# Patient Record
Sex: FEMALE | Race: WHITE | Employment: OTHER | URBAN - METROPOLITAN AREA
[De-identification: names, ages, dates, MRNs, and addresses within clinical notes are randomized per-mention and may not be internally consistent; named-entity substitution may affect disease eponyms.]

---

## 2019-08-20 ENCOUNTER — HOSPITAL ENCOUNTER (OUTPATIENT)
Dept: MAMMOGRAPHY | Age: 74
Discharge: HOME | End: 2019-08-22
Payer: MEDICARE

## 2019-08-20 DIAGNOSIS — Z12.31 SCREENING MAMMOGRAM, ENCOUNTER FOR: ICD-10-CM

## 2019-08-20 PROCEDURE — 77067 SCR MAMMO BI INCL CAD: CPT

## 2019-08-29 ENCOUNTER — HOSPITAL ENCOUNTER (OUTPATIENT)
Dept: ULTRASOUND IMAGING | Age: 74
Discharge: HOME | End: 2019-08-29
Payer: MEDICARE

## 2019-08-29 ENCOUNTER — HOSPITAL ENCOUNTER (OUTPATIENT)
Dept: MAMMOGRAPHY | Age: 74
Discharge: HOME | End: 2019-08-31
Payer: MEDICARE

## 2019-08-29 DIAGNOSIS — N64.89 BREAST ASYMMETRY: ICD-10-CM

## 2019-08-29 DIAGNOSIS — R92.0 BREAST MICROCALCIFICATIONS: ICD-10-CM

## 2019-08-29 PROCEDURE — 76642 ULTRASOUND BREAST LIMITED: CPT

## 2019-08-29 PROCEDURE — G0279 TOMOSYNTHESIS, MAMMO: HCPCS

## 2019-09-06 ENCOUNTER — TELEPHONE (OUTPATIENT)
Dept: GENERAL RADIOLOGY | Age: 74
End: 2019-09-06

## 2019-09-10 ENCOUNTER — HOSPITAL ENCOUNTER (OUTPATIENT)
Dept: GENERAL RADIOLOGY | Age: 74
Discharge: HOME | End: 2019-09-12
Payer: MEDICARE

## 2019-09-10 DIAGNOSIS — R92.0 MICROCALCIFICATIONS OF THE BREAST: ICD-10-CM

## 2019-09-10 PROCEDURE — 88360 TUMOR IMMUNOHISTOCHEM/MANUAL: CPT

## 2019-09-10 PROCEDURE — 76098 X-RAY EXAM SURGICAL SPECIMEN: CPT

## 2019-09-10 PROCEDURE — 77065 DX MAMMO INCL CAD UNI: CPT

## 2019-09-10 PROCEDURE — 2720000010 MAM STEREO BREAST BX W LOC DEVICE 1ST LESION LEFT

## 2019-09-10 PROCEDURE — 88342 IMHCHEM/IMCYTCHM 1ST ANTB: CPT

## 2019-09-10 PROCEDURE — 88305 TISSUE EXAM BY PATHOLOGIST: CPT

## 2019-09-10 PROCEDURE — 2500000003 HC RX 250 WO HCPCS

## 2019-09-10 PROCEDURE — 88341 IMHCHEM/IMCYTCHM EA ADD ANTB: CPT

## 2019-09-11 ENCOUNTER — TELEPHONE (OUTPATIENT)
Dept: GENERAL RADIOLOGY | Age: 74
End: 2019-09-11

## 2019-09-16 ENCOUNTER — TELEPHONE (OUTPATIENT)
Dept: GENERAL RADIOLOGY | Age: 74
End: 2019-09-16

## 2019-09-17 ENCOUNTER — HOSPITAL ENCOUNTER (OUTPATIENT)
Dept: GENERAL RADIOLOGY | Age: 74
Discharge: HOME | End: 2019-09-19
Payer: MEDICARE

## 2019-09-17 DIAGNOSIS — N63.0 BREAST NODULE: ICD-10-CM

## 2019-09-17 PROCEDURE — 2500000003 HC RX 250 WO HCPCS

## 2019-09-17 PROCEDURE — 19083 BX BREAST 1ST LESION US IMAG: CPT

## 2019-09-17 PROCEDURE — 88305 TISSUE EXAM BY PATHOLOGIST: CPT

## 2019-09-17 PROCEDURE — 77065 DX MAMMO INCL CAD UNI: CPT

## 2019-09-18 ENCOUNTER — TELEPHONE (OUTPATIENT)
Dept: GENERAL RADIOLOGY | Age: 74
End: 2019-09-18

## 2019-09-23 ENCOUNTER — TELEPHONE (OUTPATIENT)
Dept: GENERAL RADIOLOGY | Age: 74
End: 2019-09-23

## 2019-09-23 NOTE — TELEPHONE ENCOUNTER
[facility] Protocol, patient was asked prior to biopsy how she would like notified of her breast biopsy results and she elected telephone call from breast navigator. Call to patient today to instruct her that the pathology report from her U/S guided left breast biopsy indicates a benign finding: however, the performing radiologist indicates that this is discordant with her breast imaging and therefore, either a repeat U/S guided biopsy or excision is recommended. Delmis Tyson indicates that she is interested in a mastectomy for her recently diagnosed DCIS, and therefore, does not want another breast biopsy. Instructed to discuss her thought with Ruel Harada and request a referral to a surgeon. She indicates that she would like a surgical consultation at the [facility]. Instructed that I will notify her practitioner of that request.  Breast biopsy results and surgical referral form faxed to [facility]'s office. Delmis Tyson indicates that she is planning a vacation to [place] the first week of October and would like a consultation with a surgeon when she returns.   Electronically signed by Rhett Cheung RN, BSN on 9/23/2019 at 3:49 PM

## 2019-09-26 ENCOUNTER — TELEPHONE (OUTPATIENT)
Dept: BREAST CENTER | Age: 74
End: 2019-09-26

## 2019-10-08 ENCOUNTER — OFFICE VISIT (OUTPATIENT)
Dept: BREAST CENTER | Age: 74
End: 2019-10-08
Payer: MEDICARE

## 2019-10-08 ENCOUNTER — TELEPHONE (OUTPATIENT)
Dept: CASE MANAGEMENT | Age: 74
End: 2019-10-08

## 2019-10-08 ENCOUNTER — HOSPITAL ENCOUNTER (OUTPATIENT)
Age: 74
Discharge: HOME | End: 2019-10-10
Payer: MEDICARE

## 2019-10-08 VITALS
RESPIRATION RATE: 16 BRPM | HEIGHT: 64 IN | DIASTOLIC BLOOD PRESSURE: 66 MMHG | OXYGEN SATURATION: 99 % | TEMPERATURE: 97.9 F | BODY MASS INDEX: 21.85 KG/M2 | HEART RATE: 67 BPM | SYSTOLIC BLOOD PRESSURE: 140 MMHG | WEIGHT: 128 LBS

## 2019-10-08 DIAGNOSIS — D05.12 DUCTAL CARCINOMA IN SITU (DCIS) OF LEFT BREAST: ICD-10-CM

## 2019-10-08 DIAGNOSIS — D05.12 DUCTAL CARCINOMA IN SITU (DCIS) OF LEFT BREAST: Primary | ICD-10-CM

## 2019-10-08 LAB
ALBUMIN SERPL-MCNC: 4.4 G/DL (ref 3.5–5.2)
ALP BLD-CCNC: 95 U/L (ref 35–104)
ALT SERPL-CCNC: 15 U/L (ref 0–32)
ANION GAP SERPL CALCULATED.3IONS-SCNC: 11 MMOL/L (ref 7–16)
AST SERPL-CCNC: 23 U/L (ref 0–31)
BASOPHILS ABSOLUTE: 0.04 E9/L (ref 0–0.2)
BASOPHILS RELATIVE PERCENT: 0.8 % (ref 0–2)
BILIRUB SERPL-MCNC: 0.3 MG/DL (ref 0–1.2)
BUN BLDV-MCNC: 11 MG/DL (ref 8–23)
CALCIUM SERPL-MCNC: 9.2 MG/DL (ref 8.6–10.2)
CHLORIDE BLD-SCNC: 100 MMOL/L (ref 98–107)
CO2: 27 MMOL/L (ref 22–29)
CREAT SERPL-MCNC: 1 MG/DL (ref 0.5–1)
EOSINOPHILS ABSOLUTE: 0.06 E9/L (ref 0.05–0.5)
EOSINOPHILS RELATIVE PERCENT: 1.2 % (ref 0–6)
GFR AFRICAN AMERICAN: >60
GFR NON-AFRICAN AMERICAN: 54 ML/MIN/1.73
GLUCOSE BLD-MCNC: 76 MG/DL (ref 74–99)
HCT VFR BLD CALC: 43.5 % (ref 34–48)
HEMOGLOBIN: 14 G/DL (ref 11.5–15.5)
IMMATURE GRANULOCYTES #: 0.02 E9/L
IMMATURE GRANULOCYTES %: 0.4 % (ref 0–5)
LYMPHOCYTES ABSOLUTE: 1.68 E9/L (ref 1.5–4)
LYMPHOCYTES RELATIVE PERCENT: 33.6 % (ref 20–42)
MCH RBC QN AUTO: 28.3 PG (ref 26–35)
MCHC RBC AUTO-ENTMCNC: 32.2 % (ref 32–34.5)
MCV RBC AUTO: 88.1 FL (ref 80–99.9)
MONOCYTES ABSOLUTE: 0.45 E9/L (ref 0.1–0.95)
MONOCYTES RELATIVE PERCENT: 9 % (ref 2–12)
NEUTROPHILS ABSOLUTE: 2.75 E9/L (ref 1.8–7.3)
NEUTROPHILS RELATIVE PERCENT: 55 % (ref 43–80)
PDW BLD-RTO: 13.1 FL (ref 11.5–15)
PLATELET # BLD: 304 E9/L (ref 130–450)
PMV BLD AUTO: 10.1 FL (ref 7–12)
POTASSIUM SERPL-SCNC: 4.3 MMOL/L (ref 3.5–5)
RBC # BLD: 4.94 E12/L (ref 3.5–5.5)
SODIUM BLD-SCNC: 138 MMOL/L (ref 132–146)
TOTAL PROTEIN: 7.4 G/DL (ref 6.4–8.3)
WBC # BLD: 5 E9/L (ref 4.5–11.5)

## 2019-10-08 PROCEDURE — G8484 FLU IMMUNIZE NO ADMIN: HCPCS | Performed by: SURGERY

## 2019-10-08 PROCEDURE — 99203 OFFICE O/P NEW LOW 30 MIN: CPT | Performed by: SURGERY

## 2019-10-08 PROCEDURE — 36415 COLL VENOUS BLD VENIPUNCTURE: CPT | Performed by: SURGERY

## 2019-10-08 PROCEDURE — 99204 OFFICE O/P NEW MOD 45 MIN: CPT | Performed by: SURGERY

## 2019-10-08 PROCEDURE — 3017F COLORECTAL CA SCREEN DOC REV: CPT | Performed by: SURGERY

## 2019-10-08 PROCEDURE — 80053 COMPREHEN METABOLIC PANEL: CPT

## 2019-10-08 PROCEDURE — 85025 COMPLETE CBC W/AUTO DIFF WBC: CPT

## 2019-10-08 PROCEDURE — 1090F PRES/ABSN URINE INCON ASSESS: CPT | Performed by: SURGERY

## 2019-10-08 PROCEDURE — 1036F TOBACCO NON-USER: CPT | Performed by: SURGERY

## 2019-10-08 PROCEDURE — G8427 DOCREV CUR MEDS BY ELIG CLIN: HCPCS | Performed by: SURGERY

## 2019-10-08 PROCEDURE — G8420 CALC BMI NORM PARAMETERS: HCPCS | Performed by: SURGERY

## 2019-10-08 PROCEDURE — G8399 PT W/DXA RESULTS DOCUMENT: HCPCS | Performed by: SURGERY

## 2019-10-08 PROCEDURE — 4040F PNEUMOC VAC/ADMIN/RCVD: CPT | Performed by: SURGERY

## 2019-10-08 PROCEDURE — 1123F ACP DISCUSS/DSCN MKR DOCD: CPT | Performed by: SURGERY

## 2019-10-08 RX ORDER — LEVOTHYROXINE SODIUM 88 UG/1
88 TABLET ORAL DAILY
COMMUNITY

## 2019-10-08 RX ORDER — PAROXETINE HYDROCHLORIDE 20 MG/1
20 TABLET, FILM COATED ORAL EVERY MORNING
COMMUNITY
End: 2020-06-04 | Stop reason: ALTCHOICE

## 2019-10-11 DIAGNOSIS — D05.12 DUCTAL CARCINOMA IN SITU (DCIS) OF LEFT BREAST: Primary | ICD-10-CM

## 2019-10-23 ENCOUNTER — PREP FOR PROCEDURE (OUTPATIENT)
Dept: SURGERY | Age: 74
End: 2019-10-23

## 2019-10-23 ENCOUNTER — TELEPHONE (OUTPATIENT)
Dept: BREAST CENTER | Age: 74
End: 2019-10-23

## 2019-10-23 RX ORDER — SODIUM CHLORIDE 0.9 % (FLUSH) 0.9 %
10 SYRINGE (ML) INJECTION PRN
Status: CANCELLED | OUTPATIENT
Start: 2019-10-23

## 2019-10-23 RX ORDER — SODIUM CHLORIDE, SODIUM LACTATE, POTASSIUM CHLORIDE, CALCIUM CHLORIDE 600; 310; 30; 20 MG/100ML; MG/100ML; MG/100ML; MG/100ML
INJECTION, SOLUTION INTRAVENOUS CONTINUOUS
Status: CANCELLED | OUTPATIENT
Start: 2019-10-23

## 2019-10-23 RX ORDER — SODIUM CHLORIDE 0.9 % (FLUSH) 0.9 %
10 SYRINGE (ML) INJECTION EVERY 12 HOURS SCHEDULED
Status: CANCELLED | OUTPATIENT
Start: 2019-10-23

## 2019-10-24 ENCOUNTER — TELEPHONE (OUTPATIENT)
Dept: BREAST CENTER | Age: 74
End: 2019-10-24

## 2019-11-20 ENCOUNTER — HOSPITAL ENCOUNTER (OUTPATIENT)
Age: 74
Setting detail: OUTPATIENT SURGERY
Discharge: HOME | End: 2019-11-20
Attending: SURGERY | Admitting: SURGERY
Payer: MEDICARE

## 2019-11-20 ENCOUNTER — HOSPITAL ENCOUNTER (OUTPATIENT)
Dept: NUCLEAR MEDICINE | Age: 74
Discharge: HOME | End: 2019-11-22
Payer: MEDICARE

## 2019-11-20 ENCOUNTER — ANESTHESIA (OUTPATIENT)
Dept: OPERATING ROOM | Age: 74
End: 2019-11-20
Payer: MEDICARE

## 2019-11-20 ENCOUNTER — ANESTHESIA EVENT (OUTPATIENT)
Dept: OPERATING ROOM | Age: 74
End: 2019-11-20
Payer: MEDICARE

## 2019-11-20 VITALS
RESPIRATION RATE: 22 BRPM | HEART RATE: 65 BPM | SYSTOLIC BLOOD PRESSURE: 146 MMHG | OXYGEN SATURATION: 96 % | WEIGHT: 136 LBS | TEMPERATURE: 98 F | BODY MASS INDEX: 23.22 KG/M2 | HEIGHT: 64 IN | DIASTOLIC BLOOD PRESSURE: 82 MMHG

## 2019-11-20 VITALS
SYSTOLIC BLOOD PRESSURE: 140 MMHG | OXYGEN SATURATION: 100 % | RESPIRATION RATE: 4 BRPM | DIASTOLIC BLOOD PRESSURE: 75 MMHG

## 2019-11-20 DIAGNOSIS — Z01.818 PREOPERATIVE TESTING: Primary | ICD-10-CM

## 2019-11-20 DIAGNOSIS — D05.12 DUCTAL CARCINOMA IN SITU (DCIS) OF LEFT BREAST: ICD-10-CM

## 2019-11-20 LAB
HCT VFR BLD CALC: 43.5 % (ref 34–48)
HEMOGLOBIN: 14.1 G/DL (ref 11.5–15.5)
MCH RBC QN AUTO: 28.1 PG (ref 26–35)
MCHC RBC AUTO-ENTMCNC: 32.4 % (ref 32–34.5)
MCV RBC AUTO: 86.8 FL (ref 80–99.9)
PDW BLD-RTO: 13.1 FL (ref 11.5–15)
PLATELET # BLD: 249 E9/L (ref 130–450)
PMV BLD AUTO: 10.3 FL (ref 7–12)
RBC # BLD: 5.01 E12/L (ref 3.5–5.5)
WBC # BLD: 4.8 E9/L (ref 4.5–11.5)

## 2019-11-20 PROCEDURE — 2580000003 HC RX 258: Performed by: SURGERY

## 2019-11-20 PROCEDURE — 2500000003 HC RX 250 WO HCPCS

## 2019-11-20 PROCEDURE — 7100000001 HC PACU RECOVERY - ADDTL 15 MIN: Performed by: SURGERY

## 2019-11-20 PROCEDURE — 88342 IMHCHEM/IMCYTCHM 1ST ANTB: CPT

## 2019-11-20 PROCEDURE — 3600000002 HC SURGERY LEVEL 2 BASE: Performed by: SURGERY

## 2019-11-20 PROCEDURE — 6360000002 HC RX W HCPCS: Performed by: ANESTHESIOLOGY

## 2019-11-20 PROCEDURE — 3700000001 HC ADD 15 MINUTES (ANESTHESIA): Performed by: SURGERY

## 2019-11-20 PROCEDURE — 6360000002 HC RX W HCPCS: Performed by: SURGERY

## 2019-11-20 PROCEDURE — 7100000010 HC PHASE II RECOVERY - FIRST 15 MIN: Performed by: SURGERY

## 2019-11-20 PROCEDURE — 3430000000 HC RX DIAGNOSTIC RADIOPHARMACEUTICAL: Performed by: RADIOLOGY

## 2019-11-20 PROCEDURE — 2720000010 HC SURG SUPPLY STERILE: Performed by: SURGERY

## 2019-11-20 PROCEDURE — 3700000000 HC ANESTHESIA ATTENDED CARE: Performed by: SURGERY

## 2019-11-20 PROCEDURE — 38900 IO MAP OF SENT LYMPH NODE: CPT | Performed by: SURGERY

## 2019-11-20 PROCEDURE — A9541 TC99M SULFUR COLLOID: HCPCS | Performed by: RADIOLOGY

## 2019-11-20 PROCEDURE — 3600000012 HC SURGERY LEVEL 2 ADDTL 15MIN: Performed by: SURGERY

## 2019-11-20 PROCEDURE — 2500000003 HC RX 250 WO HCPCS: Performed by: SURGERY

## 2019-11-20 PROCEDURE — 19303 MAST SIMPLE COMPLETE: CPT | Performed by: SURGERY

## 2019-11-20 PROCEDURE — 85027 COMPLETE CBC AUTOMATED: CPT

## 2019-11-20 PROCEDURE — 2709999900 HC NON-CHARGEABLE SUPPLY: Performed by: SURGERY

## 2019-11-20 PROCEDURE — 6360000002 HC RX W HCPCS

## 2019-11-20 PROCEDURE — 38525 BIOPSY/REMOVAL LYMPH NODES: CPT | Performed by: SURGERY

## 2019-11-20 PROCEDURE — 36415 COLL VENOUS BLD VENIPUNCTURE: CPT

## 2019-11-20 PROCEDURE — 2500000003 HC RX 250 WO HCPCS: Performed by: NURSE ANESTHETIST, CERTIFIED REGISTERED

## 2019-11-20 PROCEDURE — 38792 RA TRACER ID OF SENTINL NODE: CPT

## 2019-11-20 PROCEDURE — 88360 TUMOR IMMUNOHISTOCHEM/MANUAL: CPT

## 2019-11-20 PROCEDURE — 64450 NJX AA&/STRD OTHER PN/BRANCH: CPT | Performed by: ANESTHESIOLOGY

## 2019-11-20 PROCEDURE — 6360000002 HC RX W HCPCS: Performed by: NURSE ANESTHETIST, CERTIFIED REGISTERED

## 2019-11-20 PROCEDURE — 7100000000 HC PACU RECOVERY - FIRST 15 MIN: Performed by: SURGERY

## 2019-11-20 PROCEDURE — 6370000000 HC RX 637 (ALT 250 FOR IP): Performed by: ANESTHESIOLOGY

## 2019-11-20 PROCEDURE — 7100000011 HC PHASE II RECOVERY - ADDTL 15 MIN: Performed by: SURGERY

## 2019-11-20 PROCEDURE — 88341 IMHCHEM/IMCYTCHM EA ADD ANTB: CPT

## 2019-11-20 PROCEDURE — 88307 TISSUE EXAM BY PATHOLOGIST: CPT

## 2019-11-20 RX ORDER — SODIUM CHLORIDE 0.9 % (FLUSH) 0.9 %
10 SYRINGE (ML) INJECTION EVERY 12 HOURS SCHEDULED
Status: DISCONTINUED | OUTPATIENT
Start: 2019-11-20 | End: 2019-11-20 | Stop reason: HOSPADM

## 2019-11-20 RX ORDER — SCOLOPAMINE TRANSDERMAL SYSTEM 1 MG/1
1 PATCH, EXTENDED RELEASE TRANSDERMAL ONCE
Status: DISCONTINUED | OUTPATIENT
Start: 2019-11-20 | End: 2019-11-20 | Stop reason: HOSPADM

## 2019-11-20 RX ORDER — FENTANYL CITRATE 50 UG/ML
25 INJECTION, SOLUTION INTRAMUSCULAR; INTRAVENOUS EVERY 5 MIN PRN
Status: DISCONTINUED | OUTPATIENT
Start: 2019-11-20 | End: 2019-11-20 | Stop reason: HOSPADM

## 2019-11-20 RX ORDER — METHYLENE BLUE 10 MG/ML
INJECTION INTRAVENOUS PRN
Status: DISCONTINUED | OUTPATIENT
Start: 2019-11-20 | End: 2019-11-20 | Stop reason: ALTCHOICE

## 2019-11-20 RX ORDER — PROMETHAZINE HYDROCHLORIDE 25 MG/ML
6.25 INJECTION, SOLUTION INTRAMUSCULAR; INTRAVENOUS
Status: DISCONTINUED | OUTPATIENT
Start: 2019-11-20 | End: 2019-11-20 | Stop reason: HOSPADM

## 2019-11-20 RX ORDER — ROPIVACAINE HYDROCHLORIDE 5 MG/ML
35 INJECTION, SOLUTION EPIDURAL; INFILTRATION; PERINEURAL ONCE
Status: COMPLETED | OUTPATIENT
Start: 2019-11-20 | End: 2019-11-20

## 2019-11-20 RX ORDER — OXYCODONE HYDROCHLORIDE AND ACETAMINOPHEN 5; 325 MG/1; MG/1
1 TABLET ORAL EVERY 6 HOURS PRN
Qty: 6 TABLET | Refills: 0 | Status: SHIPPED | OUTPATIENT
Start: 2019-11-20 | End: 2019-11-23

## 2019-11-20 RX ORDER — LABETALOL HYDROCHLORIDE 5 MG/ML
INJECTION, SOLUTION INTRAVENOUS
Status: COMPLETED
Start: 2019-11-20 | End: 2019-11-20

## 2019-11-20 RX ORDER — DEXAMETHASONE SODIUM PHOSPHATE 10 MG/ML
INJECTION INTRAMUSCULAR; INTRAVENOUS PRN
Status: DISCONTINUED | OUTPATIENT
Start: 2019-11-20 | End: 2019-11-20 | Stop reason: SDUPTHER

## 2019-11-20 RX ORDER — FENTANYL CITRATE 50 UG/ML
50 INJECTION, SOLUTION INTRAMUSCULAR; INTRAVENOUS EVERY 5 MIN PRN
Status: DISCONTINUED | OUTPATIENT
Start: 2019-11-20 | End: 2019-11-20 | Stop reason: HOSPADM

## 2019-11-20 RX ORDER — SODIUM CHLORIDE, SODIUM LACTATE, POTASSIUM CHLORIDE, CALCIUM CHLORIDE 600; 310; 30; 20 MG/100ML; MG/100ML; MG/100ML; MG/100ML
INJECTION, SOLUTION INTRAVENOUS CONTINUOUS
Status: DISCONTINUED | OUTPATIENT
Start: 2019-11-20 | End: 2019-11-20 | Stop reason: HOSPADM

## 2019-11-20 RX ORDER — FENTANYL CITRATE 50 UG/ML
25 INJECTION, SOLUTION INTRAMUSCULAR; INTRAVENOUS PRN
Status: DISCONTINUED | OUTPATIENT
Start: 2019-11-20 | End: 2019-11-20 | Stop reason: HOSPADM

## 2019-11-20 RX ORDER — PROPOFOL 10 MG/ML
INJECTION, EMULSION INTRAVENOUS PRN
Status: DISCONTINUED | OUTPATIENT
Start: 2019-11-20 | End: 2019-11-20 | Stop reason: SDUPTHER

## 2019-11-20 RX ORDER — ONDANSETRON 2 MG/ML
INJECTION INTRAMUSCULAR; INTRAVENOUS PRN
Status: DISCONTINUED | OUTPATIENT
Start: 2019-11-20 | End: 2019-11-20 | Stop reason: SDUPTHER

## 2019-11-20 RX ORDER — NEOSTIGMINE METHYLSULFATE 1 MG/ML
INJECTION, SOLUTION INTRAVENOUS PRN
Status: DISCONTINUED | OUTPATIENT
Start: 2019-11-20 | End: 2019-11-20 | Stop reason: SDUPTHER

## 2019-11-20 RX ORDER — SODIUM CHLORIDE 0.9 % (FLUSH) 0.9 %
10 SYRINGE (ML) INJECTION PRN
Status: DISCONTINUED | OUTPATIENT
Start: 2019-11-20 | End: 2019-11-20 | Stop reason: HOSPADM

## 2019-11-20 RX ORDER — NALOXONE HYDROCHLORIDE 0.4 MG/ML
INJECTION, SOLUTION INTRAMUSCULAR; INTRAVENOUS; SUBCUTANEOUS PRN
Status: DISCONTINUED | OUTPATIENT
Start: 2019-11-20 | End: 2019-11-20 | Stop reason: SDUPTHER

## 2019-11-20 RX ORDER — OXYCODONE HYDROCHLORIDE AND ACETAMINOPHEN 5; 325 MG/1; MG/1
1 TABLET ORAL
Status: DISCONTINUED | OUTPATIENT
Start: 2019-11-20 | End: 2019-11-20 | Stop reason: HOSPADM

## 2019-11-20 RX ORDER — LABETALOL HYDROCHLORIDE 5 MG/ML
10 INJECTION, SOLUTION INTRAVENOUS ONCE
Status: COMPLETED | OUTPATIENT
Start: 2019-11-20 | End: 2019-11-20

## 2019-11-20 RX ORDER — VECURONIUM BROMIDE 1 MG/ML
INJECTION, POWDER, LYOPHILIZED, FOR SOLUTION INTRAVENOUS PRN
Status: DISCONTINUED | OUTPATIENT
Start: 2019-11-20 | End: 2019-11-20 | Stop reason: SDUPTHER

## 2019-11-20 RX ORDER — CEFAZOLIN SODIUM 2 G/50ML
2 SOLUTION INTRAVENOUS
Status: COMPLETED | OUTPATIENT
Start: 2019-11-20 | End: 2019-11-20

## 2019-11-20 RX ORDER — MIDAZOLAM HYDROCHLORIDE 1 MG/ML
0.5 INJECTION INTRAMUSCULAR; INTRAVENOUS PRN
Status: DISCONTINUED | OUTPATIENT
Start: 2019-11-20 | End: 2019-11-20 | Stop reason: HOSPADM

## 2019-11-20 RX ORDER — FENTANYL CITRATE 50 UG/ML
INJECTION, SOLUTION INTRAMUSCULAR; INTRAVENOUS PRN
Status: DISCONTINUED | OUTPATIENT
Start: 2019-11-20 | End: 2019-11-20 | Stop reason: SDUPTHER

## 2019-11-20 RX ORDER — KETOROLAC TROMETHAMINE 30 MG/ML
INJECTION, SOLUTION INTRAMUSCULAR; INTRAVENOUS PRN
Status: DISCONTINUED | OUTPATIENT
Start: 2019-11-20 | End: 2019-11-20 | Stop reason: SDUPTHER

## 2019-11-20 RX ORDER — MEPERIDINE HYDROCHLORIDE 50 MG/ML
12.5 INJECTION INTRAMUSCULAR; INTRAVENOUS; SUBCUTANEOUS EVERY 5 MIN PRN
Status: DISCONTINUED | OUTPATIENT
Start: 2019-11-20 | End: 2019-11-20 | Stop reason: HOSPADM

## 2019-11-20 RX ORDER — BUPIVACAINE HYDROCHLORIDE AND EPINEPHRINE 2.5; 5 MG/ML; UG/ML
INJECTION, SOLUTION EPIDURAL; INFILTRATION; INTRACAUDAL; PERINEURAL PRN
Status: DISCONTINUED | OUTPATIENT
Start: 2019-11-20 | End: 2019-11-20 | Stop reason: ALTCHOICE

## 2019-11-20 RX ORDER — DIPHENHYDRAMINE HYDROCHLORIDE 50 MG/ML
12.5 INJECTION INTRAMUSCULAR; INTRAVENOUS
Status: DISCONTINUED | OUTPATIENT
Start: 2019-11-20 | End: 2019-11-20 | Stop reason: HOSPADM

## 2019-11-20 RX ORDER — GLYCOPYRROLATE 1 MG/5 ML
SYRINGE (ML) INTRAVENOUS PRN
Status: DISCONTINUED | OUTPATIENT
Start: 2019-11-20 | End: 2019-11-20 | Stop reason: SDUPTHER

## 2019-11-20 RX ORDER — LIDOCAINE HYDROCHLORIDE 20 MG/ML
INJECTION, SOLUTION INTRAVENOUS PRN
Status: DISCONTINUED | OUTPATIENT
Start: 2019-11-20 | End: 2019-11-20 | Stop reason: SDUPTHER

## 2019-11-20 RX ADMIN — Medication 520 MICRO CURIE: at 10:00

## 2019-11-20 RX ADMIN — LABETALOL HYDROCHLORIDE 10 MG: 5 INJECTION INTRAVENOUS at 16:55

## 2019-11-20 RX ADMIN — SODIUM CHLORIDE, POTASSIUM CHLORIDE, SODIUM LACTATE AND CALCIUM CHLORIDE: 600; 310; 30; 20 INJECTION, SOLUTION INTRAVENOUS at 08:57

## 2019-11-20 RX ADMIN — ONDANSETRON HYDROCHLORIDE 4 MG: 2 INJECTION, SOLUTION INTRAMUSCULAR; INTRAVENOUS at 15:42

## 2019-11-20 RX ADMIN — Medication 0.4 MG: at 15:56

## 2019-11-20 RX ADMIN — SODIUM CHLORIDE, POTASSIUM CHLORIDE, SODIUM LACTATE AND CALCIUM CHLORIDE: 600; 310; 30; 20 INJECTION, SOLUTION INTRAVENOUS at 14:42

## 2019-11-20 RX ADMIN — NALOXONE HYDROCHLORIDE 0.08 MG: 0.4 INJECTION, SOLUTION INTRAMUSCULAR; INTRAVENOUS; SUBCUTANEOUS at 16:13

## 2019-11-20 RX ADMIN — FENTANYL CITRATE 100 MCG: 50 INJECTION, SOLUTION INTRAMUSCULAR; INTRAVENOUS at 14:09

## 2019-11-20 RX ADMIN — FENTANYL CITRATE 50 MCG: 50 INJECTION, SOLUTION INTRAMUSCULAR; INTRAVENOUS at 14:32

## 2019-11-20 RX ADMIN — VECURONIUM BROMIDE FOR INJECTION 2 MG: 1 INJECTION, POWDER, LYOPHILIZED, FOR SOLUTION INTRAVENOUS at 14:58

## 2019-11-20 RX ADMIN — KETOROLAC TROMETHAMINE 30 MG: 30 INJECTION, SOLUTION INTRAMUSCULAR; INTRAVENOUS at 15:38

## 2019-11-20 RX ADMIN — VECURONIUM BROMIDE FOR INJECTION 6 MG: 1 INJECTION, POWDER, LYOPHILIZED, FOR SOLUTION INTRAVENOUS at 14:09

## 2019-11-20 RX ADMIN — LIDOCAINE HYDROCHLORIDE 40 MG: 20 INJECTION, SOLUTION INTRAVENOUS at 14:09

## 2019-11-20 RX ADMIN — ROPIVACAINE HYDROCHLORIDE 30 ML: 5 INJECTION EPIDURAL; INFILTRATION; PERINEURAL at 12:23

## 2019-11-20 RX ADMIN — LABETALOL HYDROCHLORIDE 10 MG: 5 INJECTION, SOLUTION INTRAVENOUS at 16:55

## 2019-11-20 RX ADMIN — Medication 3 MG: at 15:56

## 2019-11-20 RX ADMIN — DEXAMETHASONE SODIUM PHOSPHATE 10 MG: 10 INJECTION INTRAMUSCULAR; INTRAVENOUS at 14:13

## 2019-11-20 RX ADMIN — FENTANYL CITRATE 50 MCG: 50 INJECTION, SOLUTION INTRAMUSCULAR; INTRAVENOUS at 12:10

## 2019-11-20 RX ADMIN — PROPOFOL 200 MG: 10 INJECTION, EMULSION INTRAVENOUS at 14:09

## 2019-11-20 RX ADMIN — FENTANYL CITRATE 50 MCG: 50 INJECTION, SOLUTION INTRAMUSCULAR; INTRAVENOUS at 14:58

## 2019-11-20 RX ADMIN — CEFAZOLIN SODIUM 2 G: 2 SOLUTION INTRAVENOUS at 14:13

## 2019-11-20 RX ADMIN — MIDAZOLAM 2 MG: 1 INJECTION INTRAMUSCULAR; INTRAVENOUS at 12:10

## 2019-11-20 ASSESSMENT — PULMONARY FUNCTION TESTS
PIF_VALUE: 23
PIF_VALUE: 1
PIF_VALUE: 20
PIF_VALUE: 22
PIF_VALUE: 23
PIF_VALUE: 21
PIF_VALUE: 24
PIF_VALUE: 20
PIF_VALUE: 20
PIF_VALUE: 22
PIF_VALUE: 23
PIF_VALUE: 22
PIF_VALUE: 9
PIF_VALUE: 22
PIF_VALUE: 8
PIF_VALUE: 24
PIF_VALUE: 23
PIF_VALUE: 23
PIF_VALUE: 20
PIF_VALUE: 22
PIF_VALUE: 23
PIF_VALUE: 23
PIF_VALUE: 22
PIF_VALUE: 20
PIF_VALUE: 19
PIF_VALUE: 20
PIF_VALUE: 3
PIF_VALUE: 21
PIF_VALUE: 22
PIF_VALUE: 20
PIF_VALUE: 22
PIF_VALUE: 22
PIF_VALUE: 1
PIF_VALUE: 20
PIF_VALUE: 21
PIF_VALUE: 22
PIF_VALUE: 23
PIF_VALUE: 23
PIF_VALUE: 20
PIF_VALUE: 22
PIF_VALUE: 23
PIF_VALUE: 21
PIF_VALUE: 3
PIF_VALUE: 22
PIF_VALUE: 1
PIF_VALUE: 20
PIF_VALUE: 23
PIF_VALUE: 23
PIF_VALUE: 22
PIF_VALUE: 1
PIF_VALUE: 3
PIF_VALUE: 1
PIF_VALUE: 23
PIF_VALUE: 1
PIF_VALUE: 22
PIF_VALUE: 23
PIF_VALUE: 23
PIF_VALUE: 22
PIF_VALUE: 1
PIF_VALUE: 22
PIF_VALUE: 21
PIF_VALUE: 2
PIF_VALUE: 3
PIF_VALUE: 11
PIF_VALUE: 23
PIF_VALUE: 20
PIF_VALUE: 20
PIF_VALUE: 22
PIF_VALUE: 7
PIF_VALUE: 23
PIF_VALUE: 24
PIF_VALUE: 20
PIF_VALUE: 3
PIF_VALUE: 21
PIF_VALUE: 22
PIF_VALUE: 23
PIF_VALUE: 20
PIF_VALUE: 0
PIF_VALUE: 22
PIF_VALUE: 22
PIF_VALUE: 20
PIF_VALUE: 21
PIF_VALUE: 31
PIF_VALUE: 21
PIF_VALUE: 22
PIF_VALUE: 10
PIF_VALUE: 22
PIF_VALUE: 23
PIF_VALUE: 2
PIF_VALUE: 22
PIF_VALUE: 1
PIF_VALUE: 22
PIF_VALUE: 19
PIF_VALUE: 20
PIF_VALUE: 20
PIF_VALUE: 16
PIF_VALUE: 20
PIF_VALUE: 21
PIF_VALUE: 2
PIF_VALUE: 20
PIF_VALUE: 22
PIF_VALUE: 13
PIF_VALUE: 3
PIF_VALUE: 20
PIF_VALUE: 21
PIF_VALUE: 2
PIF_VALUE: 23
PIF_VALUE: 22
PIF_VALUE: 21
PIF_VALUE: 22
PIF_VALUE: 22
PIF_VALUE: 21
PIF_VALUE: 24
PIF_VALUE: 20
PIF_VALUE: 23
PIF_VALUE: 22
PIF_VALUE: 21
PIF_VALUE: 22
PIF_VALUE: 23
PIF_VALUE: 21
PIF_VALUE: 22

## 2019-11-20 ASSESSMENT — PAIN DESCRIPTION - PAIN TYPE: TYPE: SURGICAL PAIN

## 2019-11-20 ASSESSMENT — PAIN - FUNCTIONAL ASSESSMENT: PAIN_FUNCTIONAL_ASSESSMENT: 0-10

## 2019-11-20 ASSESSMENT — PAIN SCALES - GENERAL
PAINLEVEL_OUTOF10: 0

## 2019-11-20 ASSESSMENT — PAIN DESCRIPTION - ORIENTATION: ORIENTATION: LEFT

## 2019-11-20 ASSESSMENT — LIFESTYLE VARIABLES: SMOKING_STATUS: 0

## 2019-11-20 ASSESSMENT — PAIN DESCRIPTION - LOCATION: LOCATION: CHEST

## 2019-11-21 ENCOUNTER — TELEPHONE (OUTPATIENT)
Dept: BREAST CENTER | Age: 74
End: 2019-11-21

## 2019-11-25 ENCOUNTER — TELEPHONE (OUTPATIENT)
Dept: BREAST CENTER | Age: 74
End: 2019-11-25

## 2019-11-27 ENCOUNTER — TELEPHONE (OUTPATIENT)
Dept: BREAST CENTER | Age: 74
End: 2019-11-27

## 2019-12-02 ENCOUNTER — OFFICE VISIT (OUTPATIENT)
Dept: BREAST CENTER | Age: 74
End: 2019-12-02
Payer: MEDICARE

## 2019-12-02 VITALS
HEIGHT: 64 IN | OXYGEN SATURATION: 98 % | TEMPERATURE: 98 F | RESPIRATION RATE: 16 BRPM | HEART RATE: 81 BPM | DIASTOLIC BLOOD PRESSURE: 62 MMHG | BODY MASS INDEX: 22.2 KG/M2 | WEIGHT: 130 LBS | SYSTOLIC BLOOD PRESSURE: 124 MMHG

## 2019-12-02 DIAGNOSIS — D05.12 DUCTAL CARCINOMA IN SITU (DCIS) OF LEFT BREAST: Primary | ICD-10-CM

## 2019-12-02 PROCEDURE — 99024 POSTOP FOLLOW-UP VISIT: CPT | Performed by: SURGERY

## 2019-12-10 ENCOUNTER — HOSPITAL ENCOUNTER (OUTPATIENT)
Dept: INFUSION THERAPY | Age: 74
Discharge: HOME | End: 2019-12-10
Payer: MEDICARE

## 2019-12-10 ENCOUNTER — OFFICE VISIT (OUTPATIENT)
Dept: ONCOLOGY | Age: 74
End: 2019-12-10
Payer: MEDICARE

## 2019-12-10 VITALS
HEART RATE: 69 BPM | HEIGHT: 64 IN | BODY MASS INDEX: 22.36 KG/M2 | DIASTOLIC BLOOD PRESSURE: 66 MMHG | SYSTOLIC BLOOD PRESSURE: 146 MMHG | WEIGHT: 131 LBS | TEMPERATURE: 98.6 F | OXYGEN SATURATION: 98 %

## 2019-12-10 DIAGNOSIS — D05.12 DUCTAL CARCINOMA IN SITU (DCIS) OF LEFT BREAST: Primary | ICD-10-CM

## 2019-12-10 DIAGNOSIS — Z79.899 ENCOUNTER FOR MONITORING CARDIOTOXIC DRUG THERAPY: ICD-10-CM

## 2019-12-10 DIAGNOSIS — Z51.81 ENCOUNTER FOR MONITORING CARDIOTOXIC DRUG THERAPY: ICD-10-CM

## 2019-12-10 DIAGNOSIS — C50.912 MALIGNANT NEOPLASM OF LEFT FEMALE BREAST, UNSPECIFIED ESTROGEN RECEPTOR STATUS, UNSPECIFIED SITE OF BREAST (HCC): ICD-10-CM

## 2019-12-10 PROCEDURE — 99205 OFFICE O/P NEW HI 60 MIN: CPT | Performed by: INTERNAL MEDICINE

## 2019-12-10 PROCEDURE — G8420 CALC BMI NORM PARAMETERS: HCPCS | Performed by: INTERNAL MEDICINE

## 2019-12-10 PROCEDURE — G8399 PT W/DXA RESULTS DOCUMENT: HCPCS | Performed by: INTERNAL MEDICINE

## 2019-12-10 PROCEDURE — G8427 DOCREV CUR MEDS BY ELIG CLIN: HCPCS | Performed by: INTERNAL MEDICINE

## 2019-12-10 PROCEDURE — 3017F COLORECTAL CA SCREEN DOC REV: CPT | Performed by: INTERNAL MEDICINE

## 2019-12-10 PROCEDURE — G8484 FLU IMMUNIZE NO ADMIN: HCPCS | Performed by: INTERNAL MEDICINE

## 2019-12-10 PROCEDURE — 99214 OFFICE O/P EST MOD 30 MIN: CPT | Performed by: INTERNAL MEDICINE

## 2019-12-10 PROCEDURE — 1036F TOBACCO NON-USER: CPT | Performed by: INTERNAL MEDICINE

## 2019-12-10 PROCEDURE — 4040F PNEUMOC VAC/ADMIN/RCVD: CPT | Performed by: INTERNAL MEDICINE

## 2019-12-10 PROCEDURE — 1090F PRES/ABSN URINE INCON ASSESS: CPT | Performed by: INTERNAL MEDICINE

## 2019-12-10 PROCEDURE — 1123F ACP DISCUSS/DSCN MKR DOCD: CPT | Performed by: INTERNAL MEDICINE

## 2019-12-10 RX ORDER — SODIUM CHLORIDE 0.9 % (FLUSH) 0.9 %
10 SYRINGE (ML) INJECTION PRN
Status: CANCELLED | OUTPATIENT
Start: 2019-12-10

## 2019-12-10 RX ORDER — HEPARIN SODIUM (PORCINE) LOCK FLUSH IV SOLN 100 UNIT/ML 100 UNIT/ML
500 SOLUTION INTRAVENOUS PRN
Status: CANCELLED | OUTPATIENT
Start: 2019-12-10

## 2019-12-11 ENCOUNTER — TELEPHONE (OUTPATIENT)
Dept: ONCOLOGY | Age: 74
End: 2019-12-11

## 2019-12-12 ENCOUNTER — HOSPITAL ENCOUNTER (OUTPATIENT)
Dept: NON INVASIVE DIAGNOSTICS | Age: 74
Discharge: HOME | End: 2019-12-12
Payer: MEDICARE

## 2019-12-12 DIAGNOSIS — Z51.81 ENCOUNTER FOR MONITORING CARDIOTOXIC DRUG THERAPY: ICD-10-CM

## 2019-12-12 DIAGNOSIS — Z79.899 ENCOUNTER FOR MONITORING CARDIOTOXIC DRUG THERAPY: ICD-10-CM

## 2019-12-12 DIAGNOSIS — D05.12 DUCTAL CARCINOMA IN SITU (DCIS) OF LEFT BREAST: ICD-10-CM

## 2019-12-12 PROCEDURE — 93306 TTE W/DOPPLER COMPLETE: CPT

## 2019-12-16 ENCOUNTER — OFFICE VISIT (OUTPATIENT)
Dept: SURGERY | Age: 74
End: 2019-12-16
Payer: MEDICARE

## 2019-12-16 VITALS
HEIGHT: 64 IN | HEART RATE: 85 BPM | WEIGHT: 130 LBS | DIASTOLIC BLOOD PRESSURE: 64 MMHG | BODY MASS INDEX: 22.2 KG/M2 | TEMPERATURE: 97.9 F | OXYGEN SATURATION: 94 % | SYSTOLIC BLOOD PRESSURE: 124 MMHG

## 2019-12-16 DIAGNOSIS — I87.8 POOR VENOUS ACCESS: Primary | ICD-10-CM

## 2019-12-16 PROCEDURE — 1123F ACP DISCUSS/DSCN MKR DOCD: CPT | Performed by: SURGERY

## 2019-12-16 PROCEDURE — 1036F TOBACCO NON-USER: CPT | Performed by: SURGERY

## 2019-12-16 PROCEDURE — 1090F PRES/ABSN URINE INCON ASSESS: CPT | Performed by: SURGERY

## 2019-12-16 PROCEDURE — G8484 FLU IMMUNIZE NO ADMIN: HCPCS | Performed by: SURGERY

## 2019-12-16 PROCEDURE — 4040F PNEUMOC VAC/ADMIN/RCVD: CPT | Performed by: SURGERY

## 2019-12-16 PROCEDURE — G8427 DOCREV CUR MEDS BY ELIG CLIN: HCPCS | Performed by: SURGERY

## 2019-12-16 PROCEDURE — 3017F COLORECTAL CA SCREEN DOC REV: CPT | Performed by: SURGERY

## 2019-12-16 PROCEDURE — G8399 PT W/DXA RESULTS DOCUMENT: HCPCS | Performed by: SURGERY

## 2019-12-16 PROCEDURE — G8420 CALC BMI NORM PARAMETERS: HCPCS | Performed by: SURGERY

## 2019-12-16 PROCEDURE — 99214 OFFICE O/P EST MOD 30 MIN: CPT | Performed by: SURGERY

## 2019-12-16 RX ORDER — SODIUM CHLORIDE 0.9 % (FLUSH) 0.9 %
10 SYRINGE (ML) INJECTION PRN
Status: CANCELLED | OUTPATIENT
Start: 2019-12-16

## 2019-12-16 RX ORDER — SODIUM CHLORIDE 0.9 % (FLUSH) 0.9 %
10 SYRINGE (ML) INJECTION EVERY 12 HOURS SCHEDULED
Status: CANCELLED | OUTPATIENT
Start: 2019-12-16

## 2019-12-16 RX ORDER — SODIUM CHLORIDE, SODIUM LACTATE, POTASSIUM CHLORIDE, CALCIUM CHLORIDE 600; 310; 30; 20 MG/100ML; MG/100ML; MG/100ML; MG/100ML
INJECTION, SOLUTION INTRAVENOUS CONTINUOUS
Status: CANCELLED | OUTPATIENT
Start: 2019-12-16

## 2019-12-17 ENCOUNTER — TELEPHONE (OUTPATIENT)
Dept: SURGERY | Age: 74
End: 2019-12-17

## 2019-12-26 ENCOUNTER — HOSPITAL ENCOUNTER (OUTPATIENT)
Dept: INFUSION THERAPY | Age: 74
Discharge: HOME | End: 2019-12-26
Payer: MEDICARE

## 2019-12-26 ENCOUNTER — HOSPITAL ENCOUNTER (OUTPATIENT)
Dept: PREADMISSION TESTING | Age: 74
Discharge: HOME | End: 2019-12-26
Payer: MEDICARE

## 2019-12-26 VITALS
SYSTOLIC BLOOD PRESSURE: 123 MMHG | BODY MASS INDEX: 22.91 KG/M2 | RESPIRATION RATE: 16 BRPM | OXYGEN SATURATION: 96 % | HEIGHT: 64 IN | WEIGHT: 134.19 LBS | TEMPERATURE: 98.3 F | HEART RATE: 80 BPM | DIASTOLIC BLOOD PRESSURE: 59 MMHG

## 2019-12-26 DIAGNOSIS — C50.912 MALIGNANT NEOPLASM OF LEFT FEMALE BREAST, UNSPECIFIED ESTROGEN RECEPTOR STATUS, UNSPECIFIED SITE OF BREAST (HCC): Primary | ICD-10-CM

## 2019-12-26 DIAGNOSIS — Z01.818 PRE-OP TESTING: Primary | ICD-10-CM

## 2019-12-26 LAB
APTT: 23.8 SEC (ref 24.5–35.1)
HCT VFR BLD CALC: 41.2 % (ref 34–48)
HEMOGLOBIN: 13.8 G/DL (ref 11.5–15.5)
INR BLD: 1
MCH RBC QN AUTO: 29.4 PG (ref 26–35)
MCHC RBC AUTO-ENTMCNC: 33.5 % (ref 32–34.5)
MCV RBC AUTO: 87.8 FL (ref 80–99.9)
PDW BLD-RTO: 12.9 FL (ref 11.5–15)
PLATELET # BLD: 274 E9/L (ref 130–450)
PMV BLD AUTO: 9.8 FL (ref 7–12)
PROTHROMBIN TIME: 11.9 SEC (ref 9.3–12.4)
RBC # BLD: 4.69 E12/L (ref 3.5–5.5)
WBC # BLD: 5.1 E9/L (ref 4.5–11.5)

## 2019-12-26 PROCEDURE — 85610 PROTHROMBIN TIME: CPT

## 2019-12-26 PROCEDURE — 99214 OFFICE O/P EST MOD 30 MIN: CPT

## 2019-12-26 PROCEDURE — 85730 THROMBOPLASTIN TIME PARTIAL: CPT

## 2019-12-26 PROCEDURE — 85027 COMPLETE CBC AUTOMATED: CPT

## 2019-12-26 PROCEDURE — 36415 COLL VENOUS BLD VENIPUNCTURE: CPT

## 2019-12-26 RX ORDER — PROCHLORPERAZINE MALEATE 10 MG
10 TABLET ORAL EVERY 6 HOURS PRN
Qty: 40 TABLET | Refills: 3 | Status: SHIPPED | OUTPATIENT
Start: 2019-12-26 | End: 2020-06-04 | Stop reason: ALTCHOICE

## 2019-12-26 NOTE — PROGRESS NOTES
CHEMOTHERAPY TEACHING    Chemotherapy teaching performed today for patient and . The teaching included:    1. Rationale for chemotherapy    2. Actions of chemotherapy    3. Actions of pre and/or post chemotherapy medications    4. Administration plan: approximate length of treatment and interval between doses. A.  Chemotherapeutic Agents:  PACLitaxel/TRastuzumab    5. Management of side effects:     A. Nausea and vomiting:  · Eat light the day of treatment  · Dietary alterations: no greasy or spicy foods. Stay away from favorites. B.  Alopecia:  · Obtain hairpiece prior to hair loss  · Alternatives to wigs  C. Bone Marrow Depression:  · Frequent CBC - Paul count (lab time prior to appointment with doctor)   D.  WBC:  · Function and recovery  · Precautionary measures when low (temperatures BID - avoid ill people/crowds)  E. Hemoglobin:  · Function and recovery  · Signs/symptoms if low  · Possibility of transfusion  F.  Platelets:  · Function and recovery  · Signs/symptoms if low    6. Mental status changes post chemotherapy:  A. Patient will need a  after 1st treatment (pre-meds)  B. Encourage family to stay with patient 24 hours post treatment. 7.  Sexuality and Reproduction:   A. Teratogenic effects of chemotherapy (prevent pregnancy during treatment                     and at least 2 months post therapy). B. Sperm banking (young males). 8.  Patient received \"Chemotherapy and You\" booklet and was encouraged to call clinic with questions. 9.  Copy of home going instructions and thermometer were given. 10.  Written consent obtained    11. Patient viewed chemotherapy teaching DVD \"Understanding Chemotherapy\" by the          [facility]. 12. Chemo bear was given to the patient. 13. Teaching took approximately 60 minutes.

## 2019-12-26 NOTE — PROGRESS NOTES
Spoke with patient about treatment medications (paclitaxel/trastuzumab). We went over the protocol to be used, what to expect as far as side effects, and when to call with problems. This was intended to reinforce the education done by Dr. Lauren Armando. Prescriptions were sent to patient's local pharmacy for prochlorperazine . Patient was provided medication and dietary handouts to take home and patient was told to call if there are any questions once they had a chance to absorb the information. Patient had the opportunity to ask questions with all questions being answered to their satisfaction. The patient expressed understanding and acceptance of instructions.     Roney Bolton [place] 12/26/2019 2:53 PM

## 2019-12-27 ENCOUNTER — ANESTHESIA (OUTPATIENT)
Dept: OPERATING ROOM | Age: 74
End: 2019-12-27
Payer: MEDICARE

## 2019-12-27 ENCOUNTER — APPOINTMENT (OUTPATIENT)
Dept: GENERAL RADIOLOGY | Age: 74
End: 2019-12-27
Attending: SURGERY
Payer: MEDICARE

## 2019-12-27 ENCOUNTER — HOSPITAL ENCOUNTER (OUTPATIENT)
Age: 74
Setting detail: OUTPATIENT SURGERY
Discharge: HOME | End: 2019-12-27
Attending: SURGERY | Admitting: SURGERY
Payer: MEDICARE

## 2019-12-27 ENCOUNTER — ANESTHESIA EVENT (OUTPATIENT)
Dept: OPERATING ROOM | Age: 74
End: 2019-12-27
Payer: MEDICARE

## 2019-12-27 VITALS
SYSTOLIC BLOOD PRESSURE: 91 MMHG | DIASTOLIC BLOOD PRESSURE: 46 MMHG | OXYGEN SATURATION: 100 % | RESPIRATION RATE: 18 BRPM

## 2019-12-27 VITALS
OXYGEN SATURATION: 95 % | HEIGHT: 64 IN | WEIGHT: 132 LBS | HEART RATE: 77 BPM | SYSTOLIC BLOOD PRESSURE: 162 MMHG | RESPIRATION RATE: 20 BRPM | TEMPERATURE: 97 F | BODY MASS INDEX: 22.53 KG/M2 | DIASTOLIC BLOOD PRESSURE: 68 MMHG

## 2019-12-27 DIAGNOSIS — I87.8 POOR VENOUS ACCESS: ICD-10-CM

## 2019-12-27 PROCEDURE — 3700000001 HC ADD 15 MINUTES (ANESTHESIA): Performed by: SURGERY

## 2019-12-27 PROCEDURE — 99024 POSTOP FOLLOW-UP VISIT: CPT | Performed by: SURGERY

## 2019-12-27 PROCEDURE — 77001 FLUOROGUIDE FOR VEIN DEVICE: CPT | Performed by: SURGERY

## 2019-12-27 PROCEDURE — 36561 INSERT TUNNELED CV CATH: CPT | Performed by: SURGERY

## 2019-12-27 PROCEDURE — 2709999900 HC NON-CHARGEABLE SUPPLY: Performed by: SURGERY

## 2019-12-27 PROCEDURE — 7100000011 HC PHASE II RECOVERY - ADDTL 15 MIN: Performed by: SURGERY

## 2019-12-27 PROCEDURE — 3700000000 HC ANESTHESIA ATTENDED CARE: Performed by: SURGERY

## 2019-12-27 PROCEDURE — 6360000002 HC RX W HCPCS: Performed by: NURSE ANESTHETIST, CERTIFIED REGISTERED

## 2019-12-27 PROCEDURE — 2580000003 HC RX 258: Performed by: SURGERY

## 2019-12-27 PROCEDURE — 3209999900 FLUORO FOR SURGICAL PROCEDURES

## 2019-12-27 PROCEDURE — C1788 PORT, INDWELLING, IMP: HCPCS | Performed by: SURGERY

## 2019-12-27 PROCEDURE — 7100000010 HC PHASE II RECOVERY - FIRST 15 MIN: Performed by: SURGERY

## 2019-12-27 PROCEDURE — 2500000003 HC RX 250 WO HCPCS: Performed by: SURGERY

## 2019-12-27 PROCEDURE — 3600000013 HC SURGERY LEVEL 3 ADDTL 15MIN: Performed by: SURGERY

## 2019-12-27 PROCEDURE — 6360000002 HC RX W HCPCS: Performed by: SURGERY

## 2019-12-27 PROCEDURE — 2580000003 HC RX 258: Performed by: NURSE ANESTHETIST, CERTIFIED REGISTERED

## 2019-12-27 PROCEDURE — 3600000003 HC SURGERY LEVEL 3 BASE: Performed by: SURGERY

## 2019-12-27 PROCEDURE — 71045 X-RAY EXAM CHEST 1 VIEW: CPT

## 2019-12-27 DEVICE — PORT INFUS OD2.7MM ID1.5MM INTRO 8FR TI POLYUR CATH DETACH CT80STPD] ANGIODYNAMICS INC]: Type: IMPLANTABLE DEVICE | Site: CHEST | Status: FUNCTIONAL

## 2019-12-27 RX ORDER — SODIUM CHLORIDE 0.9 % (FLUSH) 0.9 %
10 SYRINGE (ML) INJECTION PRN
Status: DISCONTINUED | OUTPATIENT
Start: 2019-12-27 | End: 2019-12-27 | Stop reason: HOSPADM

## 2019-12-27 RX ORDER — BUPIVACAINE HYDROCHLORIDE AND EPINEPHRINE 2.5; 5 MG/ML; UG/ML
INJECTION, SOLUTION EPIDURAL; INFILTRATION; INTRACAUDAL; PERINEURAL PRN
Status: DISCONTINUED | OUTPATIENT
Start: 2019-12-27 | End: 2019-12-27 | Stop reason: ALTCHOICE

## 2019-12-27 RX ORDER — PROPOFOL 10 MG/ML
INJECTION, EMULSION INTRAVENOUS PRN
Status: DISCONTINUED | OUTPATIENT
Start: 2019-12-27 | End: 2019-12-27 | Stop reason: SDUPTHER

## 2019-12-27 RX ORDER — HEPARIN SODIUM (PORCINE) LOCK FLUSH IV SOLN 100 UNIT/ML 100 UNIT/ML
SOLUTION INTRAVENOUS PRN
Status: DISCONTINUED | OUTPATIENT
Start: 2019-12-27 | End: 2019-12-27 | Stop reason: ALTCHOICE

## 2019-12-27 RX ORDER — FENTANYL CITRATE 50 UG/ML
INJECTION, SOLUTION INTRAMUSCULAR; INTRAVENOUS PRN
Status: DISCONTINUED | OUTPATIENT
Start: 2019-12-27 | End: 2019-12-27 | Stop reason: SDUPTHER

## 2019-12-27 RX ORDER — SODIUM CHLORIDE 0.9 % (FLUSH) 0.9 %
10 SYRINGE (ML) INJECTION EVERY 12 HOURS SCHEDULED
Status: DISCONTINUED | OUTPATIENT
Start: 2019-12-27 | End: 2019-12-27 | Stop reason: HOSPADM

## 2019-12-27 RX ORDER — CEFAZOLIN SODIUM 2 G/50ML
2 SOLUTION INTRAVENOUS
Status: COMPLETED | OUTPATIENT
Start: 2019-12-27 | End: 2019-12-27

## 2019-12-27 RX ORDER — PROPOFOL 10 MG/ML
INJECTION, EMULSION INTRAVENOUS CONTINUOUS PRN
Status: DISCONTINUED | OUTPATIENT
Start: 2019-12-27 | End: 2019-12-27 | Stop reason: SDUPTHER

## 2019-12-27 RX ORDER — SODIUM CHLORIDE, SODIUM LACTATE, POTASSIUM CHLORIDE, CALCIUM CHLORIDE 600; 310; 30; 20 MG/100ML; MG/100ML; MG/100ML; MG/100ML
INJECTION, SOLUTION INTRAVENOUS CONTINUOUS
Status: DISCONTINUED | OUTPATIENT
Start: 2019-12-27 | End: 2019-12-27 | Stop reason: HOSPADM

## 2019-12-27 RX ORDER — HYDROCODONE BITARTRATE AND ACETAMINOPHEN 5; 325 MG/1; MG/1
1 TABLET ORAL EVERY 6 HOURS PRN
Qty: 10 TABLET | Refills: 0 | Status: SHIPPED | OUTPATIENT
Start: 2019-12-27 | End: 2019-12-30

## 2019-12-27 RX ORDER — SODIUM CHLORIDE, SODIUM LACTATE, POTASSIUM CHLORIDE, CALCIUM CHLORIDE 600; 310; 30; 20 MG/100ML; MG/100ML; MG/100ML; MG/100ML
INJECTION, SOLUTION INTRAVENOUS CONTINUOUS PRN
Status: DISCONTINUED | OUTPATIENT
Start: 2019-12-27 | End: 2019-12-27 | Stop reason: SDUPTHER

## 2019-12-27 RX ADMIN — PROPOFOL 100 MCG/KG/MIN: 10 INJECTION, EMULSION INTRAVENOUS at 08:32

## 2019-12-27 RX ADMIN — SODIUM CHLORIDE, POTASSIUM CHLORIDE, SODIUM LACTATE AND CALCIUM CHLORIDE: 600; 310; 30; 20 INJECTION, SOLUTION INTRAVENOUS at 08:28

## 2019-12-27 RX ADMIN — PROPOFOL 100 MG: 10 INJECTION, EMULSION INTRAVENOUS at 08:32

## 2019-12-27 RX ADMIN — FENTANYL CITRATE 50 MCG: 50 INJECTION, SOLUTION INTRAMUSCULAR; INTRAVENOUS at 08:44

## 2019-12-27 RX ADMIN — CEFAZOLIN SODIUM 2 G: 2 SOLUTION INTRAVENOUS at 08:32

## 2019-12-27 RX ADMIN — FENTANYL CITRATE 50 MCG: 50 INJECTION, SOLUTION INTRAMUSCULAR; INTRAVENOUS at 08:32

## 2019-12-27 RX ADMIN — SODIUM CHLORIDE, POTASSIUM CHLORIDE, SODIUM LACTATE AND CALCIUM CHLORIDE: 600; 310; 30; 20 INJECTION, SOLUTION INTRAVENOUS at 07:25

## 2019-12-27 ASSESSMENT — PULMONARY FUNCTION TESTS
PIF_VALUE: 11
PIF_VALUE: 11
PIF_VALUE: 12
PIF_VALUE: 1
PIF_VALUE: 12
PIF_VALUE: 12
PIF_VALUE: 1
PIF_VALUE: 12
PIF_VALUE: 11
PIF_VALUE: 5
PIF_VALUE: 1
PIF_VALUE: 0
PIF_VALUE: 11
PIF_VALUE: 11
PIF_VALUE: 12
PIF_VALUE: 1
PIF_VALUE: 1
PIF_VALUE: 12
PIF_VALUE: 11
PIF_VALUE: 12
PIF_VALUE: 1
PIF_VALUE: 1
PIF_VALUE: 11
PIF_VALUE: 11
PIF_VALUE: 12
PIF_VALUE: 0
PIF_VALUE: 12
PIF_VALUE: 1
PIF_VALUE: 11
PIF_VALUE: 12
PIF_VALUE: 12
PIF_VALUE: 3
PIF_VALUE: 1
PIF_VALUE: 0
PIF_VALUE: 12
PIF_VALUE: 1
PIF_VALUE: 12

## 2019-12-27 ASSESSMENT — PAIN SCALES - GENERAL
PAINLEVEL_OUTOF10: 0
PAINLEVEL_OUTOF10: 0

## 2019-12-27 ASSESSMENT — LIFESTYLE VARIABLES: SMOKING_STATUS: 0

## 2019-12-27 ASSESSMENT — PAIN - FUNCTIONAL ASSESSMENT: PAIN_FUNCTIONAL_ASSESSMENT: 0-10

## 2020-01-02 ENCOUNTER — HOSPITAL ENCOUNTER (OUTPATIENT)
Dept: INFUSION THERAPY | Age: 75
Discharge: HOME | End: 2020-01-02
Payer: MEDICARE

## 2020-01-02 ENCOUNTER — OFFICE VISIT (OUTPATIENT)
Dept: ONCOLOGY | Age: 75
End: 2020-01-02
Payer: MEDICARE

## 2020-01-02 VITALS
HEART RATE: 87 BPM | WEIGHT: 131.1 LBS | HEIGHT: 64 IN | BODY MASS INDEX: 22.38 KG/M2 | SYSTOLIC BLOOD PRESSURE: 141 MMHG | TEMPERATURE: 98.2 F | OXYGEN SATURATION: 97 % | DIASTOLIC BLOOD PRESSURE: 67 MMHG

## 2020-01-02 VITALS — RESPIRATION RATE: 20 BRPM | HEART RATE: 67 BPM | SYSTOLIC BLOOD PRESSURE: 129 MMHG | DIASTOLIC BLOOD PRESSURE: 69 MMHG

## 2020-01-02 DIAGNOSIS — C50.912 MALIGNANT NEOPLASM OF LEFT FEMALE BREAST, UNSPECIFIED ESTROGEN RECEPTOR STATUS, UNSPECIFIED SITE OF BREAST (HCC): Primary | ICD-10-CM

## 2020-01-02 DIAGNOSIS — D05.12 DUCTAL CARCINOMA IN SITU (DCIS) OF LEFT BREAST: ICD-10-CM

## 2020-01-02 LAB
ALBUMIN SERPL-MCNC: 4.2 G/DL (ref 3.5–5.2)
ALP BLD-CCNC: 98 U/L (ref 35–104)
ALT SERPL-CCNC: 9 U/L (ref 0–32)
ANION GAP SERPL CALCULATED.3IONS-SCNC: 12 MMOL/L (ref 7–16)
AST SERPL-CCNC: 19 U/L (ref 0–31)
BASOPHILS ABSOLUTE: 0.03 E9/L (ref 0–0.2)
BASOPHILS RELATIVE PERCENT: 0.6 % (ref 0–2)
BILIRUB SERPL-MCNC: 0.4 MG/DL (ref 0–1.2)
BUN BLDV-MCNC: 15 MG/DL (ref 8–23)
CALCIUM SERPL-MCNC: 8.9 MG/DL (ref 8.6–10.2)
CHLORIDE BLD-SCNC: 99 MMOL/L (ref 98–107)
CO2: 24 MMOL/L (ref 22–29)
CREAT SERPL-MCNC: 0.9 MG/DL (ref 0.5–1)
EOSINOPHILS ABSOLUTE: 0.05 E9/L (ref 0.05–0.5)
EOSINOPHILS RELATIVE PERCENT: 0.9 % (ref 0–6)
GFR AFRICAN AMERICAN: >60
GFR NON-AFRICAN AMERICAN: >60 ML/MIN/1.73
GLUCOSE BLD-MCNC: 111 MG/DL (ref 74–99)
HCT VFR BLD CALC: 40 % (ref 34–48)
HEMOGLOBIN: 13.1 G/DL (ref 11.5–15.5)
IMMATURE GRANULOCYTES #: 0.02 E9/L
IMMATURE GRANULOCYTES %: 0.4 % (ref 0–5)
LYMPHOCYTES ABSOLUTE: 1.13 E9/L (ref 1.5–4)
LYMPHOCYTES RELATIVE PERCENT: 20.8 % (ref 20–42)
MCH RBC QN AUTO: 28.7 PG (ref 26–35)
MCHC RBC AUTO-ENTMCNC: 32.8 % (ref 32–34.5)
MCV RBC AUTO: 87.5 FL (ref 80–99.9)
MONOCYTES ABSOLUTE: 0.54 E9/L (ref 0.1–0.95)
MONOCYTES RELATIVE PERCENT: 9.9 % (ref 2–12)
NEUTROPHILS ABSOLUTE: 3.67 E9/L (ref 1.8–7.3)
NEUTROPHILS RELATIVE PERCENT: 67.4 % (ref 43–80)
PDW BLD-RTO: 13.1 FL (ref 11.5–15)
PLATELET # BLD: 254 E9/L (ref 130–450)
PMV BLD AUTO: 9.6 FL (ref 7–12)
POTASSIUM SERPL-SCNC: 4 MMOL/L (ref 3.5–5)
RBC # BLD: 4.57 E12/L (ref 3.5–5.5)
SODIUM BLD-SCNC: 135 MMOL/L (ref 132–146)
TOTAL PROTEIN: 7.1 G/DL (ref 6.4–8.3)
WBC # BLD: 5.4 E9/L (ref 4.5–11.5)

## 2020-01-02 PROCEDURE — 2500000003 HC RX 250 WO HCPCS: Performed by: INTERNAL MEDICINE

## 2020-01-02 PROCEDURE — 3017F COLORECTAL CA SCREEN DOC REV: CPT | Performed by: INTERNAL MEDICINE

## 2020-01-02 PROCEDURE — 6360000002 HC RX W HCPCS: Performed by: INTERNAL MEDICINE

## 2020-01-02 PROCEDURE — 80053 COMPREHEN METABOLIC PANEL: CPT

## 2020-01-02 PROCEDURE — 1036F TOBACCO NON-USER: CPT | Performed by: INTERNAL MEDICINE

## 2020-01-02 PROCEDURE — 4040F PNEUMOC VAC/ADMIN/RCVD: CPT | Performed by: INTERNAL MEDICINE

## 2020-01-02 PROCEDURE — 85025 COMPLETE CBC W/AUTO DIFF WBC: CPT

## 2020-01-02 PROCEDURE — 96413 CHEMO IV INFUSION 1 HR: CPT

## 2020-01-02 PROCEDURE — 2580000003 HC RX 258: Performed by: INTERNAL MEDICINE

## 2020-01-02 PROCEDURE — 96415 CHEMO IV INFUSION ADDL HR: CPT

## 2020-01-02 PROCEDURE — 1090F PRES/ABSN URINE INCON ASSESS: CPT | Performed by: INTERNAL MEDICINE

## 2020-01-02 PROCEDURE — 1123F ACP DISCUSS/DSCN MKR DOCD: CPT | Performed by: INTERNAL MEDICINE

## 2020-01-02 PROCEDURE — 96417 CHEMO IV INFUS EACH ADDL SEQ: CPT

## 2020-01-02 PROCEDURE — G8420 CALC BMI NORM PARAMETERS: HCPCS | Performed by: INTERNAL MEDICINE

## 2020-01-02 PROCEDURE — 99214 OFFICE O/P EST MOD 30 MIN: CPT | Performed by: INTERNAL MEDICINE

## 2020-01-02 PROCEDURE — G8427 DOCREV CUR MEDS BY ELIG CLIN: HCPCS | Performed by: INTERNAL MEDICINE

## 2020-01-02 PROCEDURE — G8484 FLU IMMUNIZE NO ADMIN: HCPCS | Performed by: INTERNAL MEDICINE

## 2020-01-02 PROCEDURE — G8399 PT W/DXA RESULTS DOCUMENT: HCPCS | Performed by: INTERNAL MEDICINE

## 2020-01-02 PROCEDURE — 96375 TX/PRO/DX INJ NEW DRUG ADDON: CPT

## 2020-01-02 RX ORDER — SODIUM CHLORIDE 9 MG/ML
20 INJECTION, SOLUTION INTRAVENOUS ONCE
Status: CANCELLED | OUTPATIENT
Start: 2020-01-16

## 2020-01-02 RX ORDER — SODIUM CHLORIDE 0.9 % (FLUSH) 0.9 %
10 SYRINGE (ML) INJECTION PRN
Status: CANCELLED | OUTPATIENT
Start: 2020-01-02

## 2020-01-02 RX ORDER — MEPERIDINE HYDROCHLORIDE 25 MG/ML
12.5 INJECTION INTRAMUSCULAR; INTRAVENOUS; SUBCUTANEOUS ONCE
Status: CANCELLED | OUTPATIENT
Start: 2020-01-02

## 2020-01-02 RX ORDER — METHYLPREDNISOLONE SODIUM SUCCINATE 125 MG/2ML
125 INJECTION, POWDER, LYOPHILIZED, FOR SOLUTION INTRAMUSCULAR; INTRAVENOUS ONCE
Status: CANCELLED | OUTPATIENT
Start: 2020-01-02

## 2020-01-02 RX ORDER — SODIUM CHLORIDE 0.9 % (FLUSH) 0.9 %
5 SYRINGE (ML) INJECTION PRN
Status: CANCELLED | OUTPATIENT
Start: 2020-01-16

## 2020-01-02 RX ORDER — MEPERIDINE HYDROCHLORIDE 25 MG/ML
12.5 INJECTION INTRAMUSCULAR; INTRAVENOUS; SUBCUTANEOUS ONCE
Status: CANCELLED | OUTPATIENT
Start: 2020-01-09

## 2020-01-02 RX ORDER — DIPHENHYDRAMINE HYDROCHLORIDE 50 MG/ML
50 INJECTION INTRAMUSCULAR; INTRAVENOUS ONCE
Status: CANCELLED | OUTPATIENT
Start: 2020-01-09

## 2020-01-02 RX ORDER — MEPERIDINE HYDROCHLORIDE 25 MG/ML
12.5 INJECTION INTRAMUSCULAR; INTRAVENOUS; SUBCUTANEOUS ONCE
Status: CANCELLED | OUTPATIENT
Start: 2020-01-16

## 2020-01-02 RX ORDER — SODIUM CHLORIDE 9 MG/ML
INJECTION, SOLUTION INTRAVENOUS CONTINUOUS
Status: CANCELLED | OUTPATIENT
Start: 2020-01-09

## 2020-01-02 RX ORDER — DIPHENHYDRAMINE HYDROCHLORIDE 50 MG/ML
50 INJECTION INTRAMUSCULAR; INTRAVENOUS ONCE
Status: CANCELLED | OUTPATIENT
Start: 2020-01-16

## 2020-01-02 RX ORDER — SODIUM CHLORIDE 9 MG/ML
INJECTION, SOLUTION INTRAVENOUS CONTINUOUS
Status: CANCELLED | OUTPATIENT
Start: 2020-01-16

## 2020-01-02 RX ORDER — DEXAMETHASONE SODIUM PHOSPHATE 10 MG/ML
10 INJECTION INTRAMUSCULAR; INTRAVENOUS ONCE
Status: COMPLETED | OUTPATIENT
Start: 2020-01-02 | End: 2020-01-02

## 2020-01-02 RX ORDER — HEPARIN SODIUM (PORCINE) LOCK FLUSH IV SOLN 100 UNIT/ML 100 UNIT/ML
500 SOLUTION INTRAVENOUS PRN
Status: DISCONTINUED | OUTPATIENT
Start: 2020-01-02 | End: 2020-01-03 | Stop reason: HOSPADM

## 2020-01-02 RX ORDER — EPINEPHRINE 1 MG/ML
0.3 INJECTION, SOLUTION, CONCENTRATE INTRAVENOUS PRN
Status: CANCELLED | OUTPATIENT
Start: 2020-01-16

## 2020-01-02 RX ORDER — DIPHENHYDRAMINE HYDROCHLORIDE 50 MG/ML
50 INJECTION INTRAMUSCULAR; INTRAVENOUS ONCE
Status: COMPLETED | OUTPATIENT
Start: 2020-01-02 | End: 2020-01-02

## 2020-01-02 RX ORDER — DEXAMETHASONE SODIUM PHOSPHATE 10 MG/ML
10 INJECTION, SOLUTION INTRAMUSCULAR; INTRAVENOUS ONCE
Status: CANCELLED | OUTPATIENT
Start: 2020-01-09

## 2020-01-02 RX ORDER — METHYLPREDNISOLONE SODIUM SUCCINATE 125 MG/2ML
125 INJECTION, POWDER, LYOPHILIZED, FOR SOLUTION INTRAMUSCULAR; INTRAVENOUS ONCE
Status: CANCELLED | OUTPATIENT
Start: 2020-01-16

## 2020-01-02 RX ORDER — SODIUM CHLORIDE 0.9 % (FLUSH) 0.9 %
5 SYRINGE (ML) INJECTION PRN
Status: CANCELLED | OUTPATIENT
Start: 2020-01-02

## 2020-01-02 RX ORDER — SODIUM CHLORIDE 0.9 % (FLUSH) 0.9 %
10 SYRINGE (ML) INJECTION PRN
Status: CANCELLED | OUTPATIENT
Start: 2020-01-09

## 2020-01-02 RX ORDER — HEPARIN SODIUM (PORCINE) LOCK FLUSH IV SOLN 100 UNIT/ML 100 UNIT/ML
500 SOLUTION INTRAVENOUS PRN
Status: CANCELLED | OUTPATIENT
Start: 2020-01-16

## 2020-01-02 RX ORDER — EPINEPHRINE 1 MG/ML
0.3 INJECTION, SOLUTION, CONCENTRATE INTRAVENOUS PRN
Status: CANCELLED | OUTPATIENT
Start: 2020-01-09

## 2020-01-02 RX ORDER — DEXAMETHASONE SODIUM PHOSPHATE 10 MG/ML
10 INJECTION, SOLUTION INTRAMUSCULAR; INTRAVENOUS ONCE
Status: CANCELLED | OUTPATIENT
Start: 2020-01-16

## 2020-01-02 RX ORDER — SODIUM CHLORIDE 0.9 % (FLUSH) 0.9 %
10 SYRINGE (ML) INJECTION PRN
Status: CANCELLED | OUTPATIENT
Start: 2020-01-16

## 2020-01-02 RX ORDER — SODIUM CHLORIDE 9 MG/ML
20 INJECTION, SOLUTION INTRAVENOUS ONCE
Status: COMPLETED | OUTPATIENT
Start: 2020-01-02 | End: 2020-01-02

## 2020-01-02 RX ORDER — HEPARIN SODIUM (PORCINE) LOCK FLUSH IV SOLN 100 UNIT/ML 100 UNIT/ML
500 SOLUTION INTRAVENOUS PRN
Status: CANCELLED | OUTPATIENT
Start: 2020-01-02

## 2020-01-02 RX ORDER — SODIUM CHLORIDE 0.9 % (FLUSH) 0.9 %
10 SYRINGE (ML) INJECTION PRN
Status: DISCONTINUED | OUTPATIENT
Start: 2020-01-02 | End: 2020-01-03 | Stop reason: HOSPADM

## 2020-01-02 RX ORDER — EPINEPHRINE 1 MG/ML
0.3 INJECTION, SOLUTION, CONCENTRATE INTRAVENOUS PRN
Status: CANCELLED | OUTPATIENT
Start: 2020-01-02

## 2020-01-02 RX ORDER — DIPHENHYDRAMINE HYDROCHLORIDE 50 MG/ML
50 INJECTION INTRAMUSCULAR; INTRAVENOUS ONCE
Status: CANCELLED | OUTPATIENT
Start: 2020-01-02

## 2020-01-02 RX ORDER — SODIUM CHLORIDE 9 MG/ML
20 INJECTION, SOLUTION INTRAVENOUS ONCE
Status: CANCELLED | OUTPATIENT
Start: 2020-01-02

## 2020-01-02 RX ORDER — METHYLPREDNISOLONE SODIUM SUCCINATE 125 MG/2ML
125 INJECTION, POWDER, LYOPHILIZED, FOR SOLUTION INTRAMUSCULAR; INTRAVENOUS ONCE
Status: CANCELLED | OUTPATIENT
Start: 2020-01-09

## 2020-01-02 RX ORDER — SODIUM CHLORIDE 0.9 % (FLUSH) 0.9 %
5 SYRINGE (ML) INJECTION PRN
Status: CANCELLED | OUTPATIENT
Start: 2020-01-09

## 2020-01-02 RX ORDER — DEXAMETHASONE SODIUM PHOSPHATE 10 MG/ML
10 INJECTION, SOLUTION INTRAMUSCULAR; INTRAVENOUS ONCE
Status: CANCELLED | OUTPATIENT
Start: 2020-01-02

## 2020-01-02 RX ORDER — HEPARIN SODIUM (PORCINE) LOCK FLUSH IV SOLN 100 UNIT/ML 100 UNIT/ML
500 SOLUTION INTRAVENOUS PRN
Status: CANCELLED | OUTPATIENT
Start: 2020-01-09

## 2020-01-02 RX ORDER — SODIUM CHLORIDE 9 MG/ML
20 INJECTION, SOLUTION INTRAVENOUS ONCE
Status: CANCELLED | OUTPATIENT
Start: 2020-01-09

## 2020-01-02 RX ORDER — SODIUM CHLORIDE 9 MG/ML
INJECTION, SOLUTION INTRAVENOUS CONTINUOUS
Status: CANCELLED | OUTPATIENT
Start: 2020-01-02

## 2020-01-02 RX ADMIN — Medication 500 UNITS: at 14:15

## 2020-01-02 RX ADMIN — Medication 10 ML: at 14:15

## 2020-01-02 RX ADMIN — SODIUM CHLORIDE 20 ML/HR: 9 INJECTION, SOLUTION INTRAVENOUS at 10:41

## 2020-01-02 RX ADMIN — DIPHENHYDRAMINE HYDROCHLORIDE 50 MG: 50 INJECTION, SOLUTION INTRAMUSCULAR; INTRAVENOUS at 10:47

## 2020-01-02 RX ADMIN — FAMOTIDINE 20 MG: 10 INJECTION, SOLUTION INTRAVENOUS at 10:46

## 2020-01-02 RX ADMIN — TRASTUZUMAB 483 MG: 150 INJECTION, POWDER, LYOPHILIZED, FOR SOLUTION INTRAVENOUS at 12:32

## 2020-01-02 RX ADMIN — PACLITAXEL 132 MG: 6 INJECTION, SOLUTION INTRAVENOUS at 11:22

## 2020-01-02 RX ADMIN — DEXAMETHASONE SODIUM PHOSPHATE 10 MG: 10 INJECTION INTRAMUSCULAR; INTRAVENOUS at 10:55

## 2020-01-09 ENCOUNTER — HOSPITAL ENCOUNTER (OUTPATIENT)
Dept: INFUSION THERAPY | Age: 75
Discharge: HOME | End: 2020-01-09
Payer: MEDICARE

## 2020-01-09 ENCOUNTER — OFFICE VISIT (OUTPATIENT)
Dept: ONCOLOGY | Age: 75
End: 2020-01-09
Payer: MEDICARE

## 2020-01-09 VITALS
HEIGHT: 64 IN | TEMPERATURE: 98.4 F | DIASTOLIC BLOOD PRESSURE: 60 MMHG | BODY MASS INDEX: 23.17 KG/M2 | WEIGHT: 135.7 LBS | SYSTOLIC BLOOD PRESSURE: 129 MMHG | HEART RATE: 72 BPM

## 2020-01-09 VITALS — SYSTOLIC BLOOD PRESSURE: 139 MMHG | DIASTOLIC BLOOD PRESSURE: 72 MMHG | HEART RATE: 68 BPM | RESPIRATION RATE: 20 BRPM

## 2020-01-09 DIAGNOSIS — C50.912 MALIGNANT NEOPLASM OF LEFT FEMALE BREAST, UNSPECIFIED ESTROGEN RECEPTOR STATUS, UNSPECIFIED SITE OF BREAST (HCC): Primary | ICD-10-CM

## 2020-01-09 DIAGNOSIS — D05.12 DUCTAL CARCINOMA IN SITU (DCIS) OF LEFT BREAST: ICD-10-CM

## 2020-01-09 LAB
ALBUMIN SERPL-MCNC: 3.9 G/DL (ref 3.5–5.2)
ALP BLD-CCNC: 87 U/L (ref 35–104)
ALT SERPL-CCNC: 10 U/L (ref 0–32)
ANION GAP SERPL CALCULATED.3IONS-SCNC: 11 MMOL/L (ref 7–16)
AST SERPL-CCNC: 18 U/L (ref 0–31)
BASOPHILS ABSOLUTE: 0.06 E9/L (ref 0–0.2)
BASOPHILS RELATIVE PERCENT: 1.4 % (ref 0–2)
BILIRUB SERPL-MCNC: 0.3 MG/DL (ref 0–1.2)
BUN BLDV-MCNC: 10 MG/DL (ref 8–23)
CALCIUM SERPL-MCNC: 8.9 MG/DL (ref 8.6–10.2)
CHLORIDE BLD-SCNC: 98 MMOL/L (ref 98–107)
CO2: 25 MMOL/L (ref 22–29)
CREAT SERPL-MCNC: 0.8 MG/DL (ref 0.5–1)
EOSINOPHILS ABSOLUTE: 0.18 E9/L (ref 0.05–0.5)
EOSINOPHILS RELATIVE PERCENT: 4.3 % (ref 0–6)
GFR AFRICAN AMERICAN: >60
GFR NON-AFRICAN AMERICAN: >60 ML/MIN/1.73
GLUCOSE BLD-MCNC: 97 MG/DL (ref 74–99)
HCT VFR BLD CALC: 36.6 % (ref 34–48)
HEMOGLOBIN: 12 G/DL (ref 11.5–15.5)
IMMATURE GRANULOCYTES #: 0.03 E9/L
IMMATURE GRANULOCYTES %: 0.7 % (ref 0–5)
LYMPHOCYTES ABSOLUTE: 1.35 E9/L (ref 1.5–4)
LYMPHOCYTES RELATIVE PERCENT: 32.6 % (ref 20–42)
MCH RBC QN AUTO: 28.7 PG (ref 26–35)
MCHC RBC AUTO-ENTMCNC: 32.8 % (ref 32–34.5)
MCV RBC AUTO: 87.6 FL (ref 80–99.9)
MONOCYTES ABSOLUTE: 0.31 E9/L (ref 0.1–0.95)
MONOCYTES RELATIVE PERCENT: 7.5 % (ref 2–12)
NEUTROPHILS ABSOLUTE: 2.21 E9/L (ref 1.8–7.3)
NEUTROPHILS RELATIVE PERCENT: 53.5 % (ref 43–80)
PDW BLD-RTO: 12.8 FL (ref 11.5–15)
PLATELET # BLD: 287 E9/L (ref 130–450)
PMV BLD AUTO: 9.6 FL (ref 7–12)
POTASSIUM SERPL-SCNC: 4.2 MMOL/L (ref 3.5–5)
RBC # BLD: 4.18 E12/L (ref 3.5–5.5)
SODIUM BLD-SCNC: 134 MMOL/L (ref 132–146)
TOTAL PROTEIN: 6.5 G/DL (ref 6.4–8.3)
WBC # BLD: 4.1 E9/L (ref 4.5–11.5)

## 2020-01-09 PROCEDURE — 1090F PRES/ABSN URINE INCON ASSESS: CPT | Performed by: INTERNAL MEDICINE

## 2020-01-09 PROCEDURE — 99214 OFFICE O/P EST MOD 30 MIN: CPT | Performed by: INTERNAL MEDICINE

## 2020-01-09 PROCEDURE — 96413 CHEMO IV INFUSION 1 HR: CPT

## 2020-01-09 PROCEDURE — 1123F ACP DISCUSS/DSCN MKR DOCD: CPT | Performed by: INTERNAL MEDICINE

## 2020-01-09 PROCEDURE — G8427 DOCREV CUR MEDS BY ELIG CLIN: HCPCS | Performed by: INTERNAL MEDICINE

## 2020-01-09 PROCEDURE — 6360000002 HC RX W HCPCS: Performed by: INTERNAL MEDICINE

## 2020-01-09 PROCEDURE — 80053 COMPREHEN METABOLIC PANEL: CPT

## 2020-01-09 PROCEDURE — G8420 CALC BMI NORM PARAMETERS: HCPCS | Performed by: INTERNAL MEDICINE

## 2020-01-09 PROCEDURE — 2500000003 HC RX 250 WO HCPCS: Performed by: INTERNAL MEDICINE

## 2020-01-09 PROCEDURE — 96375 TX/PRO/DX INJ NEW DRUG ADDON: CPT

## 2020-01-09 PROCEDURE — 99212 OFFICE O/P EST SF 10 MIN: CPT

## 2020-01-09 PROCEDURE — 2580000003 HC RX 258: Performed by: INTERNAL MEDICINE

## 2020-01-09 PROCEDURE — G8399 PT W/DXA RESULTS DOCUMENT: HCPCS | Performed by: INTERNAL MEDICINE

## 2020-01-09 PROCEDURE — 1036F TOBACCO NON-USER: CPT | Performed by: INTERNAL MEDICINE

## 2020-01-09 PROCEDURE — 3017F COLORECTAL CA SCREEN DOC REV: CPT | Performed by: INTERNAL MEDICINE

## 2020-01-09 PROCEDURE — G8484 FLU IMMUNIZE NO ADMIN: HCPCS | Performed by: INTERNAL MEDICINE

## 2020-01-09 PROCEDURE — 85025 COMPLETE CBC W/AUTO DIFF WBC: CPT

## 2020-01-09 PROCEDURE — 4040F PNEUMOC VAC/ADMIN/RCVD: CPT | Performed by: INTERNAL MEDICINE

## 2020-01-09 RX ORDER — HEPARIN SODIUM (PORCINE) LOCK FLUSH IV SOLN 100 UNIT/ML 100 UNIT/ML
500 SOLUTION INTRAVENOUS PRN
Status: DISCONTINUED | OUTPATIENT
Start: 2020-01-09 | End: 2020-01-10 | Stop reason: HOSPADM

## 2020-01-09 RX ORDER — SODIUM CHLORIDE 9 MG/ML
20 INJECTION, SOLUTION INTRAVENOUS ONCE
Status: COMPLETED | OUTPATIENT
Start: 2020-01-09 | End: 2020-01-09

## 2020-01-09 RX ORDER — DIPHENHYDRAMINE HYDROCHLORIDE 50 MG/ML
50 INJECTION INTRAMUSCULAR; INTRAVENOUS ONCE
Status: COMPLETED | OUTPATIENT
Start: 2020-01-09 | End: 2020-01-09

## 2020-01-09 RX ORDER — SODIUM CHLORIDE 0.9 % (FLUSH) 0.9 %
10 SYRINGE (ML) INJECTION PRN
Status: DISCONTINUED | OUTPATIENT
Start: 2020-01-09 | End: 2020-01-10 | Stop reason: HOSPADM

## 2020-01-09 RX ORDER — DEXAMETHASONE SODIUM PHOSPHATE 10 MG/ML
10 INJECTION INTRAMUSCULAR; INTRAVENOUS ONCE
Status: COMPLETED | OUTPATIENT
Start: 2020-01-09 | End: 2020-01-09

## 2020-01-09 RX ADMIN — SODIUM CHLORIDE 20 ML/HR: 9 INJECTION, SOLUTION INTRAVENOUS at 11:12

## 2020-01-09 RX ADMIN — SODIUM CHLORIDE, PRESERVATIVE FREE 10 ML: 5 INJECTION INTRAVENOUS at 12:55

## 2020-01-09 RX ADMIN — DIPHENHYDRAMINE HYDROCHLORIDE 50 MG: 50 INJECTION, SOLUTION INTRAMUSCULAR; INTRAVENOUS at 11:15

## 2020-01-09 RX ADMIN — Medication 500 UNITS: at 12:55

## 2020-01-09 RX ADMIN — FAMOTIDINE 20 MG: 10 INJECTION, SOLUTION INTRAVENOUS at 11:14

## 2020-01-09 RX ADMIN — PACLITAXEL 132 MG: 6 INJECTION, SOLUTION INTRAVENOUS at 11:40

## 2020-01-09 RX ADMIN — DEXAMETHASONE SODIUM PHOSPHATE 10 MG: 10 INJECTION INTRAMUSCULAR; INTRAVENOUS at 11:19

## 2020-01-09 NOTE — PROGRESS NOTES
[address] [address]  Dept: [phone]  Loc: [phone]  Attending Progress Note      Reason for Visit:   Left breast cancer. Referring Physician: Mine Greer MD.    PCP:  KAREN Roe    History of Present Illness: The patient is a 44-year-old lady with a past medical history significant for thyroid disease, depression, and IBS, who had presented with an abnormal screening mammogram,    8/20/19  Bilateral screening mammogram  [facility]         FINDINGS: No suspicious masses, calcifications, or distortions are   identified on the right. On the left there is a new focal nodular   asymmetry at 12:00, with adjacent linear branching calcifications. Spot compression views is recommended for the asymmetry with   ultrasound, and spot magnification views for the calcifications. .             Impression   1. Right breast no mammographic evidence of malignancy           2. Left breast new focal asymmetry at 12:00, new microcalcifications.       Recommendation:   1. Right breast annual screening mammogram.   2. Left breast additional views spot compression and spot   magnification along with ultrasound.       BI-RADS Category 0:  Incomplete- Needs Additional Imaging Evaluation             8/29/19  Left Diagnostic mammogram   [facility]         FINDINGS:        A small partially obscured mass is identified in the upper outer left   breast measuring approximately 5-6 mm. Additionally, there is a small   segmentally distributed cluster of pleomorphic microcalcifications   located superiorly near the mass extending to the middle third depth.       Ultrasound confirmed a small irregular shaped hypoechoic mass with   circumscribed margins at the 12:00 position measuring 5 mm in maximal   dimension.           Impression       1. Small solid suspicious mass at the 12:00 position.    2. Segmentally distributed pleomorphic microcalcifications located   near the breast mass likely at the 12:00 position.       RECOMMENDATION:       Recommend ultrasound core biopsy of the mass seen on ultrasound and   stereotactic biopsy of the microcalcifications.       BI-RADS Category 5: Highly Suggestive of Malignancy          8/29/19  Left Breast US   [facility]         FINDINGS:        A small partially obscured mass is identified in the upper outer left   breast measuring approximately 5-6 mm. Additionally, there is a small   segmentally distributed cluster of pleomorphic microcalcifications   located superiorly near the mass extending to the middle third depth.       Ultrasound confirmed a small irregular shaped hypoechoic mass with   circumscribed margins at the 12:00 position measuring 5 mm in maximal   dimension.           Impression       1. Small solid suspicious mass at the 12:00 position. 2. Segmentally distributed pleomorphic microcalcifications located   near the breast mass likely at the 12:00 position.       RECOMMENDATION:       Recommend ultrasound core biopsy of the mass seen on ultrasound and   stereotactic biopsy of the microcalcifications.       BI-RADS Category 5: Highly Suggestive of Malignancy              She underwent a stereotactic guided left breast core biopsy at 12 o'clock position on September 10 , 2019.a single top hat shaped marker clip was deployed into the site.     She underwent an ultrasound guided biopsy of the left breast at the 12 o'clock position on September 17, 2019, A post-surgical ribbon shaped microclip was placed.      Pathological evaluation completed at [facility]:     9/10/19  Final Surgical Pathology Report  Diagnosis:  A. Left breast, 12:00, biopsy: High-grade ductal carcinoma in situ,  cannot exclude microinvasion, see comment. B. Left breast 12:00, additional, biopsy: High-grade ductal carcinoma in  situ, cannot exclude microinvasion, see comment.     Breast Cancer Marker Studies:  Estrogen Receptors (ER): Positive         Percentage of cells positive: <10%         Intensity: Weak    Progesterone Receptors (MI): Negative        Internal control cells present and stain as expected: No internal  control present     9/17/19 Final Surgical Pathology Report    Diagnosis:  Mass, Left breast, 12:00, core biopsy: Segments of benign fibroadipose  tissue and scant skeletal muscle, see comment. Comment:   Epithelial lined mammary ducts and lobules are not identified  in the tissue sample. Correlation with clinical and radiologic findings  is essential to assure adequacy of tissue sampling. In the setting of a  mass clinically or radiologically suspicious for neoplasm, additional  tissue sampling is recommended. The patient underwent on 11/20/2019 a left mastectomy with sentinel lymph node excisional biopsy, pathology:    CANCER CASE SUMMARY:  Procedure: Left simple mastectomy  Specimen laterality: Left  Tumor site: 12:00 position  Tumor size: 8.0 mm in greatest dimension  Histologic type:  Invasive carcinoma of no special type (invasive ductal  carcinoma, not otherwise specified)  Histologic grade (Deandre histologic score):   Glandular differentiation: Score 3   Nuclear pleomorphism: Score 2   Mitotic rate: Score 2   Overall grade: Grade 2 (score of 7)  Tumor focality: Single focus of invasive carcinoma  Ductal carcinoma in situ (DCIS): Present, adjacent to the invasive  carcinoma  Invasive carcinoma margins:   Margins uninvolved by invasive carcinoma    Distance from closest margin: 5.0 mm from the posterior margin  DCIS margins:   Margin uninvolved by DCIS    Distance from closest margin: 10.0 mm from the posterior margin  Regional lymph nodes: Uninvolved by tumor cells   Total number of lymph nodes examined: 2 (both sentinel nodes)  Treatment effect: No known presurgical therapy  Pathologic stage classification (pTNM, AJCC 8th edition):   pT1b   (sn) pN0    Ancillary studies:  Calponin immunostain on block A4 shows the invasive carcinoma lacking a  myoepithelial layer. P63 immunostain on block A6 shows the invasive carcinoma lacking a  myoepithelial layer, with a myoepithelial layer retained around the DCIS. Breast Cancer Marker Studies (Block A6):  Negative: 0%        No internal control cells present. Progesterone Receptors (LA):  Negative: 0%        No internal control cells present. Her-2/miles (c-erb B-2) protein expression: Positive (Score 3+)    The patient was started on adjuvant treatment with Herceptin and Taxol on 1/2/2020, she tolerated the treatment fairly well, no nausea vomiting diarrhea or mouth sores. No allergic-like reactions. Review of Systems;  CONSTITUTIONAL: No fever, chills. Good appetite and energy level. ENMT: Eyes: No diplopia; Nose: No epistaxis. Mouth: No sore throat. RESPIRATORY: No hemoptysis, shortness of breath, cough. CARDIOVASCULAR: No chest pain, palpitations. GASTROINTESTINAL: No nausea/vomiting, abdominal pain, diarrhea/constipation. GENITOURINARY: No dysuria, urinary frequency, hematuria. NEURO: No syncope, presyncope, headache.   Remainder:  ROS NEGATIVE    Past Medical History:      Diagnosis Date    Cancer [facility]) 2019    breast left    Depression     Hypothyroidism     Irritable bowel syndrome     not diagnosed, patient controls with diet    Thyroid disease      Patient Active Problem List   Diagnosis    Ductal carcinoma in situ (DCIS) of left breast    Malignant neoplasm of left female breast, unspecified estrogen receptor status, unspecified site of breast ([facility] [address]    Poor venous access        Past Surgical History:      Procedure Laterality Date    APPENDECTOMY      BREAST SURGERY      mastectomy left nov 20 2019    CHOLECYSTECTOMY      HYSTERECTOMY, TOTAL ABDOMINAL      oophorectomy    INSERTION / REMOVAL / REPLACEMENT VENOUS ACCESS CATHETER N/A 12/27/2019    MEDIPORT INSERTION performed by Robb Alberts MD at [facility] Left 11/20/2019

## 2020-01-16 ENCOUNTER — TELEPHONE (OUTPATIENT)
Dept: CASE MANAGEMENT | Age: 75
End: 2020-01-16

## 2020-01-16 ENCOUNTER — OFFICE VISIT (OUTPATIENT)
Dept: ONCOLOGY | Age: 75
End: 2020-01-16
Payer: MEDICARE

## 2020-01-16 ENCOUNTER — HOSPITAL ENCOUNTER (OUTPATIENT)
Dept: INFUSION THERAPY | Age: 75
Discharge: HOME | End: 2020-01-16
Payer: MEDICARE

## 2020-01-16 VITALS
TEMPERATURE: 97.3 F | HEART RATE: 77 BPM | BODY MASS INDEX: 22.98 KG/M2 | SYSTOLIC BLOOD PRESSURE: 136 MMHG | WEIGHT: 134.6 LBS | OXYGEN SATURATION: 98 % | DIASTOLIC BLOOD PRESSURE: 66 MMHG | HEIGHT: 64 IN

## 2020-01-16 VITALS — DIASTOLIC BLOOD PRESSURE: 55 MMHG | HEART RATE: 70 BPM | TEMPERATURE: 98.6 F | SYSTOLIC BLOOD PRESSURE: 123 MMHG

## 2020-01-16 DIAGNOSIS — C50.912 MALIGNANT NEOPLASM OF LEFT FEMALE BREAST, UNSPECIFIED ESTROGEN RECEPTOR STATUS, UNSPECIFIED SITE OF BREAST (HCC): Primary | ICD-10-CM

## 2020-01-16 DIAGNOSIS — D05.12 DUCTAL CARCINOMA IN SITU (DCIS) OF LEFT BREAST: ICD-10-CM

## 2020-01-16 LAB
ALBUMIN SERPL-MCNC: 4.1 G/DL (ref 3.5–5.2)
ALP BLD-CCNC: 79 U/L (ref 35–104)
ALT SERPL-CCNC: 8 U/L (ref 0–32)
ANION GAP SERPL CALCULATED.3IONS-SCNC: 9 MMOL/L (ref 7–16)
AST SERPL-CCNC: 20 U/L (ref 0–31)
BASOPHILS ABSOLUTE: 0.03 E9/L (ref 0–0.2)
BASOPHILS RELATIVE PERCENT: 0.9 % (ref 0–2)
BILIRUB SERPL-MCNC: 0.3 MG/DL (ref 0–1.2)
BUN BLDV-MCNC: 10 MG/DL (ref 8–23)
CALCIUM SERPL-MCNC: 8.6 MG/DL (ref 8.6–10.2)
CHLORIDE BLD-SCNC: 100 MMOL/L (ref 98–107)
CO2: 25 MMOL/L (ref 22–29)
CREAT SERPL-MCNC: 0.8 MG/DL (ref 0.5–1)
EOSINOPHILS ABSOLUTE: 0.18 E9/L (ref 0.05–0.5)
EOSINOPHILS RELATIVE PERCENT: 5.5 % (ref 0–6)
GFR AFRICAN AMERICAN: >60
GFR NON-AFRICAN AMERICAN: >60 ML/MIN/1.73
GLUCOSE BLD-MCNC: 113 MG/DL (ref 74–99)
HCT VFR BLD CALC: 36.1 % (ref 34–48)
HEMOGLOBIN: 11.9 G/DL (ref 11.5–15.5)
IMMATURE GRANULOCYTES #: 0.02 E9/L
IMMATURE GRANULOCYTES %: 0.6 % (ref 0–5)
LYMPHOCYTES ABSOLUTE: 1.27 E9/L (ref 1.5–4)
LYMPHOCYTES RELATIVE PERCENT: 38.5 % (ref 20–42)
MCH RBC QN AUTO: 29.1 PG (ref 26–35)
MCHC RBC AUTO-ENTMCNC: 33 % (ref 32–34.5)
MCV RBC AUTO: 88.3 FL (ref 80–99.9)
MONOCYTES ABSOLUTE: 0.27 E9/L (ref 0.1–0.95)
MONOCYTES RELATIVE PERCENT: 8.2 % (ref 2–12)
NEUTROPHILS ABSOLUTE: 1.53 E9/L (ref 1.8–7.3)
NEUTROPHILS RELATIVE PERCENT: 46.3 % (ref 43–80)
PDW BLD-RTO: 13 FL (ref 11.5–15)
PLATELET # BLD: 280 E9/L (ref 130–450)
PMV BLD AUTO: 9.6 FL (ref 7–12)
POTASSIUM SERPL-SCNC: 4.7 MMOL/L (ref 3.5–5)
RBC # BLD: 4.09 E12/L (ref 3.5–5.5)
SODIUM BLD-SCNC: 134 MMOL/L (ref 132–146)
TOTAL PROTEIN: 6.5 G/DL (ref 6.4–8.3)
WBC # BLD: 3.3 E9/L (ref 4.5–11.5)

## 2020-01-16 PROCEDURE — 1090F PRES/ABSN URINE INCON ASSESS: CPT | Performed by: INTERNAL MEDICINE

## 2020-01-16 PROCEDURE — 1036F TOBACCO NON-USER: CPT | Performed by: INTERNAL MEDICINE

## 2020-01-16 PROCEDURE — 96375 TX/PRO/DX INJ NEW DRUG ADDON: CPT

## 2020-01-16 PROCEDURE — G8427 DOCREV CUR MEDS BY ELIG CLIN: HCPCS | Performed by: INTERNAL MEDICINE

## 2020-01-16 PROCEDURE — G8420 CALC BMI NORM PARAMETERS: HCPCS | Performed by: INTERNAL MEDICINE

## 2020-01-16 PROCEDURE — 4040F PNEUMOC VAC/ADMIN/RCVD: CPT | Performed by: INTERNAL MEDICINE

## 2020-01-16 PROCEDURE — 1123F ACP DISCUSS/DSCN MKR DOCD: CPT | Performed by: INTERNAL MEDICINE

## 2020-01-16 PROCEDURE — 80053 COMPREHEN METABOLIC PANEL: CPT

## 2020-01-16 PROCEDURE — 96413 CHEMO IV INFUSION 1 HR: CPT

## 2020-01-16 PROCEDURE — G8484 FLU IMMUNIZE NO ADMIN: HCPCS | Performed by: INTERNAL MEDICINE

## 2020-01-16 PROCEDURE — G8399 PT W/DXA RESULTS DOCUMENT: HCPCS | Performed by: INTERNAL MEDICINE

## 2020-01-16 PROCEDURE — 85025 COMPLETE CBC W/AUTO DIFF WBC: CPT

## 2020-01-16 PROCEDURE — 99214 OFFICE O/P EST MOD 30 MIN: CPT | Performed by: INTERNAL MEDICINE

## 2020-01-16 PROCEDURE — 2500000003 HC RX 250 WO HCPCS: Performed by: INTERNAL MEDICINE

## 2020-01-16 PROCEDURE — 2580000003 HC RX 258: Performed by: INTERNAL MEDICINE

## 2020-01-16 PROCEDURE — 6360000002 HC RX W HCPCS: Performed by: INTERNAL MEDICINE

## 2020-01-16 PROCEDURE — 3017F COLORECTAL CA SCREEN DOC REV: CPT | Performed by: INTERNAL MEDICINE

## 2020-01-16 RX ORDER — DIPHENHYDRAMINE HYDROCHLORIDE 50 MG/ML
50 INJECTION INTRAMUSCULAR; INTRAVENOUS ONCE
Status: COMPLETED | OUTPATIENT
Start: 2020-01-16 | End: 2020-01-16

## 2020-01-16 RX ORDER — DEXAMETHASONE SODIUM PHOSPHATE 10 MG/ML
10 INJECTION INTRAMUSCULAR; INTRAVENOUS ONCE
Status: COMPLETED | OUTPATIENT
Start: 2020-01-16 | End: 2020-01-16

## 2020-01-16 RX ORDER — HEPARIN SODIUM (PORCINE) LOCK FLUSH IV SOLN 100 UNIT/ML 100 UNIT/ML
500 SOLUTION INTRAVENOUS PRN
Status: DISCONTINUED | OUTPATIENT
Start: 2020-01-16 | End: 2020-01-17 | Stop reason: HOSPADM

## 2020-01-16 RX ORDER — SODIUM CHLORIDE 0.9 % (FLUSH) 0.9 %
10 SYRINGE (ML) INJECTION PRN
Status: DISCONTINUED | OUTPATIENT
Start: 2020-01-16 | End: 2020-01-17 | Stop reason: HOSPADM

## 2020-01-16 RX ORDER — SODIUM CHLORIDE 9 MG/ML
20 INJECTION, SOLUTION INTRAVENOUS ONCE
Status: COMPLETED | OUTPATIENT
Start: 2020-01-16 | End: 2020-01-16

## 2020-01-16 RX ADMIN — DIPHENHYDRAMINE HYDROCHLORIDE 50 MG: 50 INJECTION, SOLUTION INTRAMUSCULAR; INTRAVENOUS at 11:22

## 2020-01-16 RX ADMIN — SODIUM CHLORIDE, PRESERVATIVE FREE 10 ML: 5 INJECTION INTRAVENOUS at 13:44

## 2020-01-16 RX ADMIN — FAMOTIDINE 20 MG: 10 INJECTION, SOLUTION INTRAVENOUS at 11:17

## 2020-01-16 RX ADMIN — SODIUM CHLORIDE 20 ML/HR: 9 INJECTION, SOLUTION INTRAVENOUS at 11:13

## 2020-01-16 RX ADMIN — Medication 500 UNITS: at 13:44

## 2020-01-16 RX ADMIN — PACLITAXEL 132 MG: 6 INJECTION, SOLUTION INTRAVENOUS at 12:22

## 2020-01-16 RX ADMIN — DEXAMETHASONE SODIUM PHOSPHATE 10 MG: 10 INJECTION INTRAMUSCULAR; INTRAVENOUS at 11:50

## 2020-01-16 NOTE — PROGRESS NOTES
breast mass likely at the 12:00 position.       RECOMMENDATION:       Recommend ultrasound core biopsy of the mass seen on ultrasound and   stereotactic biopsy of the microcalcifications.       BI-RADS Category 5: Highly Suggestive of Malignancy          8/29/19  Left Breast US   [facility]         FINDINGS:        A small partially obscured mass is identified in the upper outer left   breast measuring approximately 5-6 mm. Additionally, there is a small   segmentally distributed cluster of pleomorphic microcalcifications   located superiorly near the mass extending to the middle third depth.       Ultrasound confirmed a small irregular shaped hypoechoic mass with   circumscribed margins at the 12:00 position measuring 5 mm in maximal   dimension.           Impression       1. Small solid suspicious mass at the 12:00 position. 2. Segmentally distributed pleomorphic microcalcifications located   near the breast mass likely at the 12:00 position.       RECOMMENDATION:       Recommend ultrasound core biopsy of the mass seen on ultrasound and   stereotactic biopsy of the microcalcifications.       BI-RADS Category 5: Highly Suggestive of Malignancy              She underwent a stereotactic guided left breast core biopsy at 12 o'clock position on September 10 , 2019.a single top hat shaped marker clip was deployed into the site.     She underwent an ultrasound guided biopsy of the left breast at the 12 o'clock position on September 17, 2019, A post-surgical ribbon shaped microclip was placed.      Pathological evaluation completed at [facility]):     9/10/19  Final Surgical Pathology Report  Diagnosis:  A. Left breast, 12:00, biopsy: High-grade ductal carcinoma in situ,  cannot exclude microinvasion, see comment. B. Left breast 12:00, additional, biopsy: High-grade ductal carcinoma in  situ, cannot exclude microinvasion, see comment.     Breast Cancer Marker Studies:  Estrogen Receptors (ER): Positive         Percentage Allergies:  No Known Allergies     OB/GYN:  Age of menarche was 22, medically induced. Age of menopause was 31. Patient admits to hormonal therapy, progesterone, premarin. Oral contraceptives? To start periods. Patient is       Physical Exam:  /66 (Site: Right Upper Arm, Position: Sitting, Cuff Size: Medium Adult)   Pulse 77   Temp 97.3 °F (36.3 °C) (Temporal)   Ht 5' 4\" (1.626 m)   Wt 134 lb 9.6 oz (61.1 kg)   SpO2 98%   BMI 23.10 kg/m²     GENERAL: Alert, oriented x 3, not in acute distress. HEENT: PERRLA; EOMI. Oropharynx clear. NECK: Supple. No palpable cervical or supraclavicular lymphadenopathy. LUNGS: Good air entry bilaterally. No wheezing, crackles or rhonchi. CARDIOVASCULAR: Regular rate. No murmurs, rubs or gallops. BREASTS: Right breast exam is negative for any skin changes, no nipple discharge, no palpable mass, no palpable right axillary lymphadenopathy. She is status post left mastectomy, the incision is healing nicely, no palpable left axillary lymphadenopathy. CHEST: This post right port placement  ABDOMEN: Soft. Non-tender, non-distended. Positive bowel sounds. EXTREMITIES: Without clubbing, cyanosis, or edema. NEUROLOGIC: No focal deficits. ECOG PS 1      Impression/Plan:      The patient is a 44-year-old lady with a past medical history significant for thyroid disease, depression, and IBS, who had presented with an abnormal screening mammogram, she had a left breast biopsy done revealing high-grade DCIS, ER positive less than 10%, NJ negative, she underwent on 2019 a left mastectomy with sentinel lymph node excisional biopsy, she was found to have invasive ductal carcinoma, tumor size 8 mm, grade 2, with associated DCIS, margins are negative, 2 sentinel lymph nodes were removed, they were both negative for metastatic disease, final pathologic stage pT1b(sn) pN0Mx, ER -0% NJ -0% HER-2/miles +3+ by IHC, prognostic stage IA.     I discussed with the

## 2020-01-23 ENCOUNTER — OFFICE VISIT (OUTPATIENT)
Dept: ONCOLOGY | Age: 75
End: 2020-01-23
Payer: MEDICARE

## 2020-01-23 ENCOUNTER — HOSPITAL ENCOUNTER (OUTPATIENT)
Dept: INFUSION THERAPY | Age: 75
Discharge: HOME | End: 2020-01-23
Payer: MEDICARE

## 2020-01-23 ENCOUNTER — TELEPHONE (OUTPATIENT)
Dept: INFUSION THERAPY | Age: 75
End: 2020-01-23

## 2020-01-23 VITALS — TEMPERATURE: 98.8 F | SYSTOLIC BLOOD PRESSURE: 120 MMHG | DIASTOLIC BLOOD PRESSURE: 59 MMHG | HEART RATE: 73 BPM

## 2020-01-23 VITALS
WEIGHT: 133.1 LBS | TEMPERATURE: 97.4 F | HEIGHT: 64 IN | SYSTOLIC BLOOD PRESSURE: 140 MMHG | DIASTOLIC BLOOD PRESSURE: 58 MMHG | HEART RATE: 80 BPM | BODY MASS INDEX: 22.72 KG/M2

## 2020-01-23 DIAGNOSIS — C50.912 MALIGNANT NEOPLASM OF LEFT FEMALE BREAST, UNSPECIFIED ESTROGEN RECEPTOR STATUS, UNSPECIFIED SITE OF BREAST (HCC): Primary | ICD-10-CM

## 2020-01-23 DIAGNOSIS — D05.12 DUCTAL CARCINOMA IN SITU (DCIS) OF LEFT BREAST: ICD-10-CM

## 2020-01-23 LAB
ALBUMIN SERPL-MCNC: 3.9 G/DL (ref 3.5–5.2)
ALP BLD-CCNC: 93 U/L (ref 35–104)
ALT SERPL-CCNC: 5 U/L (ref 0–32)
ANION GAP SERPL CALCULATED.3IONS-SCNC: 12 MMOL/L (ref 7–16)
AST SERPL-CCNC: 17 U/L (ref 0–31)
BASOPHILS ABSOLUTE: 0.04 E9/L (ref 0–0.2)
BASOPHILS RELATIVE PERCENT: 1.1 % (ref 0–2)
BILIRUB SERPL-MCNC: 0.2 MG/DL (ref 0–1.2)
BUN BLDV-MCNC: 10 MG/DL (ref 8–23)
CALCIUM SERPL-MCNC: 8.5 MG/DL (ref 8.6–10.2)
CHLORIDE BLD-SCNC: 97 MMOL/L (ref 98–107)
CO2: 26 MMOL/L (ref 22–29)
CREAT SERPL-MCNC: 0.8 MG/DL (ref 0.5–1)
EOSINOPHILS ABSOLUTE: 0.16 E9/L (ref 0.05–0.5)
EOSINOPHILS RELATIVE PERCENT: 4.3 % (ref 0–6)
GFR AFRICAN AMERICAN: >60
GFR NON-AFRICAN AMERICAN: >60 ML/MIN/1.73
GLUCOSE BLD-MCNC: 144 MG/DL (ref 74–99)
HCT VFR BLD CALC: 35.5 % (ref 34–48)
HEMOGLOBIN: 11.7 G/DL (ref 11.5–15.5)
IMMATURE GRANULOCYTES #: 0.01 E9/L
IMMATURE GRANULOCYTES %: 0.3 % (ref 0–5)
LYMPHOCYTES ABSOLUTE: 1.46 E9/L (ref 1.5–4)
LYMPHOCYTES RELATIVE PERCENT: 38.8 % (ref 20–42)
MCH RBC QN AUTO: 29.5 PG (ref 26–35)
MCHC RBC AUTO-ENTMCNC: 33 % (ref 32–34.5)
MCV RBC AUTO: 89.4 FL (ref 80–99.9)
MONOCYTES ABSOLUTE: 0.44 E9/L (ref 0.1–0.95)
MONOCYTES RELATIVE PERCENT: 11.7 % (ref 2–12)
NEUTROPHILS ABSOLUTE: 1.65 E9/L (ref 1.8–7.3)
NEUTROPHILS RELATIVE PERCENT: 43.8 % (ref 43–80)
PDW BLD-RTO: 13.3 FL (ref 11.5–15)
PLATELET # BLD: 289 E9/L (ref 130–450)
PMV BLD AUTO: 9.9 FL (ref 7–12)
POTASSIUM SERPL-SCNC: 3.9 MMOL/L (ref 3.5–5)
RBC # BLD: 3.97 E12/L (ref 3.5–5.5)
SODIUM BLD-SCNC: 135 MMOL/L (ref 132–146)
TOTAL PROTEIN: 6.6 G/DL (ref 6.4–8.3)
WBC # BLD: 3.8 E9/L (ref 4.5–11.5)

## 2020-01-23 PROCEDURE — G8427 DOCREV CUR MEDS BY ELIG CLIN: HCPCS | Performed by: INTERNAL MEDICINE

## 2020-01-23 PROCEDURE — 96417 CHEMO IV INFUS EACH ADDL SEQ: CPT

## 2020-01-23 PROCEDURE — 96413 CHEMO IV INFUSION 1 HR: CPT

## 2020-01-23 PROCEDURE — 1123F ACP DISCUSS/DSCN MKR DOCD: CPT | Performed by: INTERNAL MEDICINE

## 2020-01-23 PROCEDURE — 85025 COMPLETE CBC W/AUTO DIFF WBC: CPT

## 2020-01-23 PROCEDURE — 99214 OFFICE O/P EST MOD 30 MIN: CPT | Performed by: INTERNAL MEDICINE

## 2020-01-23 PROCEDURE — 6360000002 HC RX W HCPCS: Performed by: INTERNAL MEDICINE

## 2020-01-23 PROCEDURE — 2580000003 HC RX 258: Performed by: INTERNAL MEDICINE

## 2020-01-23 PROCEDURE — 96375 TX/PRO/DX INJ NEW DRUG ADDON: CPT

## 2020-01-23 PROCEDURE — G8420 CALC BMI NORM PARAMETERS: HCPCS | Performed by: INTERNAL MEDICINE

## 2020-01-23 PROCEDURE — 1090F PRES/ABSN URINE INCON ASSESS: CPT | Performed by: INTERNAL MEDICINE

## 2020-01-23 PROCEDURE — 4040F PNEUMOC VAC/ADMIN/RCVD: CPT | Performed by: INTERNAL MEDICINE

## 2020-01-23 PROCEDURE — G8484 FLU IMMUNIZE NO ADMIN: HCPCS | Performed by: INTERNAL MEDICINE

## 2020-01-23 PROCEDURE — 3017F COLORECTAL CA SCREEN DOC REV: CPT | Performed by: INTERNAL MEDICINE

## 2020-01-23 PROCEDURE — G8399 PT W/DXA RESULTS DOCUMENT: HCPCS | Performed by: INTERNAL MEDICINE

## 2020-01-23 PROCEDURE — 1036F TOBACCO NON-USER: CPT | Performed by: INTERNAL MEDICINE

## 2020-01-23 PROCEDURE — 80053 COMPREHEN METABOLIC PANEL: CPT

## 2020-01-23 PROCEDURE — 2500000003 HC RX 250 WO HCPCS: Performed by: INTERNAL MEDICINE

## 2020-01-23 RX ORDER — SODIUM CHLORIDE 9 MG/ML
20 INJECTION, SOLUTION INTRAVENOUS ONCE
Status: CANCELLED | OUTPATIENT
Start: 2020-02-06

## 2020-01-23 RX ORDER — DIPHENHYDRAMINE HYDROCHLORIDE 50 MG/ML
50 INJECTION INTRAMUSCULAR; INTRAVENOUS ONCE
Status: CANCELLED | OUTPATIENT
Start: 2020-02-06

## 2020-01-23 RX ORDER — SODIUM CHLORIDE 9 MG/ML
20 INJECTION, SOLUTION INTRAVENOUS ONCE
Status: CANCELLED | OUTPATIENT
Start: 2020-01-30

## 2020-01-23 RX ORDER — DEXAMETHASONE SODIUM PHOSPHATE 10 MG/ML
10 INJECTION, SOLUTION INTRAMUSCULAR; INTRAVENOUS ONCE
Status: CANCELLED | OUTPATIENT
Start: 2020-01-30

## 2020-01-23 RX ORDER — SODIUM CHLORIDE 9 MG/ML
20 INJECTION, SOLUTION INTRAVENOUS ONCE
Status: CANCELLED | OUTPATIENT
Start: 2020-01-23

## 2020-01-23 RX ORDER — DIPHENHYDRAMINE HYDROCHLORIDE 50 MG/ML
INJECTION INTRAMUSCULAR; INTRAVENOUS
Status: DISPENSED
Start: 2020-01-23 | End: 2020-01-23

## 2020-01-23 RX ORDER — SODIUM CHLORIDE 9 MG/ML
20 INJECTION, SOLUTION INTRAVENOUS ONCE
Status: COMPLETED | OUTPATIENT
Start: 2020-01-23 | End: 2020-01-23

## 2020-01-23 RX ORDER — MEPERIDINE HYDROCHLORIDE 25 MG/ML
12.5 INJECTION INTRAMUSCULAR; INTRAVENOUS; SUBCUTANEOUS ONCE
Status: CANCELLED | OUTPATIENT
Start: 2020-01-23

## 2020-01-23 RX ORDER — SODIUM CHLORIDE 9 MG/ML
INJECTION, SOLUTION INTRAVENOUS CONTINUOUS
Status: CANCELLED | OUTPATIENT
Start: 2020-01-30

## 2020-01-23 RX ORDER — SODIUM CHLORIDE 0.9 % (FLUSH) 0.9 %
10 SYRINGE (ML) INJECTION PRN
Status: CANCELLED | OUTPATIENT
Start: 2020-02-06

## 2020-01-23 RX ORDER — SODIUM CHLORIDE 0.9 % (FLUSH) 0.9 %
10 SYRINGE (ML) INJECTION PRN
Status: DISCONTINUED | OUTPATIENT
Start: 2020-01-23 | End: 2020-01-24 | Stop reason: HOSPADM

## 2020-01-23 RX ORDER — MEPERIDINE HYDROCHLORIDE 25 MG/ML
12.5 INJECTION INTRAMUSCULAR; INTRAVENOUS; SUBCUTANEOUS ONCE
Status: CANCELLED | OUTPATIENT
Start: 2020-01-30

## 2020-01-23 RX ORDER — MEPERIDINE HYDROCHLORIDE 25 MG/ML
12.5 INJECTION INTRAMUSCULAR; INTRAVENOUS; SUBCUTANEOUS ONCE
Status: CANCELLED | OUTPATIENT
Start: 2020-02-06

## 2020-01-23 RX ORDER — DIPHENHYDRAMINE HYDROCHLORIDE 50 MG/ML
50 INJECTION INTRAMUSCULAR; INTRAVENOUS ONCE
Status: CANCELLED | OUTPATIENT
Start: 2020-01-30

## 2020-01-23 RX ORDER — EPINEPHRINE 1 MG/ML
0.3 INJECTION, SOLUTION, CONCENTRATE INTRAVENOUS PRN
Status: CANCELLED | OUTPATIENT
Start: 2020-01-30

## 2020-01-23 RX ORDER — DIPHENHYDRAMINE HYDROCHLORIDE 50 MG/ML
50 INJECTION INTRAMUSCULAR; INTRAVENOUS ONCE
Status: CANCELLED | OUTPATIENT
Start: 2020-01-23

## 2020-01-23 RX ORDER — HEPARIN SODIUM (PORCINE) LOCK FLUSH IV SOLN 100 UNIT/ML 100 UNIT/ML
500 SOLUTION INTRAVENOUS PRN
Status: CANCELLED | OUTPATIENT
Start: 2020-02-06

## 2020-01-23 RX ORDER — METHYLPREDNISOLONE SODIUM SUCCINATE 125 MG/2ML
125 INJECTION, POWDER, LYOPHILIZED, FOR SOLUTION INTRAMUSCULAR; INTRAVENOUS ONCE
Status: CANCELLED | OUTPATIENT
Start: 2020-01-30

## 2020-01-23 RX ORDER — EPINEPHRINE 1 MG/ML
0.3 INJECTION, SOLUTION, CONCENTRATE INTRAVENOUS PRN
Status: CANCELLED | OUTPATIENT
Start: 2020-02-06

## 2020-01-23 RX ORDER — HEPARIN SODIUM (PORCINE) LOCK FLUSH IV SOLN 100 UNIT/ML 100 UNIT/ML
500 SOLUTION INTRAVENOUS PRN
Status: CANCELLED | OUTPATIENT
Start: 2020-01-23

## 2020-01-23 RX ORDER — SODIUM CHLORIDE 0.9 % (FLUSH) 0.9 %
10 SYRINGE (ML) INJECTION PRN
Status: CANCELLED | OUTPATIENT
Start: 2020-01-23

## 2020-01-23 RX ORDER — HEPARIN SODIUM (PORCINE) LOCK FLUSH IV SOLN 100 UNIT/ML 100 UNIT/ML
500 SOLUTION INTRAVENOUS PRN
Status: DISCONTINUED | OUTPATIENT
Start: 2020-01-23 | End: 2020-01-24 | Stop reason: HOSPADM

## 2020-01-23 RX ORDER — SODIUM CHLORIDE 0.9 % (FLUSH) 0.9 %
10 SYRINGE (ML) INJECTION PRN
Status: CANCELLED | OUTPATIENT
Start: 2020-01-30

## 2020-01-23 RX ORDER — SODIUM CHLORIDE 0.9 % (FLUSH) 0.9 %
5 SYRINGE (ML) INJECTION PRN
Status: CANCELLED | OUTPATIENT
Start: 2020-01-30

## 2020-01-23 RX ORDER — DEXAMETHASONE SODIUM PHOSPHATE 10 MG/ML
10 INJECTION INTRAMUSCULAR; INTRAVENOUS ONCE
Status: COMPLETED | OUTPATIENT
Start: 2020-01-23 | End: 2020-01-23

## 2020-01-23 RX ORDER — DEXAMETHASONE SODIUM PHOSPHATE 10 MG/ML
10 INJECTION, SOLUTION INTRAMUSCULAR; INTRAVENOUS ONCE
Status: CANCELLED | OUTPATIENT
Start: 2020-02-06

## 2020-01-23 RX ORDER — HEPARIN SODIUM (PORCINE) LOCK FLUSH IV SOLN 100 UNIT/ML 100 UNIT/ML
500 SOLUTION INTRAVENOUS PRN
Status: CANCELLED | OUTPATIENT
Start: 2020-01-30

## 2020-01-23 RX ORDER — METHYLPREDNISOLONE SODIUM SUCCINATE 125 MG/2ML
125 INJECTION, POWDER, LYOPHILIZED, FOR SOLUTION INTRAMUSCULAR; INTRAVENOUS ONCE
Status: CANCELLED | OUTPATIENT
Start: 2020-01-23

## 2020-01-23 RX ORDER — DEXAMETHASONE SODIUM PHOSPHATE 10 MG/ML
INJECTION INTRAMUSCULAR; INTRAVENOUS
Status: DISPENSED
Start: 2020-01-23 | End: 2020-01-23

## 2020-01-23 RX ORDER — SODIUM CHLORIDE 9 MG/ML
INJECTION, SOLUTION INTRAVENOUS CONTINUOUS
Status: CANCELLED | OUTPATIENT
Start: 2020-01-23

## 2020-01-23 RX ORDER — EPINEPHRINE 1 MG/ML
0.3 INJECTION, SOLUTION, CONCENTRATE INTRAVENOUS PRN
Status: CANCELLED | OUTPATIENT
Start: 2020-01-23

## 2020-01-23 RX ORDER — DIPHENHYDRAMINE HYDROCHLORIDE 50 MG/ML
50 INJECTION INTRAMUSCULAR; INTRAVENOUS ONCE
Status: COMPLETED | OUTPATIENT
Start: 2020-01-23 | End: 2020-01-23

## 2020-01-23 RX ORDER — DEXAMETHASONE SODIUM PHOSPHATE 10 MG/ML
10 INJECTION, SOLUTION INTRAMUSCULAR; INTRAVENOUS ONCE
Status: CANCELLED | OUTPATIENT
Start: 2020-01-23

## 2020-01-23 RX ORDER — SODIUM CHLORIDE 0.9 % (FLUSH) 0.9 %
5 SYRINGE (ML) INJECTION PRN
Status: CANCELLED | OUTPATIENT
Start: 2020-01-23

## 2020-01-23 RX ORDER — METHYLPREDNISOLONE SODIUM SUCCINATE 125 MG/2ML
125 INJECTION, POWDER, LYOPHILIZED, FOR SOLUTION INTRAMUSCULAR; INTRAVENOUS ONCE
Status: CANCELLED | OUTPATIENT
Start: 2020-02-06

## 2020-01-23 RX ORDER — SODIUM CHLORIDE 9 MG/ML
INJECTION, SOLUTION INTRAVENOUS CONTINUOUS
Status: CANCELLED | OUTPATIENT
Start: 2020-02-06

## 2020-01-23 RX ORDER — SODIUM CHLORIDE 0.9 % (FLUSH) 0.9 %
5 SYRINGE (ML) INJECTION PRN
Status: CANCELLED | OUTPATIENT
Start: 2020-02-06

## 2020-01-23 RX ADMIN — Medication 500 UNITS: at 13:46

## 2020-01-23 RX ADMIN — DIPHENHYDRAMINE HYDROCHLORIDE 50 MG: 50 INJECTION, SOLUTION INTRAMUSCULAR; INTRAVENOUS at 10:16

## 2020-01-23 RX ADMIN — DEXAMETHASONE SODIUM PHOSPHATE 10 MG: 10 INJECTION INTRAMUSCULAR; INTRAVENOUS at 10:06

## 2020-01-23 RX ADMIN — TRASTUZUMAB 357 MG: 150 INJECTION, POWDER, LYOPHILIZED, FOR SOLUTION INTRAVENOUS at 10:46

## 2020-01-23 RX ADMIN — SODIUM CHLORIDE 20 ML/HR: 9 INJECTION, SOLUTION INTRAVENOUS at 09:45

## 2020-01-23 RX ADMIN — PACLITAXEL 132 MG: 6 INJECTION, SOLUTION INTRAVENOUS at 12:04

## 2020-01-23 RX ADMIN — FAMOTIDINE 20 MG: 10 INJECTION, SOLUTION INTRAVENOUS at 10:02

## 2020-01-23 RX ADMIN — SODIUM CHLORIDE, PRESERVATIVE FREE 10 ML: 5 INJECTION INTRAVENOUS at 13:46

## 2020-01-23 NOTE — PROGRESS NOTES
[address] [address]  Dept: [phone]  Loc: [phone]  Attending Progress Note      Reason for Visit:   Left breast cancer. Referring Physician: Calrotta Dempsey MD.    PCP:  KAREN Stanley    History of Present Illness: The patient is a 76-year-old lady with a past medical history significant for thyroid disease, depression, and IBS, who had presented with an abnormal screening mammogram,    8/20/19  Bilateral screening mammogram  [facility]         FINDINGS: No suspicious masses, calcifications, or distortions are   identified on the right. On the left there is a new focal nodular   asymmetry at 12:00, with adjacent linear branching calcifications. Spot compression views is recommended for the asymmetry with   ultrasound, and spot magnification views for the calcifications. .             Impression   1. Right breast no mammographic evidence of malignancy           2. Left breast new focal asymmetry at 12:00, new microcalcifications.       Recommendation:   1. Right breast annual screening mammogram.   2. Left breast additional views spot compression and spot   magnification along with ultrasound.       BI-RADS Category 0:  Incomplete- Needs Additional Imaging Evaluation             8/29/19  Left Diagnostic mammogram   [facility]         FINDINGS:        A small partially obscured mass is identified in the upper outer left   breast measuring approximately 5-6 mm. Additionally, there is a small   segmentally distributed cluster of pleomorphic microcalcifications   located superiorly near the mass extending to the middle third depth.       Ultrasound confirmed a small irregular shaped hypoechoic mass with   circumscribed margins at the 12:00 position measuring 5 mm in maximal   dimension.           Impression       1. Small solid suspicious mass at the 12:00 position.    2. Segmentally distributed pleomorphic microcalcifications located   near the of cells positive: <10%         Intensity: Weak    Progesterone Receptors (MN): Negative        Internal control cells present and stain as expected: No internal  control present     9/17/19 Final Surgical Pathology Report    Diagnosis:  Mass, Left breast, 12:00, core biopsy: Segments of benign fibroadipose  tissue and scant skeletal muscle, see comment. Comment:   Epithelial lined mammary ducts and lobules are not identified  in the tissue sample. Correlation with clinical and radiologic findings  is essential to assure adequacy of tissue sampling. In the setting of a  mass clinically or radiologically suspicious for neoplasm, additional  tissue sampling is recommended. The patient underwent on 11/20/2019 a left mastectomy with sentinel lymph node excisional biopsy, pathology:    CANCER CASE SUMMARY:  Procedure: Left simple mastectomy  Specimen laterality: Left  Tumor site: 12:00 position  Tumor size: 8.0 mm in greatest dimension  Histologic type:  Invasive carcinoma of no special type (invasive ductal  carcinoma, not otherwise specified)  Histologic grade (Deandre histologic score):   Glandular differentiation: Score 3   Nuclear pleomorphism: Score 2   Mitotic rate: Score 2   Overall grade: Grade 2 (score of 7)  Tumor focality: Single focus of invasive carcinoma  Ductal carcinoma in situ (DCIS): Present, adjacent to the invasive  carcinoma  Invasive carcinoma margins:   Margins uninvolved by invasive carcinoma    Distance from closest margin: 5.0 mm from the posterior margin  DCIS margins:   Margin uninvolved by DCIS    Distance from closest margin: 10.0 mm from the posterior margin  Regional lymph nodes: Uninvolved by tumor cells   Total number of lymph nodes examined: 2 (both sentinel nodes)  Treatment effect: No known presurgical therapy  Pathologic stage classification (pTNM, AJCC 8th edition):   pT1b   (sn) pN0    Ancillary studies:  Calponin immunostain on block A4 shows the invasive carcinoma lacking a  myoepithelial layer. P63 immunostain on block A6 shows the invasive carcinoma lacking a  myoepithelial layer, with a myoepithelial layer retained around the DCIS. Breast Cancer Marker Studies (Block A6):  Negative: 0%        No internal control cells present. Progesterone Receptors (VT):  Negative: 0%        No internal control cells present. Her-2/miles (c-erb B-2) protein expression: Positive (Score 3+)    The patient was started on adjuvant treatment with Herceptin and Taxol on 1/2/2020, she had restless leg after the last treatment. Review of Systems;  CONSTITUTIONAL: No fever, chills. Good appetite and energy level. ENMT: Eyes: No diplopia; Nose: Positive for epistaxis. Mouth: No sore throat. RESPIRATORY: No hemoptysis, shortness of breath, cough. CARDIOVASCULAR: No chest pain, palpitations. GASTROINTESTINAL: As per HPI. GENITOURINARY: No dysuria, urinary frequency, hematuria. NEURO: No syncope, presyncope, headache.   Remainder:  ROS NEGATIVE    Past Medical History:      Diagnosis Date    Cancer [facility]) 2019    breast left    Depression     Hypothyroidism     Irritable bowel syndrome     not diagnosed, patient controls with diet    Thyroid disease      Patient Active Problem List   Diagnosis    Ductal carcinoma in situ (DCIS) of left breast    Malignant neoplasm of left female breast, unspecified estrogen receptor status, unspecified site of breast ([facility] [address]    Poor venous access        Past Surgical History:      Procedure Laterality Date    APPENDECTOMY      BREAST SURGERY      mastectomy left nov 20 2019    CHOLECYSTECTOMY      HYSTERECTOMY, TOTAL ABDOMINAL      oophorectomy    INSERTION / REMOVAL / REPLACEMENT VENOUS ACCESS CATHETER N/A 12/27/2019    MEDIPORT INSERTION performed by Robb Alberts MD at [facility] Left 11/20/2019    LEFT BREAST SIMPLE  MASTECTOMY, BLUE DYE INJECTION, LEFT AXILLARY  SENTINEL NODE BIOPSY POSSIBLE LEFT progesterone, premarin. Oral contraceptives? To start periods. Patient is       Physical Exam:  BP (!) 140/58 (Site: Right Upper Arm, Position: Sitting, Cuff Size: Medium Adult)   Pulse 80   Temp 97.4 °F (36.3 °C) (Temporal)   Ht 5' 4\" (1.626 m)   Wt 133 lb 1.6 oz (60.4 kg)   BMI 22.85 kg/m²     GENERAL: Alert, oriented x 3, not in acute distress. HEENT: PERRLA; EOMI. Oropharynx clear. NECK: Supple. No palpable cervical or supraclavicular lymphadenopathy. LUNGS: Good air entry bilaterally. No wheezing, crackles or rhonchi. CARDIOVASCULAR: Regular rate. No murmurs, rubs or gallops. BREASTS: Right breast exam is negative for any skin changes, no nipple discharge, no palpable mass, no palpable right axillary lymphadenopathy. She is status post left mastectomy, the incision is healing nicely, no palpable left axillary lymphadenopathy. CHEST: This post right port placement  ABDOMEN: Soft. Non-tender, non-distended. Positive bowel sounds. EXTREMITIES: Without clubbing, cyanosis, or edema. NEUROLOGIC: No focal deficits. ECOG PS 1      Impression/Plan:      The patient is a 59-year-old lady with a past medical history significant for thyroid disease, depression, and IBS, who had presented with an abnormal screening mammogram, she had a left breast biopsy done revealing high-grade DCIS, ER positive less than 10%, [place] negative, she underwent on 2019 a left mastectomy with sentinel lymph node excisional biopsy, she was found to have invasive ductal carcinoma, tumor size 8 mm, grade 2, with associated DCIS, margins are negative, 2 sentinel lymph nodes were removed, they were both negative for metastatic disease, final pathologic stage pT1b(sn) pN0Mx, ER -0% [place] -0% HER-2/miles +3+ by IHC, prognostic stage IA. I discussed with the patient her diagnosis, risks of her tumor, prognosis and recommendations for systemic therapy.   The patient has a small HER-2/miles positive disease, tumor size is 8 mm,

## 2020-01-30 ENCOUNTER — HOSPITAL ENCOUNTER (OUTPATIENT)
Dept: INFUSION THERAPY | Age: 75
Discharge: HOME | End: 2020-01-30
Payer: MEDICARE

## 2020-01-30 ENCOUNTER — TELEPHONE (OUTPATIENT)
Dept: CASE MANAGEMENT | Age: 75
End: 2020-01-30

## 2020-01-30 ENCOUNTER — OFFICE VISIT (OUTPATIENT)
Dept: ONCOLOGY | Age: 75
End: 2020-01-30
Payer: MEDICARE

## 2020-01-30 VITALS
BODY MASS INDEX: 22.88 KG/M2 | HEART RATE: 76 BPM | TEMPERATURE: 97 F | HEIGHT: 64 IN | SYSTOLIC BLOOD PRESSURE: 144 MMHG | OXYGEN SATURATION: 100 % | DIASTOLIC BLOOD PRESSURE: 66 MMHG | WEIGHT: 134 LBS

## 2020-01-30 VITALS — HEART RATE: 69 BPM | SYSTOLIC BLOOD PRESSURE: 130 MMHG | TEMPERATURE: 97.8 F | DIASTOLIC BLOOD PRESSURE: 61 MMHG

## 2020-01-30 DIAGNOSIS — D05.12 DUCTAL CARCINOMA IN SITU (DCIS) OF LEFT BREAST: ICD-10-CM

## 2020-01-30 DIAGNOSIS — C50.912 MALIGNANT NEOPLASM OF LEFT FEMALE BREAST, UNSPECIFIED ESTROGEN RECEPTOR STATUS, UNSPECIFIED SITE OF BREAST (HCC): Primary | ICD-10-CM

## 2020-01-30 LAB
ALBUMIN SERPL-MCNC: 3.8 G/DL (ref 3.5–5.2)
ALP BLD-CCNC: 82 U/L (ref 35–104)
ALT SERPL-CCNC: 11 U/L (ref 0–32)
ANION GAP SERPL CALCULATED.3IONS-SCNC: 12 MMOL/L (ref 7–16)
AST SERPL-CCNC: 21 U/L (ref 0–31)
BASOPHILS ABSOLUTE: 0.04 E9/L (ref 0–0.2)
BASOPHILS RELATIVE PERCENT: 1.1 % (ref 0–2)
BILIRUB SERPL-MCNC: <0.2 MG/DL (ref 0–1.2)
BUN BLDV-MCNC: 13 MG/DL (ref 8–23)
CALCIUM SERPL-MCNC: 8.6 MG/DL (ref 8.6–10.2)
CHLORIDE BLD-SCNC: 97 MMOL/L (ref 98–107)
CO2: 24 MMOL/L (ref 22–29)
CREAT SERPL-MCNC: 0.8 MG/DL (ref 0.5–1)
EOSINOPHILS ABSOLUTE: 0.12 E9/L (ref 0.05–0.5)
EOSINOPHILS RELATIVE PERCENT: 3.3 % (ref 0–6)
GFR AFRICAN AMERICAN: >60
GFR NON-AFRICAN AMERICAN: >60 ML/MIN/1.73
GLUCOSE BLD-MCNC: 126 MG/DL (ref 74–99)
HCT VFR BLD CALC: 34.8 % (ref 34–48)
HEMOGLOBIN: 11.5 G/DL (ref 11.5–15.5)
IMMATURE GRANULOCYTES #: 0.01 E9/L
IMMATURE GRANULOCYTES %: 0.3 % (ref 0–5)
LYMPHOCYTES ABSOLUTE: 1.17 E9/L (ref 1.5–4)
LYMPHOCYTES RELATIVE PERCENT: 32.3 % (ref 20–42)
MAGNESIUM: 2.1 MG/DL (ref 1.6–2.6)
MCH RBC QN AUTO: 29.6 PG (ref 26–35)
MCHC RBC AUTO-ENTMCNC: 33 % (ref 32–34.5)
MCV RBC AUTO: 89.5 FL (ref 80–99.9)
MONOCYTES ABSOLUTE: 0.32 E9/L (ref 0.1–0.95)
MONOCYTES RELATIVE PERCENT: 8.8 % (ref 2–12)
NEUTROPHILS ABSOLUTE: 1.96 E9/L (ref 1.8–7.3)
NEUTROPHILS RELATIVE PERCENT: 54.2 % (ref 43–80)
PDW BLD-RTO: 13.2 FL (ref 11.5–15)
PLATELET # BLD: 246 E9/L (ref 130–450)
PMV BLD AUTO: 9.7 FL (ref 7–12)
POTASSIUM SERPL-SCNC: 4.1 MMOL/L (ref 3.5–5)
RBC # BLD: 3.89 E12/L (ref 3.5–5.5)
SODIUM BLD-SCNC: 133 MMOL/L (ref 132–146)
TOTAL PROTEIN: 6.4 G/DL (ref 6.4–8.3)
WBC # BLD: 3.6 E9/L (ref 4.5–11.5)

## 2020-01-30 PROCEDURE — 3017F COLORECTAL CA SCREEN DOC REV: CPT | Performed by: INTERNAL MEDICINE

## 2020-01-30 PROCEDURE — 96375 TX/PRO/DX INJ NEW DRUG ADDON: CPT

## 2020-01-30 PROCEDURE — 1090F PRES/ABSN URINE INCON ASSESS: CPT | Performed by: INTERNAL MEDICINE

## 2020-01-30 PROCEDURE — 2580000003 HC RX 258: Performed by: INTERNAL MEDICINE

## 2020-01-30 PROCEDURE — 83735 ASSAY OF MAGNESIUM: CPT

## 2020-01-30 PROCEDURE — 4040F PNEUMOC VAC/ADMIN/RCVD: CPT | Performed by: INTERNAL MEDICINE

## 2020-01-30 PROCEDURE — 80053 COMPREHEN METABOLIC PANEL: CPT

## 2020-01-30 PROCEDURE — G8420 CALC BMI NORM PARAMETERS: HCPCS | Performed by: INTERNAL MEDICINE

## 2020-01-30 PROCEDURE — 1123F ACP DISCUSS/DSCN MKR DOCD: CPT | Performed by: INTERNAL MEDICINE

## 2020-01-30 PROCEDURE — 85025 COMPLETE CBC W/AUTO DIFF WBC: CPT

## 2020-01-30 PROCEDURE — 2500000003 HC RX 250 WO HCPCS: Performed by: INTERNAL MEDICINE

## 2020-01-30 PROCEDURE — 6360000002 HC RX W HCPCS: Performed by: INTERNAL MEDICINE

## 2020-01-30 PROCEDURE — 1036F TOBACCO NON-USER: CPT | Performed by: INTERNAL MEDICINE

## 2020-01-30 PROCEDURE — G8399 PT W/DXA RESULTS DOCUMENT: HCPCS | Performed by: INTERNAL MEDICINE

## 2020-01-30 PROCEDURE — 96413 CHEMO IV INFUSION 1 HR: CPT

## 2020-01-30 PROCEDURE — G8484 FLU IMMUNIZE NO ADMIN: HCPCS | Performed by: INTERNAL MEDICINE

## 2020-01-30 PROCEDURE — G8427 DOCREV CUR MEDS BY ELIG CLIN: HCPCS | Performed by: INTERNAL MEDICINE

## 2020-01-30 PROCEDURE — 99214 OFFICE O/P EST MOD 30 MIN: CPT | Performed by: INTERNAL MEDICINE

## 2020-01-30 RX ORDER — SODIUM CHLORIDE 0.9 % (FLUSH) 0.9 %
10 SYRINGE (ML) INJECTION PRN
Status: DISCONTINUED | OUTPATIENT
Start: 2020-01-30 | End: 2020-01-31 | Stop reason: HOSPADM

## 2020-01-30 RX ORDER — SODIUM CHLORIDE 9 MG/ML
20 INJECTION, SOLUTION INTRAVENOUS ONCE
Status: COMPLETED | OUTPATIENT
Start: 2020-01-30 | End: 2020-01-30

## 2020-01-30 RX ORDER — DIPHENHYDRAMINE HYDROCHLORIDE 50 MG/ML
50 INJECTION INTRAMUSCULAR; INTRAVENOUS ONCE
Status: COMPLETED | OUTPATIENT
Start: 2020-01-30 | End: 2020-01-30

## 2020-01-30 RX ORDER — HEPARIN SODIUM (PORCINE) LOCK FLUSH IV SOLN 100 UNIT/ML 100 UNIT/ML
500 SOLUTION INTRAVENOUS PRN
Status: DISCONTINUED | OUTPATIENT
Start: 2020-01-30 | End: 2020-01-31 | Stop reason: HOSPADM

## 2020-01-30 RX ORDER — DEXAMETHASONE SODIUM PHOSPHATE 10 MG/ML
10 INJECTION INTRAMUSCULAR; INTRAVENOUS ONCE
Status: COMPLETED | OUTPATIENT
Start: 2020-01-30 | End: 2020-01-30

## 2020-01-30 RX ADMIN — PACLITAXEL 132 MG: 6 INJECTION, SOLUTION INTRAVENOUS at 10:43

## 2020-01-30 RX ADMIN — SODIUM CHLORIDE, PRESERVATIVE FREE 10 ML: 5 INJECTION INTRAVENOUS at 11:56

## 2020-01-30 RX ADMIN — SODIUM CHLORIDE 20 ML/HR: 9 INJECTION, SOLUTION INTRAVENOUS at 09:54

## 2020-01-30 RX ADMIN — Medication 500 UNITS: at 11:56

## 2020-01-30 RX ADMIN — DEXAMETHASONE SODIUM PHOSPHATE 10 MG: 10 INJECTION INTRAMUSCULAR; INTRAVENOUS at 10:20

## 2020-01-30 RX ADMIN — DIPHENHYDRAMINE HYDROCHLORIDE 50 MG: 50 INJECTION, SOLUTION INTRAMUSCULAR; INTRAVENOUS at 10:01

## 2020-01-30 RX ADMIN — FAMOTIDINE 20 MG: 10 INJECTION, SOLUTION INTRAVENOUS at 09:57

## 2020-01-30 NOTE — PROGRESS NOTES
breast mass likely at the 12:00 position.       RECOMMENDATION:       Recommend ultrasound core biopsy of the mass seen on ultrasound and   stereotactic biopsy of the microcalcifications.       BI-RADS Category 5: Highly Suggestive of Malignancy          8/29/19  Left Breast US   [facility]         FINDINGS:        A small partially obscured mass is identified in the upper outer left   breast measuring approximately 5-6 mm. Additionally, there is a small   segmentally distributed cluster of pleomorphic microcalcifications   located superiorly near the mass extending to the middle third depth.       Ultrasound confirmed a small irregular shaped hypoechoic mass with   circumscribed margins at the 12:00 position measuring 5 mm in maximal   dimension.           Impression       1. Small solid suspicious mass at the 12:00 position. 2. Segmentally distributed pleomorphic microcalcifications located   near the breast mass likely at the 12:00 position.       RECOMMENDATION:       Recommend ultrasound core biopsy of the mass seen on ultrasound and   stereotactic biopsy of the microcalcifications.       BI-RADS Category 5: Highly Suggestive of Malignancy              She underwent a stereotactic guided left breast core biopsy at 12 o'clock position on September 10 , 2019.a single top hat shaped marker clip was deployed into the site.     She underwent an ultrasound guided biopsy of the left breast at the 12 o'clock position on September 17, 2019, A post-surgical ribbon shaped microclip was placed.      Pathological evaluation completed at [facility]):     9/10/19  Final Surgical Pathology Report  Diagnosis:  A. Left breast, 12:00, biopsy: High-grade ductal carcinoma in situ,  cannot exclude microinvasion, see comment. B. Left breast 12:00, additional, biopsy: High-grade ductal carcinoma in  situ, cannot exclude microinvasion, see comment.     Breast Cancer Marker Studies:  Estrogen Receptors (ER): Positive         Percentage of cells positive: <10%         Intensity: Weak    Progesterone Receptors (RI): Negative        Internal control cells present and stain as expected: No internal  control present     9/17/19 Final Surgical Pathology Report    Diagnosis:  Mass, Left breast, 12:00, core biopsy: Segments of benign fibroadipose  tissue and scant skeletal muscle, see comment. Comment:   Epithelial lined mammary ducts and lobules are not identified  in the tissue sample. Correlation with clinical and radiologic findings  is essential to assure adequacy of tissue sampling. In the setting of a  mass clinically or radiologically suspicious for neoplasm, additional  tissue sampling is recommended. The patient underwent on 11/20/2019 a left mastectomy with sentinel lymph node excisional biopsy, pathology:    CANCER CASE SUMMARY:  Procedure: Left simple mastectomy  Specimen laterality: Left  Tumor site: 12:00 position  Tumor size: 8.0 mm in greatest dimension  Histologic type:  Invasive carcinoma of no special type (invasive ductal  carcinoma, not otherwise specified)  Histologic grade (Deandre histologic score):   Glandular differentiation: Score 3   Nuclear pleomorphism: Score 2   Mitotic rate: Score 2   Overall grade: Grade 2 (score of 7)  Tumor focality: Single focus of invasive carcinoma  Ductal carcinoma in situ (DCIS): Present, adjacent to the invasive  carcinoma  Invasive carcinoma margins:   Margins uninvolved by invasive carcinoma    Distance from closest margin: 5.0 mm from the posterior margin  DCIS margins:   Margin uninvolved by DCIS    Distance from closest margin: 10.0 mm from the posterior margin  Regional lymph nodes: Uninvolved by tumor cells   Total number of lymph nodes examined: 2 (both sentinel nodes)  Treatment effect: No known presurgical therapy  Pathologic stage classification (pTNM, AJCC 8th edition):   pT1b   (sn) pN0    Ancillary studies:  Calponin immunostain on block A4 shows the invasive carcinoma lacking a  myoepithelial layer. P63 immunostain on block A6 shows the invasive carcinoma lacking a  myoepithelial layer, with a myoepithelial layer retained around the DCIS. Breast Cancer Marker Studies (Block A6):  Negative: 0%        No internal control cells present. Progesterone Receptors (OK):  Negative: 0%        No internal control cells present. Her-2/miles (c-erb B-2) protein expression: Positive (Score 3+)    The patient was started on adjuvant treatment with Herceptin and Taxol on 1/2/2020, she had restless leg after the last treatment, and was very active. Review of Systems;  CONSTITUTIONAL: No fever, chills. Good appetite and energy level. ENMT: Eyes: No diplopia; Nose: Positive for epistaxis. Mouth: No sore throat. RESPIRATORY: No hemoptysis, shortness of breath, cough. CARDIOVASCULAR: No chest pain, palpitations. GASTROINTESTINAL: As per HPI. GENITOURINARY: No dysuria, urinary frequency, hematuria. NEURO: No syncope, presyncope, headache.   Remainder:  ROS NEGATIVE    Past Medical History:      Diagnosis Date    Cancer [facility]) 2019    breast left    Depression     Hypothyroidism     Irritable bowel syndrome     not diagnosed, patient controls with diet    Thyroid disease      Patient Active Problem List   Diagnosis    Ductal carcinoma in situ (DCIS) of left breast    Malignant neoplasm of left female breast, unspecified estrogen receptor status, unspecified site of breast ([facility] [address]    Poor venous access        Past Surgical History:      Procedure Laterality Date    APPENDECTOMY      BREAST SURGERY      mastectomy left nov 20 2019    CHOLECYSTECTOMY      HYSTERECTOMY, TOTAL ABDOMINAL      oophorectomy    INSERTION / REMOVAL / REPLACEMENT VENOUS ACCESS CATHETER N/A 12/27/2019    MEDIPORT INSERTION performed by Irwin Mendoza MD at [facility] Left 11/20/2019    LEFT BREAST SIMPLE  MASTECTOMY, BLUE DYE INJECTION, LEFT AXILLARY  SENTINEL NODE to hormonal therapy, progesterone, premarin. Oral contraceptives? To start periods. Patient is       Physical Exam:  BP (!) 144/66 (Site: Right Upper Arm, Position: Sitting, Cuff Size: Medium Adult)   Pulse 76   Temp 97 °F (36.1 °C) (Temporal)   Ht 5' 4\" (1.626 m)   Wt 134 lb (60.8 kg)   SpO2 100%   BMI 23.00 kg/m²     GENERAL: Alert, oriented x 3, not in acute distress. HEENT: PERRLA; EOMI. Oropharynx clear. NECK: Supple. No palpable cervical or supraclavicular lymphadenopathy. LUNGS: Good air entry bilaterally. No wheezing, crackles or rhonchi. CARDIOVASCULAR: Regular rate. No murmurs, rubs or gallops. BREASTS: Right breast exam is negative for any skin changes, no nipple discharge, no palpable mass, no palpable right axillary lymphadenopathy. She is status post left mastectomy, the incision is healing nicely, no palpable left axillary lymphadenopathy. CHEST: This post right port placement  ABDOMEN: Soft. Non-tender, non-distended. Positive bowel sounds. EXTREMITIES: Without clubbing, cyanosis, or edema. NEUROLOGIC: No focal deficits. ECOG PS 1      Impression/Plan:      The patient is a 76-year-old lady with a past medical history significant for thyroid disease, depression, and IBS, who had presented with an abnormal screening mammogram, she had a left breast biopsy done revealing high-grade DCIS, ER positive less than 10%, NC negative, she underwent on 2019 a left mastectomy with sentinel lymph node excisional biopsy, she was found to have invasive ductal carcinoma, tumor size 8 mm, grade 2, with associated DCIS, margins are negative, 2 sentinel lymph nodes were removed, they were both negative for metastatic disease, final pathologic stage pT1b(sn) pN0Mx, ER -0% NC -0% HER-2/miles +3+ by IHC, prognostic stage IA. I discussed with the patient her diagnosis, risks of her tumor, prognosis and recommendations for systemic therapy.   The patient has a small HER-2/miles positive disease, tumor size is 8 mm, adjuvant treatment with Taxol and Herceptin is recommended, schedule and the side effects of the treatment were reviewed with the patient. 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 Will start endocrine therapy after she completes Taxol chemotherapy. Patient had a 2D echocardiogram done, LVEF is 65%, adequate for treatment with Herceptin, she had a port placed, she was started on adjuvant therapy with Taxol and Herceptin on 1/2/2020, tolerating it well, labs reviewed, blood counts are adequate for treatment, proceed with chemotherapy Herceptin and Taxol, week #5 (single agent Taxol today) today 1/23/2020. Discussed decreasing dose of the steroids, she prefers to continue with the same dose, magnesium is normal.    RTC in 1 week with labs, treatment. Thank you for allowing us to participate in the care of Mrs. Mariann Edwards.     Sahil Nazario MD   [facility]OLOG[facility]ICAL [facility]  [address] [address]  Dept: [place]: [phone]

## 2020-02-06 ENCOUNTER — OFFICE VISIT (OUTPATIENT)
Dept: ONCOLOGY | Age: 75
End: 2020-02-06
Payer: MEDICARE

## 2020-02-06 ENCOUNTER — HOSPITAL ENCOUNTER (OUTPATIENT)
Dept: INFUSION THERAPY | Age: 75
End: 2020-02-06
Payer: MEDICARE

## 2020-02-06 ENCOUNTER — HOSPITAL ENCOUNTER (OUTPATIENT)
Dept: INFUSION THERAPY | Age: 75
Discharge: HOME | End: 2020-02-06
Payer: MEDICARE

## 2020-02-06 VITALS
WEIGHT: 134.9 LBS | OXYGEN SATURATION: 97 % | DIASTOLIC BLOOD PRESSURE: 59 MMHG | SYSTOLIC BLOOD PRESSURE: 129 MMHG | TEMPERATURE: 97 F | BODY MASS INDEX: 23.03 KG/M2 | HEIGHT: 64 IN | HEART RATE: 71 BPM

## 2020-02-06 VITALS — DIASTOLIC BLOOD PRESSURE: 55 MMHG | SYSTOLIC BLOOD PRESSURE: 115 MMHG | TEMPERATURE: 97.6 F | HEART RATE: 71 BPM

## 2020-02-06 DIAGNOSIS — D05.12 DUCTAL CARCINOMA IN SITU (DCIS) OF LEFT BREAST: ICD-10-CM

## 2020-02-06 DIAGNOSIS — C50.912 MALIGNANT NEOPLASM OF LEFT FEMALE BREAST, UNSPECIFIED ESTROGEN RECEPTOR STATUS, UNSPECIFIED SITE OF BREAST (HCC): Primary | ICD-10-CM

## 2020-02-06 LAB
ALBUMIN SERPL-MCNC: 3.9 G/DL (ref 3.5–5.2)
ALP BLD-CCNC: 81 U/L (ref 35–104)
ALT SERPL-CCNC: 11 U/L (ref 0–32)
ANION GAP SERPL CALCULATED.3IONS-SCNC: 13 MMOL/L (ref 7–16)
AST SERPL-CCNC: 19 U/L (ref 0–31)
BASOPHILS ABSOLUTE: 0.03 E9/L (ref 0–0.2)
BASOPHILS RELATIVE PERCENT: 1 % (ref 0–2)
BILIRUB SERPL-MCNC: 0.2 MG/DL (ref 0–1.2)
BUN BLDV-MCNC: 10 MG/DL (ref 8–23)
CALCIUM SERPL-MCNC: 8.8 MG/DL (ref 8.6–10.2)
CHLORIDE BLD-SCNC: 98 MMOL/L (ref 98–107)
CO2: 24 MMOL/L (ref 22–29)
CREAT SERPL-MCNC: 0.8 MG/DL (ref 0.5–1)
EOSINOPHILS ABSOLUTE: 0.12 E9/L (ref 0.05–0.5)
EOSINOPHILS RELATIVE PERCENT: 3.9 % (ref 0–6)
GFR AFRICAN AMERICAN: >60
GFR NON-AFRICAN AMERICAN: >60 ML/MIN/1.73
GLUCOSE BLD-MCNC: 114 MG/DL (ref 74–99)
HCT VFR BLD CALC: 34.8 % (ref 34–48)
HEMOGLOBIN: 11.1 G/DL (ref 11.5–15.5)
IMMATURE GRANULOCYTES #: 0.01 E9/L
IMMATURE GRANULOCYTES %: 0.3 % (ref 0–5)
LYMPHOCYTES ABSOLUTE: 1.07 E9/L (ref 1.5–4)
LYMPHOCYTES RELATIVE PERCENT: 34.6 % (ref 20–42)
MAGNESIUM: 1.9 MG/DL (ref 1.6–2.6)
MCH RBC QN AUTO: 28.7 PG (ref 26–35)
MCHC RBC AUTO-ENTMCNC: 31.9 % (ref 32–34.5)
MCV RBC AUTO: 89.9 FL (ref 80–99.9)
MONOCYTES ABSOLUTE: 0.34 E9/L (ref 0.1–0.95)
MONOCYTES RELATIVE PERCENT: 11 % (ref 2–12)
NEUTROPHILS ABSOLUTE: 1.52 E9/L (ref 1.8–7.3)
NEUTROPHILS RELATIVE PERCENT: 49.2 % (ref 43–80)
PDW BLD-RTO: 13.7 FL (ref 11.5–15)
PLATELET # BLD: 248 E9/L (ref 130–450)
PMV BLD AUTO: 10.4 FL (ref 7–12)
POTASSIUM SERPL-SCNC: 3.7 MMOL/L (ref 3.5–5)
RBC # BLD: 3.87 E12/L (ref 3.5–5.5)
SODIUM BLD-SCNC: 135 MMOL/L (ref 132–146)
TOTAL PROTEIN: 6.3 G/DL (ref 6.4–8.3)
WBC # BLD: 3.1 E9/L (ref 4.5–11.5)

## 2020-02-06 PROCEDURE — 83735 ASSAY OF MAGNESIUM: CPT

## 2020-02-06 PROCEDURE — 1123F ACP DISCUSS/DSCN MKR DOCD: CPT | Performed by: INTERNAL MEDICINE

## 2020-02-06 PROCEDURE — G8420 CALC BMI NORM PARAMETERS: HCPCS | Performed by: INTERNAL MEDICINE

## 2020-02-06 PROCEDURE — G8427 DOCREV CUR MEDS BY ELIG CLIN: HCPCS | Performed by: INTERNAL MEDICINE

## 2020-02-06 PROCEDURE — 1090F PRES/ABSN URINE INCON ASSESS: CPT | Performed by: INTERNAL MEDICINE

## 2020-02-06 PROCEDURE — 80053 COMPREHEN METABOLIC PANEL: CPT

## 2020-02-06 PROCEDURE — 6360000002 HC RX W HCPCS: Performed by: INTERNAL MEDICINE

## 2020-02-06 PROCEDURE — G8399 PT W/DXA RESULTS DOCUMENT: HCPCS | Performed by: INTERNAL MEDICINE

## 2020-02-06 PROCEDURE — 96375 TX/PRO/DX INJ NEW DRUG ADDON: CPT

## 2020-02-06 PROCEDURE — 2500000003 HC RX 250 WO HCPCS: Performed by: INTERNAL MEDICINE

## 2020-02-06 PROCEDURE — G8484 FLU IMMUNIZE NO ADMIN: HCPCS | Performed by: INTERNAL MEDICINE

## 2020-02-06 PROCEDURE — 4040F PNEUMOC VAC/ADMIN/RCVD: CPT | Performed by: INTERNAL MEDICINE

## 2020-02-06 PROCEDURE — 2580000003 HC RX 258: Performed by: INTERNAL MEDICINE

## 2020-02-06 PROCEDURE — 1036F TOBACCO NON-USER: CPT | Performed by: INTERNAL MEDICINE

## 2020-02-06 PROCEDURE — 99214 OFFICE O/P EST MOD 30 MIN: CPT | Performed by: INTERNAL MEDICINE

## 2020-02-06 PROCEDURE — 96413 CHEMO IV INFUSION 1 HR: CPT

## 2020-02-06 PROCEDURE — 85025 COMPLETE CBC W/AUTO DIFF WBC: CPT

## 2020-02-06 PROCEDURE — 3017F COLORECTAL CA SCREEN DOC REV: CPT | Performed by: INTERNAL MEDICINE

## 2020-02-06 RX ORDER — DIPHENHYDRAMINE HYDROCHLORIDE 50 MG/ML
50 INJECTION INTRAMUSCULAR; INTRAVENOUS ONCE
Status: COMPLETED | OUTPATIENT
Start: 2020-02-06 | End: 2020-02-06

## 2020-02-06 RX ORDER — HEPARIN SODIUM (PORCINE) LOCK FLUSH IV SOLN 100 UNIT/ML 100 UNIT/ML
500 SOLUTION INTRAVENOUS PRN
Status: DISCONTINUED | OUTPATIENT
Start: 2020-02-06 | End: 2020-02-07 | Stop reason: HOSPADM

## 2020-02-06 RX ORDER — DEXAMETHASONE SODIUM PHOSPHATE 10 MG/ML
10 INJECTION INTRAMUSCULAR; INTRAVENOUS ONCE
Status: COMPLETED | OUTPATIENT
Start: 2020-02-06 | End: 2020-02-06

## 2020-02-06 RX ORDER — SODIUM CHLORIDE 9 MG/ML
20 INJECTION, SOLUTION INTRAVENOUS ONCE
Status: COMPLETED | OUTPATIENT
Start: 2020-02-06 | End: 2020-02-06

## 2020-02-06 RX ORDER — SODIUM CHLORIDE 0.9 % (FLUSH) 0.9 %
10 SYRINGE (ML) INJECTION PRN
Status: DISCONTINUED | OUTPATIENT
Start: 2020-02-06 | End: 2020-02-07 | Stop reason: HOSPADM

## 2020-02-06 RX ADMIN — DEXAMETHASONE SODIUM PHOSPHATE 10 MG: 10 INJECTION INTRAMUSCULAR; INTRAVENOUS at 09:44

## 2020-02-06 RX ADMIN — DIPHENHYDRAMINE HYDROCHLORIDE 50 MG: 50 INJECTION, SOLUTION INTRAMUSCULAR; INTRAVENOUS at 10:00

## 2020-02-06 RX ADMIN — PACLITAXEL 132 MG: 6 INJECTION, SOLUTION INTRAVENOUS at 10:28

## 2020-02-06 RX ADMIN — SODIUM CHLORIDE, PRESERVATIVE FREE 10 ML: 5 INJECTION INTRAVENOUS at 11:40

## 2020-02-06 RX ADMIN — SODIUM CHLORIDE 20 ML/HR: 9 INJECTION, SOLUTION INTRAVENOUS at 09:35

## 2020-02-06 RX ADMIN — FAMOTIDINE 20 MG: 10 INJECTION, SOLUTION INTRAVENOUS at 09:39

## 2020-02-06 RX ADMIN — Medication 500 UNITS: at 11:40

## 2020-02-06 NOTE — PROGRESS NOTES
[address] [address]  Dept: [phone]  Loc: [phone]  Attending Progress Note      Reason for Visit:   Left breast cancer. Referring Physician: Venus Chicas MD.    PCP:  KAREN Carrera    History of Present Illness: The patient is a 71-year-old lady with a past medical history significant for thyroid disease, depression, and IBS, who had presented with an abnormal screening mammogram,    8/20/19  Bilateral screening mammogram  [facility]         FINDINGS: No suspicious masses, calcifications, or distortions are   identified on the right. On the left there is a new focal nodular   asymmetry at 12:00, with adjacent linear branching calcifications. Spot compression views is recommended for the asymmetry with   ultrasound, and spot magnification views for the calcifications. .             Impression   1. Right breast no mammographic evidence of malignancy           2. Left breast new focal asymmetry at 12:00, new microcalcifications.       Recommendation:   1. Right breast annual screening mammogram.   2. Left breast additional views spot compression and spot   magnification along with ultrasound.       BI-RADS Category 0:  Incomplete- Needs Additional Imaging Evaluation             8/29/19  Left Diagnostic mammogram   [facility]         FINDINGS:        A small partially obscured mass is identified in the upper outer left   breast measuring approximately 5-6 mm. Additionally, there is a small   segmentally distributed cluster of pleomorphic microcalcifications   located superiorly near the mass extending to the middle third depth.       Ultrasound confirmed a small irregular shaped hypoechoic mass with   circumscribed margins at the 12:00 position measuring 5 mm in maximal   dimension.           Impression       1. Small solid suspicious mass at the 12:00 position.    2. Segmentally distributed pleomorphic microcalcifications located   near the breast mass likely at the 12:00 position.       RECOMMENDATION:       Recommend ultrasound core biopsy of the mass seen on ultrasound and   stereotactic biopsy of the microcalcifications.       BI-RADS Category 5: Highly Suggestive of Malignancy          8/29/19  Left Breast US   [facility]         FINDINGS:        A small partially obscured mass is identified in the upper outer left   breast measuring approximately 5-6 mm. Additionally, there is a small   segmentally distributed cluster of pleomorphic microcalcifications   located superiorly near the mass extending to the middle third depth.       Ultrasound confirmed a small irregular shaped hypoechoic mass with   circumscribed margins at the 12:00 position measuring 5 mm in maximal   dimension.           Impression       1. Small solid suspicious mass at the 12:00 position. 2. Segmentally distributed pleomorphic microcalcifications located   near the breast mass likely at the 12:00 position.       RECOMMENDATION:       Recommend ultrasound core biopsy of the mass seen on ultrasound and   stereotactic biopsy of the microcalcifications.       BI-RADS Category 5: Highly Suggestive of Malignancy              She underwent a stereotactic guided left breast core biopsy at 12 o'clock position on September 10 , 2019.a single top hat shaped marker clip was deployed into the site.     She underwent an ultrasound guided biopsy of the left breast at the 12 o'clock position on September 17, 2019, A post-surgical ribbon shaped microclip was placed.      Pathological evaluation completed at [facility]):     9/10/19  Final Surgical Pathology Report  Diagnosis:  A. Left breast, 12:00, biopsy: High-grade ductal carcinoma in situ,  cannot exclude microinvasion, see comment. B. Left breast 12:00, additional, biopsy: High-grade ductal carcinoma in  situ, cannot exclude microinvasion, see comment.     Breast Cancer Marker Studies:  Estrogen Receptors (ER): Positive         Percentage of cells positive: <10%         Intensity: Weak    Progesterone Receptors (IN): Negative        Internal control cells present and stain as expected: No internal  control present     9/17/19 Final Surgical Pathology Report    Diagnosis:  Mass, Left breast, 12:00, core biopsy: Segments of benign fibroadipose  tissue and scant skeletal muscle, see comment. Comment:   Epithelial lined mammary ducts and lobules are not identified  in the tissue sample. Correlation with clinical and radiologic findings  is essential to assure adequacy of tissue sampling. In the setting of a  mass clinically or radiologically suspicious for neoplasm, additional  tissue sampling is recommended. The patient underwent on 11/20/2019 a left mastectomy with sentinel lymph node excisional biopsy, pathology:    CANCER CASE SUMMARY:  Procedure: Left simple mastectomy  Specimen laterality: Left  Tumor site: 12:00 position  Tumor size: 8.0 mm in greatest dimension  Histologic type:  Invasive carcinoma of no special type (invasive ductal  carcinoma, not otherwise specified)  Histologic grade (Deandre histologic score):   Glandular differentiation: Score 3   Nuclear pleomorphism: Score 2   Mitotic rate: Score 2   Overall grade: Grade 2 (score of 7)  Tumor focality: Single focus of invasive carcinoma  Ductal carcinoma in situ (DCIS): Present, adjacent to the invasive  carcinoma  Invasive carcinoma margins:   Margins uninvolved by invasive carcinoma    Distance from closest margin: 5.0 mm from the posterior margin  DCIS margins:   Margin uninvolved by DCIS    Distance from closest margin: 10.0 mm from the posterior margin  Regional lymph nodes: Uninvolved by tumor cells   Total number of lymph nodes examined: 2 (both sentinel nodes)  Treatment effect: No known presurgical therapy  Pathologic stage classification (pTNM, AJCC 8th edition):   pT1b   (sn) pN0    Ancillary studies:  Calponin immunostain on block A4 shows the invasive carcinoma

## 2020-02-13 ENCOUNTER — OFFICE VISIT (OUTPATIENT)
Dept: ONCOLOGY | Age: 75
End: 2020-02-13
Payer: MEDICARE

## 2020-02-13 ENCOUNTER — HOSPITAL ENCOUNTER (OUTPATIENT)
Dept: INFUSION THERAPY | Age: 75
Discharge: HOME | End: 2020-02-13
Payer: MEDICARE

## 2020-02-13 VITALS — DIASTOLIC BLOOD PRESSURE: 60 MMHG | SYSTOLIC BLOOD PRESSURE: 128 MMHG | TEMPERATURE: 98.2 F | HEART RATE: 72 BPM

## 2020-02-13 VITALS
BODY MASS INDEX: 22.96 KG/M2 | TEMPERATURE: 97.2 F | HEART RATE: 75 BPM | DIASTOLIC BLOOD PRESSURE: 65 MMHG | HEIGHT: 64 IN | OXYGEN SATURATION: 98 % | WEIGHT: 134.5 LBS | SYSTOLIC BLOOD PRESSURE: 137 MMHG

## 2020-02-13 DIAGNOSIS — D05.12 DUCTAL CARCINOMA IN SITU (DCIS) OF LEFT BREAST: ICD-10-CM

## 2020-02-13 DIAGNOSIS — C50.912 MALIGNANT NEOPLASM OF LEFT FEMALE BREAST, UNSPECIFIED ESTROGEN RECEPTOR STATUS, UNSPECIFIED SITE OF BREAST (HCC): Primary | ICD-10-CM

## 2020-02-13 LAB
ALBUMIN SERPL-MCNC: 4 G/DL (ref 3.5–5.2)
ALP BLD-CCNC: 80 U/L (ref 35–104)
ALT SERPL-CCNC: 10 U/L (ref 0–32)
ANION GAP SERPL CALCULATED.3IONS-SCNC: 11 MMOL/L (ref 7–16)
AST SERPL-CCNC: 18 U/L (ref 0–31)
BASOPHILS ABSOLUTE: 0.04 E9/L (ref 0–0.2)
BASOPHILS RELATIVE PERCENT: 1.3 % (ref 0–2)
BILIRUB SERPL-MCNC: 0.2 MG/DL (ref 0–1.2)
BUN BLDV-MCNC: 12 MG/DL (ref 8–23)
CALCIUM SERPL-MCNC: 9.2 MG/DL (ref 8.6–10.2)
CHLORIDE BLD-SCNC: 94 MMOL/L (ref 98–107)
CO2: 26 MMOL/L (ref 22–29)
CREAT SERPL-MCNC: 0.8 MG/DL (ref 0.5–1)
EOSINOPHILS ABSOLUTE: 0.12 E9/L (ref 0.05–0.5)
EOSINOPHILS RELATIVE PERCENT: 3.8 % (ref 0–6)
GFR AFRICAN AMERICAN: >60
GFR NON-AFRICAN AMERICAN: >60 ML/MIN/1.73
GLUCOSE BLD-MCNC: 125 MG/DL (ref 74–99)
HCT VFR BLD CALC: 33.7 % (ref 34–48)
HEMOGLOBIN: 11.2 G/DL (ref 11.5–15.5)
IMMATURE GRANULOCYTES #: 0.03 E9/L
IMMATURE GRANULOCYTES %: 1 % (ref 0–5)
LYMPHOCYTES ABSOLUTE: 1.08 E9/L (ref 1.5–4)
LYMPHOCYTES RELATIVE PERCENT: 34.6 % (ref 20–42)
MAGNESIUM: 1.9 MG/DL (ref 1.6–2.6)
MCH RBC QN AUTO: 29.8 PG (ref 26–35)
MCHC RBC AUTO-ENTMCNC: 33.2 % (ref 32–34.5)
MCV RBC AUTO: 89.6 FL (ref 80–99.9)
MONOCYTES ABSOLUTE: 0.29 E9/L (ref 0.1–0.95)
MONOCYTES RELATIVE PERCENT: 9.3 % (ref 2–12)
NEUTROPHILS ABSOLUTE: 1.56 E9/L (ref 1.8–7.3)
NEUTROPHILS RELATIVE PERCENT: 50 % (ref 43–80)
PDW BLD-RTO: 14 FL (ref 11.5–15)
PLATELET # BLD: 284 E9/L (ref 130–450)
PMV BLD AUTO: 10 FL (ref 7–12)
POTASSIUM SERPL-SCNC: 3.9 MMOL/L (ref 3.5–5)
RBC # BLD: 3.76 E12/L (ref 3.5–5.5)
SODIUM BLD-SCNC: 131 MMOL/L (ref 132–146)
TOTAL PROTEIN: 6.3 G/DL (ref 6.4–8.3)
WBC # BLD: 3.1 E9/L (ref 4.5–11.5)

## 2020-02-13 PROCEDURE — G8399 PT W/DXA RESULTS DOCUMENT: HCPCS | Performed by: INTERNAL MEDICINE

## 2020-02-13 PROCEDURE — 85025 COMPLETE CBC W/AUTO DIFF WBC: CPT

## 2020-02-13 PROCEDURE — 1036F TOBACCO NON-USER: CPT | Performed by: INTERNAL MEDICINE

## 2020-02-13 PROCEDURE — 6360000002 HC RX W HCPCS: Performed by: INTERNAL MEDICINE

## 2020-02-13 PROCEDURE — 3017F COLORECTAL CA SCREEN DOC REV: CPT | Performed by: INTERNAL MEDICINE

## 2020-02-13 PROCEDURE — 96375 TX/PRO/DX INJ NEW DRUG ADDON: CPT

## 2020-02-13 PROCEDURE — 96417 CHEMO IV INFUS EACH ADDL SEQ: CPT

## 2020-02-13 PROCEDURE — G8484 FLU IMMUNIZE NO ADMIN: HCPCS | Performed by: INTERNAL MEDICINE

## 2020-02-13 PROCEDURE — 96413 CHEMO IV INFUSION 1 HR: CPT

## 2020-02-13 PROCEDURE — G8427 DOCREV CUR MEDS BY ELIG CLIN: HCPCS | Performed by: INTERNAL MEDICINE

## 2020-02-13 PROCEDURE — 2500000003 HC RX 250 WO HCPCS: Performed by: INTERNAL MEDICINE

## 2020-02-13 PROCEDURE — 1123F ACP DISCUSS/DSCN MKR DOCD: CPT | Performed by: INTERNAL MEDICINE

## 2020-02-13 PROCEDURE — 1090F PRES/ABSN URINE INCON ASSESS: CPT | Performed by: INTERNAL MEDICINE

## 2020-02-13 PROCEDURE — 2580000003 HC RX 258: Performed by: INTERNAL MEDICINE

## 2020-02-13 PROCEDURE — 99214 OFFICE O/P EST MOD 30 MIN: CPT | Performed by: INTERNAL MEDICINE

## 2020-02-13 PROCEDURE — 4040F PNEUMOC VAC/ADMIN/RCVD: CPT | Performed by: INTERNAL MEDICINE

## 2020-02-13 PROCEDURE — 83735 ASSAY OF MAGNESIUM: CPT

## 2020-02-13 PROCEDURE — 80053 COMPREHEN METABOLIC PANEL: CPT

## 2020-02-13 PROCEDURE — G8420 CALC BMI NORM PARAMETERS: HCPCS | Performed by: INTERNAL MEDICINE

## 2020-02-13 RX ORDER — DEXAMETHASONE SODIUM PHOSPHATE 10 MG/ML
10 INJECTION INTRAMUSCULAR; INTRAVENOUS ONCE
Status: COMPLETED | OUTPATIENT
Start: 2020-02-13 | End: 2020-02-13

## 2020-02-13 RX ORDER — DIPHENHYDRAMINE HYDROCHLORIDE 50 MG/ML
50 INJECTION INTRAMUSCULAR; INTRAVENOUS ONCE
Status: CANCELLED | OUTPATIENT
Start: 2020-02-20

## 2020-02-13 RX ORDER — DIPHENHYDRAMINE HYDROCHLORIDE 50 MG/ML
50 INJECTION INTRAMUSCULAR; INTRAVENOUS ONCE
Status: CANCELLED | OUTPATIENT
Start: 2020-02-13

## 2020-02-13 RX ORDER — METHYLPREDNISOLONE SODIUM SUCCINATE 125 MG/2ML
125 INJECTION, POWDER, LYOPHILIZED, FOR SOLUTION INTRAMUSCULAR; INTRAVENOUS ONCE
Status: CANCELLED | OUTPATIENT
Start: 2020-02-27

## 2020-02-13 RX ORDER — METHYLPREDNISOLONE SODIUM SUCCINATE 125 MG/2ML
125 INJECTION, POWDER, LYOPHILIZED, FOR SOLUTION INTRAMUSCULAR; INTRAVENOUS ONCE
Status: CANCELLED | OUTPATIENT
Start: 2020-02-20

## 2020-02-13 RX ORDER — EPINEPHRINE 1 MG/ML
0.3 INJECTION, SOLUTION, CONCENTRATE INTRAVENOUS PRN
Status: CANCELLED | OUTPATIENT
Start: 2020-02-13

## 2020-02-13 RX ORDER — MEPERIDINE HYDROCHLORIDE 25 MG/ML
12.5 INJECTION INTRAMUSCULAR; INTRAVENOUS; SUBCUTANEOUS ONCE
Status: CANCELLED | OUTPATIENT
Start: 2020-02-20

## 2020-02-13 RX ORDER — DEXAMETHASONE SODIUM PHOSPHATE 10 MG/ML
10 INJECTION, SOLUTION INTRAMUSCULAR; INTRAVENOUS ONCE
Status: CANCELLED | OUTPATIENT
Start: 2020-02-13

## 2020-02-13 RX ORDER — SODIUM CHLORIDE 0.9 % (FLUSH) 0.9 %
10 SYRINGE (ML) INJECTION PRN
Status: DISCONTINUED | OUTPATIENT
Start: 2020-02-13 | End: 2020-02-14 | Stop reason: HOSPADM

## 2020-02-13 RX ORDER — SODIUM CHLORIDE 9 MG/ML
20 INJECTION, SOLUTION INTRAVENOUS ONCE
Status: CANCELLED | OUTPATIENT
Start: 2020-02-13

## 2020-02-13 RX ORDER — SODIUM CHLORIDE 0.9 % (FLUSH) 0.9 %
10 SYRINGE (ML) INJECTION PRN
Status: CANCELLED | OUTPATIENT
Start: 2020-02-27

## 2020-02-13 RX ORDER — MEPERIDINE HYDROCHLORIDE 25 MG/ML
12.5 INJECTION INTRAMUSCULAR; INTRAVENOUS; SUBCUTANEOUS ONCE
Status: CANCELLED | OUTPATIENT
Start: 2020-02-13

## 2020-02-13 RX ORDER — HEPARIN SODIUM (PORCINE) LOCK FLUSH IV SOLN 100 UNIT/ML 100 UNIT/ML
500 SOLUTION INTRAVENOUS PRN
Status: CANCELLED | OUTPATIENT
Start: 2020-02-27

## 2020-02-13 RX ORDER — DIPHENHYDRAMINE HYDROCHLORIDE 50 MG/ML
50 INJECTION INTRAMUSCULAR; INTRAVENOUS ONCE
Status: COMPLETED | OUTPATIENT
Start: 2020-02-13 | End: 2020-02-13

## 2020-02-13 RX ORDER — SODIUM CHLORIDE 0.9 % (FLUSH) 0.9 %
10 SYRINGE (ML) INJECTION PRN
Status: CANCELLED | OUTPATIENT
Start: 2020-02-13

## 2020-02-13 RX ORDER — MEPERIDINE HYDROCHLORIDE 25 MG/ML
12.5 INJECTION INTRAMUSCULAR; INTRAVENOUS; SUBCUTANEOUS ONCE
Status: CANCELLED | OUTPATIENT
Start: 2020-02-27

## 2020-02-13 RX ORDER — EPINEPHRINE 1 MG/ML
0.3 INJECTION, SOLUTION, CONCENTRATE INTRAVENOUS PRN
Status: CANCELLED | OUTPATIENT
Start: 2020-02-20

## 2020-02-13 RX ORDER — SODIUM CHLORIDE 0.9 % (FLUSH) 0.9 %
5 SYRINGE (ML) INJECTION PRN
Status: CANCELLED | OUTPATIENT
Start: 2020-02-27

## 2020-02-13 RX ORDER — SODIUM CHLORIDE 9 MG/ML
INJECTION, SOLUTION INTRAVENOUS CONTINUOUS
Status: CANCELLED | OUTPATIENT
Start: 2020-02-20

## 2020-02-13 RX ORDER — SODIUM CHLORIDE 9 MG/ML
INJECTION, SOLUTION INTRAVENOUS CONTINUOUS
Status: CANCELLED | OUTPATIENT
Start: 2020-02-27

## 2020-02-13 RX ORDER — SODIUM CHLORIDE 0.9 % (FLUSH) 0.9 %
5 SYRINGE (ML) INJECTION PRN
Status: CANCELLED | OUTPATIENT
Start: 2020-02-20

## 2020-02-13 RX ORDER — SODIUM CHLORIDE 0.9 % (FLUSH) 0.9 %
5 SYRINGE (ML) INJECTION PRN
Status: CANCELLED | OUTPATIENT
Start: 2020-02-13

## 2020-02-13 RX ORDER — DEXAMETHASONE SODIUM PHOSPHATE 10 MG/ML
10 INJECTION, SOLUTION INTRAMUSCULAR; INTRAVENOUS ONCE
Status: CANCELLED | OUTPATIENT
Start: 2020-02-27

## 2020-02-13 RX ORDER — SODIUM CHLORIDE 9 MG/ML
INJECTION, SOLUTION INTRAVENOUS CONTINUOUS
Status: CANCELLED | OUTPATIENT
Start: 2020-02-13

## 2020-02-13 RX ORDER — HEPARIN SODIUM (PORCINE) LOCK FLUSH IV SOLN 100 UNIT/ML 100 UNIT/ML
500 SOLUTION INTRAVENOUS PRN
Status: CANCELLED | OUTPATIENT
Start: 2020-02-20

## 2020-02-13 RX ORDER — METHYLPREDNISOLONE SODIUM SUCCINATE 125 MG/2ML
125 INJECTION, POWDER, LYOPHILIZED, FOR SOLUTION INTRAMUSCULAR; INTRAVENOUS ONCE
Status: CANCELLED | OUTPATIENT
Start: 2020-02-13

## 2020-02-13 RX ORDER — EPINEPHRINE 1 MG/ML
0.3 INJECTION, SOLUTION, CONCENTRATE INTRAVENOUS PRN
Status: CANCELLED | OUTPATIENT
Start: 2020-02-27

## 2020-02-13 RX ORDER — SODIUM CHLORIDE 9 MG/ML
20 INJECTION, SOLUTION INTRAVENOUS ONCE
Status: COMPLETED | OUTPATIENT
Start: 2020-02-13 | End: 2020-02-13

## 2020-02-13 RX ORDER — HEPARIN SODIUM (PORCINE) LOCK FLUSH IV SOLN 100 UNIT/ML 100 UNIT/ML
500 SOLUTION INTRAVENOUS PRN
Status: CANCELLED | OUTPATIENT
Start: 2020-02-13

## 2020-02-13 RX ORDER — SODIUM CHLORIDE 0.9 % (FLUSH) 0.9 %
10 SYRINGE (ML) INJECTION PRN
Status: CANCELLED | OUTPATIENT
Start: 2020-02-20

## 2020-02-13 RX ORDER — DIPHENHYDRAMINE HYDROCHLORIDE 50 MG/ML
50 INJECTION INTRAMUSCULAR; INTRAVENOUS ONCE
Status: CANCELLED | OUTPATIENT
Start: 2020-02-27

## 2020-02-13 RX ORDER — DEXAMETHASONE SODIUM PHOSPHATE 10 MG/ML
10 INJECTION, SOLUTION INTRAMUSCULAR; INTRAVENOUS ONCE
Status: CANCELLED | OUTPATIENT
Start: 2020-02-20

## 2020-02-13 RX ORDER — HEPARIN SODIUM (PORCINE) LOCK FLUSH IV SOLN 100 UNIT/ML 100 UNIT/ML
500 SOLUTION INTRAVENOUS PRN
Status: DISCONTINUED | OUTPATIENT
Start: 2020-02-13 | End: 2020-02-14 | Stop reason: HOSPADM

## 2020-02-13 RX ORDER — SODIUM CHLORIDE 9 MG/ML
20 INJECTION, SOLUTION INTRAVENOUS ONCE
Status: CANCELLED | OUTPATIENT
Start: 2020-02-27

## 2020-02-13 RX ORDER — SODIUM CHLORIDE 9 MG/ML
20 INJECTION, SOLUTION INTRAVENOUS ONCE
Status: CANCELLED | OUTPATIENT
Start: 2020-02-20

## 2020-02-13 RX ADMIN — FAMOTIDINE 20 MG: 10 INJECTION, SOLUTION INTRAVENOUS at 10:53

## 2020-02-13 RX ADMIN — Medication 500 UNITS: at 13:25

## 2020-02-13 RX ADMIN — DIPHENHYDRAMINE HYDROCHLORIDE 50 MG: 50 INJECTION, SOLUTION INTRAMUSCULAR; INTRAVENOUS at 10:55

## 2020-02-13 RX ADMIN — DEXAMETHASONE SODIUM PHOSPHATE 10 MG: 10 INJECTION INTRAMUSCULAR; INTRAVENOUS at 11:11

## 2020-02-13 RX ADMIN — PACLITAXEL 132 MG: 6 INJECTION, SOLUTION INTRAVENOUS at 11:37

## 2020-02-13 RX ADMIN — SODIUM CHLORIDE 20 ML/HR: 9 INJECTION, SOLUTION INTRAVENOUS at 10:51

## 2020-02-13 RX ADMIN — SODIUM CHLORIDE, PRESERVATIVE FREE 10 ML: 5 INJECTION INTRAVENOUS at 13:25

## 2020-02-13 RX ADMIN — TRASTUZUMAB 357 MG: 150 INJECTION, POWDER, LYOPHILIZED, FOR SOLUTION INTRAVENOUS at 12:46

## 2020-02-13 NOTE — PROGRESS NOTES
of cells positive: <10%         Intensity: Weak    Progesterone Receptors ([place]): Negative        Internal control cells present and stain as expected: No internal  control present     9/17/19 Final Surgical Pathology Report    Diagnosis:  Mass, Left breast, 12:00, core biopsy: Segments of benign fibroadipose  tissue and scant skeletal muscle, see comment. Comment:   Epithelial lined mammary ducts and lobules are not identified  in the tissue sample. Correlation with clinical and radiologic findings  is essential to assure adequacy of tissue sampling. In the setting of a  mass clinically or radiologically suspicious for neoplasm, additional  tissue sampling is recommended. The patient underwent on 11/20/2019 a left mastectomy with sentinel lymph node excisional biopsy, pathology:    CANCER CASE SUMMARY:  Procedure: Left simple mastectomy  Specimen laterality: Left  Tumor site: 12:00 position  Tumor size: 8.0 mm in greatest dimension  Histologic type:  Invasive carcinoma of no special type (invasive ductal  carcinoma, not otherwise specified)  Histologic grade (Deandre histologic score):   Glandular differentiation: Score 3   Nuclear pleomorphism: Score 2   Mitotic rate: Score 2   Overall grade: Grade 2 (score of 7)  Tumor focality: Single focus of invasive carcinoma  Ductal carcinoma in situ (DCIS): Present, adjacent to the invasive  carcinoma  Invasive carcinoma margins:   Margins uninvolved by invasive carcinoma    Distance from closest margin: 5.0 mm from the posterior margin  DCIS margins:   Margin uninvolved by DCIS    Distance from closest margin: 10.0 mm from the posterior margin  Regional lymph nodes: Uninvolved by tumor cells   Total number of lymph nodes examined: 2 (both sentinel nodes)  Treatment effect: No known presurgical therapy  Pathologic stage classification (pTNM, AJCC 8th edition):   pT1b   (sn) pN0    Ancillary studies:  Calponin immunostain on block A4 shows the invasive carcinoma lacking a  myoepithelial layer. P63 immunostain on block A6 shows the invasive carcinoma lacking a  myoepithelial layer, with a myoepithelial layer retained around the DCIS. Breast Cancer Marker Studies (Block A6):  Negative: 0%        No internal control cells present. Progesterone Receptors (PA):  Negative: 0%        No internal control cells present. Her-2/miles (c-erb B-2) protein expression: Positive (Score 3+)    The patient was started on adjuvant treatment with Herceptin and Taxol on 1/2/2020, she felt tired after the last treatment, but she continues to be very active. Review of Systems;  CONSTITUTIONAL: No fever, chills. Good appetite feeling tired. ENMT: Eyes: No diplopia; Nose: Positive for epistaxis. Mouth: No sore throat. RESPIRATORY: No hemoptysis, shortness of breath, cough. CARDIOVASCULAR: No chest pain, palpitations. GASTROINTESTINAL: As per HPI. GENITOURINARY: No dysuria, urinary frequency, hematuria. NEURO: No syncope, presyncope, headache.   Remainder:  ROS NEGATIVE    Past Medical History:      Diagnosis Date    [facility]) 2019    breast left    Depression     Hypothyroidism     Irritable bowel syndrome     not diagnosed, patient controls with diet    Thyroid disease      Patient Active Problem List   Diagnosis    Ductal carcinoma in situ (DCIS) of left breast    Malignant neoplasm of left female breast, unspecified estrogen receptor status, unspecified site of breast ([facility] [place] [address]    Poor venous access        Past Surgical History:      Procedure Laterality Date    APPENDECTOMY      BREAST SURGERY      mastectomy left nov 20 2019    CHOLECYSTECTOMY      HYSTERECTOMY, TOTAL ABDOMINAL      oophorectomy    INSERTION / REMOVAL / REPLACEMENT VENOUS ACCESS CATHETER N/A 12/27/2019    MEDIPORT INSERTION performed by Cosme Faria MD at [facility] Left 11/20/2019    LEFT BREAST SIMPLE  MASTECTOMY, BLUE DYE INJECTION, LEFT AXILLARY  SENTINEL NODE BIOPSY POSSIBLE LEFT AXILLARY DISSECTION -- Bing Gordillo -- PECTORAL BLOCK performed by Narinder Ortega MD at [facility] 22 TONSILLECTOMY         Family History:  Family History   Problem Relation Age of Onset    Cancer Mother 66        liver    Cancer Brother 58        kidney       Medications:  Reviewed and reconciled. Social History:  Social History     Socioeconomic History    Marital status: Single     Spouse name: Not on file    Number of children: Not on file    Years of education: Not on file    Highest education level: Not on file   Occupational History    Not on file   Social Needs    Financial resource strain: Not on file    Food insecurity:     Worry: Not on file     Inability: Not on file    Transportation needs:     Medical: Not on file     Non-medical: Not on file   Tobacco Use    Smoking status: Former Smoker     Packs/day: 2.00     Years: 35.00     Pack years: 70.00     Last attempt to quit:      Years since quittin.1    Smokeless tobacco: Never Used   Substance and Sexual Activity    Alcohol use: Not Currently    Drug use: Never    Sexual activity: Not Currently   Lifestyle    Physical activity:     Days per week: Not on file     Minutes per session: Not on file    Stress: Not on file   Relationships    Social connections:     Talks on phone: Not on file     Gets together: Not on file     Attends [employer] service: Not on file     Active member of club or organization: Not on file     Attends meetings of clubs or organizations: Not on file     Relationship status: Not on file    Intimate partner violence:     Fear of current or ex partner: Not on file     Emotionally abused: Not on file     Physically abused: Not on file     Forced sexual activity: Not on file   Other Topics Concern    Not on file   Social History Narrative    Not on file       Allergies:  No Known Allergies     OB/GYN:  Age of menarche was 22, medically induced. Age of menopause was 31.     Patient

## 2020-02-20 ENCOUNTER — TELEPHONE (OUTPATIENT)
Dept: CASE MANAGEMENT | Age: 75
End: 2020-02-20

## 2020-02-20 ENCOUNTER — OFFICE VISIT (OUTPATIENT)
Dept: ONCOLOGY | Age: 75
End: 2020-02-20
Payer: MEDICARE

## 2020-02-20 ENCOUNTER — HOSPITAL ENCOUNTER (OUTPATIENT)
Dept: INFUSION THERAPY | Age: 75
Discharge: HOME | End: 2020-02-20
Payer: MEDICARE

## 2020-02-20 VITALS
BODY MASS INDEX: 23.05 KG/M2 | HEART RATE: 70 BPM | HEIGHT: 64 IN | TEMPERATURE: 98 F | WEIGHT: 135 LBS | DIASTOLIC BLOOD PRESSURE: 66 MMHG | SYSTOLIC BLOOD PRESSURE: 143 MMHG

## 2020-02-20 VITALS — HEART RATE: 73 BPM | TEMPERATURE: 97.7 F | DIASTOLIC BLOOD PRESSURE: 62 MMHG | SYSTOLIC BLOOD PRESSURE: 128 MMHG

## 2020-02-20 DIAGNOSIS — C50.912 MALIGNANT NEOPLASM OF LEFT FEMALE BREAST, UNSPECIFIED ESTROGEN RECEPTOR STATUS, UNSPECIFIED SITE OF BREAST (HCC): ICD-10-CM

## 2020-02-20 DIAGNOSIS — R53.83 OTHER FATIGUE: ICD-10-CM

## 2020-02-20 DIAGNOSIS — D05.12 DUCTAL CARCINOMA IN SITU (DCIS) OF LEFT BREAST: Primary | ICD-10-CM

## 2020-02-20 LAB
ALBUMIN SERPL-MCNC: 4 G/DL (ref 3.5–5.2)
ALP BLD-CCNC: 82 U/L (ref 35–104)
ALT SERPL-CCNC: 12 U/L (ref 0–32)
ANION GAP SERPL CALCULATED.3IONS-SCNC: 9 MMOL/L (ref 7–16)
AST SERPL-CCNC: 18 U/L (ref 0–31)
BASOPHILS ABSOLUTE: 0.05 E9/L (ref 0–0.2)
BASOPHILS RELATIVE PERCENT: 1.2 % (ref 0–2)
BILIRUB SERPL-MCNC: 0.2 MG/DL (ref 0–1.2)
BUN BLDV-MCNC: 13 MG/DL (ref 8–23)
CALCIUM SERPL-MCNC: 9 MG/DL (ref 8.6–10.2)
CHLORIDE BLD-SCNC: 99 MMOL/L (ref 98–107)
CO2: 26 MMOL/L (ref 22–29)
CREAT SERPL-MCNC: 0.8 MG/DL (ref 0.5–1)
EOSINOPHILS ABSOLUTE: 0.21 E9/L (ref 0.05–0.5)
EOSINOPHILS RELATIVE PERCENT: 5 % (ref 0–6)
GFR AFRICAN AMERICAN: >60
GFR NON-AFRICAN AMERICAN: >60 ML/MIN/1.73
GLUCOSE BLD-MCNC: 91 MG/DL (ref 74–99)
HCT VFR BLD CALC: 34.6 % (ref 34–48)
HEMOGLOBIN: 11.4 G/DL (ref 11.5–15.5)
IMMATURE GRANULOCYTES #: 0.02 E9/L
IMMATURE GRANULOCYTES %: 0.5 % (ref 0–5)
LYMPHOCYTES ABSOLUTE: 1.1 E9/L (ref 1.5–4)
LYMPHOCYTES RELATIVE PERCENT: 26.4 % (ref 20–42)
MAGNESIUM: 2 MG/DL (ref 1.6–2.6)
MCH RBC QN AUTO: 30 PG (ref 26–35)
MCHC RBC AUTO-ENTMCNC: 32.9 % (ref 32–34.5)
MCV RBC AUTO: 91.1 FL (ref 80–99.9)
MONOCYTES ABSOLUTE: 0.39 E9/L (ref 0.1–0.95)
MONOCYTES RELATIVE PERCENT: 9.4 % (ref 2–12)
NEUTROPHILS ABSOLUTE: 2.39 E9/L (ref 1.8–7.3)
NEUTROPHILS RELATIVE PERCENT: 57.5 % (ref 43–80)
PDW BLD-RTO: 14.9 FL (ref 11.5–15)
PLATELET # BLD: 272 E9/L (ref 130–450)
PMV BLD AUTO: 10.3 FL (ref 7–12)
POTASSIUM SERPL-SCNC: 4.2 MMOL/L (ref 3.5–5)
RBC # BLD: 3.8 E12/L (ref 3.5–5.5)
SODIUM BLD-SCNC: 134 MMOL/L (ref 132–146)
TOTAL PROTEIN: 6.4 G/DL (ref 6.4–8.3)
TSH SERPL DL<=0.05 MIU/L-ACNC: 1.55 UIU/ML (ref 0.27–4.2)
WBC # BLD: 4.2 E9/L (ref 4.5–11.5)

## 2020-02-20 PROCEDURE — 3017F COLORECTAL CA SCREEN DOC REV: CPT | Performed by: INTERNAL MEDICINE

## 2020-02-20 PROCEDURE — 1036F TOBACCO NON-USER: CPT | Performed by: INTERNAL MEDICINE

## 2020-02-20 PROCEDURE — 99213 OFFICE O/P EST LOW 20 MIN: CPT

## 2020-02-20 PROCEDURE — G8399 PT W/DXA RESULTS DOCUMENT: HCPCS | Performed by: INTERNAL MEDICINE

## 2020-02-20 PROCEDURE — G8484 FLU IMMUNIZE NO ADMIN: HCPCS | Performed by: INTERNAL MEDICINE

## 2020-02-20 PROCEDURE — G8420 CALC BMI NORM PARAMETERS: HCPCS | Performed by: INTERNAL MEDICINE

## 2020-02-20 PROCEDURE — 84443 ASSAY THYROID STIM HORMONE: CPT

## 2020-02-20 PROCEDURE — 2500000003 HC RX 250 WO HCPCS: Performed by: INTERNAL MEDICINE

## 2020-02-20 PROCEDURE — 96375 TX/PRO/DX INJ NEW DRUG ADDON: CPT

## 2020-02-20 PROCEDURE — 96413 CHEMO IV INFUSION 1 HR: CPT

## 2020-02-20 PROCEDURE — 6360000002 HC RX W HCPCS: Performed by: INTERNAL MEDICINE

## 2020-02-20 PROCEDURE — 99214 OFFICE O/P EST MOD 30 MIN: CPT | Performed by: INTERNAL MEDICINE

## 2020-02-20 PROCEDURE — 83735 ASSAY OF MAGNESIUM: CPT

## 2020-02-20 PROCEDURE — 1123F ACP DISCUSS/DSCN MKR DOCD: CPT | Performed by: INTERNAL MEDICINE

## 2020-02-20 PROCEDURE — 4040F PNEUMOC VAC/ADMIN/RCVD: CPT | Performed by: INTERNAL MEDICINE

## 2020-02-20 PROCEDURE — 80053 COMPREHEN METABOLIC PANEL: CPT

## 2020-02-20 PROCEDURE — 1090F PRES/ABSN URINE INCON ASSESS: CPT | Performed by: INTERNAL MEDICINE

## 2020-02-20 PROCEDURE — 2580000003 HC RX 258: Performed by: INTERNAL MEDICINE

## 2020-02-20 PROCEDURE — 85025 COMPLETE CBC W/AUTO DIFF WBC: CPT

## 2020-02-20 PROCEDURE — G8427 DOCREV CUR MEDS BY ELIG CLIN: HCPCS | Performed by: INTERNAL MEDICINE

## 2020-02-20 RX ORDER — SODIUM CHLORIDE 0.9 % (FLUSH) 0.9 %
10 SYRINGE (ML) INJECTION PRN
Status: DISCONTINUED | OUTPATIENT
Start: 2020-02-20 | End: 2020-02-21 | Stop reason: HOSPADM

## 2020-02-20 RX ORDER — HEPARIN SODIUM (PORCINE) LOCK FLUSH IV SOLN 100 UNIT/ML 100 UNIT/ML
500 SOLUTION INTRAVENOUS PRN
Status: DISCONTINUED | OUTPATIENT
Start: 2020-02-20 | End: 2020-02-21 | Stop reason: HOSPADM

## 2020-02-20 RX ORDER — DEXAMETHASONE SODIUM PHOSPHATE 10 MG/ML
10 INJECTION INTRAMUSCULAR; INTRAVENOUS ONCE
Status: COMPLETED | OUTPATIENT
Start: 2020-02-20 | End: 2020-02-20

## 2020-02-20 RX ORDER — SODIUM CHLORIDE 9 MG/ML
20 INJECTION, SOLUTION INTRAVENOUS ONCE
Status: COMPLETED | OUTPATIENT
Start: 2020-02-20 | End: 2020-02-20

## 2020-02-20 RX ORDER — DIPHENHYDRAMINE HYDROCHLORIDE 50 MG/ML
50 INJECTION INTRAMUSCULAR; INTRAVENOUS ONCE
Status: COMPLETED | OUTPATIENT
Start: 2020-02-20 | End: 2020-02-20

## 2020-02-20 RX ADMIN — SODIUM CHLORIDE, PRESERVATIVE FREE 10 ML: 5 INJECTION INTRAVENOUS at 13:55

## 2020-02-20 RX ADMIN — DIPHENHYDRAMINE HYDROCHLORIDE 50 MG: 50 INJECTION, SOLUTION INTRAMUSCULAR; INTRAVENOUS at 12:26

## 2020-02-20 RX ADMIN — DEXAMETHASONE SODIUM PHOSPHATE 10 MG: 10 INJECTION INTRAMUSCULAR; INTRAVENOUS at 12:01

## 2020-02-20 RX ADMIN — SODIUM CHLORIDE 20 ML/HR: 9 INJECTION, SOLUTION INTRAVENOUS at 11:47

## 2020-02-20 RX ADMIN — Medication 500 UNITS: at 13:55

## 2020-02-20 RX ADMIN — FAMOTIDINE 20 MG: 10 INJECTION, SOLUTION INTRAVENOUS at 11:58

## 2020-02-20 RX ADMIN — PACLITAXEL 132 MG: 6 INJECTION, SOLUTION INTRAVENOUS at 12:47

## 2020-02-20 NOTE — PROGRESS NOTES
[address] [address]  Dept: [phone]  Loc: [phone]  Attending Progress Note      Reason for Visit:   Left breast cancer. Referring Physician: Kiara Montenegro MD.    PCP:  KAREN Rodriguez    History of Present Illness: The patient is a 69-year-old lady with a past medical history significant for thyroid disease, depression, and IBS, who had presented with an abnormal screening mammogram,    8/20/19  Bilateral screening mammogram  [facility]         FINDINGS: No suspicious masses, calcifications, or distortions are   identified on the right. On the left there is a new focal nodular   asymmetry at 12:00, with adjacent linear branching calcifications. Spot compression views is recommended for the asymmetry with   ultrasound, and spot magnification views for the calcifications. .             Impression   1. Right breast no mammographic evidence of malignancy           2. Left breast new focal asymmetry at 12:00, new microcalcifications.       Recommendation:   1. Right breast annual screening mammogram.   2. Left breast additional views spot compression and spot   magnification along with ultrasound.       BI-RADS Category 0:  Incomplete- Needs Additional Imaging Evaluation             8/29/19  Left Diagnostic mammogram   [facility]         FINDINGS:        A small partially obscured mass is identified in the upper outer left   breast measuring approximately 5-6 mm. Additionally, there is a small   segmentally distributed cluster of pleomorphic microcalcifications   located superiorly near the mass extending to the middle third depth.       Ultrasound confirmed a small irregular shaped hypoechoic mass with   circumscribed margins at the 12:00 position measuring 5 mm in maximal   dimension.           Impression       1. Small solid suspicious mass at the 12:00 position.    2. Segmentally distributed pleomorphic microcalcifications located   near the lacking a  myoepithelial layer. P63 immunostain on block A6 shows the invasive carcinoma lacking a  myoepithelial layer, with a myoepithelial layer retained around the DCIS. Breast Cancer Marker Studies (Block A6):  Negative: 0%        No internal control cells present. Progesterone Receptors (NM):  Negative: 0%        No internal control cells present. Her-2/miles (c-erb B-2) protein expression: Positive (Score 3+)    The patient was started on adjuvant treatment with Herceptin and Taxol on 1/2/2020, she is tolerating the treatment well, she has a lump and tenderness in the right breast.    Review of Systems;  CONSTITUTIONAL: No fever, chills. Good appetite feeling tired. ENMT: Eyes: No diplopia; Nose: Positive for epistaxis. Mouth: No sore throat. RESPIRATORY: No hemoptysis, shortness of breath, cough. CARDIOVASCULAR: No chest pain, palpitations. GASTROINTESTINAL: As per HPI. GENITOURINARY: No dysuria, urinary frequency, hematuria. NEURO: No syncope, presyncope, headache.   Remainder:  ROS NEGATIVE    Past Medical History:      Diagnosis Date    Cancer [facility]) 2019    breast left    Depression     Hypothyroidism     Irritable bowel syndrome     not diagnosed, patient controls with diet    Thyroid disease      Patient Active Problem List   Diagnosis    Ductal carcinoma in situ (DCIS) of left breast    Malignant neoplasm of left female breast, unspecified estrogen receptor status, unspecified site of breast ([facility] [address]    Poor venous access        Past Surgical History:      Procedure Laterality Date    APPENDECTOMY      BREAST SURGERY      mastectomy left nov 20 2019    CHOLECYSTECTOMY      HYSTERECTOMY, TOTAL ABDOMINAL      oophorectomy    INSERTION / REMOVAL / REPLACEMENT VENOUS ACCESS CATHETER N/A 12/27/2019    MEDIPORT INSERTION performed by Татьяна Feldman MD at [facility] Left 11/20/2019    LEFT BREAST SIMPLE  MASTECTOMY, BLUE DYE INJECTION, LEFT systemic therapy. The patient has a small HER-2/miles positive disease, tumor size is 8 mm, adjuvant treatment with Taxol and Herceptin is recommended, schedule and the side effects of the treatment were reviewed with the patient. 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 Will start endocrine therapy after she completes Taxol chemotherapy. Patient had a 2D echocardiogram done, LVEF is 65%, adequate for treatment with Herceptin, she had a port placed, she was started on adjuvant therapy with Taxol and Herceptin on 1/2/2020, tolerating it well, labs reviewed, blood counts are adequate for treatment, proceed with chemotherapy Herceptin and Taxol, week #8 (Taxol today) today 2/20/2020. Right breast tenderness and lumps, ordered right diagnostic mammogram and ultrasound. RTC in 1 week with labs, treatment. Thank you for allowing us to participate in the care of Mrs. Letty Richards.     Burak Maldonado MD   HEMATOLOGY/MEDICAL [address]  [address] [address]  Dept: Saud: [phone]

## 2020-02-20 NOTE — TELEPHONE ENCOUNTER
Met with patient and her significant other in the treatment room during her chemotherapy treatment for follow up. Patient appears well and is in good spirits. States that she is still doing well with the treatments. Reports that her restless legs are bothering her some today and positioning helps relieve it more than her toxic water. Reports eating and sleeping well.  Denies any nausea or vomiting.  Provided support and encouragement.  Her significant other brought her for her treatment today.  Denies any additional needs for assistance from this NN.  Nurse navigator will continue as needed. Carol Whitney, JOSEW, RN, OCN  Oncology Nurse Navigator

## 2020-02-25 ENCOUNTER — HOSPITAL ENCOUNTER (OUTPATIENT)
Dept: GENERAL RADIOLOGY | Age: 75
Discharge: HOME | End: 2020-02-27
Payer: MEDICARE

## 2020-02-25 PROCEDURE — G0279 TOMOSYNTHESIS, MAMMO: HCPCS

## 2020-02-25 PROCEDURE — 76642 ULTRASOUND BREAST LIMITED: CPT

## 2020-02-27 ENCOUNTER — HOSPITAL ENCOUNTER (OUTPATIENT)
Dept: INFUSION THERAPY | Age: 75
Discharge: HOME | End: 2020-02-27
Payer: MEDICARE

## 2020-02-27 ENCOUNTER — OFFICE VISIT (OUTPATIENT)
Dept: ONCOLOGY | Age: 75
End: 2020-02-27
Payer: MEDICARE

## 2020-02-27 VITALS
OXYGEN SATURATION: 98 % | DIASTOLIC BLOOD PRESSURE: 52 MMHG | WEIGHT: 134.4 LBS | HEART RATE: 75 BPM | TEMPERATURE: 97.4 F | BODY MASS INDEX: 22.94 KG/M2 | SYSTOLIC BLOOD PRESSURE: 112 MMHG | HEIGHT: 64 IN

## 2020-02-27 VITALS
HEART RATE: 78 BPM | RESPIRATION RATE: 18 BRPM | SYSTOLIC BLOOD PRESSURE: 142 MMHG | DIASTOLIC BLOOD PRESSURE: 65 MMHG | TEMPERATURE: 98.1 F

## 2020-02-27 DIAGNOSIS — C50.912 MALIGNANT NEOPLASM OF LEFT FEMALE BREAST, UNSPECIFIED ESTROGEN RECEPTOR STATUS, UNSPECIFIED SITE OF BREAST (HCC): Primary | ICD-10-CM

## 2020-02-27 DIAGNOSIS — D05.12 DUCTAL CARCINOMA IN SITU (DCIS) OF LEFT BREAST: ICD-10-CM

## 2020-02-27 LAB
ALBUMIN SERPL-MCNC: 3.8 G/DL (ref 3.5–5.2)
ALP BLD-CCNC: 86 U/L (ref 35–104)
ALT SERPL-CCNC: 9 U/L (ref 0–32)
ANION GAP SERPL CALCULATED.3IONS-SCNC: 11 MMOL/L (ref 7–16)
AST SERPL-CCNC: 18 U/L (ref 0–31)
BASOPHILS ABSOLUTE: 0.08 E9/L (ref 0–0.2)
BASOPHILS RELATIVE PERCENT: 1.5 % (ref 0–2)
BILIRUB SERPL-MCNC: 0.3 MG/DL (ref 0–1.2)
BUN BLDV-MCNC: 9 MG/DL (ref 8–23)
CALCIUM SERPL-MCNC: 8.9 MG/DL (ref 8.6–10.2)
CHLORIDE BLD-SCNC: 97 MMOL/L (ref 98–107)
CO2: 25 MMOL/L (ref 22–29)
CREAT SERPL-MCNC: 0.8 MG/DL (ref 0.5–1)
EOSINOPHILS ABSOLUTE: 0.38 E9/L (ref 0.05–0.5)
EOSINOPHILS RELATIVE PERCENT: 6.9 % (ref 0–6)
GFR AFRICAN AMERICAN: >60
GFR NON-AFRICAN AMERICAN: >60 ML/MIN/1.73
GLUCOSE BLD-MCNC: 124 MG/DL (ref 74–99)
HCT VFR BLD CALC: 34.3 % (ref 34–48)
HEMOGLOBIN: 11.3 G/DL (ref 11.5–15.5)
IMMATURE GRANULOCYTES #: 0.02 E9/L
IMMATURE GRANULOCYTES %: 0.4 % (ref 0–5)
LYMPHOCYTES ABSOLUTE: 0.99 E9/L (ref 1.5–4)
LYMPHOCYTES RELATIVE PERCENT: 18.1 % (ref 20–42)
MCH RBC QN AUTO: 29.9 PG (ref 26–35)
MCHC RBC AUTO-ENTMCNC: 32.9 % (ref 32–34.5)
MCV RBC AUTO: 90.7 FL (ref 80–99.9)
MONOCYTES ABSOLUTE: 0.43 E9/L (ref 0.1–0.95)
MONOCYTES RELATIVE PERCENT: 7.9 % (ref 2–12)
NEUTROPHILS ABSOLUTE: 3.57 E9/L (ref 1.8–7.3)
NEUTROPHILS RELATIVE PERCENT: 65.2 % (ref 43–80)
PDW BLD-RTO: 15 FL (ref 11.5–15)
PLATELET # BLD: 301 E9/L (ref 130–450)
PMV BLD AUTO: 9.9 FL (ref 7–12)
POTASSIUM SERPL-SCNC: 3.8 MMOL/L (ref 3.5–5)
RBC # BLD: 3.78 E12/L (ref 3.5–5.5)
SODIUM BLD-SCNC: 133 MMOL/L (ref 132–146)
TOTAL PROTEIN: 6.4 G/DL (ref 6.4–8.3)
WBC # BLD: 5.5 E9/L (ref 4.5–11.5)

## 2020-02-27 PROCEDURE — G8420 CALC BMI NORM PARAMETERS: HCPCS | Performed by: INTERNAL MEDICINE

## 2020-02-27 PROCEDURE — 6360000002 HC RX W HCPCS: Performed by: INTERNAL MEDICINE

## 2020-02-27 PROCEDURE — 1036F TOBACCO NON-USER: CPT | Performed by: INTERNAL MEDICINE

## 2020-02-27 PROCEDURE — 2580000003 HC RX 258: Performed by: INTERNAL MEDICINE

## 2020-02-27 PROCEDURE — 96413 CHEMO IV INFUSION 1 HR: CPT

## 2020-02-27 PROCEDURE — 2500000003 HC RX 250 WO HCPCS: Performed by: INTERNAL MEDICINE

## 2020-02-27 PROCEDURE — G8484 FLU IMMUNIZE NO ADMIN: HCPCS | Performed by: INTERNAL MEDICINE

## 2020-02-27 PROCEDURE — 85025 COMPLETE CBC W/AUTO DIFF WBC: CPT

## 2020-02-27 PROCEDURE — 1090F PRES/ABSN URINE INCON ASSESS: CPT | Performed by: INTERNAL MEDICINE

## 2020-02-27 PROCEDURE — G8427 DOCREV CUR MEDS BY ELIG CLIN: HCPCS | Performed by: INTERNAL MEDICINE

## 2020-02-27 PROCEDURE — 99214 OFFICE O/P EST MOD 30 MIN: CPT | Performed by: INTERNAL MEDICINE

## 2020-02-27 PROCEDURE — 96375 TX/PRO/DX INJ NEW DRUG ADDON: CPT

## 2020-02-27 PROCEDURE — G8399 PT W/DXA RESULTS DOCUMENT: HCPCS | Performed by: INTERNAL MEDICINE

## 2020-02-27 PROCEDURE — 1123F ACP DISCUSS/DSCN MKR DOCD: CPT | Performed by: INTERNAL MEDICINE

## 2020-02-27 PROCEDURE — 3017F COLORECTAL CA SCREEN DOC REV: CPT | Performed by: INTERNAL MEDICINE

## 2020-02-27 PROCEDURE — 4040F PNEUMOC VAC/ADMIN/RCVD: CPT | Performed by: INTERNAL MEDICINE

## 2020-02-27 PROCEDURE — 80053 COMPREHEN METABOLIC PANEL: CPT

## 2020-02-27 RX ORDER — SODIUM CHLORIDE 9 MG/ML
20 INJECTION, SOLUTION INTRAVENOUS ONCE
Status: COMPLETED | OUTPATIENT
Start: 2020-02-27 | End: 2020-02-27

## 2020-02-27 RX ORDER — DIPHENHYDRAMINE HYDROCHLORIDE 50 MG/ML
50 INJECTION INTRAMUSCULAR; INTRAVENOUS ONCE
Status: COMPLETED | OUTPATIENT
Start: 2020-02-27 | End: 2020-02-27

## 2020-02-27 RX ORDER — SODIUM CHLORIDE 0.9 % (FLUSH) 0.9 %
10 SYRINGE (ML) INJECTION PRN
Status: DISCONTINUED | OUTPATIENT
Start: 2020-02-27 | End: 2020-02-28 | Stop reason: HOSPADM

## 2020-02-27 RX ORDER — HEPARIN SODIUM (PORCINE) LOCK FLUSH IV SOLN 100 UNIT/ML 100 UNIT/ML
500 SOLUTION INTRAVENOUS PRN
Status: DISCONTINUED | OUTPATIENT
Start: 2020-02-27 | End: 2020-02-28 | Stop reason: HOSPADM

## 2020-02-27 RX ORDER — DEXAMETHASONE SODIUM PHOSPHATE 100 MG/10ML
10 INJECTION INTRAMUSCULAR; INTRAVENOUS ONCE
Status: COMPLETED | OUTPATIENT
Start: 2020-02-27 | End: 2020-02-27

## 2020-02-27 RX ADMIN — SODIUM CHLORIDE 20 ML/HR: 9 INJECTION, SOLUTION INTRAVENOUS at 11:16

## 2020-02-27 RX ADMIN — PACLITAXEL 132 MG: 6 INJECTION, SOLUTION INTRAVENOUS at 11:58

## 2020-02-27 RX ADMIN — DEXAMETHASONE SODIUM PHOSPHATE 10 MG: 10 INJECTION, SOLUTION INTRAMUSCULAR; INTRAVENOUS at 11:37

## 2020-02-27 RX ADMIN — DIPHENHYDRAMINE HYDROCHLORIDE 50 MG: 50 INJECTION, SOLUTION INTRAMUSCULAR; INTRAVENOUS at 11:22

## 2020-02-27 RX ADMIN — FAMOTIDINE 20 MG: 10 INJECTION, SOLUTION INTRAVENOUS at 11:18

## 2020-02-27 RX ADMIN — Medication 500 UNITS: at 13:10

## 2020-02-27 RX ADMIN — SODIUM CHLORIDE, PRESERVATIVE FREE 10 ML: 5 INJECTION INTRAVENOUS at 13:10

## 2020-02-27 NOTE — PROGRESS NOTES
breast mass likely at the 12:00 position.       RECOMMENDATION:       Recommend ultrasound core biopsy of the mass seen on ultrasound and   stereotactic biopsy of the microcalcifications.       BI-RADS Category 5: Highly Suggestive of Malignancy          8/29/19  Left Breast US   [facility]         FINDINGS:        A small partially obscured mass is identified in the upper outer left   breast measuring approximately 5-6 mm. Additionally, there is a small   segmentally distributed cluster of pleomorphic microcalcifications   located superiorly near the mass extending to the middle third depth.       Ultrasound confirmed a small irregular shaped hypoechoic mass with   circumscribed margins at the 12:00 position measuring 5 mm in maximal   dimension.           Impression       1. Small solid suspicious mass at the 12:00 position. 2. Segmentally distributed pleomorphic microcalcifications located   near the breast mass likely at the 12:00 position.       RECOMMENDATION:       Recommend ultrasound core biopsy of the mass seen on ultrasound and   stereotactic biopsy of the microcalcifications.       BI-RADS Category 5: Highly Suggestive of Malignancy              She underwent a stereotactic guided left breast core biopsy at 12 o'clock position on September 10 , 2019.a single top hat shaped marker clip was deployed into the site.     She underwent an ultrasound guided biopsy of the left breast at the 12 o'clock position on September 17, 2019, A post-surgical ribbon shaped microclip was placed.      Pathological evaluation completed at [facility]):     9/10/19  Final Surgical Pathology Report  Diagnosis:  A. Left breast, 12:00, biopsy: High-grade ductal carcinoma in situ,  cannot exclude microinvasion, see comment. B. Left breast 12:00, additional, biopsy: High-grade ductal carcinoma in  situ, cannot exclude microinvasion, see comment.     Breast Cancer Marker Studies:  Estrogen Receptors (ER): Positive         Percentage lacking a  myoepithelial layer. P63 immunostain on block A6 shows the invasive carcinoma lacking a  myoepithelial layer, with a myoepithelial layer retained around the DCIS. Breast Cancer Marker Studies (Block A6):  Negative: 0%        No internal control cells present. Progesterone Receptors ([place]):  Negative: 0%        No internal control cells present. Her-2/miles (c-erb B-2) protein expression: Positive (Score 3+)    The patient was started on adjuvant treatment with Herceptin and Taxol on 1/2/2020, she is tolerating the treatment well, she denies any nausea or vomiting or tingling numbness of the hands and feet. Review of Systems;  CONSTITUTIONAL: No fever, chills. Good appetite feeling tired. ENMT: Eyes: No diplopia; Nose: Positive for epistaxis. Mouth: No sore throat. RESPIRATORY: No hemoptysis, shortness of breath, cough. CARDIOVASCULAR: No chest pain, palpitations. GASTROINTESTINAL: As per HPI. GENITOURINARY: No dysuria, urinary frequency, hematuria. NEURO: No syncope, presyncope, headache.   Remainder:  ROS NEGATIVE    Past Medical History:      Diagnosis Date    Cancer [facility]) 2019    breast left    Depression     Hypothyroidism     Irritable bowel syndrome     not diagnosed, patient controls with diet    Thyroid disease      Patient Active Problem List   Diagnosis    Ductal carcinoma in situ (DCIS) of left breast    Malignant neoplasm of left female breast, unspecified estrogen receptor status, unspecified site of breast ([facility] [place] [address]    Poor venous access        Past Surgical History:      Procedure Laterality Date    APPENDECTOMY      BREAST SURGERY      mastectomy left nov 20 2019    CHOLECYSTECTOMY      HYSTERECTOMY, TOTAL ABDOMINAL      oophorectomy    INSERTION / REMOVAL / REPLACEMENT VENOUS ACCESS CATHETER N/A 12/27/2019    MEDIPORT INSERTION performed by Robb Alberts MD at [facility] Left 11/20/2019    LEFT BREAST SIMPLE  MASTECTOMY, BLUE Age of menopause was 31. Patient admits to hormonal therapy, progesterone, premarin. Oral contraceptives? To start periods. Patient is       Physical Exam:  BP (!) 112/52 (Site: Right Upper Arm, Position: Sitting, Cuff Size: Medium Adult)   Pulse 75   Temp 97.4 °F (36.3 °C) (Temporal)   Ht 5' 4\" (1.626 m)   Wt 134 lb 6.4 oz (61 kg)   SpO2 98%   BMI 23.07 kg/m²     GENERAL: Alert, oriented x 3, not in acute distress. HEENT: PERRLA; EOMI. Oropharynx clear. NECK: Supple. No palpable cervical or supraclavicular lymphadenopathy. LUNGS: Good air entry bilaterally. No wheezing, crackles or rhonchi. CARDIOVASCULAR: Regular rate. No murmurs, rubs or gallops. BREASTS: Right breast exam is negative for any skin changes, no nipple discharge, she has nodularity and tenderness in the lower inner and outer quadrants, no palpable right axillary adenopathy. She is status post left mastectomy, the incision is healing nicely, no palpable left axillary lymphadenopathy. CHEST: This post right port placement  ABDOMEN: Soft. Non-tender, non-distended. Positive bowel sounds. EXTREMITIES: Without clubbing, cyanosis, or edema. NEUROLOGIC: No focal deficits. ECOG PS 1      Impression/Plan:      The patient is a 79-year-old lady with a past medical history significant for thyroid disease, depression, and IBS, who had presented with an abnormal screening mammogram, she had a left breast biopsy done revealing high-grade DCIS, ER positive less than 10%, [place] negative, she underwent on 2019 a left mastectomy with sentinel lymph node excisional biopsy, she was found to have invasive ductal carcinoma, tumor size 8 mm, grade 2, with associated DCIS, margins are negative, 2 sentinel lymph nodes were removed, they were both negative for metastatic disease, final pathologic stage pT1b(sn) pN0Mx, ER -0% [place] -0% HER-2/miles +3+ by IHC, prognostic stage IA.     I discussed with the patient her diagnosis, risks of her tumor, prognosis and recommendations for systemic therapy. The patient has a small HER-2/miles positive disease, tumor size is 8 mm, adjuvant treatment with Taxol and Herceptin is recommended, schedule and the side effects of the treatment were reviewed with the patient. 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 Will start endocrine therapy after she completes Taxol chemotherapy. Patient had a 2D echocardiogram done, LVEF is 65%, adequate for treatment with Herceptin, she had a port placed, she was started on adjuvant therapy with Taxol and Herceptin on 1/2/2020, tolerating it well, labs reviewed, blood counts are adequate for treatment, proceed with chemotherapy Herceptin and Taxol, week #9 (Taxol today) today 2/27/2020. Right breast tenderness and lumps, ordered right diagnostic mammogram and ultrasound. They were done on 2/25/2020, there was no mammographic/sonographic evidence of malignancy, this was BI-RADS Category 1, negative. RTC in 1 week with labs, treatment. Thank you for allowing us to participate in the care of Mrs. Rory Raman.     Ollie Ma MD   HEMATOLOGY/MEDICAL [address]  [address] [address]  Dept: Saud: [phone]

## 2020-03-05 ENCOUNTER — OFFICE VISIT (OUTPATIENT)
Dept: ONCOLOGY | Age: 75
End: 2020-03-05
Payer: MEDICARE

## 2020-03-05 ENCOUNTER — HOSPITAL ENCOUNTER (OUTPATIENT)
Dept: INFUSION THERAPY | Age: 75
Discharge: HOME | End: 2020-03-05
Payer: MEDICARE

## 2020-03-05 VITALS — HEART RATE: 77 BPM | SYSTOLIC BLOOD PRESSURE: 116 MMHG | RESPIRATION RATE: 18 BRPM | DIASTOLIC BLOOD PRESSURE: 65 MMHG

## 2020-03-05 VITALS
HEIGHT: 64 IN | SYSTOLIC BLOOD PRESSURE: 129 MMHG | DIASTOLIC BLOOD PRESSURE: 57 MMHG | WEIGHT: 134.4 LBS | BODY MASS INDEX: 22.94 KG/M2 | TEMPERATURE: 96.7 F | OXYGEN SATURATION: 96 % | HEART RATE: 78 BPM

## 2020-03-05 DIAGNOSIS — D05.12 DUCTAL CARCINOMA IN SITU (DCIS) OF LEFT BREAST: ICD-10-CM

## 2020-03-05 DIAGNOSIS — C50.912 MALIGNANT NEOPLASM OF LEFT FEMALE BREAST, UNSPECIFIED ESTROGEN RECEPTOR STATUS, UNSPECIFIED SITE OF BREAST (HCC): Primary | ICD-10-CM

## 2020-03-05 LAB
ALBUMIN SERPL-MCNC: 4.1 G/DL (ref 3.5–5.2)
ALP BLD-CCNC: 89 U/L (ref 35–104)
ALT SERPL-CCNC: 10 U/L (ref 0–32)
ANION GAP SERPL CALCULATED.3IONS-SCNC: 13 MMOL/L (ref 7–16)
AST SERPL-CCNC: 19 U/L (ref 0–31)
BASOPHILS ABSOLUTE: 0.07 E9/L (ref 0–0.2)
BASOPHILS RELATIVE PERCENT: 1.4 % (ref 0–2)
BILIRUB SERPL-MCNC: 0.2 MG/DL (ref 0–1.2)
BUN BLDV-MCNC: 10 MG/DL (ref 8–23)
CALCIUM SERPL-MCNC: 9.1 MG/DL (ref 8.6–10.2)
CHLORIDE BLD-SCNC: 97 MMOL/L (ref 98–107)
CO2: 26 MMOL/L (ref 22–29)
CREAT SERPL-MCNC: 0.8 MG/DL (ref 0.5–1)
EOSINOPHILS ABSOLUTE: 0.28 E9/L (ref 0.05–0.5)
EOSINOPHILS RELATIVE PERCENT: 5.7 % (ref 0–6)
GFR AFRICAN AMERICAN: >60
GFR NON-AFRICAN AMERICAN: >60 ML/MIN/1.73
GLUCOSE BLD-MCNC: 131 MG/DL (ref 74–99)
HCT VFR BLD CALC: 34.3 % (ref 34–48)
HEMOGLOBIN: 11.4 G/DL (ref 11.5–15.5)
IMMATURE GRANULOCYTES #: 0.02 E9/L
IMMATURE GRANULOCYTES %: 0.4 % (ref 0–5)
LYMPHOCYTES ABSOLUTE: 0.95 E9/L (ref 1.5–4)
LYMPHOCYTES RELATIVE PERCENT: 19.3 % (ref 20–42)
MAGNESIUM: 2.1 MG/DL (ref 1.6–2.6)
MCH RBC QN AUTO: 30.2 PG (ref 26–35)
MCHC RBC AUTO-ENTMCNC: 33.2 % (ref 32–34.5)
MCV RBC AUTO: 90.7 FL (ref 80–99.9)
MONOCYTES ABSOLUTE: 0.39 E9/L (ref 0.1–0.95)
MONOCYTES RELATIVE PERCENT: 7.9 % (ref 2–12)
NEUTROPHILS ABSOLUTE: 3.2 E9/L (ref 1.8–7.3)
NEUTROPHILS RELATIVE PERCENT: 65.3 % (ref 43–80)
PDW BLD-RTO: 15 FL (ref 11.5–15)
PLATELET # BLD: 321 E9/L (ref 130–450)
PMV BLD AUTO: 10 FL (ref 7–12)
POTASSIUM SERPL-SCNC: 3.8 MMOL/L (ref 3.5–5)
RBC # BLD: 3.78 E12/L (ref 3.5–5.5)
SODIUM BLD-SCNC: 136 MMOL/L (ref 132–146)
TOTAL PROTEIN: 6.5 G/DL (ref 6.4–8.3)
WBC # BLD: 4.9 E9/L (ref 4.5–11.5)

## 2020-03-05 PROCEDURE — G8420 CALC BMI NORM PARAMETERS: HCPCS | Performed by: INTERNAL MEDICINE

## 2020-03-05 PROCEDURE — G8427 DOCREV CUR MEDS BY ELIG CLIN: HCPCS | Performed by: INTERNAL MEDICINE

## 2020-03-05 PROCEDURE — 80053 COMPREHEN METABOLIC PANEL: CPT

## 2020-03-05 PROCEDURE — 3017F COLORECTAL CA SCREEN DOC REV: CPT | Performed by: INTERNAL MEDICINE

## 2020-03-05 PROCEDURE — 83735 ASSAY OF MAGNESIUM: CPT

## 2020-03-05 PROCEDURE — 4040F PNEUMOC VAC/ADMIN/RCVD: CPT | Performed by: INTERNAL MEDICINE

## 2020-03-05 PROCEDURE — 85025 COMPLETE CBC W/AUTO DIFF WBC: CPT

## 2020-03-05 PROCEDURE — 99214 OFFICE O/P EST MOD 30 MIN: CPT | Performed by: INTERNAL MEDICINE

## 2020-03-05 PROCEDURE — G8399 PT W/DXA RESULTS DOCUMENT: HCPCS | Performed by: INTERNAL MEDICINE

## 2020-03-05 PROCEDURE — 1090F PRES/ABSN URINE INCON ASSESS: CPT | Performed by: INTERNAL MEDICINE

## 2020-03-05 PROCEDURE — 2500000003 HC RX 250 WO HCPCS: Performed by: INTERNAL MEDICINE

## 2020-03-05 PROCEDURE — 1123F ACP DISCUSS/DSCN MKR DOCD: CPT | Performed by: INTERNAL MEDICINE

## 2020-03-05 PROCEDURE — 96417 CHEMO IV INFUS EACH ADDL SEQ: CPT

## 2020-03-05 PROCEDURE — G8484 FLU IMMUNIZE NO ADMIN: HCPCS | Performed by: INTERNAL MEDICINE

## 2020-03-05 PROCEDURE — 6360000002 HC RX W HCPCS: Performed by: INTERNAL MEDICINE

## 2020-03-05 PROCEDURE — 1036F TOBACCO NON-USER: CPT | Performed by: INTERNAL MEDICINE

## 2020-03-05 PROCEDURE — 96413 CHEMO IV INFUSION 1 HR: CPT

## 2020-03-05 PROCEDURE — 96375 TX/PRO/DX INJ NEW DRUG ADDON: CPT

## 2020-03-05 PROCEDURE — 2580000003 HC RX 258: Performed by: INTERNAL MEDICINE

## 2020-03-05 RX ORDER — SODIUM CHLORIDE 0.9 % (FLUSH) 0.9 %
10 SYRINGE (ML) INJECTION PRN
Status: DISCONTINUED | OUTPATIENT
Start: 2020-03-05 | End: 2020-03-06 | Stop reason: HOSPADM

## 2020-03-05 RX ORDER — HEPARIN SODIUM (PORCINE) LOCK FLUSH IV SOLN 100 UNIT/ML 100 UNIT/ML
500 SOLUTION INTRAVENOUS PRN
Status: CANCELLED | OUTPATIENT
Start: 2020-03-12

## 2020-03-05 RX ORDER — DIPHENHYDRAMINE HYDROCHLORIDE 50 MG/ML
50 INJECTION INTRAMUSCULAR; INTRAVENOUS ONCE
Status: CANCELLED | OUTPATIENT
Start: 2020-03-19

## 2020-03-05 RX ORDER — SODIUM CHLORIDE 9 MG/ML
20 INJECTION, SOLUTION INTRAVENOUS ONCE
Status: COMPLETED | OUTPATIENT
Start: 2020-03-05 | End: 2020-03-05

## 2020-03-05 RX ORDER — SODIUM CHLORIDE 0.9 % (FLUSH) 0.9 %
10 SYRINGE (ML) INJECTION PRN
Status: CANCELLED | OUTPATIENT
Start: 2020-03-12

## 2020-03-05 RX ORDER — SODIUM CHLORIDE 0.9 % (FLUSH) 0.9 %
10 SYRINGE (ML) INJECTION PRN
Status: CANCELLED | OUTPATIENT
Start: 2020-03-05

## 2020-03-05 RX ORDER — DEXAMETHASONE SODIUM PHOSPHATE 100 MG/10ML
10 INJECTION INTRAMUSCULAR; INTRAVENOUS ONCE
Status: COMPLETED | OUTPATIENT
Start: 2020-03-05 | End: 2020-03-05

## 2020-03-05 RX ORDER — DIPHENHYDRAMINE HYDROCHLORIDE 50 MG/ML
50 INJECTION INTRAMUSCULAR; INTRAVENOUS ONCE
Status: CANCELLED | OUTPATIENT
Start: 2020-03-05

## 2020-03-05 RX ORDER — DIPHENHYDRAMINE HYDROCHLORIDE 50 MG/ML
50 INJECTION INTRAMUSCULAR; INTRAVENOUS ONCE
Status: COMPLETED | OUTPATIENT
Start: 2020-03-05 | End: 2020-03-05

## 2020-03-05 RX ORDER — MEPERIDINE HYDROCHLORIDE 25 MG/ML
12.5 INJECTION INTRAMUSCULAR; INTRAVENOUS; SUBCUTANEOUS ONCE
Status: CANCELLED | OUTPATIENT
Start: 2020-03-19

## 2020-03-05 RX ORDER — METHYLPREDNISOLONE SODIUM SUCCINATE 125 MG/2ML
125 INJECTION, POWDER, LYOPHILIZED, FOR SOLUTION INTRAMUSCULAR; INTRAVENOUS ONCE
Status: CANCELLED | OUTPATIENT
Start: 2020-03-12

## 2020-03-05 RX ORDER — EPINEPHRINE 1 MG/ML
0.3 INJECTION, SOLUTION, CONCENTRATE INTRAVENOUS PRN
Status: CANCELLED | OUTPATIENT
Start: 2020-03-12

## 2020-03-05 RX ORDER — EPINEPHRINE 1 MG/ML
0.3 INJECTION, SOLUTION, CONCENTRATE INTRAVENOUS PRN
Status: CANCELLED | OUTPATIENT
Start: 2020-03-05

## 2020-03-05 RX ORDER — SODIUM CHLORIDE 0.9 % (FLUSH) 0.9 %
5 SYRINGE (ML) INJECTION PRN
Status: CANCELLED | OUTPATIENT
Start: 2020-03-19

## 2020-03-05 RX ORDER — METHYLPREDNISOLONE SODIUM SUCCINATE 125 MG/2ML
125 INJECTION, POWDER, LYOPHILIZED, FOR SOLUTION INTRAMUSCULAR; INTRAVENOUS ONCE
Status: CANCELLED | OUTPATIENT
Start: 2020-03-05

## 2020-03-05 RX ORDER — HEPARIN SODIUM (PORCINE) LOCK FLUSH IV SOLN 100 UNIT/ML 100 UNIT/ML
500 SOLUTION INTRAVENOUS PRN
Status: CANCELLED | OUTPATIENT
Start: 2020-03-05

## 2020-03-05 RX ORDER — EPINEPHRINE 1 MG/ML
0.3 INJECTION, SOLUTION, CONCENTRATE INTRAVENOUS PRN
Status: CANCELLED | OUTPATIENT
Start: 2020-03-19

## 2020-03-05 RX ORDER — DIPHENHYDRAMINE HYDROCHLORIDE 50 MG/ML
50 INJECTION INTRAMUSCULAR; INTRAVENOUS ONCE
Status: CANCELLED | OUTPATIENT
Start: 2020-03-12

## 2020-03-05 RX ORDER — SODIUM CHLORIDE 9 MG/ML
INJECTION, SOLUTION INTRAVENOUS CONTINUOUS
Status: CANCELLED | OUTPATIENT
Start: 2020-03-05

## 2020-03-05 RX ORDER — MEPERIDINE HYDROCHLORIDE 25 MG/ML
12.5 INJECTION INTRAMUSCULAR; INTRAVENOUS; SUBCUTANEOUS ONCE
Status: CANCELLED | OUTPATIENT
Start: 2020-03-05

## 2020-03-05 RX ORDER — SODIUM CHLORIDE 9 MG/ML
20 INJECTION, SOLUTION INTRAVENOUS ONCE
Status: CANCELLED | OUTPATIENT
Start: 2020-03-05

## 2020-03-05 RX ORDER — SODIUM CHLORIDE 9 MG/ML
INJECTION, SOLUTION INTRAVENOUS CONTINUOUS
Status: CANCELLED | OUTPATIENT
Start: 2020-03-12

## 2020-03-05 RX ORDER — HEPARIN SODIUM (PORCINE) LOCK FLUSH IV SOLN 100 UNIT/ML 100 UNIT/ML
500 SOLUTION INTRAVENOUS PRN
Status: DISCONTINUED | OUTPATIENT
Start: 2020-03-05 | End: 2020-03-06 | Stop reason: HOSPADM

## 2020-03-05 RX ORDER — SODIUM CHLORIDE 0.9 % (FLUSH) 0.9 %
5 SYRINGE (ML) INJECTION PRN
Status: CANCELLED | OUTPATIENT
Start: 2020-03-12

## 2020-03-05 RX ORDER — SODIUM CHLORIDE 9 MG/ML
20 INJECTION, SOLUTION INTRAVENOUS ONCE
Status: CANCELLED | OUTPATIENT
Start: 2020-03-19

## 2020-03-05 RX ORDER — METHYLPREDNISOLONE SODIUM SUCCINATE 125 MG/2ML
125 INJECTION, POWDER, LYOPHILIZED, FOR SOLUTION INTRAMUSCULAR; INTRAVENOUS ONCE
Status: CANCELLED | OUTPATIENT
Start: 2020-03-19

## 2020-03-05 RX ORDER — DEXAMETHASONE SODIUM PHOSPHATE 10 MG/ML
10 INJECTION, SOLUTION INTRAMUSCULAR; INTRAVENOUS ONCE
Status: CANCELLED | OUTPATIENT
Start: 2020-03-05

## 2020-03-05 RX ORDER — DEXAMETHASONE SODIUM PHOSPHATE 10 MG/ML
10 INJECTION, SOLUTION INTRAMUSCULAR; INTRAVENOUS ONCE
Status: CANCELLED | OUTPATIENT
Start: 2020-03-12

## 2020-03-05 RX ORDER — SODIUM CHLORIDE 0.9 % (FLUSH) 0.9 %
10 SYRINGE (ML) INJECTION PRN
Status: CANCELLED | OUTPATIENT
Start: 2020-03-19

## 2020-03-05 RX ORDER — MEPERIDINE HYDROCHLORIDE 25 MG/ML
12.5 INJECTION INTRAMUSCULAR; INTRAVENOUS; SUBCUTANEOUS ONCE
Status: CANCELLED | OUTPATIENT
Start: 2020-03-12

## 2020-03-05 RX ORDER — SODIUM CHLORIDE 9 MG/ML
INJECTION, SOLUTION INTRAVENOUS CONTINUOUS
Status: CANCELLED | OUTPATIENT
Start: 2020-03-19

## 2020-03-05 RX ORDER — DEXAMETHASONE SODIUM PHOSPHATE 10 MG/ML
10 INJECTION, SOLUTION INTRAMUSCULAR; INTRAVENOUS ONCE
Status: CANCELLED | OUTPATIENT
Start: 2020-03-19

## 2020-03-05 RX ORDER — SODIUM CHLORIDE 0.9 % (FLUSH) 0.9 %
5 SYRINGE (ML) INJECTION PRN
Status: CANCELLED | OUTPATIENT
Start: 2020-03-05

## 2020-03-05 RX ORDER — HEPARIN SODIUM (PORCINE) LOCK FLUSH IV SOLN 100 UNIT/ML 100 UNIT/ML
500 SOLUTION INTRAVENOUS PRN
Status: CANCELLED | OUTPATIENT
Start: 2020-03-19

## 2020-03-05 RX ORDER — SODIUM CHLORIDE 9 MG/ML
20 INJECTION, SOLUTION INTRAVENOUS ONCE
Status: CANCELLED | OUTPATIENT
Start: 2020-03-12

## 2020-03-05 RX ADMIN — FAMOTIDINE 20 MG: 10 INJECTION, SOLUTION INTRAVENOUS at 10:00

## 2020-03-05 RX ADMIN — PACLITAXEL 132 MG: 6 INJECTION, SOLUTION INTRAVENOUS at 11:11

## 2020-03-05 RX ADMIN — TRASTUZUMAB 357 MG: 150 INJECTION, POWDER, LYOPHILIZED, FOR SOLUTION INTRAVENOUS at 10:23

## 2020-03-05 RX ADMIN — DEXAMETHASONE SODIUM PHOSPHATE 10 MG: 10 INJECTION, SOLUTION INTRAMUSCULAR; INTRAVENOUS at 09:48

## 2020-03-05 RX ADMIN — Medication 10 ML: at 12:40

## 2020-03-05 RX ADMIN — SODIUM CHLORIDE 20 ML/HR: 9 INJECTION, SOLUTION INTRAVENOUS at 09:00

## 2020-03-05 RX ADMIN — Medication 500 UNITS: at 12:41

## 2020-03-05 RX ADMIN — DIPHENHYDRAMINE HYDROCHLORIDE 50 MG: 50 INJECTION, SOLUTION INTRAMUSCULAR; INTRAVENOUS at 09:43

## 2020-03-05 NOTE — PROGRESS NOTES
AXILLARY  SENTINEL NODE BIOPSY POSSIBLE LEFT AXILLARY DISSECTION -- Gila Ibarra -- PECTORAL BLOCK performed by Selena Dos Santos MD at [facility] 22 TONSILLECTOMY         Family History:  Family History   Problem Relation Age of Onset    Cancer Mother 66        liver    Cancer Brother 58        kidney       Medications:  Reviewed and reconciled. Social History:  Social History     Socioeconomic History    Marital status: Single     Spouse name: Not on file    Number of children: Not on file    Years of education: Not on file    Highest education level: Not on file   Occupational History    Not on file   Social Needs    Financial resource strain: Not on file    Food insecurity:     Worry: Not on file     Inability: Not on file    Transportation needs:     Medical: Not on file     Non-medical: Not on file   Tobacco Use    Smoking status: Former Smoker     Packs/day: 2.00     Years: 35.00     Pack years: 70.00     Last attempt to quit:      Years since quittin.1    Smokeless tobacco: Never Used   Substance and Sexual Activity    Alcohol use: Not Currently    Drug use: Never    Sexual activity: Not Currently   Lifestyle    Physical activity:     Days per week: Not on file     Minutes per session: Not on file    Stress: Not on file   Relationships    Social connections:     Talks on phone: Not on file     Gets together: Not on file     Attends [employer] service: Not on file     Active member of club or organization: Not on file     Attends meetings of clubs or organizations: Not on file     Relationship status: Not on file    Intimate partner violence:     Fear of current or ex partner: Not on file     Emotionally abused: Not on file     Physically abused: Not on file     Forced sexual activity: Not on file   Other Topics Concern    Not on file   Social History Narrative    Not on file       Allergies:  No Known Allergies     OB/GYN:  Age of menarche was 22, medically induced.    Age of menopause recommendations for systemic therapy. The patient has a small HER-2/miles positive disease, tumor size is 8 mm, adjuvant treatment with Taxol and Herceptin is recommended, schedule and the side effects of the treatment were reviewed with the patient. 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 Will start endocrine therapy after she completes Taxol chemotherapy. Patient had a 2D echocardiogram done, LVEF is 65%, adequate for treatment with Herceptin, she had a port placed, she was started on adjuvant therapy with Taxol and Herceptin on 1/2/2020, tolerating it well, labs reviewed, blood counts are adequate for treatment, proceed with chemotherapy Herceptin and Taxol, week #9 (Herceptin and Taxol today) today 3/5/2020. She will be due for repeat echocardiogram around April 2, 2020. Right breast tenderness and lumps, ordered right diagnostic mammogram and ultrasound. They were done on 2/25/2020, there was no mammographic/sonographic evidence of malignancy, this was BI-RADS Category 1, negative. RTC in 1 week with labs, treatment. Thank you for allowing us to participate in the care of Mrs. Alex Barry.     Dk Shrestha MD   HEMATOLOGY/MEDICAL [address]  [address] [facility] [address]  Dept: Saud: [phone]

## 2020-03-12 ENCOUNTER — HOSPITAL ENCOUNTER (OUTPATIENT)
Dept: INFUSION THERAPY | Age: 75
Discharge: HOME | End: 2020-03-12
Payer: MEDICARE

## 2020-03-12 ENCOUNTER — OFFICE VISIT (OUTPATIENT)
Dept: ONCOLOGY | Age: 75
End: 2020-03-12
Payer: MEDICARE

## 2020-03-12 VITALS
BODY MASS INDEX: 22.86 KG/M2 | DIASTOLIC BLOOD PRESSURE: 69 MMHG | WEIGHT: 133.9 LBS | SYSTOLIC BLOOD PRESSURE: 132 MMHG | HEART RATE: 76 BPM | HEIGHT: 64 IN | TEMPERATURE: 97.5 F

## 2020-03-12 VITALS — TEMPERATURE: 98 F | HEART RATE: 77 BPM | SYSTOLIC BLOOD PRESSURE: 134 MMHG | DIASTOLIC BLOOD PRESSURE: 70 MMHG

## 2020-03-12 DIAGNOSIS — D05.12 DUCTAL CARCINOMA IN SITU (DCIS) OF LEFT BREAST: ICD-10-CM

## 2020-03-12 DIAGNOSIS — C50.912 MALIGNANT NEOPLASM OF LEFT FEMALE BREAST, UNSPECIFIED ESTROGEN RECEPTOR STATUS, UNSPECIFIED SITE OF BREAST (HCC): Primary | ICD-10-CM

## 2020-03-12 LAB
ALBUMIN SERPL-MCNC: 3.8 G/DL (ref 3.5–5.2)
ALP BLD-CCNC: 83 U/L (ref 35–104)
ALT SERPL-CCNC: 9 U/L (ref 0–32)
ANION GAP SERPL CALCULATED.3IONS-SCNC: 14 MMOL/L (ref 7–16)
AST SERPL-CCNC: 17 U/L (ref 0–31)
BASOPHILS ABSOLUTE: 0.06 E9/L (ref 0–0.2)
BASOPHILS RELATIVE PERCENT: 1 % (ref 0–2)
BILIRUB SERPL-MCNC: 0.2 MG/DL (ref 0–1.2)
BUN BLDV-MCNC: 9 MG/DL (ref 8–23)
CALCIUM SERPL-MCNC: 8.6 MG/DL (ref 8.6–10.2)
CHLORIDE BLD-SCNC: 97 MMOL/L (ref 98–107)
CO2: 24 MMOL/L (ref 22–29)
CREAT SERPL-MCNC: 0.8 MG/DL (ref 0.5–1)
EOSINOPHILS ABSOLUTE: 0.1 E9/L (ref 0.05–0.5)
EOSINOPHILS RELATIVE PERCENT: 1.6 % (ref 0–6)
GFR AFRICAN AMERICAN: >60
GFR NON-AFRICAN AMERICAN: >60 ML/MIN/1.73
GLUCOSE BLD-MCNC: 115 MG/DL (ref 74–99)
HCT VFR BLD CALC: 34.1 % (ref 34–48)
HEMOGLOBIN: 11.3 G/DL (ref 11.5–15.5)
IMMATURE GRANULOCYTES #: 0.03 E9/L
IMMATURE GRANULOCYTES %: 0.5 % (ref 0–5)
LYMPHOCYTES ABSOLUTE: 0.98 E9/L (ref 1.5–4)
LYMPHOCYTES RELATIVE PERCENT: 15.7 % (ref 20–42)
MAGNESIUM: 2 MG/DL (ref 1.6–2.6)
MCH RBC QN AUTO: 30.5 PG (ref 26–35)
MCHC RBC AUTO-ENTMCNC: 33.1 % (ref 32–34.5)
MCV RBC AUTO: 92.2 FL (ref 80–99.9)
MONOCYTES ABSOLUTE: 0.48 E9/L (ref 0.1–0.95)
MONOCYTES RELATIVE PERCENT: 7.7 % (ref 2–12)
NEUTROPHILS ABSOLUTE: 4.58 E9/L (ref 1.8–7.3)
NEUTROPHILS RELATIVE PERCENT: 73.5 % (ref 43–80)
PDW BLD-RTO: 15.3 FL (ref 11.5–15)
PLATELET # BLD: 305 E9/L (ref 130–450)
PMV BLD AUTO: 10.1 FL (ref 7–12)
POTASSIUM SERPL-SCNC: 4.1 MMOL/L (ref 3.5–5)
RBC # BLD: 3.7 E12/L (ref 3.5–5.5)
SODIUM BLD-SCNC: 135 MMOL/L (ref 132–146)
TOTAL PROTEIN: 6.3 G/DL (ref 6.4–8.3)
WBC # BLD: 6.2 E9/L (ref 4.5–11.5)

## 2020-03-12 PROCEDURE — G8399 PT W/DXA RESULTS DOCUMENT: HCPCS | Performed by: INTERNAL MEDICINE

## 2020-03-12 PROCEDURE — G8427 DOCREV CUR MEDS BY ELIG CLIN: HCPCS | Performed by: INTERNAL MEDICINE

## 2020-03-12 PROCEDURE — 96375 TX/PRO/DX INJ NEW DRUG ADDON: CPT

## 2020-03-12 PROCEDURE — 96413 CHEMO IV INFUSION 1 HR: CPT

## 2020-03-12 PROCEDURE — 1123F ACP DISCUSS/DSCN MKR DOCD: CPT | Performed by: INTERNAL MEDICINE

## 2020-03-12 PROCEDURE — 99214 OFFICE O/P EST MOD 30 MIN: CPT | Performed by: INTERNAL MEDICINE

## 2020-03-12 PROCEDURE — G8484 FLU IMMUNIZE NO ADMIN: HCPCS | Performed by: INTERNAL MEDICINE

## 2020-03-12 PROCEDURE — 83735 ASSAY OF MAGNESIUM: CPT

## 2020-03-12 PROCEDURE — 2500000003 HC RX 250 WO HCPCS: Performed by: INTERNAL MEDICINE

## 2020-03-12 PROCEDURE — 4040F PNEUMOC VAC/ADMIN/RCVD: CPT | Performed by: INTERNAL MEDICINE

## 2020-03-12 PROCEDURE — 2580000003 HC RX 258: Performed by: INTERNAL MEDICINE

## 2020-03-12 PROCEDURE — 85025 COMPLETE CBC W/AUTO DIFF WBC: CPT

## 2020-03-12 PROCEDURE — G8420 CALC BMI NORM PARAMETERS: HCPCS | Performed by: INTERNAL MEDICINE

## 2020-03-12 PROCEDURE — 3017F COLORECTAL CA SCREEN DOC REV: CPT | Performed by: INTERNAL MEDICINE

## 2020-03-12 PROCEDURE — 1090F PRES/ABSN URINE INCON ASSESS: CPT | Performed by: INTERNAL MEDICINE

## 2020-03-12 PROCEDURE — 1036F TOBACCO NON-USER: CPT | Performed by: INTERNAL MEDICINE

## 2020-03-12 PROCEDURE — 80053 COMPREHEN METABOLIC PANEL: CPT

## 2020-03-12 PROCEDURE — 6360000002 HC RX W HCPCS: Performed by: INTERNAL MEDICINE

## 2020-03-12 RX ORDER — HEPARIN SODIUM (PORCINE) LOCK FLUSH IV SOLN 100 UNIT/ML 100 UNIT/ML
500 SOLUTION INTRAVENOUS PRN
Status: DISCONTINUED | OUTPATIENT
Start: 2020-03-12 | End: 2020-03-13 | Stop reason: HOSPADM

## 2020-03-12 RX ORDER — DIPHENHYDRAMINE HYDROCHLORIDE 50 MG/ML
50 INJECTION INTRAMUSCULAR; INTRAVENOUS ONCE
Status: COMPLETED | OUTPATIENT
Start: 2020-03-12 | End: 2020-03-12

## 2020-03-12 RX ORDER — SODIUM CHLORIDE 9 MG/ML
20 INJECTION, SOLUTION INTRAVENOUS ONCE
Status: COMPLETED | OUTPATIENT
Start: 2020-03-12 | End: 2020-03-12

## 2020-03-12 RX ORDER — DEXAMETHASONE SODIUM PHOSPHATE 100 MG/10ML
10 INJECTION INTRAMUSCULAR; INTRAVENOUS ONCE
Status: COMPLETED | OUTPATIENT
Start: 2020-03-12 | End: 2020-03-12

## 2020-03-12 RX ORDER — SODIUM CHLORIDE 0.9 % (FLUSH) 0.9 %
10 SYRINGE (ML) INJECTION PRN
Status: DISCONTINUED | OUTPATIENT
Start: 2020-03-12 | End: 2020-03-13 | Stop reason: HOSPADM

## 2020-03-12 RX ADMIN — SODIUM CHLORIDE 20 ML/HR: 9 INJECTION, SOLUTION INTRAVENOUS at 11:21

## 2020-03-12 RX ADMIN — DIPHENHYDRAMINE HYDROCHLORIDE 50 MG: 50 INJECTION, SOLUTION INTRAMUSCULAR; INTRAVENOUS at 11:27

## 2020-03-12 RX ADMIN — FAMOTIDINE 20 MG: 10 INJECTION, SOLUTION INTRAVENOUS at 11:24

## 2020-03-12 RX ADMIN — DEXAMETHASONE SODIUM PHOSPHATE 10 MG: 10 INJECTION, SOLUTION INTRAMUSCULAR; INTRAVENOUS at 11:58

## 2020-03-12 RX ADMIN — PACLITAXEL 132 MG: 6 INJECTION, SOLUTION INTRAVENOUS at 12:40

## 2020-03-12 RX ADMIN — Medication 500 UNITS: at 14:00

## 2020-03-12 RX ADMIN — SODIUM CHLORIDE, PRESERVATIVE FREE 10 ML: 5 INJECTION INTRAVENOUS at 13:59

## 2020-03-12 NOTE — PROGRESS NOTES
[address] [address]  Dept: [phone]  Loc: [phone]  Attending Progress Note      Reason for Visit:   Left breast cancer. Referring Physician: Ignacio Apodaca MD.    PCP:  KAREN Beth    History of Present Illness: The patient is a 39-year-old lady with a past medical history significant for thyroid disease, depression, and IBS, who had presented with an abnormal screening mammogram,    8/20/19  Bilateral screening mammogram  [facility]         FINDINGS: No suspicious masses, calcifications, or distortions are   identified on the right. On the left there is a new focal nodular   asymmetry at 12:00, with adjacent linear branching calcifications. Spot compression views is recommended for the asymmetry with   ultrasound, and spot magnification views for the calcifications. .             Impression   1. Right breast no mammographic evidence of malignancy           2. Left breast new focal asymmetry at 12:00, new microcalcifications.       Recommendation:   1. Right breast annual screening mammogram.   2. Left breast additional views spot compression and spot   magnification along with ultrasound.       BI-RADS Category 0:  Incomplete- Needs Additional Imaging Evaluation             8/29/19  Left Diagnostic mammogram   [facility]         FINDINGS:        A small partially obscured mass is identified in the upper outer left   breast measuring approximately 5-6 mm. Additionally, there is a small   segmentally distributed cluster of pleomorphic microcalcifications   located superiorly near the mass extending to the middle third depth.       Ultrasound confirmed a small irregular shaped hypoechoic mass with   circumscribed margins at the 12:00 position measuring 5 mm in maximal   dimension.           Impression       1. Small solid suspicious mass at the 12:00 position.    2. Segmentally distributed pleomorphic microcalcifications located   near the lacking a  myoepithelial layer. P63 immunostain on block A6 shows the invasive carcinoma lacking a  myoepithelial layer, with a myoepithelial layer retained around the DCIS. Breast Cancer Marker Studies (Block A6):  Negative: 0%        No internal control cells present. Progesterone Receptors ([place]):  Negative: 0%        No internal control cells present. Her-2/miles (c-erb B-2) protein expression: Positive (Score 3+)    The patient was started on adjuvant treatment with Herceptin and Taxol on 1/2/2020, she had tingling and numbness in her left foot and the tips of her thumbs, which had resolved. Review of Systems;  CONSTITUTIONAL: No fever, chills. Good appetite feeling tired. ENMT: Eyes: No diplopia; Nose: Positive for epistaxis. Mouth: No sore throat. RESPIRATORY: No hemoptysis, shortness of breath, cough. CARDIOVASCULAR: No chest pain, palpitations. GASTROINTESTINAL: As per HPI. GENITOURINARY: No dysuria, urinary frequency, hematuria. NEURO: No syncope, presyncope, headache.   Remainder:  ROS NEGATIVE    Past Medical History:      Diagnosis Date    Cancer [facility]) 2019    breast left    Depression     Hypothyroidism     Irritable bowel syndrome     not diagnosed, patient controls with diet    Thyroid disease      Patient Active Problem List   Diagnosis    Ductal carcinoma in situ (DCIS) of left breast    Malignant neoplasm of left female breast, unspecified estrogen receptor status, unspecified site of breast ([facility] [address]    Poor venous access        Past Surgical History:      Procedure Laterality Date    APPENDECTOMY      BREAST SURGERY      mastectomy left nov 20 2019    CHOLECYSTECTOMY      HYSTERECTOMY, TOTAL ABDOMINAL      oophorectomy    INSERTION / REMOVAL / REPLACEMENT VENOUS ACCESS CATHETER N/A 12/27/2019    MEDIPORT INSERTION performed by Matilde Cazares MD at [facility] Left 11/20/2019    LEFT BREAST SIMPLE  MASTECTOMY, BLUE DYE INJECTION, LEFT Patient admits to hormonal therapy, progesterone, premarin. Oral contraceptives? To start periods. Patient is       Physical Exam:  /69 (Site: Right Upper Arm, Position: Sitting, Cuff Size: Medium Adult)   Pulse 76   Temp 97.5 °F (36.4 °C) (Temporal)   Ht 5' 4\" (1.626 m)   Wt 133 lb 14.4 oz (60.7 kg)   BMI 22.98 kg/m²     GENERAL: Alert, oriented x 3, not in acute distress. HEENT: PERRLA; EOMI. Oropharynx clear. NECK: Supple. No palpable cervical or supraclavicular lymphadenopathy. LUNGS: Good air entry bilaterally. No wheezing, crackles or rhonchi. CARDIOVASCULAR: Regular rate. No murmurs, rubs or gallops. BREASTS: Right breast exam is negative for any skin changes, no nipple discharge, she has nodularity and tenderness in the lower inner and outer quadrants, no palpable right axillary adenopathy. She is status post left mastectomy, the incision is healing nicely, no palpable left axillary lymphadenopathy. CHEST: This post right port placement  ABDOMEN: Soft. Non-tender, non-distended. Positive bowel sounds. EXTREMITIES: Without clubbing, cyanosis, or edema. NEUROLOGIC: No focal deficits. ECOG PS 1      Impression/Plan:      The patient is a 41-year-old lady with a past medical history significant for thyroid disease, depression, and IBS, who had presented with an abnormal screening mammogram, she had a left breast biopsy done revealing high-grade DCIS, ER positive less than 10%, [place] negative, she underwent on 2019 a left mastectomy with sentinel lymph node excisional biopsy, she was found to have invasive ductal carcinoma, tumor size 8 mm, grade 2, with associated DCIS, margins are negative, 2 sentinel lymph nodes were removed, they were both negative for metastatic disease, final pathologic stage pT1b(sn) pN0Mx, ER -0% [place] -0% HER-2/miles +3+ by IHC, prognostic stage IA.     I discussed with the patient her diagnosis, risks of her tumor, prognosis and recommendations for systemic therapy. The patient has a small HER-2/miles positive disease, tumor size is 8 mm, adjuvant treatment with Taxol and Herceptin is recommended, schedule and the side effects of the treatment were reviewed with the patient. 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 Will start endocrine therapy after she completes Taxol chemotherapy. Patient had a 2D echocardiogram done, LVEF is 65%, adequate for treatment with Herceptin, she had a port placed, she was started on adjuvant therapy with Taxol and Herceptin on 1/2/2020. labs reviewed, blood counts are adequate for treatment, proceed with chemotherapy Herceptin and Taxol, week #11 ( Taxol today) today 3/12/2020. She will be due for repeat echocardiogram around April 2, 2020. 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 Right breast tenderness and lumps, ordered right diagnostic mammogram and ultrasound. They were done on 2/25/2020, there was no mammographic/sonographic evidence of malignancy, this was BI-RADS Category 1, negative. RTC in 1 week with labs, treatment. Thank you for allowing us to participate in the care of Mrs. Star Bean.     Dayne Rdz MD   HEMATOLOGY/MEDICAL [address]  [address] [facility] [address]  Dept: Saud: [phone]

## 2020-03-19 ENCOUNTER — OFFICE VISIT (OUTPATIENT)
Dept: ONCOLOGY | Age: 75
End: 2020-03-19
Payer: MEDICARE

## 2020-03-19 ENCOUNTER — TELEPHONE (OUTPATIENT)
Dept: CASE MANAGEMENT | Age: 75
End: 2020-03-19

## 2020-03-19 ENCOUNTER — HOSPITAL ENCOUNTER (OUTPATIENT)
Dept: INFUSION THERAPY | Age: 75
Discharge: HOME | End: 2020-03-19
Payer: MEDICARE

## 2020-03-19 VITALS
DIASTOLIC BLOOD PRESSURE: 75 MMHG | HEART RATE: 78 BPM | TEMPERATURE: 98.1 F | WEIGHT: 133.8 LBS | BODY MASS INDEX: 22.84 KG/M2 | HEIGHT: 64 IN | SYSTOLIC BLOOD PRESSURE: 147 MMHG | OXYGEN SATURATION: 97 %

## 2020-03-19 VITALS
HEART RATE: 74 BPM | DIASTOLIC BLOOD PRESSURE: 68 MMHG | SYSTOLIC BLOOD PRESSURE: 119 MMHG | TEMPERATURE: 98.1 F | RESPIRATION RATE: 18 BRPM

## 2020-03-19 DIAGNOSIS — C50.912 MALIGNANT NEOPLASM OF LEFT FEMALE BREAST, UNSPECIFIED ESTROGEN RECEPTOR STATUS, UNSPECIFIED SITE OF BREAST (HCC): Primary | ICD-10-CM

## 2020-03-19 DIAGNOSIS — D05.12 DUCTAL CARCINOMA IN SITU (DCIS) OF LEFT BREAST: ICD-10-CM

## 2020-03-19 LAB
ALBUMIN SERPL-MCNC: 3.5 G/DL (ref 3.5–5.2)
ALP BLD-CCNC: 88 U/L (ref 35–104)
ALT SERPL-CCNC: 8 U/L (ref 0–32)
ANION GAP SERPL CALCULATED.3IONS-SCNC: 9 MMOL/L (ref 7–16)
AST SERPL-CCNC: 17 U/L (ref 0–31)
BASOPHILS ABSOLUTE: 0.04 E9/L (ref 0–0.2)
BASOPHILS RELATIVE PERCENT: 0.6 % (ref 0–2)
BILIRUB SERPL-MCNC: 0.2 MG/DL (ref 0–1.2)
BUN BLDV-MCNC: 10 MG/DL (ref 8–23)
CALCIUM SERPL-MCNC: 8.8 MG/DL (ref 8.6–10.2)
CHLORIDE BLD-SCNC: 97 MMOL/L (ref 98–107)
CO2: 24 MMOL/L (ref 22–29)
CREAT SERPL-MCNC: 0.8 MG/DL (ref 0.5–1)
EOSINOPHILS ABSOLUTE: 0.08 E9/L (ref 0.05–0.5)
EOSINOPHILS RELATIVE PERCENT: 1.1 % (ref 0–6)
GFR AFRICAN AMERICAN: >60
GFR NON-AFRICAN AMERICAN: >60 ML/MIN/1.73
GLUCOSE BLD-MCNC: 83 MG/DL (ref 74–99)
HCT VFR BLD CALC: 33.1 % (ref 34–48)
HEMOGLOBIN: 11 G/DL (ref 11.5–15.5)
IMMATURE GRANULOCYTES #: 0.05 E9/L
IMMATURE GRANULOCYTES %: 0.7 % (ref 0–5)
LYMPHOCYTES ABSOLUTE: 1.06 E9/L (ref 1.5–4)
LYMPHOCYTES RELATIVE PERCENT: 14.6 % (ref 20–42)
MAGNESIUM: 2 MG/DL (ref 1.6–2.6)
MCH RBC QN AUTO: 30.3 PG (ref 26–35)
MCHC RBC AUTO-ENTMCNC: 33.2 % (ref 32–34.5)
MCV RBC AUTO: 91.2 FL (ref 80–99.9)
MONOCYTES ABSOLUTE: 0.64 E9/L (ref 0.1–0.95)
MONOCYTES RELATIVE PERCENT: 8.8 % (ref 2–12)
NEUTROPHILS ABSOLUTE: 5.39 E9/L (ref 1.8–7.3)
NEUTROPHILS RELATIVE PERCENT: 74.2 % (ref 43–80)
PDW BLD-RTO: 15.4 FL (ref 11.5–15)
PLATELET # BLD: 329 E9/L (ref 130–450)
PMV BLD AUTO: 9.9 FL (ref 7–12)
POTASSIUM SERPL-SCNC: 4.3 MMOL/L (ref 3.5–5)
RBC # BLD: 3.63 E12/L (ref 3.5–5.5)
SODIUM BLD-SCNC: 130 MMOL/L (ref 132–146)
TOTAL PROTEIN: 6.1 G/DL (ref 6.4–8.3)
WBC # BLD: 7.3 E9/L (ref 4.5–11.5)

## 2020-03-19 PROCEDURE — G8484 FLU IMMUNIZE NO ADMIN: HCPCS | Performed by: INTERNAL MEDICINE

## 2020-03-19 PROCEDURE — 4040F PNEUMOC VAC/ADMIN/RCVD: CPT | Performed by: INTERNAL MEDICINE

## 2020-03-19 PROCEDURE — 80053 COMPREHEN METABOLIC PANEL: CPT

## 2020-03-19 PROCEDURE — 3017F COLORECTAL CA SCREEN DOC REV: CPT | Performed by: INTERNAL MEDICINE

## 2020-03-19 PROCEDURE — G8399 PT W/DXA RESULTS DOCUMENT: HCPCS | Performed by: INTERNAL MEDICINE

## 2020-03-19 PROCEDURE — 99214 OFFICE O/P EST MOD 30 MIN: CPT | Performed by: INTERNAL MEDICINE

## 2020-03-19 PROCEDURE — 2580000003 HC RX 258: Performed by: INTERNAL MEDICINE

## 2020-03-19 PROCEDURE — 6360000002 HC RX W HCPCS: Performed by: INTERNAL MEDICINE

## 2020-03-19 PROCEDURE — 1123F ACP DISCUSS/DSCN MKR DOCD: CPT | Performed by: INTERNAL MEDICINE

## 2020-03-19 PROCEDURE — G8420 CALC BMI NORM PARAMETERS: HCPCS | Performed by: INTERNAL MEDICINE

## 2020-03-19 PROCEDURE — 1036F TOBACCO NON-USER: CPT | Performed by: INTERNAL MEDICINE

## 2020-03-19 PROCEDURE — 83735 ASSAY OF MAGNESIUM: CPT

## 2020-03-19 PROCEDURE — 2500000003 HC RX 250 WO HCPCS: Performed by: INTERNAL MEDICINE

## 2020-03-19 PROCEDURE — 96375 TX/PRO/DX INJ NEW DRUG ADDON: CPT

## 2020-03-19 PROCEDURE — 85025 COMPLETE CBC W/AUTO DIFF WBC: CPT

## 2020-03-19 PROCEDURE — G8427 DOCREV CUR MEDS BY ELIG CLIN: HCPCS | Performed by: INTERNAL MEDICINE

## 2020-03-19 PROCEDURE — 1090F PRES/ABSN URINE INCON ASSESS: CPT | Performed by: INTERNAL MEDICINE

## 2020-03-19 PROCEDURE — 96413 CHEMO IV INFUSION 1 HR: CPT

## 2020-03-19 RX ORDER — SODIUM CHLORIDE 0.9 % (FLUSH) 0.9 %
10 SYRINGE (ML) INJECTION PRN
Status: DISCONTINUED | OUTPATIENT
Start: 2020-03-19 | End: 2020-03-20 | Stop reason: HOSPADM

## 2020-03-19 RX ORDER — DEXAMETHASONE SODIUM PHOSPHATE 100 MG/10ML
10 INJECTION INTRAMUSCULAR; INTRAVENOUS ONCE
Status: COMPLETED | OUTPATIENT
Start: 2020-03-19 | End: 2020-03-19

## 2020-03-19 RX ORDER — HEPARIN SODIUM (PORCINE) LOCK FLUSH IV SOLN 100 UNIT/ML 100 UNIT/ML
500 SOLUTION INTRAVENOUS PRN
Status: DISCONTINUED | OUTPATIENT
Start: 2020-03-19 | End: 2020-03-20 | Stop reason: HOSPADM

## 2020-03-19 RX ORDER — DIPHENHYDRAMINE HYDROCHLORIDE 50 MG/ML
50 INJECTION INTRAMUSCULAR; INTRAVENOUS ONCE
Status: COMPLETED | OUTPATIENT
Start: 2020-03-19 | End: 2020-03-19

## 2020-03-19 RX ORDER — SODIUM CHLORIDE 9 MG/ML
20 INJECTION, SOLUTION INTRAVENOUS ONCE
Status: COMPLETED | OUTPATIENT
Start: 2020-03-19 | End: 2020-03-19

## 2020-03-19 RX ADMIN — SODIUM CHLORIDE, PRESERVATIVE FREE 10 ML: 5 INJECTION INTRAVENOUS at 12:24

## 2020-03-19 RX ADMIN — DIPHENHYDRAMINE HYDROCHLORIDE 50 MG: 50 INJECTION, SOLUTION INTRAMUSCULAR; INTRAVENOUS at 10:49

## 2020-03-19 RX ADMIN — PACLITAXEL 132 MG: 6 INJECTION, SOLUTION INTRAVENOUS at 11:15

## 2020-03-19 RX ADMIN — DEXAMETHASONE SODIUM PHOSPHATE 10 MG: 10 INJECTION, SOLUTION INTRAMUSCULAR; INTRAVENOUS at 10:59

## 2020-03-19 RX ADMIN — Medication 500 UNITS: at 12:24

## 2020-03-19 RX ADMIN — SODIUM CHLORIDE 20 ML/HR: 9 INJECTION, SOLUTION INTRAVENOUS at 10:43

## 2020-03-19 RX ADMIN — FAMOTIDINE 20 MG: 10 INJECTION, SOLUTION INTRAVENOUS at 10:46

## 2020-03-19 NOTE — TELEPHONE ENCOUNTER
Met with patient and her significant other in the treatment room during her last chemotherapy treatment for follow up. Valentino Mathew appears well and is in good spirits.  States that she is still doing well with the treatments besides some peripheral neuropathy on finger tips and toes on one foot. Discussed taking vitamin b complex daily to assist per Dr. Fran Melvin. Verbalizes understanding. She is scheduled for her Herceptin treatment next week. Answered questions regarding her treatment and informed her that she will be scheduled for a 2D echo per Dr. Fran Melvin. Reviewed schedule for the testing during the remainder of her Herceptin treatments. Verbalizes understanding. Reports eating and sleeping well.  Denies any nausea or vomiting.  Provided support and encouragement.  Her significant other brought her for her treatment today.  Denies any additional needs for assistance from this NN.  Nurse navigator will continue as needed.  Carol Clark, JOSEW, RN, OCN  Oncology Nurse Navigator

## 2020-03-19 NOTE — PROGRESS NOTES
of cells positive: <10%         Intensity: Weak    Progesterone Receptors ([place]): Negative        Internal control cells present and stain as expected: No internal  control present     9/17/19 Final Surgical Pathology Report    Diagnosis:  Mass, Left breast, 12:00, core biopsy: Segments of benign fibroadipose  tissue and scant skeletal muscle, see comment. Comment:   Epithelial lined mammary ducts and lobules are not identified  in the tissue sample. Correlation with clinical and radiologic findings  is essential to assure adequacy of tissue sampling. In the setting of a  mass clinically or radiologically suspicious for neoplasm, additional  tissue sampling is recommended. The patient underwent on 11/20/2019 a left mastectomy with sentinel lymph node excisional biopsy, pathology:    CANCER CASE SUMMARY:  Procedure: Left simple mastectomy  Specimen laterality: Left  Tumor site: 12:00 position  Tumor size: 8.0 mm in greatest dimension  Histologic type:  Invasive carcinoma of no special type (invasive ductal  carcinoma, not otherwise specified)  Histologic grade (Deandre histologic score):   Glandular differentiation: Score 3   Nuclear pleomorphism: Score 2   Mitotic rate: Score 2   Overall grade: Grade 2 (score of 7)  Tumor focality: Single focus of invasive carcinoma  Ductal carcinoma in situ (DCIS): Present, adjacent to the invasive  carcinoma  Invasive carcinoma margins:   Margins uninvolved by invasive carcinoma    Distance from closest margin: 5.0 mm from the posterior margin  DCIS margins:   Margin uninvolved by DCIS    Distance from closest margin: 10.0 mm from the posterior margin  Regional lymph nodes: Uninvolved by tumor cells   Total number of lymph nodes examined: 2 (both sentinel nodes)  Treatment effect: No known presurgical therapy  Pathologic stage classification (pTNM, AJCC 8th edition):   pT1b   (sn) pN0    Ancillary studies:  Calponin immunostain on block A4 shows the invasive carcinoma lacking a  myoepithelial layer. P63 immunostain on block A6 shows the invasive carcinoma lacking a  myoepithelial layer, with a myoepithelial layer retained around the DCIS. Breast Cancer Marker Studies (Block A6):  Negative: 0%        No internal control cells present. Progesterone Receptors ([place]):  Negative: 0%        No internal control cells present. Her-2/miles (c-erb B-2) protein expression: Positive (Score 3+)    The patient was started on adjuvant treatment with Herceptin and Taxol on 1/2/2020, she had tingling and numbness in her left foot and the tips of her thumbs, intermittent. No new problems since last week. Review of Systems;  CONSTITUTIONAL: No fever, chills. Good appetite feeling tired. ENMT: Eyes: No diplopia; Nose: Positive for epistaxis. Mouth: No sore throat. RESPIRATORY: No hemoptysis, shortness of breath, cough. CARDIOVASCULAR: No chest pain, palpitations. GASTROINTESTINAL: As per HPI. GENITOURINARY: No dysuria, urinary frequency, hematuria. NEURO: No syncope, presyncope, headache.   Remainder:  ROS NEGATIVE    Past Medical History:      Diagnosis Date    Cancer [facility]) 2019    breast left    Depression     Hypothyroidism     Irritable bowel syndrome     not diagnosed, patient controls with diet    Thyroid disease      Patient Active Problem List   Diagnosis    Ductal carcinoma in situ (DCIS) of left breast    Malignant neoplasm of left female breast, unspecified estrogen receptor status, unspecified site of breast ([facility] [address]    Poor venous access        Past Surgical History:      Procedure Laterality Date    APPENDECTOMY      BREAST SURGERY      mastectomy left nov 20 2019    CHOLECYSTECTOMY      HYSTERECTOMY, TOTAL ABDOMINAL      oophorectomy    INSERTION / REMOVAL / REPLACEMENT VENOUS ACCESS CATHETER N/A 12/27/2019    MEDIPORT INSERTION performed by Carlota Dasilva MD at [facility] Left 11/20/2019    LEFT BREAST SIMPLE

## 2020-03-26 ENCOUNTER — OFFICE VISIT (OUTPATIENT)
Dept: ONCOLOGY | Age: 75
End: 2020-03-26
Payer: MEDICARE

## 2020-03-26 ENCOUNTER — HOSPITAL ENCOUNTER (OUTPATIENT)
Dept: INFUSION THERAPY | Age: 75
Discharge: HOME | End: 2020-03-26
Payer: MEDICARE

## 2020-03-26 VITALS
TEMPERATURE: 98.9 F | HEART RATE: 79 BPM | OXYGEN SATURATION: 94 % | HEIGHT: 64 IN | BODY MASS INDEX: 22.59 KG/M2 | DIASTOLIC BLOOD PRESSURE: 63 MMHG | SYSTOLIC BLOOD PRESSURE: 151 MMHG | WEIGHT: 132.3 LBS

## 2020-03-26 VITALS — DIASTOLIC BLOOD PRESSURE: 59 MMHG | HEART RATE: 80 BPM | TEMPERATURE: 98 F | SYSTOLIC BLOOD PRESSURE: 146 MMHG

## 2020-03-26 DIAGNOSIS — D05.12 DUCTAL CARCINOMA IN SITU (DCIS) OF LEFT BREAST: ICD-10-CM

## 2020-03-26 DIAGNOSIS — C50.912 MALIGNANT NEOPLASM OF LEFT FEMALE BREAST, UNSPECIFIED ESTROGEN RECEPTOR STATUS, UNSPECIFIED SITE OF BREAST (HCC): Primary | ICD-10-CM

## 2020-03-26 LAB
ALBUMIN SERPL-MCNC: 3.8 G/DL (ref 3.5–5.2)
ALP BLD-CCNC: 90 U/L (ref 35–104)
ALT SERPL-CCNC: 8 U/L (ref 0–32)
ANION GAP SERPL CALCULATED.3IONS-SCNC: 10 MMOL/L (ref 7–16)
AST SERPL-CCNC: 17 U/L (ref 0–31)
BASOPHILS ABSOLUTE: 0.06 E9/L (ref 0–0.2)
BASOPHILS RELATIVE PERCENT: 0.6 % (ref 0–2)
BILIRUB SERPL-MCNC: 0.2 MG/DL (ref 0–1.2)
BUN BLDV-MCNC: 9 MG/DL (ref 8–23)
CALCIUM SERPL-MCNC: 8.9 MG/DL (ref 8.6–10.2)
CHLORIDE BLD-SCNC: 93 MMOL/L (ref 98–107)
CO2: 26 MMOL/L (ref 22–29)
CREAT SERPL-MCNC: 0.8 MG/DL (ref 0.5–1)
EOSINOPHILS ABSOLUTE: 0.09 E9/L (ref 0.05–0.5)
EOSINOPHILS RELATIVE PERCENT: 1 % (ref 0–6)
GFR AFRICAN AMERICAN: >60
GFR NON-AFRICAN AMERICAN: >60 ML/MIN/1.73
GLUCOSE BLD-MCNC: 82 MG/DL (ref 74–99)
HCT VFR BLD CALC: 33.1 % (ref 34–48)
HEMOGLOBIN: 11.2 G/DL (ref 11.5–15.5)
IMMATURE GRANULOCYTES #: 0.05 E9/L
IMMATURE GRANULOCYTES %: 0.5 % (ref 0–5)
LYMPHOCYTES ABSOLUTE: 1.08 E9/L (ref 1.5–4)
LYMPHOCYTES RELATIVE PERCENT: 11.5 % (ref 20–42)
MAGNESIUM: 2.1 MG/DL (ref 1.6–2.6)
MCH RBC QN AUTO: 30.9 PG (ref 26–35)
MCHC RBC AUTO-ENTMCNC: 33.8 % (ref 32–34.5)
MCV RBC AUTO: 91.4 FL (ref 80–99.9)
MONOCYTES ABSOLUTE: 0.87 E9/L (ref 0.1–0.95)
MONOCYTES RELATIVE PERCENT: 9.3 % (ref 2–12)
NEUTROPHILS ABSOLUTE: 7.25 E9/L (ref 1.8–7.3)
NEUTROPHILS RELATIVE PERCENT: 77.1 % (ref 43–80)
PDW BLD-RTO: 15.4 FL (ref 11.5–15)
PLATELET # BLD: 393 E9/L (ref 130–450)
PMV BLD AUTO: 10 FL (ref 7–12)
POTASSIUM SERPL-SCNC: 4.2 MMOL/L (ref 3.5–5)
RBC # BLD: 3.62 E12/L (ref 3.5–5.5)
SODIUM BLD-SCNC: 129 MMOL/L (ref 132–146)
TOTAL PROTEIN: 6.5 G/DL (ref 6.4–8.3)
WBC # BLD: 9.4 E9/L (ref 4.5–11.5)

## 2020-03-26 PROCEDURE — 1036F TOBACCO NON-USER: CPT | Performed by: INTERNAL MEDICINE

## 2020-03-26 PROCEDURE — G8484 FLU IMMUNIZE NO ADMIN: HCPCS | Performed by: INTERNAL MEDICINE

## 2020-03-26 PROCEDURE — 80053 COMPREHEN METABOLIC PANEL: CPT

## 2020-03-26 PROCEDURE — G8427 DOCREV CUR MEDS BY ELIG CLIN: HCPCS | Performed by: INTERNAL MEDICINE

## 2020-03-26 PROCEDURE — 96413 CHEMO IV INFUSION 1 HR: CPT

## 2020-03-26 PROCEDURE — G8399 PT W/DXA RESULTS DOCUMENT: HCPCS | Performed by: INTERNAL MEDICINE

## 2020-03-26 PROCEDURE — 2580000003 HC RX 258: Performed by: INTERNAL MEDICINE

## 2020-03-26 PROCEDURE — 85025 COMPLETE CBC W/AUTO DIFF WBC: CPT

## 2020-03-26 PROCEDURE — 3017F COLORECTAL CA SCREEN DOC REV: CPT | Performed by: INTERNAL MEDICINE

## 2020-03-26 PROCEDURE — 99214 OFFICE O/P EST MOD 30 MIN: CPT | Performed by: INTERNAL MEDICINE

## 2020-03-26 PROCEDURE — 1123F ACP DISCUSS/DSCN MKR DOCD: CPT | Performed by: INTERNAL MEDICINE

## 2020-03-26 PROCEDURE — 6360000002 HC RX W HCPCS: Performed by: INTERNAL MEDICINE

## 2020-03-26 PROCEDURE — G8420 CALC BMI NORM PARAMETERS: HCPCS | Performed by: INTERNAL MEDICINE

## 2020-03-26 PROCEDURE — 83735 ASSAY OF MAGNESIUM: CPT

## 2020-03-26 PROCEDURE — 4040F PNEUMOC VAC/ADMIN/RCVD: CPT | Performed by: INTERNAL MEDICINE

## 2020-03-26 PROCEDURE — 1090F PRES/ABSN URINE INCON ASSESS: CPT | Performed by: INTERNAL MEDICINE

## 2020-03-26 RX ORDER — SODIUM CHLORIDE 9 MG/ML
INJECTION, SOLUTION INTRAVENOUS CONTINUOUS
Status: CANCELLED | OUTPATIENT
Start: 2020-03-26

## 2020-03-26 RX ORDER — EPINEPHRINE 1 MG/ML
0.3 INJECTION, SOLUTION, CONCENTRATE INTRAVENOUS PRN
Status: CANCELLED | OUTPATIENT
Start: 2020-03-26

## 2020-03-26 RX ORDER — HEPARIN SODIUM (PORCINE) LOCK FLUSH IV SOLN 100 UNIT/ML 100 UNIT/ML
500 SOLUTION INTRAVENOUS PRN
Status: DISCONTINUED | OUTPATIENT
Start: 2020-03-26 | End: 2020-03-27 | Stop reason: HOSPADM

## 2020-03-26 RX ORDER — HEPARIN SODIUM (PORCINE) LOCK FLUSH IV SOLN 100 UNIT/ML 100 UNIT/ML
500 SOLUTION INTRAVENOUS PRN
Status: CANCELLED | OUTPATIENT
Start: 2020-03-26

## 2020-03-26 RX ORDER — METHYLPREDNISOLONE SODIUM SUCCINATE 125 MG/2ML
125 INJECTION, POWDER, LYOPHILIZED, FOR SOLUTION INTRAMUSCULAR; INTRAVENOUS ONCE
Status: CANCELLED | OUTPATIENT
Start: 2020-03-26

## 2020-03-26 RX ORDER — MEPERIDINE HYDROCHLORIDE 25 MG/ML
12.5 INJECTION INTRAMUSCULAR; INTRAVENOUS; SUBCUTANEOUS ONCE
Status: CANCELLED | OUTPATIENT
Start: 2020-03-26

## 2020-03-26 RX ORDER — SODIUM CHLORIDE 0.9 % (FLUSH) 0.9 %
5 SYRINGE (ML) INJECTION PRN
Status: CANCELLED | OUTPATIENT
Start: 2020-03-26

## 2020-03-26 RX ORDER — SODIUM CHLORIDE 0.9 % (FLUSH) 0.9 %
10 SYRINGE (ML) INJECTION PRN
Status: DISCONTINUED | OUTPATIENT
Start: 2020-03-26 | End: 2020-03-27 | Stop reason: HOSPADM

## 2020-03-26 RX ORDER — SODIUM CHLORIDE 9 MG/ML
20 INJECTION, SOLUTION INTRAVENOUS ONCE
Status: COMPLETED | OUTPATIENT
Start: 2020-03-26 | End: 2020-03-26

## 2020-03-26 RX ORDER — SODIUM CHLORIDE 0.9 % (FLUSH) 0.9 %
10 SYRINGE (ML) INJECTION PRN
Status: CANCELLED | OUTPATIENT
Start: 2020-03-26

## 2020-03-26 RX ORDER — DIPHENHYDRAMINE HYDROCHLORIDE 50 MG/ML
50 INJECTION INTRAMUSCULAR; INTRAVENOUS ONCE
Status: CANCELLED | OUTPATIENT
Start: 2020-03-26

## 2020-03-26 RX ORDER — SODIUM CHLORIDE 9 MG/ML
20 INJECTION, SOLUTION INTRAVENOUS ONCE
Status: CANCELLED | OUTPATIENT
Start: 2020-03-26

## 2020-03-26 RX ADMIN — SODIUM CHLORIDE, PRESERVATIVE FREE 10 ML: 5 INJECTION INTRAVENOUS at 12:32

## 2020-03-26 RX ADMIN — SODIUM CHLORIDE 20 ML/HR: 9 INJECTION, SOLUTION INTRAVENOUS at 10:59

## 2020-03-26 RX ADMIN — TRASTUZUMAB 357 MG: 150 INJECTION, POWDER, LYOPHILIZED, FOR SOLUTION INTRAVENOUS at 11:50

## 2020-03-26 RX ADMIN — Medication 500 UNITS: at 12:32

## 2020-03-26 NOTE — PROGRESS NOTES
breast mass likely at the 12:00 position.       RECOMMENDATION:       Recommend ultrasound core biopsy of the mass seen on ultrasound and   stereotactic biopsy of the microcalcifications.       BI-RADS Category 5: Highly Suggestive of Malignancy          8/29/19  Left Breast US   [facility]         FINDINGS:        A small partially obscured mass is identified in the upper outer left   breast measuring approximately 5-6 mm. Additionally, there is a small   segmentally distributed cluster of pleomorphic microcalcifications   located superiorly near the mass extending to the middle third depth.       Ultrasound confirmed a small irregular shaped hypoechoic mass with   circumscribed margins at the 12:00 position measuring 5 mm in maximal   dimension.           Impression       1. Small solid suspicious mass at the 12:00 position. 2. Segmentally distributed pleomorphic microcalcifications located   near the breast mass likely at the 12:00 position.       RECOMMENDATION:       Recommend ultrasound core biopsy of the mass seen on ultrasound and   stereotactic biopsy of the microcalcifications.       BI-RADS Category 5: Highly Suggestive of Malignancy              She underwent a stereotactic guided left breast core biopsy at 12 o'clock position on September 10 , 2019.a single top hat shaped marker clip was deployed into the site.     She underwent an ultrasound guided biopsy of the left breast at the 12 o'clock position on September 17, 2019, A post-surgical ribbon shaped microclip was placed.      Pathological evaluation completed at [facility]):     9/10/19  Final Surgical Pathology Report  Diagnosis:  A. Left breast, 12:00, biopsy: High-grade ductal carcinoma in situ,  cannot exclude microinvasion, see comment. B. Left breast 12:00, additional, biopsy: High-grade ductal carcinoma in  situ, cannot exclude microinvasion, see comment.     Breast Cancer Marker Studies:  Estrogen Receptors (ER): Positive         Percentage of cells positive: <10%         Intensity: Weak    Progesterone Receptors ([place]): Negative        Internal control cells present and stain as expected: No internal  control present     9/17/19 Final Surgical Pathology Report    Diagnosis:  Mass, Left breast, 12:00, core biopsy: Segments of benign fibroadipose  tissue and scant skeletal muscle, see comment. Comment:   Epithelial lined mammary ducts and lobules are not identified  in the tissue sample. Correlation with clinical and radiologic findings  is essential to assure adequacy of tissue sampling. In the setting of a  mass clinically or radiologically suspicious for neoplasm, additional  tissue sampling is recommended. The patient underwent on 11/20/2019 a left mastectomy with sentinel lymph node excisional biopsy, pathology:    CANCER CASE SUMMARY:  Procedure: Left simple mastectomy  Specimen laterality: Left  Tumor site: 12:00 position  Tumor size: 8.0 mm in greatest dimension  Histologic type:  Invasive carcinoma of no special type (invasive ductal  carcinoma, not otherwise specified)  Histologic grade (Deandre histologic score):   Glandular differentiation: Score 3   Nuclear pleomorphism: Score 2   Mitotic rate: Score 2   Overall grade: Grade 2 (score of 7)  Tumor focality: Single focus of invasive carcinoma  Ductal carcinoma in situ (DCIS): Present, adjacent to the invasive  carcinoma  Invasive carcinoma margins:   Margins uninvolved by invasive carcinoma    Distance from closest margin: 5.0 mm from the posterior margin  DCIS margins:   Margin uninvolved by DCIS    Distance from closest margin: 10.0 mm from the posterior margin  Regional lymph nodes: Uninvolved by tumor cells   Total number of lymph nodes examined: 2 (both sentinel nodes)  Treatment effect: No known presurgical therapy  Pathologic stage classification (pTNM, AJCC 8th edition):   pT1b   (sn) pN0    Ancillary studies:  Calponin immunostain on block A4 shows the invasive carcinoma lacking a  myoepithelial layer. P63 immunostain on block A6 shows the invasive carcinoma lacking a  myoepithelial layer, with a myoepithelial layer retained around the DCIS. Breast Cancer Marker Studies (Block A6):  Negative: 0%        No internal control cells present. Progesterone Receptors ([place]):  Negative: 0%        No internal control cells present. Her-2/miles (c-erb B-2) protein expression: Positive (Score 3+)    The patient was started on adjuvant treatment with Herceptin and Taxol on 1/2/2020, she completed Taxol yesterday on 3/19/2020 the tingling and numbness in her left foot had resolved, she has tingling numbness of the tip of the fingers. She has restless legs. Review of Systems;  CONSTITUTIONAL: No fever, chills. Good appetite feeling tired. ENMT: Eyes: No diplopia; Nose: Positive for epistaxis. Mouth: No sore throat. RESPIRATORY: No hemoptysis, shortness of breath, cough. CARDIOVASCULAR: No chest pain, palpitations. GASTROINTESTINAL: As per HPI. GENITOURINARY: No dysuria, urinary frequency, hematuria. NEURO: No syncope, presyncope, headache.   Remainder:  ROS NEGATIVE    Past Medical History:      Diagnosis Date    Cancer [facility]) 2019    breast left    Depression     Hypothyroidism     Irritable bowel syndrome     not diagnosed, patient controls with diet    Thyroid disease      Patient Active Problem List   Diagnosis    Ductal carcinoma in situ (DCIS) of left breast    Malignant neoplasm of left female breast, unspecified estrogen receptor status, unspecified site of breast ([facility] [address]    Poor venous access        Past Surgical History:      Procedure Laterality Date    APPENDECTOMY      BREAST SURGERY      mastectomy left nov 20 2019    CHOLECYSTECTOMY      HYSTERECTOMY, TOTAL ABDOMINAL      oophorectomy    INSERTION / REMOVAL / REPLACEMENT VENOUS ACCESS CATHETER N/A 12/27/2019    MEDIPORT INSERTION performed by Tanya Ha MD at [place] [address] Allergies     OB/GYN:  Age of menarche was 22, medically induced. Age of menopause was 31. Patient admits to hormonal therapy, progesterone, premarin. Oral contraceptives? To start periods. Patient is       Physical Exam:  BP (!) 151/63 (Site: Right Upper Arm, Position: Sitting, Cuff Size: Medium Adult)   Pulse 79   Temp 98.9 °F (37.2 °C) (Temporal)   Ht 5' 4\" (1.626 m)   Wt 132 lb 4.8 oz (60 kg)   SpO2 94%   BMI 22.71 kg/m²     GENERAL: Alert, oriented x 3, not in acute distress. HEENT: PERRLA; EOMI. Oropharynx clear. NECK: Supple. No palpable cervical or supraclavicular lymphadenopathy. LUNGS: Good air entry bilaterally. No wheezing, crackles or rhonchi. CARDIOVASCULAR: Regular rate. No murmurs, rubs or gallops. BREASTS: Right breast exam is negative for any skin changes, no nipple discharge, she has nodularity and tenderness in the lower inner and outer quadrants, no palpable right axillary adenopathy. She is status post left mastectomy, the incision is healing nicely, no palpable left axillary lymphadenopathy. CHEST: This post right port placement  ABDOMEN: Soft. Non-tender, non-distended. Positive bowel sounds. EXTREMITIES: Without clubbing, cyanosis, or edema. NEUROLOGIC: No focal deficits.    ECOG PS 1      Impression/Plan:      The patient is a 67-year-old lady with a past medical history significant for thyroid disease, depression, and IBS, who had presented with an abnormal screening mammogram, she had a left breast biopsy done revealing high-grade DCIS, ER positive less than 10%, LA negative, she underwent on 2019 a left mastectomy with sentinel lymph node excisional biopsy, she was found to have invasive ductal carcinoma, tumor size 8 mm, grade 2, with associated DCIS, margins are negative, 2 sentinel lymph nodes were removed, they were both negative for metastatic disease, final pathologic stage pT1b(sn) pN0Mx, ER -0% LA -0% HER-2/miles +3+ by IHC, prognostic stage IA.    I discussed with the patient her diagnosis, risks of her tumor, prognosis and recommendations for systemic therapy. The patient has a small HER-2/miles positive disease, tumor size is 8 mm, adjuvant treatment with Taxol and Herceptin is recommended, schedule and the side effects of the treatment were reviewed with the patient. 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 Will start endocrine therapy after she completes Taxol chemotherapy. Patient had a 2D echocardiogram done, LVEF is 65%, adequate for treatment with Herceptin, she had a port placed, she was started on adjuvant therapy with Taxol and Herceptin on 1/2/2020. She completed Taxol on 3/19/2020, labs reviewed, proceed with treatment, single agent Herceptin today 3/26/2020. She will be due for repeat echocardiogram around April 2, 2020, was ordered today. Taxol-induced peripheral neuropathy, I discussed with her starting Neurontin, she does not want to be on Neurontin at this time, she will continue to take the vitamin B complex. Right breast tenderness and lumps, ordered right diagnostic mammogram and ultrasound. They were done on 2/25/2020, there was no mammographic/sonographic evidence of malignancy, this was BI-RADS Category 1, negative. RTC in 3 weeks. Thank you for allowing us to participate in the care of Mrs. Rory Raman.     Ollie Ma MD   HEMATOLOGY/MEDICAL [address]  [address] [place] [id number]  Dept: [place]: [phone]

## 2020-04-13 ENCOUNTER — HOSPITAL ENCOUNTER (OUTPATIENT)
Dept: NON INVASIVE DIAGNOSTICS | Age: 75
Discharge: HOME | End: 2020-04-13
Payer: MEDICARE

## 2020-04-13 PROCEDURE — 93308 TTE F-UP OR LMTD: CPT

## 2020-04-16 ENCOUNTER — HOSPITAL ENCOUNTER (OUTPATIENT)
Dept: INFUSION THERAPY | Age: 75
Discharge: HOME | End: 2020-04-16
Payer: MEDICARE

## 2020-04-16 ENCOUNTER — OFFICE VISIT (OUTPATIENT)
Dept: ONCOLOGY | Age: 75
End: 2020-04-16
Payer: MEDICARE

## 2020-04-16 VITALS
HEART RATE: 66 BPM | DIASTOLIC BLOOD PRESSURE: 66 MMHG | OXYGEN SATURATION: 98 % | BODY MASS INDEX: 22.88 KG/M2 | WEIGHT: 134 LBS | SYSTOLIC BLOOD PRESSURE: 130 MMHG | HEIGHT: 64 IN | TEMPERATURE: 98.4 F

## 2020-04-16 VITALS — HEART RATE: 68 BPM | SYSTOLIC BLOOD PRESSURE: 142 MMHG | DIASTOLIC BLOOD PRESSURE: 66 MMHG

## 2020-04-16 DIAGNOSIS — D05.12 DUCTAL CARCINOMA IN SITU (DCIS) OF LEFT BREAST: ICD-10-CM

## 2020-04-16 DIAGNOSIS — C50.912 MALIGNANT NEOPLASM OF LEFT FEMALE BREAST, UNSPECIFIED ESTROGEN RECEPTOR STATUS, UNSPECIFIED SITE OF BREAST (HCC): Primary | ICD-10-CM

## 2020-04-16 LAB
ALBUMIN SERPL-MCNC: 3.9 G/DL (ref 3.5–5.2)
ALP BLD-CCNC: 85 U/L (ref 35–104)
ALT SERPL-CCNC: 12 U/L (ref 0–32)
ANION GAP SERPL CALCULATED.3IONS-SCNC: 10 MMOL/L (ref 7–16)
AST SERPL-CCNC: 22 U/L (ref 0–31)
BASOPHILS ABSOLUTE: 0.05 E9/L (ref 0–0.2)
BASOPHILS RELATIVE PERCENT: 1.2 % (ref 0–2)
BILIRUB SERPL-MCNC: 0.3 MG/DL (ref 0–1.2)
BUN BLDV-MCNC: 11 MG/DL (ref 8–23)
CALCIUM SERPL-MCNC: 8.4 MG/DL (ref 8.6–10.2)
CHLORIDE BLD-SCNC: 100 MMOL/L (ref 98–107)
CO2: 24 MMOL/L (ref 22–29)
CREAT SERPL-MCNC: 0.9 MG/DL (ref 0.5–1)
EOSINOPHILS ABSOLUTE: 0.09 E9/L (ref 0.05–0.5)
EOSINOPHILS RELATIVE PERCENT: 2.2 % (ref 0–6)
GFR AFRICAN AMERICAN: >60
GFR NON-AFRICAN AMERICAN: >60 ML/MIN/1.73
GLUCOSE BLD-MCNC: 102 MG/DL (ref 74–99)
HCT VFR BLD CALC: 36 % (ref 34–48)
HEMOGLOBIN: 11.7 G/DL (ref 11.5–15.5)
IMMATURE GRANULOCYTES #: 0.01 E9/L
IMMATURE GRANULOCYTES %: 0.2 % (ref 0–5)
LYMPHOCYTES ABSOLUTE: 1.25 E9/L (ref 1.5–4)
LYMPHOCYTES RELATIVE PERCENT: 31.1 % (ref 20–42)
MAGNESIUM: 2.2 MG/DL (ref 1.6–2.6)
MCH RBC QN AUTO: 30.2 PG (ref 26–35)
MCHC RBC AUTO-ENTMCNC: 32.5 % (ref 32–34.5)
MCV RBC AUTO: 92.8 FL (ref 80–99.9)
MONOCYTES ABSOLUTE: 0.4 E9/L (ref 0.1–0.95)
MONOCYTES RELATIVE PERCENT: 10 % (ref 2–12)
NEUTROPHILS ABSOLUTE: 2.22 E9/L (ref 1.8–7.3)
NEUTROPHILS RELATIVE PERCENT: 55.3 % (ref 43–80)
PDW BLD-RTO: 14.6 FL (ref 11.5–15)
PLATELET # BLD: 284 E9/L (ref 130–450)
PMV BLD AUTO: 9.7 FL (ref 7–12)
POTASSIUM SERPL-SCNC: 4.2 MMOL/L (ref 3.5–5)
RBC # BLD: 3.88 E12/L (ref 3.5–5.5)
SODIUM BLD-SCNC: 134 MMOL/L (ref 132–146)
TOTAL PROTEIN: 6.4 G/DL (ref 6.4–8.3)
WBC # BLD: 4 E9/L (ref 4.5–11.5)

## 2020-04-16 PROCEDURE — 1036F TOBACCO NON-USER: CPT | Performed by: INTERNAL MEDICINE

## 2020-04-16 PROCEDURE — 3017F COLORECTAL CA SCREEN DOC REV: CPT | Performed by: INTERNAL MEDICINE

## 2020-04-16 PROCEDURE — G8420 CALC BMI NORM PARAMETERS: HCPCS | Performed by: INTERNAL MEDICINE

## 2020-04-16 PROCEDURE — 80053 COMPREHEN METABOLIC PANEL: CPT

## 2020-04-16 PROCEDURE — 85025 COMPLETE CBC W/AUTO DIFF WBC: CPT

## 2020-04-16 PROCEDURE — 96413 CHEMO IV INFUSION 1 HR: CPT

## 2020-04-16 PROCEDURE — 1123F ACP DISCUSS/DSCN MKR DOCD: CPT | Performed by: INTERNAL MEDICINE

## 2020-04-16 PROCEDURE — 99214 OFFICE O/P EST MOD 30 MIN: CPT | Performed by: INTERNAL MEDICINE

## 2020-04-16 PROCEDURE — 2580000003 HC RX 258: Performed by: INTERNAL MEDICINE

## 2020-04-16 PROCEDURE — G8427 DOCREV CUR MEDS BY ELIG CLIN: HCPCS | Performed by: INTERNAL MEDICINE

## 2020-04-16 PROCEDURE — 4040F PNEUMOC VAC/ADMIN/RCVD: CPT | Performed by: INTERNAL MEDICINE

## 2020-04-16 PROCEDURE — 1090F PRES/ABSN URINE INCON ASSESS: CPT | Performed by: INTERNAL MEDICINE

## 2020-04-16 PROCEDURE — 83735 ASSAY OF MAGNESIUM: CPT

## 2020-04-16 PROCEDURE — 6360000002 HC RX W HCPCS: Performed by: INTERNAL MEDICINE

## 2020-04-16 PROCEDURE — G8399 PT W/DXA RESULTS DOCUMENT: HCPCS | Performed by: INTERNAL MEDICINE

## 2020-04-16 RX ORDER — EPINEPHRINE 1 MG/ML
0.3 INJECTION, SOLUTION, CONCENTRATE INTRAVENOUS PRN
Status: CANCELLED | OUTPATIENT
Start: 2020-04-16

## 2020-04-16 RX ORDER — SODIUM CHLORIDE 9 MG/ML
INJECTION, SOLUTION INTRAVENOUS CONTINUOUS
Status: CANCELLED | OUTPATIENT
Start: 2020-04-16

## 2020-04-16 RX ORDER — MEPERIDINE HYDROCHLORIDE 25 MG/ML
12.5 INJECTION INTRAMUSCULAR; INTRAVENOUS; SUBCUTANEOUS ONCE
Status: CANCELLED | OUTPATIENT
Start: 2020-04-16

## 2020-04-16 RX ORDER — HEPARIN SODIUM (PORCINE) LOCK FLUSH IV SOLN 100 UNIT/ML 100 UNIT/ML
500 SOLUTION INTRAVENOUS PRN
Status: DISCONTINUED | OUTPATIENT
Start: 2020-04-16 | End: 2020-04-17 | Stop reason: HOSPADM

## 2020-04-16 RX ORDER — HEPARIN SODIUM (PORCINE) LOCK FLUSH IV SOLN 100 UNIT/ML 100 UNIT/ML
500 SOLUTION INTRAVENOUS PRN
Status: CANCELLED | OUTPATIENT
Start: 2020-04-16

## 2020-04-16 RX ORDER — SODIUM CHLORIDE 0.9 % (FLUSH) 0.9 %
10 SYRINGE (ML) INJECTION PRN
Status: DISCONTINUED | OUTPATIENT
Start: 2020-04-16 | End: 2020-04-17 | Stop reason: HOSPADM

## 2020-04-16 RX ORDER — METHYLPREDNISOLONE SODIUM SUCCINATE 125 MG/2ML
125 INJECTION, POWDER, LYOPHILIZED, FOR SOLUTION INTRAMUSCULAR; INTRAVENOUS ONCE
Status: CANCELLED | OUTPATIENT
Start: 2020-04-16

## 2020-04-16 RX ORDER — SODIUM CHLORIDE 0.9 % (FLUSH) 0.9 %
5 SYRINGE (ML) INJECTION PRN
Status: CANCELLED | OUTPATIENT
Start: 2020-04-16

## 2020-04-16 RX ORDER — SODIUM CHLORIDE 0.9 % (FLUSH) 0.9 %
10 SYRINGE (ML) INJECTION PRN
Status: CANCELLED | OUTPATIENT
Start: 2020-04-16

## 2020-04-16 RX ORDER — SODIUM CHLORIDE 9 MG/ML
20 INJECTION, SOLUTION INTRAVENOUS ONCE
Status: CANCELLED | OUTPATIENT
Start: 2020-04-16

## 2020-04-16 RX ORDER — DIPHENHYDRAMINE HYDROCHLORIDE 50 MG/ML
50 INJECTION INTRAMUSCULAR; INTRAVENOUS ONCE
Status: CANCELLED | OUTPATIENT
Start: 2020-04-16

## 2020-04-16 RX ORDER — SODIUM CHLORIDE 9 MG/ML
20 INJECTION, SOLUTION INTRAVENOUS ONCE
Status: COMPLETED | OUTPATIENT
Start: 2020-04-16 | End: 2020-04-16

## 2020-04-16 RX ADMIN — SODIUM CHLORIDE, PRESERVATIVE FREE 10 ML: 5 INJECTION INTRAVENOUS at 11:55

## 2020-04-16 RX ADMIN — SODIUM CHLORIDE 20 ML/HR: 9 INJECTION, SOLUTION INTRAVENOUS at 11:44

## 2020-04-16 RX ADMIN — Medication 500 UNITS: at 11:55

## 2020-04-16 RX ADMIN — TRASTUZUMAB 357 MG: 150 INJECTION, POWDER, LYOPHILIZED, FOR SOLUTION INTRAVENOUS at 11:12

## 2020-04-16 NOTE — PROGRESS NOTES
[address] [address]  Dept: [phone]  Loc: [phone]  Attending Progress Note      Reason for Visit:   Left breast cancer. Referring Physician: Curtis Spring MD.    PCP:  KAREN Fraire    History of Present Illness: The patient is a 76-year-old lady with a past medical history significant for thyroid disease, depression, and IBS, who had presented with an abnormal screening mammogram,    8/20/19  Bilateral screening mammogram  [facility]         FINDINGS: No suspicious masses, calcifications, or distortions are   identified on the right. On the left there is a new focal nodular   asymmetry at 12:00, with adjacent linear branching calcifications. Spot compression views is recommended for the asymmetry with   ultrasound, and spot magnification views for the calcifications. .             Impression   1. Right breast no mammographic evidence of malignancy           2. Left breast new focal asymmetry at 12:00, new microcalcifications.       Recommendation:   1. Right breast annual screening mammogram.   2. Left breast additional views spot compression and spot   magnification along with ultrasound.       BI-RADS Category 0:  Incomplete- Needs Additional Imaging Evaluation             8/29/19  Left Diagnostic mammogram   [facility]         FINDINGS:        A small partially obscured mass is identified in the upper outer left   breast measuring approximately 5-6 mm. Additionally, there is a small   segmentally distributed cluster of pleomorphic microcalcifications   located superiorly near the mass extending to the middle third depth.       Ultrasound confirmed a small irregular shaped hypoechoic mass with   circumscribed margins at the 12:00 position measuring 5 mm in maximal   dimension.           Impression       1. Small solid suspicious mass at the 12:00 position.    2. Segmentally distributed pleomorphic microcalcifications located   near the lacking a  myoepithelial layer. P63 immunostain on block A6 shows the invasive carcinoma lacking a  myoepithelial layer, with a myoepithelial layer retained around the DCIS. Breast Cancer Marker Studies (Block A6):  Negative: 0%        No internal control cells present. Progesterone Receptors ([place]):  Negative: 0%        No internal control cells present. Her-2/miles (c-erb B-2) protein expression: Positive (Score 3+)    The patient was started on adjuvant treatment with Herceptin and Taxol on 1/2/2020, she completed Taxol yesterday on 3/19/2020 the restless leg and anterior numbness of the foot and the tips of the fingers had improved. Review of Systems;  CONSTITUTIONAL: No fever, chills. Good appetite feeling tired. ENMT: Eyes: No diplopia; Nose: Positive for epistaxis. Mouth: No sore throat. RESPIRATORY: No hemoptysis, shortness of breath, cough. CARDIOVASCULAR: No chest pain, palpitations. GASTROINTESTINAL: As per HPI. GENITOURINARY: No dysuria, urinary frequency, hematuria. NEURO: No syncope, presyncope, headache.   Remainder:  ROS NEGATIVE    Past Medical History:      Diagnosis Date    Cancer [facility]) 2019    breast left    Depression     Hypothyroidism     Irritable bowel syndrome     not diagnosed, patient controls with diet    Thyroid disease      Patient Active Problem List   Diagnosis    Ductal carcinoma in situ (DCIS) of left breast    Malignant neoplasm of left female breast, unspecified estrogen receptor status, unspecified site of breast ([facility] [address]    Poor venous access        Past Surgical History:      Procedure Laterality Date    APPENDECTOMY      BREAST SURGERY      mastectomy left nov 20 2019    CHOLECYSTECTOMY      HYSTERECTOMY, TOTAL ABDOMINAL      oophorectomy    INSERTION / REMOVAL / REPLACEMENT VENOUS ACCESS CATHETER N/A 12/27/2019    MEDIPORT INSERTION performed by Walter Burns MD at [facility] Left 11/20/2019    LEFT BREAST SIMPLE  MASTECTOMY, BLUE DYE INJECTION, LEFT AXILLARY  SENTINEL NODE BIOPSY POSSIBLE LEFT AXILLARY DISSECTION -- Kusum Velarde -- PECTORAL BLOCK performed by Jb Washburn MD at [facility] TONSILLECTOMY         Family History:  Family History   Problem Relation Age of Onset    Cancer Mother 66        liver    Cancer Brother 58        kidney       Medications:  Reviewed and reconciled.     Social History:  Social History     Socioeconomic History    Marital status:      Spouse name: Not on file    Number of children: Not on file    Years of education: Not on file    Highest education level: Not on file   Occupational History    Not on file   Social Needs    Financial resource strain: Not on file    Food insecurity     Worry: Not on file     Inability: Not on file   [employer] needs     Medical: Not on file     Non-medical: Not on file   Tobacco Use    Smoking status: Former Smoker     Packs/day: 2.00     Years: 35.00     Pack years: 70.00     Last attempt to quit:      Years since quittin.3    Smokeless tobacco: Never Used   Substance and Sexual Activity    Alcohol use: Not Currently    Drug use: Never    Sexual activity: Not Currently   Lifestyle    Physical activity     Days per week: Not on file     Minutes per session: Not on file    Stress: Not on file   Relationships    Social connections     Talks on phone: Not on file     Gets together: Not on file     Attends Alevism service: Not on file     Active member of club or organization: Not on file     Attends meetings of clubs or organizations: Not on file     Relationship status: Not on file    Intimate partner violence     Fear of current or ex partner: Not on file     Emotionally abused: Not on file     Physically abused: Not on file     Forced sexual activity: Not on file   Other Topics Concern    Not on file   Social History Narrative    Not on file       Allergies:  No Known Allergies     OB/GYN:  Age of menarche was

## 2020-05-04 NOTE — PROGRESS NOTES
[address] [address]  Dept: [phone]  Loc: [phone]  Attending Progress Note      Reason for Visit:   Left breast cancer. Referring Physician: Vita Beltran MD.    PCP:  KAREN Mccauley    History of Present Illness: The patient is a 75 y.o. lady with a past medical history significant for thyroid disease, depression, and IBS, who had presented with an abnormal screening mammogram,    8/20/19  Bilateral screening mammogram  [facility]         FINDINGS: No suspicious masses, calcifications, or distortions are   identified on the right. On the left there is a new focal nodular   asymmetry at 12:00, with adjacent linear branching calcifications. Spot compression views is recommended for the asymmetry with   ultrasound, and spot magnification views for the calcifications. .             Impression   1. Right breast no mammographic evidence of malignancy           2. Left breast new focal asymmetry at 12:00, new microcalcifications.       Recommendation:   1. Right breast annual screening mammogram.   2. Left breast additional views spot compression and spot   magnification along with ultrasound.       BI-RADS Category 0:  Incomplete- Needs Additional Imaging Evaluation             8/29/19  Left Diagnostic mammogram   [facility]         FINDINGS:        A small partially obscured mass is identified in the upper outer left   breast measuring approximately 5-6 mm. Additionally, there is a small   segmentally distributed cluster of pleomorphic microcalcifications   located superiorly near the mass extending to the middle third depth.       Ultrasound confirmed a small irregular shaped hypoechoic mass with   circumscribed margins at the 12:00 position measuring 5 mm in maximal   dimension.           Impression       1. Small solid suspicious mass at the 12:00 position.    2. Segmentally distributed pleomorphic microcalcifications located   near the breast mass likely at the 12:00 position.       RECOMMENDATION:       Recommend ultrasound core biopsy of the mass seen on ultrasound and   stereotactic biopsy of the microcalcifications.       BI-RADS Category 5: Highly Suggestive of Malignancy          8/29/19  Left Breast US   [facility]         FINDINGS:        A small partially obscured mass is identified in the upper outer left   breast measuring approximately 5-6 mm. Additionally, there is a small   segmentally distributed cluster of pleomorphic microcalcifications   located superiorly near the mass extending to the middle third depth.       Ultrasound confirmed a small irregular shaped hypoechoic mass with   circumscribed margins at the 12:00 position measuring 5 mm in maximal   dimension.           Impression       1. Small solid suspicious mass at the 12:00 position. 2. Segmentally distributed pleomorphic microcalcifications located   near the breast mass likely at the 12:00 position.       RECOMMENDATION:       Recommend ultrasound core biopsy of the mass seen on ultrasound and   stereotactic biopsy of the microcalcifications.       BI-RADS Category 5: Highly Suggestive of Malignancy              She underwent a stereotactic guided left breast core biopsy at 12 o'clock position on September 10 , 2019.a single top hat shaped marker clip was deployed into the site.     She underwent an ultrasound guided biopsy of the left breast at the 12 o'clock position on September 17, 2019, A post-surgical ribbon shaped microclip was placed.      Pathological evaluation completed at [facility]):     9/10/19  Final Surgical Pathology Report  Diagnosis:  A. Left breast, 12:00, biopsy: High-grade ductal carcinoma in situ,  cannot exclude microinvasion, see comment. B. Left breast 12:00, additional, biopsy: High-grade ductal carcinoma in  situ, cannot exclude microinvasion, see comment.     Breast Cancer Marker Studies:  Estrogen Receptors (ER): Positive         Percentage of cells [address]

## 2020-05-07 ENCOUNTER — TELEPHONE (OUTPATIENT)
Dept: CASE MANAGEMENT | Age: 75
End: 2020-05-07

## 2020-05-07 ENCOUNTER — HOSPITAL ENCOUNTER (OUTPATIENT)
Dept: INFUSION THERAPY | Age: 75
Discharge: HOME | End: 2020-05-07
Payer: MEDICARE

## 2020-05-07 ENCOUNTER — OFFICE VISIT (OUTPATIENT)
Dept: ONCOLOGY | Age: 75
End: 2020-05-07
Payer: MEDICARE

## 2020-05-07 VITALS
HEART RATE: 74 BPM | DIASTOLIC BLOOD PRESSURE: 74 MMHG | HEIGHT: 64 IN | BODY MASS INDEX: 22.72 KG/M2 | TEMPERATURE: 97 F | WEIGHT: 133.1 LBS | SYSTOLIC BLOOD PRESSURE: 145 MMHG | OXYGEN SATURATION: 98 %

## 2020-05-07 VITALS — HEART RATE: 69 BPM | DIASTOLIC BLOOD PRESSURE: 74 MMHG | SYSTOLIC BLOOD PRESSURE: 138 MMHG

## 2020-05-07 DIAGNOSIS — D05.12 DUCTAL CARCINOMA IN SITU (DCIS) OF LEFT BREAST: ICD-10-CM

## 2020-05-07 DIAGNOSIS — C50.912 MALIGNANT NEOPLASM OF LEFT FEMALE BREAST, UNSPECIFIED ESTROGEN RECEPTOR STATUS, UNSPECIFIED SITE OF BREAST (HCC): Primary | ICD-10-CM

## 2020-05-07 LAB
ALBUMIN SERPL-MCNC: 4.1 G/DL (ref 3.5–5.2)
ALP BLD-CCNC: 87 U/L (ref 35–104)
ALT SERPL-CCNC: 13 U/L (ref 0–32)
ANION GAP SERPL CALCULATED.3IONS-SCNC: 12 MMOL/L (ref 7–16)
AST SERPL-CCNC: 22 U/L (ref 0–31)
BASOPHILS ABSOLUTE: 0.03 E9/L (ref 0–0.2)
BASOPHILS RELATIVE PERCENT: 0.8 % (ref 0–2)
BILIRUB SERPL-MCNC: 0.3 MG/DL (ref 0–1.2)
BUN BLDV-MCNC: 13 MG/DL (ref 8–23)
CALCIUM SERPL-MCNC: 8.6 MG/DL (ref 8.6–10.2)
CHLORIDE BLD-SCNC: 99 MMOL/L (ref 98–107)
CO2: 24 MMOL/L (ref 22–29)
CREAT SERPL-MCNC: 0.9 MG/DL (ref 0.5–1)
EOSINOPHILS ABSOLUTE: 0.08 E9/L (ref 0.05–0.5)
EOSINOPHILS RELATIVE PERCENT: 2.1 % (ref 0–6)
GFR AFRICAN AMERICAN: >60
GFR NON-AFRICAN AMERICAN: >60 ML/MIN/1.73
GLUCOSE BLD-MCNC: 96 MG/DL (ref 74–99)
HCT VFR BLD CALC: 38.4 % (ref 34–48)
HEMOGLOBIN: 12.5 G/DL (ref 11.5–15.5)
IMMATURE GRANULOCYTES #: 0 E9/L
IMMATURE GRANULOCYTES %: 0 % (ref 0–5)
LYMPHOCYTES ABSOLUTE: 1.29 E9/L (ref 1.5–4)
LYMPHOCYTES RELATIVE PERCENT: 34.3 % (ref 20–42)
MAGNESIUM: 2.2 MG/DL (ref 1.6–2.6)
MCH RBC QN AUTO: 29.8 PG (ref 26–35)
MCHC RBC AUTO-ENTMCNC: 32.6 % (ref 32–34.5)
MCV RBC AUTO: 91.4 FL (ref 80–99.9)
MONOCYTES ABSOLUTE: 0.33 E9/L (ref 0.1–0.95)
MONOCYTES RELATIVE PERCENT: 8.8 % (ref 2–12)
NEUTROPHILS ABSOLUTE: 2.03 E9/L (ref 1.8–7.3)
NEUTROPHILS RELATIVE PERCENT: 54 % (ref 43–80)
PDW BLD-RTO: 13 FL (ref 11.5–15)
PLATELET # BLD: 259 E9/L (ref 130–450)
PMV BLD AUTO: 9.8 FL (ref 7–12)
POTASSIUM SERPL-SCNC: 4 MMOL/L (ref 3.5–5)
RBC # BLD: 4.2 E12/L (ref 3.5–5.5)
SODIUM BLD-SCNC: 135 MMOL/L (ref 132–146)
TOTAL PROTEIN: 6.5 G/DL (ref 6.4–8.3)
WBC # BLD: 3.8 E9/L (ref 4.5–11.5)

## 2020-05-07 PROCEDURE — 83735 ASSAY OF MAGNESIUM: CPT

## 2020-05-07 PROCEDURE — 1036F TOBACCO NON-USER: CPT | Performed by: INTERNAL MEDICINE

## 2020-05-07 PROCEDURE — 1123F ACP DISCUSS/DSCN MKR DOCD: CPT | Performed by: INTERNAL MEDICINE

## 2020-05-07 PROCEDURE — 2580000003 HC RX 258: Performed by: INTERNAL MEDICINE

## 2020-05-07 PROCEDURE — 80053 COMPREHEN METABOLIC PANEL: CPT

## 2020-05-07 PROCEDURE — 3017F COLORECTAL CA SCREEN DOC REV: CPT | Performed by: INTERNAL MEDICINE

## 2020-05-07 PROCEDURE — 6360000002 HC RX W HCPCS: Performed by: INTERNAL MEDICINE

## 2020-05-07 PROCEDURE — G8420 CALC BMI NORM PARAMETERS: HCPCS | Performed by: INTERNAL MEDICINE

## 2020-05-07 PROCEDURE — G8427 DOCREV CUR MEDS BY ELIG CLIN: HCPCS | Performed by: INTERNAL MEDICINE

## 2020-05-07 PROCEDURE — 96413 CHEMO IV INFUSION 1 HR: CPT

## 2020-05-07 PROCEDURE — 4040F PNEUMOC VAC/ADMIN/RCVD: CPT | Performed by: INTERNAL MEDICINE

## 2020-05-07 PROCEDURE — 1090F PRES/ABSN URINE INCON ASSESS: CPT | Performed by: INTERNAL MEDICINE

## 2020-05-07 PROCEDURE — G8399 PT W/DXA RESULTS DOCUMENT: HCPCS | Performed by: INTERNAL MEDICINE

## 2020-05-07 PROCEDURE — 85025 COMPLETE CBC W/AUTO DIFF WBC: CPT

## 2020-05-07 PROCEDURE — 99214 OFFICE O/P EST MOD 30 MIN: CPT | Performed by: INTERNAL MEDICINE

## 2020-05-07 RX ORDER — HEPARIN SODIUM (PORCINE) LOCK FLUSH IV SOLN 100 UNIT/ML 100 UNIT/ML
500 SOLUTION INTRAVENOUS PRN
Status: DISCONTINUED | OUTPATIENT
Start: 2020-05-07 | End: 2020-05-08 | Stop reason: HOSPADM

## 2020-05-07 RX ORDER — SODIUM CHLORIDE 9 MG/ML
20 INJECTION, SOLUTION INTRAVENOUS ONCE
Status: CANCELLED | OUTPATIENT
Start: 2020-05-07

## 2020-05-07 RX ORDER — HEPARIN SODIUM (PORCINE) LOCK FLUSH IV SOLN 100 UNIT/ML 100 UNIT/ML
500 SOLUTION INTRAVENOUS PRN
Status: CANCELLED | OUTPATIENT
Start: 2020-05-07

## 2020-05-07 RX ORDER — SODIUM CHLORIDE 9 MG/ML
20 INJECTION, SOLUTION INTRAVENOUS ONCE
Status: COMPLETED | OUTPATIENT
Start: 2020-05-07 | End: 2020-05-07

## 2020-05-07 RX ORDER — SODIUM CHLORIDE 0.9 % (FLUSH) 0.9 %
10 SYRINGE (ML) INJECTION PRN
Status: DISCONTINUED | OUTPATIENT
Start: 2020-05-07 | End: 2020-05-08 | Stop reason: HOSPADM

## 2020-05-07 RX ORDER — MEPERIDINE HYDROCHLORIDE 25 MG/ML
12.5 INJECTION INTRAMUSCULAR; INTRAVENOUS; SUBCUTANEOUS ONCE
Status: CANCELLED | OUTPATIENT
Start: 2020-05-07

## 2020-05-07 RX ORDER — SODIUM CHLORIDE 0.9 % (FLUSH) 0.9 %
10 SYRINGE (ML) INJECTION PRN
Status: CANCELLED | OUTPATIENT
Start: 2020-05-07

## 2020-05-07 RX ORDER — METHYLPREDNISOLONE SODIUM SUCCINATE 125 MG/2ML
125 INJECTION, POWDER, LYOPHILIZED, FOR SOLUTION INTRAMUSCULAR; INTRAVENOUS ONCE
Status: CANCELLED | OUTPATIENT
Start: 2020-05-07

## 2020-05-07 RX ORDER — EPINEPHRINE 1 MG/ML
0.3 INJECTION, SOLUTION, CONCENTRATE INTRAVENOUS PRN
Status: CANCELLED | OUTPATIENT
Start: 2020-05-07

## 2020-05-07 RX ORDER — SODIUM CHLORIDE 0.9 % (FLUSH) 0.9 %
5 SYRINGE (ML) INJECTION PRN
Status: CANCELLED | OUTPATIENT
Start: 2020-05-07

## 2020-05-07 RX ORDER — SODIUM CHLORIDE 9 MG/ML
INJECTION, SOLUTION INTRAVENOUS CONTINUOUS
Status: CANCELLED | OUTPATIENT
Start: 2020-05-07

## 2020-05-07 RX ORDER — DIPHENHYDRAMINE HYDROCHLORIDE 50 MG/ML
50 INJECTION INTRAMUSCULAR; INTRAVENOUS ONCE
Status: CANCELLED | OUTPATIENT
Start: 2020-05-07

## 2020-05-07 RX ADMIN — Medication 500 UNITS: at 12:09

## 2020-05-07 RX ADMIN — SODIUM CHLORIDE, PRESERVATIVE FREE 10 ML: 5 INJECTION INTRAVENOUS at 12:09

## 2020-05-07 RX ADMIN — SODIUM CHLORIDE 20 ML/HR: 9 INJECTION, SOLUTION INTRAVENOUS at 10:53

## 2020-05-07 RX ADMIN — TRASTUZUMAB 357 MG: 150 INJECTION, POWDER, LYOPHILIZED, FOR SOLUTION INTRAVENOUS at 11:23

## 2020-05-26 RX ORDER — ANASTROZOLE 1 MG/1
1 TABLET ORAL DAILY
Qty: 40 TABLET | Refills: 0 | Status: CANCELLED | OUTPATIENT
Start: 2020-05-26

## 2020-05-26 NOTE — PROGRESS NOTES
mass likely at the 12:00 position.       RECOMMENDATION:       Recommend ultrasound core biopsy of the mass seen on ultrasound and   stereotactic biopsy of the microcalcifications.       BI-RADS Category 5: Highly Suggestive of Malignancy          8/29/19  Left Breast US   [facility]         FINDINGS:        A small partially obscured mass is identified in the upper outer left   breast measuring approximately 5-6 mm. Additionally, there is a small   segmentally distributed cluster of pleomorphic microcalcifications   located superiorly near the mass extending to the middle third depth.       Ultrasound confirmed a small irregular shaped hypoechoic mass with   circumscribed margins at the 12:00 position measuring 5 mm in maximal   dimension.           Impression       1. Small solid suspicious mass at the 12:00 position. 2. Segmentally distributed pleomorphic microcalcifications located   near the breast mass likely at the 12:00 position.       RECOMMENDATION:       Recommend ultrasound core biopsy of the mass seen on ultrasound and   stereotactic biopsy of the microcalcifications.       BI-RADS Category 5: Highly Suggestive of Malignancy              She underwent a stereotactic guided left breast core biopsy at 12 o'clock position on September 10 , 2019.a single top hat shaped marker clip was deployed into the site.     She underwent an ultrasound guided biopsy of the left breast at the 12 o'clock position on September 17, 2019, A post-surgical ribbon shaped microclip was placed.      Pathological evaluation completed at [facility]):     9/10/19  Final Surgical Pathology Report  Diagnosis:  A. Left breast, 12:00, biopsy: High-grade ductal carcinoma in situ,  cannot exclude microinvasion, see comment. B. Left breast 12:00, additional, biopsy: High-grade ductal carcinoma in  situ, cannot exclude microinvasion, see comment.     Breast Cancer Marker Studies:  Estrogen Receptors (ER): Positive         Percentage of cells you for allowing us to participate in the care of Mrs. Sandrine Vogel. Rigo Gotti, RN, MSN, APRN-CNP, AOCNP  Advanced Oncology Certified Nurse Practitioner  Medical Oncology, Care in collaboration with Dr. Marika Proctor. Corrina Luong.

## 2020-05-28 ENCOUNTER — HOSPITAL ENCOUNTER (OUTPATIENT)
Dept: INFUSION THERAPY | Age: 75
Discharge: HOME | End: 2020-05-28
Payer: MEDICARE

## 2020-05-28 ENCOUNTER — OFFICE VISIT (OUTPATIENT)
Dept: ONCOLOGY | Age: 75
End: 2020-05-28
Payer: MEDICARE

## 2020-05-28 VITALS
DIASTOLIC BLOOD PRESSURE: 68 MMHG | SYSTOLIC BLOOD PRESSURE: 135 MMHG | HEART RATE: 64 BPM | TEMPERATURE: 96.7 F | RESPIRATION RATE: 16 BRPM

## 2020-05-28 VITALS
HEART RATE: 60 BPM | BODY MASS INDEX: 22.71 KG/M2 | OXYGEN SATURATION: 97 % | WEIGHT: 133 LBS | HEIGHT: 64 IN | DIASTOLIC BLOOD PRESSURE: 72 MMHG | TEMPERATURE: 96.2 F | SYSTOLIC BLOOD PRESSURE: 143 MMHG

## 2020-05-28 DIAGNOSIS — D05.12 DUCTAL CARCINOMA IN SITU (DCIS) OF LEFT BREAST: ICD-10-CM

## 2020-05-28 DIAGNOSIS — C50.912 MALIGNANT NEOPLASM OF LEFT FEMALE BREAST, UNSPECIFIED ESTROGEN RECEPTOR STATUS, UNSPECIFIED SITE OF BREAST (HCC): Primary | ICD-10-CM

## 2020-05-28 LAB
ALBUMIN SERPL-MCNC: 4.1 G/DL (ref 3.5–5.2)
ALP BLD-CCNC: 90 U/L (ref 35–104)
ALT SERPL-CCNC: 10 U/L (ref 0–32)
ANION GAP SERPL CALCULATED.3IONS-SCNC: 9 MMOL/L (ref 7–16)
AST SERPL-CCNC: 22 U/L (ref 0–31)
BASOPHILS ABSOLUTE: 0.02 E9/L (ref 0–0.2)
BASOPHILS RELATIVE PERCENT: 0.5 % (ref 0–2)
BILIRUB SERPL-MCNC: 0.2 MG/DL (ref 0–1.2)
BUN BLDV-MCNC: 11 MG/DL (ref 8–23)
CALCIUM SERPL-MCNC: 8.7 MG/DL (ref 8.6–10.2)
CHLORIDE BLD-SCNC: 98 MMOL/L (ref 98–107)
CO2: 26 MMOL/L (ref 22–29)
CREAT SERPL-MCNC: 0.9 MG/DL (ref 0.5–1)
EOSINOPHILS ABSOLUTE: 0.05 E9/L (ref 0.05–0.5)
EOSINOPHILS RELATIVE PERCENT: 1.1 % (ref 0–6)
GFR AFRICAN AMERICAN: >60
GFR NON-AFRICAN AMERICAN: >60 ML/MIN/1.73
GLUCOSE BLD-MCNC: 78 MG/DL (ref 74–99)
HCT VFR BLD CALC: 37.3 % (ref 34–48)
HEMOGLOBIN: 12.7 G/DL (ref 11.5–15.5)
IMMATURE GRANULOCYTES #: 0.01 E9/L
IMMATURE GRANULOCYTES %: 0.2 % (ref 0–5)
LYMPHOCYTES ABSOLUTE: 1.44 E9/L (ref 1.5–4)
LYMPHOCYTES RELATIVE PERCENT: 32.9 % (ref 20–42)
MAGNESIUM: 2.1 MG/DL (ref 1.6–2.6)
MCH RBC QN AUTO: 30.3 PG (ref 26–35)
MCHC RBC AUTO-ENTMCNC: 34 % (ref 32–34.5)
MCV RBC AUTO: 89 FL (ref 80–99.9)
MONOCYTES ABSOLUTE: 0.45 E9/L (ref 0.1–0.95)
MONOCYTES RELATIVE PERCENT: 10.3 % (ref 2–12)
NEUTROPHILS ABSOLUTE: 2.41 E9/L (ref 1.8–7.3)
NEUTROPHILS RELATIVE PERCENT: 55 % (ref 43–80)
PDW BLD-RTO: 12 FL (ref 11.5–15)
PLATELET # BLD: 239 E9/L (ref 130–450)
PMV BLD AUTO: 10 FL (ref 7–12)
POTASSIUM SERPL-SCNC: 4.5 MMOL/L (ref 3.5–5)
RBC # BLD: 4.19 E12/L (ref 3.5–5.5)
SODIUM BLD-SCNC: 133 MMOL/L (ref 132–146)
TOTAL PROTEIN: 6.8 G/DL (ref 6.4–8.3)
WBC # BLD: 4.4 E9/L (ref 4.5–11.5)

## 2020-05-28 PROCEDURE — G8420 CALC BMI NORM PARAMETERS: HCPCS | Performed by: NURSE PRACTITIONER

## 2020-05-28 PROCEDURE — 96413 CHEMO IV INFUSION 1 HR: CPT

## 2020-05-28 PROCEDURE — 99214 OFFICE O/P EST MOD 30 MIN: CPT | Performed by: NURSE PRACTITIONER

## 2020-05-28 PROCEDURE — 85025 COMPLETE CBC W/AUTO DIFF WBC: CPT

## 2020-05-28 PROCEDURE — 3017F COLORECTAL CA SCREEN DOC REV: CPT | Performed by: NURSE PRACTITIONER

## 2020-05-28 PROCEDURE — G8427 DOCREV CUR MEDS BY ELIG CLIN: HCPCS | Performed by: NURSE PRACTITIONER

## 2020-05-28 PROCEDURE — 1090F PRES/ABSN URINE INCON ASSESS: CPT | Performed by: NURSE PRACTITIONER

## 2020-05-28 PROCEDURE — 4040F PNEUMOC VAC/ADMIN/RCVD: CPT | Performed by: NURSE PRACTITIONER

## 2020-05-28 PROCEDURE — 1036F TOBACCO NON-USER: CPT | Performed by: NURSE PRACTITIONER

## 2020-05-28 PROCEDURE — G8399 PT W/DXA RESULTS DOCUMENT: HCPCS | Performed by: NURSE PRACTITIONER

## 2020-05-28 PROCEDURE — 80053 COMPREHEN METABOLIC PANEL: CPT

## 2020-05-28 PROCEDURE — 83735 ASSAY OF MAGNESIUM: CPT

## 2020-05-28 PROCEDURE — 2580000003 HC RX 258: Performed by: NURSE PRACTITIONER

## 2020-05-28 PROCEDURE — 1123F ACP DISCUSS/DSCN MKR DOCD: CPT | Performed by: NURSE PRACTITIONER

## 2020-05-28 PROCEDURE — 6360000002 HC RX W HCPCS: Performed by: NURSE PRACTITIONER

## 2020-05-28 RX ORDER — MEPERIDINE HYDROCHLORIDE 25 MG/ML
12.5 INJECTION INTRAMUSCULAR; INTRAVENOUS; SUBCUTANEOUS ONCE
Status: CANCELLED | OUTPATIENT
Start: 2020-05-28

## 2020-05-28 RX ORDER — SODIUM CHLORIDE 0.9 % (FLUSH) 0.9 %
10 SYRINGE (ML) INJECTION PRN
Status: DISCONTINUED | OUTPATIENT
Start: 2020-05-28 | End: 2020-05-29 | Stop reason: HOSPADM

## 2020-05-28 RX ORDER — SODIUM CHLORIDE 9 MG/ML
20 INJECTION, SOLUTION INTRAVENOUS ONCE
Status: CANCELLED | OUTPATIENT
Start: 2020-05-28

## 2020-05-28 RX ORDER — METHYLPREDNISOLONE SODIUM SUCCINATE 125 MG/2ML
125 INJECTION, POWDER, LYOPHILIZED, FOR SOLUTION INTRAMUSCULAR; INTRAVENOUS ONCE
Status: CANCELLED | OUTPATIENT
Start: 2020-05-28

## 2020-05-28 RX ORDER — SODIUM CHLORIDE 0.9 % (FLUSH) 0.9 %
10 SYRINGE (ML) INJECTION PRN
Status: CANCELLED | OUTPATIENT
Start: 2020-05-28

## 2020-05-28 RX ORDER — HEPARIN SODIUM (PORCINE) LOCK FLUSH IV SOLN 100 UNIT/ML 100 UNIT/ML
500 SOLUTION INTRAVENOUS PRN
Status: DISCONTINUED | OUTPATIENT
Start: 2020-05-28 | End: 2020-05-29 | Stop reason: HOSPADM

## 2020-05-28 RX ORDER — SODIUM CHLORIDE 9 MG/ML
20 INJECTION, SOLUTION INTRAVENOUS ONCE
Status: DISCONTINUED | OUTPATIENT
Start: 2020-05-28 | End: 2020-05-29 | Stop reason: HOSPADM

## 2020-05-28 RX ORDER — SODIUM CHLORIDE 0.9 % (FLUSH) 0.9 %
5 SYRINGE (ML) INJECTION PRN
Status: CANCELLED | OUTPATIENT
Start: 2020-05-28

## 2020-05-28 RX ORDER — HEPARIN SODIUM (PORCINE) LOCK FLUSH IV SOLN 100 UNIT/ML 100 UNIT/ML
500 SOLUTION INTRAVENOUS PRN
Status: CANCELLED | OUTPATIENT
Start: 2020-05-28

## 2020-05-28 RX ORDER — DIPHENHYDRAMINE HYDROCHLORIDE 50 MG/ML
50 INJECTION INTRAMUSCULAR; INTRAVENOUS ONCE
Status: CANCELLED | OUTPATIENT
Start: 2020-05-28

## 2020-05-28 RX ORDER — EPINEPHRINE 1 MG/ML
0.3 INJECTION, SOLUTION, CONCENTRATE INTRAVENOUS PRN
Status: CANCELLED | OUTPATIENT
Start: 2020-05-28

## 2020-05-28 RX ORDER — SODIUM CHLORIDE 9 MG/ML
INJECTION, SOLUTION INTRAVENOUS CONTINUOUS
Status: CANCELLED | OUTPATIENT
Start: 2020-05-28

## 2020-05-28 RX ADMIN — SODIUM CHLORIDE, PRESERVATIVE FREE 10 ML: 5 INJECTION INTRAVENOUS at 12:40

## 2020-05-28 RX ADMIN — TRASTUZUMAB 357 MG: 150 INJECTION, POWDER, LYOPHILIZED, FOR SOLUTION INTRAVENOUS at 12:00

## 2020-05-28 RX ADMIN — SODIUM CHLORIDE 20 ML/HR: 9 INJECTION, SOLUTION INTRAVENOUS at 11:39

## 2020-05-28 RX ADMIN — Medication 500 UNITS: at 12:40

## 2020-05-29 ENCOUNTER — HOSPITAL ENCOUNTER (OUTPATIENT)
Dept: MAMMOGRAPHY | Age: 75
Discharge: HOME | End: 2020-05-31
Payer: MEDICARE

## 2020-05-29 PROCEDURE — 77080 DXA BONE DENSITY AXIAL: CPT

## 2020-05-29 NOTE — PROGRESS NOTES
Called Marimar Villegas to review DEXA Bone density revealing osteoporosis of the bilateral hips T-score left hip -2.6; Right hip T-Score -2.8. She has osteoporosis and Arimidex is not recommended. We discussed her bone density report and advised her she may benefit from a bisphosphonate. She adamantly refuses to take anything to promote her bone health, stating she will exercise and eat healthy. In view of the osteoporosis, we recommend endocrine therapy with tamoxifen for her underlying breast cancer. Side effects of tamoxifen were reviewed in detail. She denies any history of a DVT or clotting disorder. She is currently on Paxil; will need to be switched to Effexor prior to starting tamoxifen. She will contact her primary care provider on Monday discuss discontinuing Paxil and starting Effexor. She will call us once Paxil has been stopped. At that time, a prescription will be sent to pharmacy for tamoxifen 20 mg once daily for 5 years. Discussed need for GYN and daily low-dose aspirin. Patricia Campbell, RN, MSN, APRN-CNP, AOCNP  Advanced Oncology Certified Nurse Practitioner  Medical Oncology, Care in collaboration with Dr. Mio Miller. Xavier Monahan.

## 2020-06-02 NOTE — PROGRESS NOTES
Review of Systems   Constitutional: Negative for activity change, appetite change, chills, fatigue, fever and unexpected weight change. Doing well. Continues on Herceptin every three weeks. Active. HENT: Negative for congestion, postnasal drip, rhinorrhea, sinus pressure, sinus pain, sore throat, trouble swallowing and voice change. Eyes: Negative for discharge, itching and visual disturbance. Respiratory: Negative for cough, choking, chest tightness, shortness of breath and wheezing. Cardiovascular: Negative for chest pain, palpitations and leg swelling. Gastrointestinal: Negative for abdominal distention, abdominal pain, blood in stool, constipation, diarrhea, nausea and vomiting. Endocrine: Negative for cold intolerance and heat intolerance. Genitourinary: Negative for difficulty urinating, dysuria, frequency and hematuria. Musculoskeletal: Negative for arthralgias, back pain, gait problem, joint swelling, myalgias, neck pain and neck stiffness. Allergic/Immunologic: Negative for environmental allergies and food allergies. Neurological: Negative for dizziness, seizures, syncope, speech difficulty, weakness, light-headedness and headaches. Hematological: Negative for adenopathy. Does not bruise/bleed easily. Psychiatric/Behavioral: Negative for agitation, confusion and decreased concentration. The patient is not nervous/anxious. Objective:   Physical Exam  Vitals signs and nursing note reviewed. Constitutional:       General: She is not in acute distress. Appearance: Normal appearance. She is well-developed. She is not diaphoretic. HENT:      Head: Normocephalic and atraumatic. Mouth/Throat:      Pharynx: No oropharyngeal exudate. Eyes:      General: No scleral icterus. Right eye: No discharge. Left eye: No discharge. Conjunctiva/sclera: Conjunctivae normal.   Neck:      Musculoskeletal: Normal range of motion and neck supple. Content: Thought content normal.         Judgment: Judgment normal.            Assessment:    Patient presents today for clinical follow up.    75 y.o. woman who underwent a left breast simple mastectomy, blue dye injection, left axillary sentinel node biopsy, possible left axillary dissection on November 20, 2019. Pathological evaluation completed at [facility]):    A. 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   Completely excised; Margins are negative for invasive carcinoma and  negative for DCIS. Fibroadenomatoid hyperplasia. Nipple with no significant pathologic findings. B. [place] lymph node #1:  One lymph node with no evidence of carcinoma (0/1). C. [place] lymph node #2:  One lymph node with no evidence of carcinoma (0/1). CANCER CASE SUMMARY:  Procedure: Left simple mastectomy  Specimen laterality: Left  Tumor site: 12:00 position  Tumor size: 8.0 mm in greatest dimension  Histologic type:  Invasive carcinoma of no special type (invasive ductal  carcinoma, not otherwise specified)  Histologic grade ([place] histologic score):   Glandular differentiation: Score 3   Nuclear pleomorphism: Score 2   Mitotic rate: Score 2   Overall grade: Grade 2 (score of 7)  Tumor focality: Single focus of invasive carcinoma  Ductal carcinoma in situ (DCIS): Present, adjacent to the invasive  carcinoma  Invasive carcinoma margins:   Margins uninvolved by invasive carcinoma    Distance from closest margin: 5.0 mm from the posterior margin  DCIS margins:   Margin uninvolved by DCIS    Distance from closest margin: 10.0 mm from the posterior margin  Regional lymph nodes: Uninvolved by tumor cells   Total number of lymph nodes examined: 2 (both sentinel nodes)  Treatment effect: No known presurgical therapy  Pathologic stage classification (pTNM, AJCC 8th edition):   pT1b   (sn) pN0    Ancillary studies:  Calponin immunostain on block A4 shows the invasive carcinoma lacking a  myoepithelial layer. P63 immunostain on block A6 shows the invasive carcinoma lacking a  myoepithelial layer, with a myoepithelial layer retained around the DCIS. Breast Cancer Marker Studies (Block A6):  Negative: 0%        No internal control cells present. Progesterone Receptors (OH):  Negative: 0%        No internal control cells present. Hormone receptor studies are performed by immunohistochemistry on  formalin-fixed, paraffin-embedded tissue (Roche Benchmark Immunostainer,  [place] anti-ER clone [id number], anti-OH clone [id number], polymer-based detection  chemistry). ER and OH are evaluated based on the percentage of cells  showing nuclear staining with >1% considered positive for each. Her-2/miles (c-erb B-2) protein expression: Positive (Score 3+)    DCIS was ER positive, endocrine therapy with Arimidex is recommended to decrease the risk of contralateral DCIS and invasive carcinoma. The patient will be started on endocrine therapy with Arimidex, but will have a DEXA scan done prior to starting it, she does have history of osteopenia, DEXA scan ordered and is scheduled for 5/29/2020.     Patient had a 2D echocardiogram done, LVEF is 65%, adequate for treatment with Herceptin, she had a port placed, she was started on adjuvant therapy with Taxol and Herceptin on 1/2/2020. She completed Taxol on 3/19/2020.     -Repeat 2D echocardiogram done on 4/13/2020, revealing an LVEF of 73%, adequate to continue with Herceptin. She will be due for a repeat 2D echocardiogram around July 13, 2020.     Single agent Herceptin, Cycle #7 was on 05/07/2020. Single agent Herceptin, Cycle #8 is scheduled for today, 05/28/2020. Labs reviewed. Okay to proceed with Herceptin. Doing well. No complaints today. DEXA Bone density results, scheduled for 05/29/2020. DEXA Bone density revealing osteoporosis of the bilateral hips T-score left hip -2.6; Right hip T-Score -2.8. She has osteoporosis and Arimidex is not recommended. We discussed her bone density report and advised her she may benefit from a bisphosphonate. She adamantly refuses to take anything to promote her bone health, stating she will exercise and eat healthy.     In view of the osteoporosis, we recommend endocrine therapy with tamoxifen for her underlying breast cancer. Side effects of tamoxifen were reviewed in detail. She denies any history of a DVT or clotting disorder. She was switched starting today from Paxil to Effexor 37.5 mg [facility].      -ET:  Prescription for Tamoxifen 20 mg daily to pharmacy. Will start on 06/11/2020 to allow for Paxil washout.          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     -Right breast tenderness and lumps, ordered right diagnostic mammogram and ultrasound. They were done on 2/25/2020, there was no mammographic/sonographic evidence of malignancy, this was BI-RADS Category 1, negative.     Continue Herceptin per medical oncology. Plan:   1. Continue monthly breast/chest wall self examination; detailed instructions reviewed today. Bring any changes to your physician's attention. 2. Continue healthy diet and exercise routinely as tolerated. 3. Maintaining ideal body weight (20-25 BMI) may lead to optimal breast cancer outcomes. 4. Avoid alcohol. 5. Repeat mammogram Feb 2020 (not ordered). 6. Continue Tamoxifen 20 mg daily at the discretion of medical oncology team.  7. Ca+/VitD daily. 8. GYN exam yearly. 9. Aspirin 81 mg daily while on Tamoxifen. 10. Continue follow up with Medical Oncology and Primary Care. 11. RTC 4 weeks for Tamoxifen toxicity check. During today's visit, face-to-face time 25 minutes, greater than 50% in counseling education and coordination of care. All questions were answered to her apparent satisfaction, and she is agreeable to the plan as outlined above.         Jenny Mendez RN, MSN, APRN-CNP, [address]  Advanced Oncology Certified Nurse Practitioner  Department of [facility]/  [facility] [place] in collaboration with Dr. Carla Lai.  Kd/Eleanor Vásquez

## 2020-06-04 ENCOUNTER — OFFICE VISIT (OUTPATIENT)
Dept: BREAST CENTER | Age: 75
End: 2020-06-04
Payer: MEDICARE

## 2020-06-04 VITALS
BODY MASS INDEX: 21.85 KG/M2 | TEMPERATURE: 98.6 F | DIASTOLIC BLOOD PRESSURE: 80 MMHG | WEIGHT: 128 LBS | SYSTOLIC BLOOD PRESSURE: 136 MMHG | HEIGHT: 64 IN | RESPIRATION RATE: 18 BRPM | OXYGEN SATURATION: 98 % | HEART RATE: 61 BPM

## 2020-06-04 PROCEDURE — G8420 CALC BMI NORM PARAMETERS: HCPCS | Performed by: NURSE PRACTITIONER

## 2020-06-04 PROCEDURE — G8399 PT W/DXA RESULTS DOCUMENT: HCPCS | Performed by: NURSE PRACTITIONER

## 2020-06-04 PROCEDURE — 3017F COLORECTAL CA SCREEN DOC REV: CPT | Performed by: NURSE PRACTITIONER

## 2020-06-04 PROCEDURE — 4040F PNEUMOC VAC/ADMIN/RCVD: CPT | Performed by: NURSE PRACTITIONER

## 2020-06-04 PROCEDURE — 1090F PRES/ABSN URINE INCON ASSESS: CPT | Performed by: NURSE PRACTITIONER

## 2020-06-04 PROCEDURE — G8427 DOCREV CUR MEDS BY ELIG CLIN: HCPCS | Performed by: NURSE PRACTITIONER

## 2020-06-04 PROCEDURE — 99214 OFFICE O/P EST MOD 30 MIN: CPT | Performed by: NURSE PRACTITIONER

## 2020-06-04 PROCEDURE — 1123F ACP DISCUSS/DSCN MKR DOCD: CPT | Performed by: NURSE PRACTITIONER

## 2020-06-04 PROCEDURE — 99213 OFFICE O/P EST LOW 20 MIN: CPT | Performed by: NURSE PRACTITIONER

## 2020-06-04 PROCEDURE — 1036F TOBACCO NON-USER: CPT | Performed by: NURSE PRACTITIONER

## 2020-06-04 RX ORDER — TAMOXIFEN CITRATE 20 MG/1
20 TABLET ORAL DAILY
Qty: 40 TABLET | Refills: 0 | Status: SHIPPED
Start: 2020-06-04 | End: 2020-08-20 | Stop reason: ALTCHOICE

## 2020-06-04 RX ORDER — VENLAFAXINE 37.5 MG/1
75 TABLET ORAL 3 TIMES DAILY
COMMUNITY
End: 2020-07-09

## 2020-06-04 ASSESSMENT — ENCOUNTER SYMPTOMS
CONSTIPATION: 0
CHOKING: 0
SORE THROAT: 0
ABDOMINAL PAIN: 0
ABDOMINAL DISTENTION: 0
WHEEZING: 0
COUGH: 0
EYE ITCHING: 0
RHINORRHEA: 0
DIARRHEA: 0
BLOOD IN STOOL: 0
SINUS PRESSURE: 0
EYE DISCHARGE: 0
VOICE CHANGE: 0
CHEST TIGHTNESS: 0
SHORTNESS OF BREATH: 0
SINUS PAIN: 0
BACK PAIN: 0
NAUSEA: 0
TROUBLE SWALLOWING: 0
VOMITING: 0

## 2020-06-18 ENCOUNTER — TELEPHONE (OUTPATIENT)
Dept: CASE MANAGEMENT | Age: 75
End: 2020-06-18

## 2020-06-18 ENCOUNTER — TELEPHONE (OUTPATIENT)
Dept: INFUSION THERAPY | Age: 75
End: 2020-06-18

## 2020-06-18 ENCOUNTER — OFFICE VISIT (OUTPATIENT)
Dept: ONCOLOGY | Age: 75
End: 2020-06-18
Payer: MEDICARE

## 2020-06-18 ENCOUNTER — HOSPITAL ENCOUNTER (OUTPATIENT)
Dept: INFUSION THERAPY | Age: 75
Discharge: HOME | End: 2020-06-18
Payer: MEDICARE

## 2020-06-18 VITALS
OXYGEN SATURATION: 97 % | BODY MASS INDEX: 22.19 KG/M2 | HEART RATE: 73 BPM | DIASTOLIC BLOOD PRESSURE: 70 MMHG | SYSTOLIC BLOOD PRESSURE: 153 MMHG | WEIGHT: 129.3 LBS | TEMPERATURE: 97.7 F

## 2020-06-18 VITALS
RESPIRATION RATE: 16 BRPM | SYSTOLIC BLOOD PRESSURE: 133 MMHG | DIASTOLIC BLOOD PRESSURE: 58 MMHG | TEMPERATURE: 96.8 F | HEART RATE: 76 BPM

## 2020-06-18 DIAGNOSIS — C50.912 MALIGNANT NEOPLASM OF LEFT FEMALE BREAST, UNSPECIFIED ESTROGEN RECEPTOR STATUS, UNSPECIFIED SITE OF BREAST (HCC): ICD-10-CM

## 2020-06-18 DIAGNOSIS — E87.1 LOW SODIUM LEVELS: ICD-10-CM

## 2020-06-18 DIAGNOSIS — D05.12 DUCTAL CARCINOMA IN SITU (DCIS) OF LEFT BREAST: Primary | ICD-10-CM

## 2020-06-18 LAB
ALBUMIN SERPL-MCNC: 4.2 G/DL (ref 3.5–5.2)
ALP BLD-CCNC: 95 U/L (ref 35–104)
ALT SERPL-CCNC: 14 U/L (ref 0–32)
ANION GAP SERPL CALCULATED.3IONS-SCNC: 9 MMOL/L (ref 7–16)
AST SERPL-CCNC: 23 U/L (ref 0–31)
BASOPHILS ABSOLUTE: 0.02 E9/L (ref 0–0.2)
BASOPHILS RELATIVE PERCENT: 0.4 % (ref 0–2)
BILIRUB SERPL-MCNC: 0.4 MG/DL (ref 0–1.2)
BUN BLDV-MCNC: 12 MG/DL (ref 8–23)
CALCIUM SERPL-MCNC: 9 MG/DL (ref 8.6–10.2)
CHLORIDE BLD-SCNC: 92 MMOL/L (ref 98–107)
CO2: 27 MMOL/L (ref 22–29)
CREAT SERPL-MCNC: 0.9 MG/DL (ref 0.5–1)
EOSINOPHILS ABSOLUTE: 0.03 E9/L (ref 0.05–0.5)
EOSINOPHILS RELATIVE PERCENT: 0.6 % (ref 0–6)
GFR AFRICAN AMERICAN: >60
GFR NON-AFRICAN AMERICAN: >60 ML/MIN/1.73
GLUCOSE BLD-MCNC: 83 MG/DL (ref 74–99)
HCT VFR BLD CALC: 39.6 % (ref 34–48)
HEMOGLOBIN: 13.1 G/DL (ref 11.5–15.5)
IMMATURE GRANULOCYTES #: 0.01 E9/L
IMMATURE GRANULOCYTES %: 0.2 % (ref 0–5)
LYMPHOCYTES ABSOLUTE: 1.87 E9/L (ref 1.5–4)
LYMPHOCYTES RELATIVE PERCENT: 36.9 % (ref 20–42)
MCH RBC QN AUTO: 29 PG (ref 26–35)
MCHC RBC AUTO-ENTMCNC: 33.1 % (ref 32–34.5)
MCV RBC AUTO: 87.8 FL (ref 80–99.9)
MONOCYTES ABSOLUTE: 0.52 E9/L (ref 0.1–0.95)
MONOCYTES RELATIVE PERCENT: 10.3 % (ref 2–12)
NEUTROPHILS ABSOLUTE: 2.62 E9/L (ref 1.8–7.3)
NEUTROPHILS RELATIVE PERCENT: 51.6 % (ref 43–80)
PDW BLD-RTO: 12 FL (ref 11.5–15)
PLATELET # BLD: 249 E9/L (ref 130–450)
PMV BLD AUTO: 10.2 FL (ref 7–12)
POTASSIUM SERPL-SCNC: 4.3 MMOL/L (ref 3.5–5)
RBC # BLD: 4.51 E12/L (ref 3.5–5.5)
SODIUM BLD-SCNC: 128 MMOL/L (ref 132–146)
TOTAL PROTEIN: 6.8 G/DL (ref 6.4–8.3)
WBC # BLD: 5.1 E9/L (ref 4.5–11.5)

## 2020-06-18 PROCEDURE — 2580000003 HC RX 258: Performed by: INTERNAL MEDICINE

## 2020-06-18 PROCEDURE — 2580000003 HC RX 258

## 2020-06-18 PROCEDURE — 96413 CHEMO IV INFUSION 1 HR: CPT

## 2020-06-18 PROCEDURE — 96361 HYDRATE IV INFUSION ADD-ON: CPT

## 2020-06-18 PROCEDURE — 99212 OFFICE O/P EST SF 10 MIN: CPT | Performed by: INTERNAL MEDICINE

## 2020-06-18 PROCEDURE — 80053 COMPREHEN METABOLIC PANEL: CPT

## 2020-06-18 PROCEDURE — 6360000002 HC RX W HCPCS: Performed by: INTERNAL MEDICINE

## 2020-06-18 PROCEDURE — 85025 COMPLETE CBC W/AUTO DIFF WBC: CPT

## 2020-06-18 RX ORDER — MEPERIDINE HYDROCHLORIDE 25 MG/ML
12.5 INJECTION INTRAMUSCULAR; INTRAVENOUS; SUBCUTANEOUS ONCE
Status: CANCELLED | OUTPATIENT
Start: 2020-06-18

## 2020-06-18 RX ORDER — SODIUM CHLORIDE 0.9 % (FLUSH) 0.9 %
10 SYRINGE (ML) INJECTION PRN
Status: CANCELLED | OUTPATIENT
Start: 2020-06-18

## 2020-06-18 RX ORDER — 0.9 % SODIUM CHLORIDE 0.9 %
1000 INTRAVENOUS SOLUTION INTRAVENOUS ONCE
Status: CANCELLED | OUTPATIENT
Start: 2020-06-18

## 2020-06-18 RX ORDER — SODIUM CHLORIDE 9 MG/ML
20 INJECTION, SOLUTION INTRAVENOUS ONCE
Status: CANCELLED | OUTPATIENT
Start: 2020-06-18

## 2020-06-18 RX ORDER — SODIUM CHLORIDE 0.9 % (FLUSH) 0.9 %
10 SYRINGE (ML) INJECTION PRN
Status: DISCONTINUED | OUTPATIENT
Start: 2020-06-18 | End: 2020-06-19 | Stop reason: HOSPADM

## 2020-06-18 RX ORDER — HEPARIN SODIUM (PORCINE) LOCK FLUSH IV SOLN 100 UNIT/ML 100 UNIT/ML
500 SOLUTION INTRAVENOUS PRN
Status: CANCELLED | OUTPATIENT
Start: 2020-06-18

## 2020-06-18 RX ORDER — HEPARIN SODIUM (PORCINE) LOCK FLUSH IV SOLN 100 UNIT/ML 100 UNIT/ML
500 SOLUTION INTRAVENOUS PRN
Status: DISCONTINUED | OUTPATIENT
Start: 2020-06-18 | End: 2020-06-19 | Stop reason: HOSPADM

## 2020-06-18 RX ORDER — SODIUM CHLORIDE 9 MG/ML
20 INJECTION, SOLUTION INTRAVENOUS ONCE
Status: COMPLETED | OUTPATIENT
Start: 2020-06-18 | End: 2020-06-18

## 2020-06-18 RX ORDER — SODIUM CHLORIDE 9 MG/ML
INJECTION, SOLUTION INTRAVENOUS CONTINUOUS
Status: CANCELLED | OUTPATIENT
Start: 2020-06-18

## 2020-06-18 RX ORDER — EPINEPHRINE 1 MG/ML
0.3 INJECTION, SOLUTION, CONCENTRATE INTRAVENOUS PRN
Status: CANCELLED | OUTPATIENT
Start: 2020-06-18

## 2020-06-18 RX ORDER — DIPHENHYDRAMINE HYDROCHLORIDE 50 MG/ML
50 INJECTION INTRAMUSCULAR; INTRAVENOUS ONCE
Status: CANCELLED | OUTPATIENT
Start: 2020-06-18

## 2020-06-18 RX ORDER — 0.9 % SODIUM CHLORIDE 0.9 %
1000 INTRAVENOUS SOLUTION INTRAVENOUS ONCE
Status: COMPLETED | OUTPATIENT
Start: 2020-06-18 | End: 2020-06-18

## 2020-06-18 RX ORDER — METHYLPREDNISOLONE SODIUM SUCCINATE 125 MG/2ML
125 INJECTION, POWDER, LYOPHILIZED, FOR SOLUTION INTRAMUSCULAR; INTRAVENOUS ONCE
Status: CANCELLED | OUTPATIENT
Start: 2020-06-18

## 2020-06-18 RX ORDER — SODIUM CHLORIDE 9 MG/ML
INJECTION, SOLUTION INTRAVENOUS
Status: COMPLETED
Start: 2020-06-18 | End: 2020-06-18

## 2020-06-18 RX ADMIN — SODIUM CHLORIDE 20 ML/HR: 9 INJECTION, SOLUTION INTRAVENOUS at 11:05

## 2020-06-18 RX ADMIN — SODIUM CHLORIDE 1000 ML: 9 INJECTION, SOLUTION INTRAVENOUS at 11:16

## 2020-06-18 RX ADMIN — SODIUM CHLORIDE, PRESERVATIVE FREE 10 ML: 5 INJECTION INTRAVENOUS at 13:00

## 2020-06-18 RX ADMIN — TRASTUZUMAB 357 MG: 150 INJECTION, POWDER, LYOPHILIZED, FOR SOLUTION INTRAVENOUS at 11:22

## 2020-06-18 RX ADMIN — Medication 500 UNITS: at 13:00

## 2020-06-18 RX ADMIN — Medication 1000 ML: at 11:16

## 2020-06-18 NOTE — TELEPHONE ENCOUNTER
Met with patient in the treatment room prior to her Herceptin treatment for follow up.  Patient appears well and is in good spirits.  States that she is improving after taking an antibiotic for a feral cat bite per her PCP. Reports that she has numerous food and medication sensitivities and that her stomach got very upset. Denies diarrhea but reports that it was \"achy\". Reviewed eating yogurt daily. Verbalizes understanding. States that her PCP increased her Effexor to 37.5mg BID and she started yesterday. Patient nervous to start taking the Tamoxifen due to her resolving stomach issues and increased anxiety/hot flashes at this time due to adjustment to new antidepressant (Paxil 20mg x 15 yrs previously). Provided support and encouragement that it may take several more weeks for her body to adjust to the medication since she was on the previous for so long. Upon inquiring, patient states she used to do yoga to assist but classes canceled so she calms herself with slow breathing. Informed her that she may be able to continue with yoga via videos on youtube or DVDs if she found it helpful. Answered numerous questions regarding medication side effects, radiation therapy (informed her that it is not indicated per NCCN guidelines in her case), and increasing sodium in her diet to her apparent satisfaction. Declined additional written literature regarding Tamoxifen or Effexor. States that she cannot tolerate Gatorade or like beverages when working outside. Instructed her to increase her water intake then while working outside then. She is receiving 1L NA with her treatment today and will have repeat blood work next week. Reports eating and sleeping well.  Denies any additional needs for assistance from this NN.  Patient states that I was helpful and she appreciated information provided. Nurse navigator will continue as needed.  Carol Marx, BSW, RN, OCN  Oncology Nurse Navigator

## 2020-06-18 NOTE — PROGRESS NOTES
Na low 128 Robyn Lead RN made aware to inform physician no further orders at this time. Will Continue to monitor.

## 2020-06-18 NOTE — TELEPHONE ENCOUNTER
Spoke to patient about increasing her sodium intake in her diet and that doctor will want to obtain repeat blood work next week. Patient voiced understanding and is agreeable with plan.

## 2020-06-18 NOTE — PROGRESS NOTES
Diagnosis    Breast ca    Interim history    Pt denies any N/V/D.    PE    HEAD NC AT  CHEST Clear BS BL  HEART S1S2 no m/r/g  ABD Soft ND NT  EXT no edema  NEURO A+Ox3    A/P    This is a 76 y/o lady with breast ca and DCIS s/p L mastectomy.     Transtuzumab    Pt would like to continue tamoxifen      F/u in 2 months    Addendum    Hyponatremia    Increase salt intake

## 2020-06-25 ENCOUNTER — TELEPHONE (OUTPATIENT)
Dept: CASE MANAGEMENT | Age: 75
End: 2020-06-25

## 2020-06-25 ENCOUNTER — HOSPITAL ENCOUNTER (OUTPATIENT)
Dept: INFUSION THERAPY | Age: 75
Discharge: HOME | End: 2020-06-25
Payer: MEDICARE

## 2020-06-25 DIAGNOSIS — D05.12 DUCTAL CARCINOMA IN SITU (DCIS) OF LEFT BREAST: ICD-10-CM

## 2020-06-25 DIAGNOSIS — C50.912 MALIGNANT NEOPLASM OF LEFT FEMALE BREAST, UNSPECIFIED ESTROGEN RECEPTOR STATUS, UNSPECIFIED SITE OF BREAST (HCC): Primary | ICD-10-CM

## 2020-06-25 LAB
ALBUMIN SERPL-MCNC: 4.3 G/DL (ref 3.5–5.2)
ALP BLD-CCNC: 97 U/L (ref 35–104)
ALT SERPL-CCNC: 12 U/L (ref 0–32)
ANION GAP SERPL CALCULATED.3IONS-SCNC: 10 MMOL/L (ref 7–16)
AST SERPL-CCNC: 22 U/L (ref 0–31)
BASOPHILS ABSOLUTE: 0.03 E9/L (ref 0–0.2)
BASOPHILS RELATIVE PERCENT: 0.6 % (ref 0–2)
BILIRUB SERPL-MCNC: 0.3 MG/DL (ref 0–1.2)
BUN BLDV-MCNC: 11 MG/DL (ref 8–23)
CALCIUM SERPL-MCNC: 8.8 MG/DL (ref 8.6–10.2)
CHLORIDE BLD-SCNC: 98 MMOL/L (ref 98–107)
CO2: 27 MMOL/L (ref 22–29)
CREAT SERPL-MCNC: 0.9 MG/DL (ref 0.5–1)
EOSINOPHILS ABSOLUTE: 0.03 E9/L (ref 0.05–0.5)
EOSINOPHILS RELATIVE PERCENT: 0.6 % (ref 0–6)
GFR AFRICAN AMERICAN: >60
GFR NON-AFRICAN AMERICAN: >60 ML/MIN/1.73
GLUCOSE BLD-MCNC: 93 MG/DL (ref 74–99)
HCT VFR BLD CALC: 40.9 % (ref 34–48)
HEMOGLOBIN: 13.3 G/DL (ref 11.5–15.5)
IMMATURE GRANULOCYTES #: 0.01 E9/L
IMMATURE GRANULOCYTES %: 0.2 % (ref 0–5)
LYMPHOCYTES ABSOLUTE: 1.41 E9/L (ref 1.5–4)
LYMPHOCYTES RELATIVE PERCENT: 30.4 % (ref 20–42)
MAGNESIUM: 2.1 MG/DL (ref 1.6–2.6)
MCH RBC QN AUTO: 28.9 PG (ref 26–35)
MCHC RBC AUTO-ENTMCNC: 32.5 % (ref 32–34.5)
MCV RBC AUTO: 88.9 FL (ref 80–99.9)
MONOCYTES ABSOLUTE: 0.39 E9/L (ref 0.1–0.95)
MONOCYTES RELATIVE PERCENT: 8.4 % (ref 2–12)
NEUTROPHILS ABSOLUTE: 2.77 E9/L (ref 1.8–7.3)
NEUTROPHILS RELATIVE PERCENT: 59.8 % (ref 43–80)
PDW BLD-RTO: 12.3 FL (ref 11.5–15)
PLATELET # BLD: 290 E9/L (ref 130–450)
PMV BLD AUTO: 10.1 FL (ref 7–12)
POTASSIUM SERPL-SCNC: 4.3 MMOL/L (ref 3.5–5)
RBC # BLD: 4.6 E12/L (ref 3.5–5.5)
SODIUM BLD-SCNC: 135 MMOL/L (ref 132–146)
TOTAL PROTEIN: 6.8 G/DL (ref 6.4–8.3)
WBC # BLD: 4.6 E9/L (ref 4.5–11.5)

## 2020-06-25 PROCEDURE — 6360000002 HC RX W HCPCS: Performed by: INTERNAL MEDICINE

## 2020-06-25 PROCEDURE — 2580000003 HC RX 258: Performed by: INTERNAL MEDICINE

## 2020-06-25 PROCEDURE — 85025 COMPLETE CBC W/AUTO DIFF WBC: CPT

## 2020-06-25 PROCEDURE — 83735 ASSAY OF MAGNESIUM: CPT

## 2020-06-25 PROCEDURE — 36591 DRAW BLOOD OFF VENOUS DEVICE: CPT

## 2020-06-25 PROCEDURE — 80053 COMPREHEN METABOLIC PANEL: CPT

## 2020-06-25 RX ORDER — SODIUM CHLORIDE 0.9 % (FLUSH) 0.9 %
10 SYRINGE (ML) INJECTION PRN
Status: DISCONTINUED | OUTPATIENT
Start: 2020-06-25 | End: 2020-06-26 | Stop reason: HOSPADM

## 2020-06-25 RX ORDER — SODIUM CHLORIDE 0.9 % (FLUSH) 0.9 %
10 SYRINGE (ML) INJECTION PRN
Status: CANCELLED | OUTPATIENT
Start: 2020-06-25

## 2020-06-25 RX ORDER — HEPARIN SODIUM (PORCINE) LOCK FLUSH IV SOLN 100 UNIT/ML 100 UNIT/ML
500 SOLUTION INTRAVENOUS PRN
Status: DISCONTINUED | OUTPATIENT
Start: 2020-06-25 | End: 2020-06-26 | Stop reason: HOSPADM

## 2020-06-25 RX ORDER — HEPARIN SODIUM (PORCINE) LOCK FLUSH IV SOLN 100 UNIT/ML 100 UNIT/ML
500 SOLUTION INTRAVENOUS PRN
Status: CANCELLED | OUTPATIENT
Start: 2020-06-25

## 2020-06-25 RX ADMIN — Medication 500 UNITS: at 11:04

## 2020-06-25 RX ADMIN — SODIUM CHLORIDE, PRESERVATIVE FREE 10 ML: 5 INJECTION INTRAVENOUS at 11:04

## 2020-06-25 NOTE — TELEPHONE ENCOUNTER
Met with patient during her lab draw appointment per her request.  Patient is having a redraw for her low sodium level. She had additional questions regarding her past sodium levels and the hormone therapy medication information. States that she changed her mind and would like patient education print outs. Provided written literature of NCI: Hormone Therapy for Breast Cancer Patients, [facility] Patient Education: Prolia, Effexor, Arimidex, Tamoxifen, and wrote out her sodium levels for June and May 2020. Patient states that she isn't comfortable with the locum physician and is going to wait until Dr. Candelaria Machado returns to discuss with her. Reports that she trusts her and her treatment recommendations. Appreciative of literature and support. Denies any additional needs for assistance from this NN.  Patient to return to her private vehicle until her blood work is completed. Staff aware. Nurse navigator will continue as needed.  Carol Keita, JOSEW, RN, OCN  Oncology Nurse Navigator

## 2020-06-25 NOTE — PROGRESS NOTES
Patient presents to clinic for labs today. 20  SQ port accessed per policy using 6.06 Mcguire needle for good blood return. Aspirate for waste and specimen sent to lab. Site flushed easily with 10 mL NSS followed by 5 mL Heparin solution 100 units/ml rinse prior to de-access. Dry sterile dressing to area. Tolerated procedure well. Encouraged to schedule port flush every 4 weeks.

## 2020-07-09 ENCOUNTER — HOSPITAL ENCOUNTER (OUTPATIENT)
Dept: INFUSION THERAPY | Age: 75
Discharge: HOME | End: 2020-07-09
Payer: MEDICARE

## 2020-07-09 VITALS — DIASTOLIC BLOOD PRESSURE: 67 MMHG | SYSTOLIC BLOOD PRESSURE: 149 MMHG | HEART RATE: 66 BPM

## 2020-07-09 DIAGNOSIS — D05.12 DUCTAL CARCINOMA IN SITU (DCIS) OF LEFT BREAST: ICD-10-CM

## 2020-07-09 DIAGNOSIS — C50.912 MALIGNANT NEOPLASM OF LEFT FEMALE BREAST, UNSPECIFIED ESTROGEN RECEPTOR STATUS, UNSPECIFIED SITE OF BREAST (HCC): Primary | ICD-10-CM

## 2020-07-09 LAB
ALBUMIN SERPL-MCNC: 3.9 G/DL (ref 3.5–5.2)
ALP BLD-CCNC: 88 U/L (ref 35–104)
ALT SERPL-CCNC: 9 U/L (ref 0–32)
ANION GAP SERPL CALCULATED.3IONS-SCNC: 9 MMOL/L (ref 7–16)
AST SERPL-CCNC: 22 U/L (ref 0–31)
BASOPHILS ABSOLUTE: 0.03 E9/L (ref 0–0.2)
BASOPHILS RELATIVE PERCENT: 0.7 % (ref 0–2)
BILIRUB SERPL-MCNC: 0.4 MG/DL (ref 0–1.2)
BUN BLDV-MCNC: 15 MG/DL (ref 8–23)
CALCIUM SERPL-MCNC: 8.6 MG/DL (ref 8.6–10.2)
CHLORIDE BLD-SCNC: 96 MMOL/L (ref 98–107)
CO2: 27 MMOL/L (ref 22–29)
CREAT SERPL-MCNC: 0.9 MG/DL (ref 0.5–1)
EOSINOPHILS ABSOLUTE: 0.06 E9/L (ref 0.05–0.5)
EOSINOPHILS RELATIVE PERCENT: 1.4 % (ref 0–6)
GFR AFRICAN AMERICAN: >60
GFR NON-AFRICAN AMERICAN: >60 ML/MIN/1.73
GLUCOSE BLD-MCNC: 82 MG/DL (ref 74–99)
HCT VFR BLD CALC: 39.4 % (ref 34–48)
HEMOGLOBIN: 12.9 G/DL (ref 11.5–15.5)
IMMATURE GRANULOCYTES #: 0.01 E9/L
IMMATURE GRANULOCYTES %: 0.2 % (ref 0–5)
LYMPHOCYTES ABSOLUTE: 1.83 E9/L (ref 1.5–4)
LYMPHOCYTES RELATIVE PERCENT: 42.6 % (ref 20–42)
MAGNESIUM: 2.2 MG/DL (ref 1.6–2.6)
MCH RBC QN AUTO: 28.9 PG (ref 26–35)
MCHC RBC AUTO-ENTMCNC: 32.7 % (ref 32–34.5)
MCV RBC AUTO: 88.1 FL (ref 80–99.9)
MONOCYTES ABSOLUTE: 0.43 E9/L (ref 0.1–0.95)
MONOCYTES RELATIVE PERCENT: 10 % (ref 2–12)
NEUTROPHILS ABSOLUTE: 1.94 E9/L (ref 1.8–7.3)
NEUTROPHILS RELATIVE PERCENT: 45.1 % (ref 43–80)
PDW BLD-RTO: 12.3 FL (ref 11.5–15)
PLATELET # BLD: 242 E9/L (ref 130–450)
PMV BLD AUTO: 10.2 FL (ref 7–12)
POTASSIUM SERPL-SCNC: 4.3 MMOL/L (ref 3.5–5)
RBC # BLD: 4.47 E12/L (ref 3.5–5.5)
SODIUM BLD-SCNC: 132 MMOL/L (ref 132–146)
TOTAL PROTEIN: 6.6 G/DL (ref 6.4–8.3)
WBC # BLD: 4.3 E9/L (ref 4.5–11.5)

## 2020-07-09 PROCEDURE — 6360000002 HC RX W HCPCS: Performed by: NURSE PRACTITIONER

## 2020-07-09 PROCEDURE — 85025 COMPLETE CBC W/AUTO DIFF WBC: CPT

## 2020-07-09 PROCEDURE — 96413 CHEMO IV INFUSION 1 HR: CPT

## 2020-07-09 PROCEDURE — 2580000003 HC RX 258: Performed by: INTERNAL MEDICINE

## 2020-07-09 PROCEDURE — 6360000002 HC RX W HCPCS: Performed by: INTERNAL MEDICINE

## 2020-07-09 PROCEDURE — 83735 ASSAY OF MAGNESIUM: CPT

## 2020-07-09 PROCEDURE — 80053 COMPREHEN METABOLIC PANEL: CPT

## 2020-07-09 PROCEDURE — 2580000003 HC RX 258: Performed by: NURSE PRACTITIONER

## 2020-07-09 RX ORDER — SODIUM CHLORIDE 0.9 % (FLUSH) 0.9 %
10 SYRINGE (ML) INJECTION PRN
Status: DISCONTINUED | OUTPATIENT
Start: 2020-07-09 | End: 2020-07-10 | Stop reason: HOSPADM

## 2020-07-09 RX ORDER — EPINEPHRINE 1 MG/ML
0.3 INJECTION, SOLUTION, CONCENTRATE INTRAVENOUS PRN
Status: CANCELLED | OUTPATIENT
Start: 2020-07-09

## 2020-07-09 RX ORDER — SODIUM CHLORIDE 0.9 % (FLUSH) 0.9 %
10 SYRINGE (ML) INJECTION PRN
Status: DISCONTINUED | OUTPATIENT
Start: 2020-07-09 | End: 2020-07-09 | Stop reason: SDUPTHER

## 2020-07-09 RX ORDER — MEPERIDINE HYDROCHLORIDE 25 MG/ML
12.5 INJECTION INTRAMUSCULAR; INTRAVENOUS; SUBCUTANEOUS ONCE
Status: CANCELLED | OUTPATIENT
Start: 2020-07-09

## 2020-07-09 RX ORDER — SODIUM CHLORIDE 9 MG/ML
INJECTION, SOLUTION INTRAVENOUS CONTINUOUS
Status: CANCELLED | OUTPATIENT
Start: 2020-07-09

## 2020-07-09 RX ORDER — HEPARIN SODIUM (PORCINE) LOCK FLUSH IV SOLN 100 UNIT/ML 100 UNIT/ML
500 SOLUTION INTRAVENOUS PRN
Status: CANCELLED | OUTPATIENT
Start: 2020-07-09

## 2020-07-09 RX ORDER — SODIUM CHLORIDE 0.9 % (FLUSH) 0.9 %
10 SYRINGE (ML) INJECTION PRN
Status: CANCELLED | OUTPATIENT
Start: 2020-07-09

## 2020-07-09 RX ORDER — HEPARIN SODIUM (PORCINE) LOCK FLUSH IV SOLN 100 UNIT/ML 100 UNIT/ML
500 SOLUTION INTRAVENOUS PRN
Status: DISCONTINUED | OUTPATIENT
Start: 2020-07-09 | End: 2020-07-10 | Stop reason: HOSPADM

## 2020-07-09 RX ORDER — DIPHENHYDRAMINE HYDROCHLORIDE 50 MG/ML
50 INJECTION INTRAMUSCULAR; INTRAVENOUS ONCE
Status: CANCELLED | OUTPATIENT
Start: 2020-07-09

## 2020-07-09 RX ORDER — SODIUM CHLORIDE 9 MG/ML
20 INJECTION, SOLUTION INTRAVENOUS ONCE
Status: DISCONTINUED | OUTPATIENT
Start: 2020-07-09 | End: 2020-07-10 | Stop reason: HOSPADM

## 2020-07-09 RX ORDER — METHYLPREDNISOLONE SODIUM SUCCINATE 125 MG/2ML
125 INJECTION, POWDER, LYOPHILIZED, FOR SOLUTION INTRAMUSCULAR; INTRAVENOUS ONCE
Status: CANCELLED | OUTPATIENT
Start: 2020-07-09

## 2020-07-09 RX ORDER — HEPARIN SODIUM (PORCINE) LOCK FLUSH IV SOLN 100 UNIT/ML 100 UNIT/ML
500 SOLUTION INTRAVENOUS PRN
Status: DISCONTINUED | OUTPATIENT
Start: 2020-07-09 | End: 2020-07-09 | Stop reason: SDUPTHER

## 2020-07-09 RX ADMIN — SODIUM CHLORIDE, PRESERVATIVE FREE 10 ML: 5 INJECTION INTRAVENOUS at 11:09

## 2020-07-09 RX ADMIN — SODIUM CHLORIDE 20 ML/HR: 9 INJECTION, SOLUTION INTRAVENOUS at 12:13

## 2020-07-09 RX ADMIN — Medication 500 UNITS: at 11:09

## 2020-07-09 RX ADMIN — SODIUM CHLORIDE, PRESERVATIVE FREE 10 ML: 5 INJECTION INTRAVENOUS at 13:10

## 2020-07-09 RX ADMIN — TRASTUZUMAB 357 MG: 150 INJECTION, POWDER, LYOPHILIZED, FOR SOLUTION INTRAVENOUS at 12:27

## 2020-07-30 ENCOUNTER — HOSPITAL ENCOUNTER (OUTPATIENT)
Dept: INFUSION THERAPY | Age: 75
Discharge: HOME | End: 2020-07-30
Payer: MEDICARE

## 2020-07-30 VITALS
SYSTOLIC BLOOD PRESSURE: 118 MMHG | RESPIRATION RATE: 16 BRPM | DIASTOLIC BLOOD PRESSURE: 58 MMHG | TEMPERATURE: 96.9 F | HEART RATE: 66 BPM

## 2020-07-30 DIAGNOSIS — D05.12 DUCTAL CARCINOMA IN SITU (DCIS) OF LEFT BREAST: ICD-10-CM

## 2020-07-30 DIAGNOSIS — C50.912 MALIGNANT NEOPLASM OF LEFT FEMALE BREAST, UNSPECIFIED ESTROGEN RECEPTOR STATUS, UNSPECIFIED SITE OF BREAST (HCC): Primary | ICD-10-CM

## 2020-07-30 LAB
ALBUMIN SERPL-MCNC: 4 G/DL (ref 3.5–5.2)
ALP BLD-CCNC: 87 U/L (ref 35–104)
ALT SERPL-CCNC: 12 U/L (ref 0–32)
ANION GAP SERPL CALCULATED.3IONS-SCNC: 11 MMOL/L (ref 7–16)
AST SERPL-CCNC: 21 U/L (ref 0–31)
BASOPHILS ABSOLUTE: 0.03 E9/L (ref 0–0.2)
BASOPHILS RELATIVE PERCENT: 0.7 % (ref 0–2)
BILIRUB SERPL-MCNC: 0.3 MG/DL (ref 0–1.2)
BUN BLDV-MCNC: 16 MG/DL (ref 8–23)
CALCIUM SERPL-MCNC: 8.9 MG/DL (ref 8.6–10.2)
CHLORIDE BLD-SCNC: 100 MMOL/L (ref 98–107)
CO2: 27 MMOL/L (ref 22–29)
CREAT SERPL-MCNC: 0.9 MG/DL (ref 0.5–1)
EOSINOPHILS ABSOLUTE: 0.07 E9/L (ref 0.05–0.5)
EOSINOPHILS RELATIVE PERCENT: 1.6 % (ref 0–6)
GFR AFRICAN AMERICAN: >60
GFR NON-AFRICAN AMERICAN: >60 ML/MIN/1.73
GLUCOSE BLD-MCNC: 70 MG/DL (ref 74–99)
HCT VFR BLD CALC: 40.1 % (ref 34–48)
HEMOGLOBIN: 12.7 G/DL (ref 11.5–15.5)
IMMATURE GRANULOCYTES #: 0 E9/L
IMMATURE GRANULOCYTES %: 0 % (ref 0–5)
LYMPHOCYTES ABSOLUTE: 1.74 E9/L (ref 1.5–4)
LYMPHOCYTES RELATIVE PERCENT: 40.9 % (ref 20–42)
MAGNESIUM: 2.1 MG/DL (ref 1.6–2.6)
MCH RBC QN AUTO: 27.7 PG (ref 26–35)
MCHC RBC AUTO-ENTMCNC: 31.7 % (ref 32–34.5)
MCV RBC AUTO: 87.6 FL (ref 80–99.9)
MONOCYTES ABSOLUTE: 0.42 E9/L (ref 0.1–0.95)
MONOCYTES RELATIVE PERCENT: 9.9 % (ref 2–12)
NEUTROPHILS ABSOLUTE: 1.99 E9/L (ref 1.8–7.3)
NEUTROPHILS RELATIVE PERCENT: 46.9 % (ref 43–80)
PDW BLD-RTO: 13.1 FL (ref 11.5–15)
PLATELET # BLD: 203 E9/L (ref 130–450)
PMV BLD AUTO: 10 FL (ref 7–12)
POTASSIUM SERPL-SCNC: 4.4 MMOL/L (ref 3.5–5)
RBC # BLD: 4.58 E12/L (ref 3.5–5.5)
SODIUM BLD-SCNC: 138 MMOL/L (ref 132–146)
TOTAL PROTEIN: 6.7 G/DL (ref 6.4–8.3)
WBC # BLD: 4.3 E9/L (ref 4.5–11.5)

## 2020-07-30 PROCEDURE — 85025 COMPLETE CBC W/AUTO DIFF WBC: CPT

## 2020-07-30 PROCEDURE — 6360000002 HC RX W HCPCS: Performed by: NURSE PRACTITIONER

## 2020-07-30 PROCEDURE — 96413 CHEMO IV INFUSION 1 HR: CPT

## 2020-07-30 PROCEDURE — 6360000002 HC RX W HCPCS

## 2020-07-30 PROCEDURE — 2580000003 HC RX 258: Performed by: NURSE PRACTITIONER

## 2020-07-30 PROCEDURE — 80053 COMPREHEN METABOLIC PANEL: CPT

## 2020-07-30 PROCEDURE — 83735 ASSAY OF MAGNESIUM: CPT

## 2020-07-30 RX ORDER — SODIUM CHLORIDE 9 MG/ML
20 INJECTION, SOLUTION INTRAVENOUS ONCE
Status: COMPLETED | OUTPATIENT
Start: 2020-07-30 | End: 2020-07-30

## 2020-07-30 RX ORDER — EPINEPHRINE 1 MG/ML
0.3 INJECTION, SOLUTION, CONCENTRATE INTRAVENOUS PRN
Status: CANCELLED | OUTPATIENT
Start: 2020-07-30

## 2020-07-30 RX ORDER — HEPARIN SODIUM (PORCINE) LOCK FLUSH IV SOLN 100 UNIT/ML 100 UNIT/ML
SOLUTION INTRAVENOUS
Status: COMPLETED
Start: 2020-07-30 | End: 2020-07-30

## 2020-07-30 RX ORDER — DIPHENHYDRAMINE HYDROCHLORIDE 50 MG/ML
50 INJECTION INTRAMUSCULAR; INTRAVENOUS ONCE
Status: CANCELLED | OUTPATIENT
Start: 2020-07-30

## 2020-07-30 RX ORDER — MEPERIDINE HYDROCHLORIDE 25 MG/ML
12.5 INJECTION INTRAMUSCULAR; INTRAVENOUS; SUBCUTANEOUS ONCE
Status: CANCELLED | OUTPATIENT
Start: 2020-07-30

## 2020-07-30 RX ORDER — SODIUM CHLORIDE 9 MG/ML
INJECTION, SOLUTION INTRAVENOUS CONTINUOUS
Status: CANCELLED | OUTPATIENT
Start: 2020-07-30

## 2020-07-30 RX ORDER — SODIUM CHLORIDE 0.9 % (FLUSH) 0.9 %
10 SYRINGE (ML) INJECTION PRN
Status: DISCONTINUED | OUTPATIENT
Start: 2020-07-30 | End: 2020-07-31 | Stop reason: HOSPADM

## 2020-07-30 RX ORDER — METHYLPREDNISOLONE SODIUM SUCCINATE 125 MG/2ML
125 INJECTION, POWDER, LYOPHILIZED, FOR SOLUTION INTRAMUSCULAR; INTRAVENOUS ONCE
Status: CANCELLED | OUTPATIENT
Start: 2020-07-30

## 2020-07-30 RX ORDER — HEPARIN SODIUM (PORCINE) LOCK FLUSH IV SOLN 100 UNIT/ML 100 UNIT/ML
500 SOLUTION INTRAVENOUS PRN
Status: DISCONTINUED | OUTPATIENT
Start: 2020-07-30 | End: 2020-07-31 | Stop reason: HOSPADM

## 2020-07-30 RX ADMIN — TRASTUZUMAB 357 MG: 150 INJECTION, POWDER, LYOPHILIZED, FOR SOLUTION INTRAVENOUS at 12:03

## 2020-07-30 RX ADMIN — SODIUM CHLORIDE 20 ML/HR: 9 INJECTION, SOLUTION INTRAVENOUS at 11:24

## 2020-07-30 RX ADMIN — HEPARIN SODIUM (PORCINE) LOCK FLUSH IV SOLN 100 UNIT/ML 500 UNITS: 100 SOLUTION at 12:42

## 2020-07-30 RX ADMIN — Medication 500 UNITS: at 12:42

## 2020-07-30 RX ADMIN — SODIUM CHLORIDE, PRESERVATIVE FREE 10 ML: 5 INJECTION INTRAVENOUS at 12:42

## 2020-08-20 ENCOUNTER — TELEPHONE (OUTPATIENT)
Dept: CASE MANAGEMENT | Age: 75
End: 2020-08-20

## 2020-08-20 ENCOUNTER — OFFICE VISIT (OUTPATIENT)
Dept: ONCOLOGY | Age: 75
End: 2020-08-20
Payer: MEDICARE

## 2020-08-20 ENCOUNTER — HOSPITAL ENCOUNTER (OUTPATIENT)
Dept: INFUSION THERAPY | Age: 75
Discharge: HOME | End: 2020-08-20
Payer: MEDICARE

## 2020-08-20 VITALS — SYSTOLIC BLOOD PRESSURE: 149 MMHG | RESPIRATION RATE: 18 BRPM | DIASTOLIC BLOOD PRESSURE: 72 MMHG | HEART RATE: 68 BPM

## 2020-08-20 VITALS
WEIGHT: 132.3 LBS | BODY MASS INDEX: 22.59 KG/M2 | TEMPERATURE: 97.9 F | DIASTOLIC BLOOD PRESSURE: 67 MMHG | SYSTOLIC BLOOD PRESSURE: 138 MMHG | HEART RATE: 61 BPM | HEIGHT: 64 IN

## 2020-08-20 DIAGNOSIS — C50.912 MALIGNANT NEOPLASM OF LEFT FEMALE BREAST, UNSPECIFIED ESTROGEN RECEPTOR STATUS, UNSPECIFIED SITE OF BREAST (HCC): Primary | ICD-10-CM

## 2020-08-20 DIAGNOSIS — D05.12 DUCTAL CARCINOMA IN SITU (DCIS) OF LEFT BREAST: ICD-10-CM

## 2020-08-20 DIAGNOSIS — R53.83 OTHER FATIGUE: ICD-10-CM

## 2020-08-20 DIAGNOSIS — C50.919 MALIGNANT NEOPLASM OF FEMALE BREAST, UNSPECIFIED ESTROGEN RECEPTOR STATUS, UNSPECIFIED LATERALITY, UNSPECIFIED SITE OF BREAST (HCC): ICD-10-CM

## 2020-08-20 PROBLEM — M81.8 OTHER OSTEOPOROSIS WITHOUT CURRENT PATHOLOGICAL FRACTURE: Status: ACTIVE | Noted: 2020-08-20

## 2020-08-20 LAB
ALBUMIN SERPL-MCNC: 4.1 G/DL (ref 3.5–5.2)
ALP BLD-CCNC: 83 U/L (ref 35–104)
ALT SERPL-CCNC: 15 U/L (ref 0–32)
ANION GAP SERPL CALCULATED.3IONS-SCNC: 12 MMOL/L (ref 7–16)
AST SERPL-CCNC: 27 U/L (ref 0–31)
BASOPHILS ABSOLUTE: 0.04 E9/L (ref 0–0.2)
BASOPHILS RELATIVE PERCENT: 0.8 % (ref 0–2)
BILIRUB SERPL-MCNC: 0.4 MG/DL (ref 0–1.2)
BUN BLDV-MCNC: 14 MG/DL (ref 8–23)
CALCIUM SERPL-MCNC: 8.7 MG/DL (ref 8.6–10.2)
CHLORIDE BLD-SCNC: 96 MMOL/L (ref 98–107)
CO2: 25 MMOL/L (ref 22–29)
CREAT SERPL-MCNC: 0.9 MG/DL (ref 0.5–1)
EOSINOPHILS ABSOLUTE: 0.06 E9/L (ref 0.05–0.5)
EOSINOPHILS RELATIVE PERCENT: 1.2 % (ref 0–6)
GFR AFRICAN AMERICAN: >60
GFR NON-AFRICAN AMERICAN: >60 ML/MIN/1.73
GLUCOSE BLD-MCNC: 106 MG/DL (ref 74–99)
HCT VFR BLD CALC: 39.5 % (ref 34–48)
HEMOGLOBIN: 12.7 G/DL (ref 11.5–15.5)
IMMATURE GRANULOCYTES #: 0.01 E9/L
IMMATURE GRANULOCYTES %: 0.2 % (ref 0–5)
LYMPHOCYTES ABSOLUTE: 1.55 E9/L (ref 1.5–4)
LYMPHOCYTES RELATIVE PERCENT: 31.6 % (ref 20–42)
MAGNESIUM: 2 MG/DL (ref 1.6–2.6)
MCH RBC QN AUTO: 28 PG (ref 26–35)
MCHC RBC AUTO-ENTMCNC: 32.2 % (ref 32–34.5)
MCV RBC AUTO: 87.2 FL (ref 80–99.9)
MONOCYTES ABSOLUTE: 0.41 E9/L (ref 0.1–0.95)
MONOCYTES RELATIVE PERCENT: 8.4 % (ref 2–12)
NEUTROPHILS ABSOLUTE: 2.83 E9/L (ref 1.8–7.3)
NEUTROPHILS RELATIVE PERCENT: 57.8 % (ref 43–80)
PDW BLD-RTO: 13.3 FL (ref 11.5–15)
PLATELET # BLD: 255 E9/L (ref 130–450)
PMV BLD AUTO: 10.2 FL (ref 7–12)
POTASSIUM SERPL-SCNC: 4.1 MMOL/L (ref 3.5–5)
RBC # BLD: 4.53 E12/L (ref 3.5–5.5)
SODIUM BLD-SCNC: 133 MMOL/L (ref 132–146)
TOTAL PROTEIN: 6.6 G/DL (ref 6.4–8.3)
TSH SERPL DL<=0.05 MIU/L-ACNC: 1.95 UIU/ML (ref 0.27–4.2)
WBC # BLD: 4.9 E9/L (ref 4.5–11.5)

## 2020-08-20 PROCEDURE — 85025 COMPLETE CBC W/AUTO DIFF WBC: CPT

## 2020-08-20 PROCEDURE — 80053 COMPREHEN METABOLIC PANEL: CPT

## 2020-08-20 PROCEDURE — 6360000002 HC RX W HCPCS: Performed by: INTERNAL MEDICINE

## 2020-08-20 PROCEDURE — 83735 ASSAY OF MAGNESIUM: CPT

## 2020-08-20 PROCEDURE — 1036F TOBACCO NON-USER: CPT | Performed by: INTERNAL MEDICINE

## 2020-08-20 PROCEDURE — G8399 PT W/DXA RESULTS DOCUMENT: HCPCS | Performed by: INTERNAL MEDICINE

## 2020-08-20 PROCEDURE — 99214 OFFICE O/P EST MOD 30 MIN: CPT | Performed by: INTERNAL MEDICINE

## 2020-08-20 PROCEDURE — 1123F ACP DISCUSS/DSCN MKR DOCD: CPT | Performed by: INTERNAL MEDICINE

## 2020-08-20 PROCEDURE — 2580000003 HC RX 258: Performed by: INTERNAL MEDICINE

## 2020-08-20 PROCEDURE — 1090F PRES/ABSN URINE INCON ASSESS: CPT | Performed by: INTERNAL MEDICINE

## 2020-08-20 PROCEDURE — 84443 ASSAY THYROID STIM HORMONE: CPT

## 2020-08-20 PROCEDURE — 96413 CHEMO IV INFUSION 1 HR: CPT

## 2020-08-20 PROCEDURE — 4040F PNEUMOC VAC/ADMIN/RCVD: CPT | Performed by: INTERNAL MEDICINE

## 2020-08-20 PROCEDURE — G8427 DOCREV CUR MEDS BY ELIG CLIN: HCPCS | Performed by: INTERNAL MEDICINE

## 2020-08-20 PROCEDURE — 3017F COLORECTAL CA SCREEN DOC REV: CPT | Performed by: INTERNAL MEDICINE

## 2020-08-20 PROCEDURE — G8420 CALC BMI NORM PARAMETERS: HCPCS | Performed by: INTERNAL MEDICINE

## 2020-08-20 RX ORDER — SODIUM CHLORIDE 9 MG/ML
20 INJECTION, SOLUTION INTRAVENOUS ONCE
Status: COMPLETED | OUTPATIENT
Start: 2020-08-20 | End: 2020-08-20

## 2020-08-20 RX ORDER — SODIUM CHLORIDE 9 MG/ML
20 INJECTION, SOLUTION INTRAVENOUS ONCE
Status: CANCELLED | OUTPATIENT
Start: 2020-08-20

## 2020-08-20 RX ORDER — MEPERIDINE HYDROCHLORIDE 25 MG/ML
12.5 INJECTION INTRAMUSCULAR; INTRAVENOUS; SUBCUTANEOUS ONCE
Status: CANCELLED | OUTPATIENT
Start: 2020-08-20

## 2020-08-20 RX ORDER — ESCITALOPRAM OXALATE 10 MG/1
10 TABLET ORAL DAILY
COMMUNITY
Start: 2020-07-22 | End: 2020-08-20 | Stop reason: SINTOL

## 2020-08-20 RX ORDER — HEPARIN SODIUM (PORCINE) LOCK FLUSH IV SOLN 100 UNIT/ML 100 UNIT/ML
500 SOLUTION INTRAVENOUS PRN
Status: CANCELLED | OUTPATIENT
Start: 2020-08-20

## 2020-08-20 RX ORDER — HEPARIN SODIUM (PORCINE) LOCK FLUSH IV SOLN 100 UNIT/ML 100 UNIT/ML
500 SOLUTION INTRAVENOUS PRN
Status: DISCONTINUED | OUTPATIENT
Start: 2020-08-20 | End: 2020-08-21 | Stop reason: HOSPADM

## 2020-08-20 RX ORDER — SODIUM CHLORIDE 9 MG/ML
INJECTION, SOLUTION INTRAVENOUS CONTINUOUS
Status: CANCELLED | OUTPATIENT
Start: 2020-08-20

## 2020-08-20 RX ORDER — SODIUM CHLORIDE 0.9 % (FLUSH) 0.9 %
10 SYRINGE (ML) INJECTION PRN
Status: DISCONTINUED | OUTPATIENT
Start: 2020-08-20 | End: 2020-08-21 | Stop reason: HOSPADM

## 2020-08-20 RX ORDER — DIPHENHYDRAMINE HYDROCHLORIDE 50 MG/ML
50 INJECTION INTRAMUSCULAR; INTRAVENOUS ONCE
Status: CANCELLED | OUTPATIENT
Start: 2020-08-20

## 2020-08-20 RX ORDER — SODIUM CHLORIDE 0.9 % (FLUSH) 0.9 %
10 SYRINGE (ML) INJECTION PRN
Status: CANCELLED | OUTPATIENT
Start: 2020-08-20

## 2020-08-20 RX ORDER — METHYLPREDNISOLONE SODIUM SUCCINATE 125 MG/2ML
125 INJECTION, POWDER, LYOPHILIZED, FOR SOLUTION INTRAMUSCULAR; INTRAVENOUS ONCE
Status: CANCELLED | OUTPATIENT
Start: 2020-08-20

## 2020-08-20 RX ORDER — EPINEPHRINE 1 MG/ML
0.3 INJECTION, SOLUTION, CONCENTRATE INTRAVENOUS PRN
Status: CANCELLED | OUTPATIENT
Start: 2020-08-20

## 2020-08-20 RX ORDER — ANASTROZOLE 1 MG/1
1 TABLET ORAL DAILY
Qty: 30 TABLET | Refills: 3 | Status: SHIPPED
Start: 2020-08-20 | End: 2020-10-10

## 2020-08-20 RX ADMIN — TRASTUZUMAB 357 MG: 150 INJECTION, POWDER, LYOPHILIZED, FOR SOLUTION INTRAVENOUS at 10:46

## 2020-08-20 RX ADMIN — SODIUM CHLORIDE, PRESERVATIVE FREE 10 ML: 5 INJECTION INTRAVENOUS at 11:28

## 2020-08-20 RX ADMIN — SODIUM CHLORIDE 20 ML/HR: 9 INJECTION, SOLUTION INTRAVENOUS at 10:36

## 2020-08-20 RX ADMIN — Medication 500 UNITS: at 11:29

## 2020-08-20 NOTE — TELEPHONE ENCOUNTER
Met with patient in the treatment room during her Herceptin treatment for follow up.  Patient appears well and is in good spirits.  States that she is very pleased today to see Dr. Torres Letters after her leave and feels comfortable with her hormone therapy and depression medication plan. She is going to resume her Paxil and start Arimidex tomorrow. Reviewed her calcium and vitamin d supplement recommendations and administration. Patient agreeable to Prolia order per Dr. Torres Letters for additional bone support. Upon inquiring, states that she doesn't have a scheduled appointment for her upper bridge completion but will call today to schedule with her dentist.  Aware that she will need it completed and dental clearance prior to starting Prolia. Listened supportively. Patient states appreciative of assistance during Dr. Lisseth Cornejo leave and continued support. Reports eating and sleeping well.  Denies any nausea or vomiting.  Provided support and encouragement.  Denies any additional needs for assistance from this NN.  Nurse navigator will continue as needed.  Carol Li, JOSEW, RN, OCN  Oncology Nurse Navigator

## 2020-08-20 NOTE — PROGRESS NOTES
[address] [address]  Dept: [phone]  Loc: [phone]  Attending Progress Note      Reason for Visit:   Left breast cancer. Referring Physician: Mariposa Larose MD.    PCP:  KAREN Crowder    History of Present Illness: The patient is a 75 y.o. lady with a past medical history significant for thyroid disease, depression, and IBS, who had presented with an abnormal screening mammogram,    8/20/19  Bilateral screening mammogram  [facility]         FINDINGS: No suspicious masses, calcifications, or distortions are   identified on the right. On the left there is a new focal nodular   asymmetry at 12:00, with adjacent linear branching calcifications. Spot compression views is recommended for the asymmetry with   ultrasound, and spot magnification views for the calcifications. .             Impression   1. Right breast no mammographic evidence of malignancy           2. Left breast new focal asymmetry at 12:00, new microcalcifications.       Recommendation:   1. Right breast annual screening mammogram.   2. Left breast additional views spot compression and spot   magnification along with ultrasound.       BI-RADS Category 0:  Incomplete- Needs Additional Imaging Evaluation             8/29/19  Left Diagnostic mammogram   [facility]         FINDINGS:        A small partially obscured mass is identified in the upper outer left   breast measuring approximately 5-6 mm. Additionally, there is a small   segmentally distributed cluster of pleomorphic microcalcifications   located superiorly near the mass extending to the middle third depth.       Ultrasound confirmed a small irregular shaped hypoechoic mass with   circumscribed margins at the 12:00 position measuring 5 mm in maximal   dimension.           Impression       1. Small solid suspicious mass at the 12:00 position.    2. Segmentally distributed pleomorphic microcalcifications located   near the breast mass likely at the 12:00 position.       RECOMMENDATION:       Recommend ultrasound core biopsy of the mass seen on ultrasound and   stereotactic biopsy of the microcalcifications.       BI-RADS Category 5: Highly Suggestive of Malignancy          8/29/19  Left Breast US   [facility]         FINDINGS:        A small partially obscured mass is identified in the upper outer left   breast measuring approximately 5-6 mm. Additionally, there is a small   segmentally distributed cluster of pleomorphic microcalcifications   located superiorly near the mass extending to the middle third depth.       Ultrasound confirmed a small irregular shaped hypoechoic mass with   circumscribed margins at the 12:00 position measuring 5 mm in maximal   dimension.           Impression       1. Small solid suspicious mass at the 12:00 position. 2. Segmentally distributed pleomorphic microcalcifications located   near the breast mass likely at the 12:00 position.       RECOMMENDATION:       Recommend ultrasound core biopsy of the mass seen on ultrasound and   stereotactic biopsy of the microcalcifications.       BI-RADS Category 5: Highly Suggestive of Malignancy              She underwent a stereotactic guided left breast core biopsy at 12 o'clock position on September 10 , 2019.a single top hat shaped marker clip was deployed into the site.     She underwent an ultrasound guided biopsy of the left breast at the 12 o'clock position on September 17, 2019, A post-surgical ribbon shaped microclip was placed.      Pathological evaluation completed at [facility]):     9/10/19  Final Surgical Pathology Report  Diagnosis:  A. Left breast, 12:00, biopsy: High-grade ductal carcinoma in situ,  cannot exclude microinvasion, see comment. B. Left breast 12:00, additional, biopsy: High-grade ductal carcinoma in  situ, cannot exclude microinvasion, see comment.     Breast Cancer Marker Studies:  Estrogen Receptors (ER): Positive         Percentage of cells positive: <10%         Intensity: Weak    Progesterone Receptors (MD): Negative        Internal control cells present and stain as expected: No internal  control present     9/17/19 Final Surgical Pathology Report    Diagnosis:  Mass, Left breast, 12:00, core biopsy: Segments of benign fibroadipose  tissue and scant skeletal muscle, see comment. Comment:   Epithelial lined mammary ducts and lobules are not identified  in the tissue sample. Correlation with clinical and radiologic findings  is essential to assure adequacy of tissue sampling. In the setting of a  mass clinically or radiologically suspicious for neoplasm, additional  tissue sampling is recommended. The patient underwent on 11/20/2019 a left mastectomy with sentinel lymph node excisional biopsy, pathology:    CANCER CASE SUMMARY:  Procedure: Left simple mastectomy  Specimen laterality: Left  Tumor site: 12:00 position  Tumor size: 8.0 mm in greatest dimension  Histologic type:  Invasive carcinoma of no special type (invasive ductal  carcinoma, not otherwise specified)  Histologic grade (Deandre histologic score):   Glandular differentiation: Score 3   Nuclear pleomorphism: Score 2   Mitotic rate: Score 2   Overall grade: Grade 2 (score of 7)  Tumor focality: Single focus of invasive carcinoma  Ductal carcinoma in situ (DCIS): Present, adjacent to the invasive  carcinoma  Invasive carcinoma margins:   Margins uninvolved by invasive carcinoma    Distance from closest margin: 5.0 mm from the posterior margin  DCIS margins:   Margin uninvolved by DCIS    Distance from closest margin: 10.0 mm from the posterior margin  Regional lymph nodes: Uninvolved by tumor cells   Total number of lymph nodes examined: 2 (both sentinel nodes)  Treatment effect: No known presurgical therapy  Pathologic stage classification (pTNM, AJCC 8th edition):   pT1b   (sn) pN0    Ancillary studies:  Calponin immunostain on block A4 shows the invasive carcinoma lacking a  myoepithelial layer. P63 immunostain on block A6 shows the invasive carcinoma lacking a  myoepithelial layer, with a myoepithelial layer retained around the DCIS. Breast Cancer Marker Studies (Block A6):  Negative: 0%        No internal control cells present. Progesterone Receptors (PA):  Negative: 0%        No internal control cells present. Her-2/miles (c-erb B-2) protein expression: Positive (Score 3+)    The patient was started on adjuvant treatment with Herceptin and Taxol on 1/2/2020, she completed Taxol  on 3/19/2020. She is on single agent Herceptin. The patient was started on tamoxifen on 6/4/2020, she was given Effexor for depression, she had side effects from it, was discontinued and she was not on Lexapro, she does not like it and would like to go back on Paxil. Review of Systems;  CONSTITUTIONAL: No fever, chills. Good appetite, feeling tired. ENMT: Eyes: No diplopia; Nose: Positive for epistaxis. Mouth: No sore throat. RESPIRATORY: No hemoptysis, shortness of breath, cough. CARDIOVASCULAR: No chest pain, palpitations. GASTROINTESTINAL: As per HPI. GENITOURINARY: No dysuria, urinary frequency, hematuria. NEURO: No syncope, presyncope, headache.   Remainder:  ROS NEGATIVE    Past Medical History:      Diagnosis Date    Cancer [facility]) 2019    breast left    Depression     Hypothyroidism     Irritable bowel syndrome     not diagnosed, patient controls with diet    Low sodium levels 6/18/2020    Thyroid disease      Patient Active Problem List   Diagnosis    Ductal carcinoma in situ (DCIS) of left breast    Malignant neoplasm of left female breast, unspecified estrogen receptor status, unspecified site of breast ([facility] [address]    Poor venous access    Low sodium levels        Past Surgical History:      Procedure Laterality Date    APPENDECTOMY      BREAST SURGERY      mastectomy left nov 20 2019    CHOLECYSTECTOMY      HYSTERECTOMY, TOTAL ABDOMINAL      oophorectomy    INSERTION / file     Forced sexual activity: Not on file   Other Topics Concern    Not on file   Social History Narrative    Not on file       Allergies: Allergies   Allergen Reactions    Effexor [Venlafaxine] Other (See Comments)     Hallucination, vision issues, felt like she was going crazy, anxious        OB/GYN:  Age of menarche was 22, medically induced. Age of menopause was 31. Patient admits to hormonal therapy, progesterone, premarin. Oral contraceptives? To start periods. Patient is       Physical Exam:  /67 (Site: Right Upper Arm, Position: Sitting, Cuff Size: Medium Adult)   Pulse 61   Temp 97.9 °F (36.6 °C) (Temporal)   Ht 5' 4\" (1.626 m)   Wt 132 lb 4.8 oz (60 kg)   BMI 22.71 kg/m²     GENERAL: Alert, oriented x 3, not in acute distress. HEENT: PERRLA; EOMI. Oropharynx clear. NECK: Supple. No palpable cervical or supraclavicular lymphadenopathy. LUNGS: Good air entry bilaterally. No wheezing, crackles or rhonchi. CARDIOVASCULAR: Regular rate. No murmurs, rubs or gallops. BREASTS: Right breast exam is negative for any skin changes, no nipple discharge, she has nodularity and tenderness in the lower inner and outer quadrants, no palpable right axillary adenopathy. She is status post left mastectomy, the incision is healing nicely, no palpable left axillary lymphadenopathy. CHEST: This post right port placement  ABDOMEN: Soft. Non-tender, non-distended. Positive bowel sounds. EXTREMITIES: Without clubbing, cyanosis, or edema. NEUROLOGIC: No focal deficits.    ECOG PS 1      Impression/Plan:      The patient is a 75 y.o. lady with a past medical history significant for thyroid disease, depression, and IBS, who had presented with an abnormal screening mammogram, she had a left breast biopsy done revealing high-grade DCIS, ER positive less than 10%, MD negative, she underwent on 2019 a left mastectomy with sentinel lymph node excisional biopsy, she was found to have invasive ductal carcinoma, tumor size 8 mm, grade 2, with associated DCIS, margins are negative, 2 sentinel lymph nodes were removed, they were both negative for metastatic disease, final pathologic stage pT1b(sn) pN0Mx, ER -0% AZ -0% HER-2/miles +3+ by IHC, prognostic stage IA. I discussed with the patient her diagnosis, risks of her tumor, prognosis and recommendations for systemic therapy. The patient has a small HER-2/miles positive disease, tumor size is 8 mm, adjuvant treatment with Taxol and Herceptin is recommended, schedule and the side effects of the treatment were reviewed with the patient. 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 The patient was started on tamoxifen on 6/4/2020, she was given Effexor for depression, she had side effects from it, was discontinued and she was not on Lexapro, she does not like it and would like to go back on Paxil. The patient will restart Paxil, due to the potential interaction with the tamoxifen, Arimidex is the preferred agent, she would like to be on Arimidex, and get treated for the osteoporosis. Prescription was sent to her pharmacy, a dental clearance is required prior to starting Prolia. Patient had a 2D echocardiogram done, LVEF is 65%, adequate for treatment with Herceptin, she had a port placed, she was started on adjuvant therapy with Taxol and Herceptin on 1/2/2020. She completed Taxol on 3/19/2020. She had a repeat 2D echocardiogram done on 4/13/2020, revealing an LVEF of 73%, adequate to continue with Herceptin. For single agent Herceptin today, 8/20/2020. Labs reviewed, proceed with Herceptin, cycle #12. She will have a 2D echocardiogram done prior to her next visit. Right breast tenderness and lumps, ordered right diagnostic mammogram and ultrasound.   They were done on 2/25/2020, there was no mammographic/sonographic evidence of malignancy, this was BI-RADS Category 1, negative. She will be due for a right breast screening mammogram in February 2021. RTC in 3 weeks for labs and treatment with single agent Herceptin. Thank you for allowing us to participate in the care of Mrs. Sarbjit Asif.     Gage Larose MD   HEMATOLOGY/MEDICAL [facility]  [address] [address]  Dept: [place]aud: [phone]

## 2020-08-21 ENCOUNTER — TELEPHONE (OUTPATIENT)
Dept: INFUSION THERAPY | Age: 75
End: 2020-08-21

## 2020-08-21 NOTE — TELEPHONE ENCOUNTER
On 8-20-20 patient given a dental clearance form by her infusion nurse, to be completed by her dentist prior to Prolia injection.

## 2020-08-28 ENCOUNTER — HOSPITAL ENCOUNTER (OUTPATIENT)
Dept: NON INVASIVE DIAGNOSTICS | Age: 75
Discharge: HOME | End: 2020-08-28
Payer: MEDICARE

## 2020-08-28 LAB
LV EF: 68 %
LVEF MODALITY: NORMAL

## 2020-08-28 PROCEDURE — 93356 MYOCRD STRAIN IMG SPCKL TRCK: CPT

## 2020-08-28 PROCEDURE — 93306 TTE W/DOPPLER COMPLETE: CPT

## 2020-09-04 ENCOUNTER — TELEPHONE (OUTPATIENT)
Dept: CASE MANAGEMENT | Age: 75
End: 2020-09-04

## 2020-09-04 NOTE — TELEPHONE ENCOUNTER
Received a call from patient with questions regarding side effects and medications. She is on Paxil 20 mg and now on Arimadex. She states her anxiety is extremely high. She is asking if she is able to increase her Paxil or discontinue her arimadex. Updated Dr Noah Green. Patient able to increase her Paxil. Called patient and updated her on information from Dr Noah Green. Patient is going to increase her Paxil to 30mg from 20mg. She was appreciative of information and return call. Encouraged to call with any needs.

## 2020-09-06 ENCOUNTER — HOSPITAL ENCOUNTER (EMERGENCY)
Age: 75
Discharge: HOME | End: 2020-09-06
Attending: EMERGENCY MEDICINE
Payer: MEDICARE

## 2020-09-06 ENCOUNTER — APPOINTMENT (OUTPATIENT)
Dept: GENERAL RADIOLOGY | Age: 75
End: 2020-09-06
Payer: MEDICARE

## 2020-09-06 VITALS
HEIGHT: 64 IN | OXYGEN SATURATION: 98 % | WEIGHT: 132 LBS | TEMPERATURE: 97.3 F | BODY MASS INDEX: 22.53 KG/M2 | SYSTOLIC BLOOD PRESSURE: 156 MMHG | RESPIRATION RATE: 18 BRPM | HEART RATE: 73 BPM | DIASTOLIC BLOOD PRESSURE: 83 MMHG

## 2020-09-06 LAB
ANION GAP SERPL CALCULATED.3IONS-SCNC: 13 MMOL/L (ref 7–16)
BASOPHILS ABSOLUTE: 0.02 E9/L (ref 0–0.2)
BASOPHILS RELATIVE PERCENT: 0.3 % (ref 0–2)
BUN BLDV-MCNC: 9 MG/DL (ref 8–23)
CALCIUM SERPL-MCNC: 8.7 MG/DL (ref 8.6–10.2)
CHLORIDE BLD-SCNC: 88 MMOL/L (ref 98–107)
CO2: 26 MMOL/L (ref 22–29)
CREAT SERPL-MCNC: 0.7 MG/DL (ref 0.5–1)
D DIMER: <200 NG/ML DDU
EOSINOPHILS ABSOLUTE: 0.01 E9/L (ref 0.05–0.5)
EOSINOPHILS RELATIVE PERCENT: 0.1 % (ref 0–6)
GFR AFRICAN AMERICAN: >60
GFR NON-AFRICAN AMERICAN: >60 ML/MIN/1.73
GLUCOSE BLD-MCNC: 107 MG/DL (ref 74–99)
HCT VFR BLD CALC: 36.9 % (ref 34–48)
HEMOGLOBIN: 12.1 G/DL (ref 11.5–15.5)
IMMATURE GRANULOCYTES #: 0.02 E9/L
IMMATURE GRANULOCYTES %: 0.3 % (ref 0–5)
LYMPHOCYTES ABSOLUTE: 1.25 E9/L (ref 1.5–4)
LYMPHOCYTES RELATIVE PERCENT: 18.4 % (ref 20–42)
MCH RBC QN AUTO: 28.3 PG (ref 26–35)
MCHC RBC AUTO-ENTMCNC: 32.8 % (ref 32–34.5)
MCV RBC AUTO: 86.2 FL (ref 80–99.9)
MONOCYTES ABSOLUTE: 0.65 E9/L (ref 0.1–0.95)
MONOCYTES RELATIVE PERCENT: 9.6 % (ref 2–12)
NEUTROPHILS ABSOLUTE: 4.83 E9/L (ref 1.8–7.3)
NEUTROPHILS RELATIVE PERCENT: 71.3 % (ref 43–80)
PDW BLD-RTO: 13.2 FL (ref 11.5–15)
PLATELET # BLD: 230 E9/L (ref 130–450)
PMV BLD AUTO: 10.2 FL (ref 7–12)
POTASSIUM REFLEX MAGNESIUM: 3.8 MMOL/L (ref 3.5–5)
RBC # BLD: 4.28 E12/L (ref 3.5–5.5)
SODIUM BLD-SCNC: 127 MMOL/L (ref 132–146)
TROPONIN: <0.01 NG/ML (ref 0–0.03)
TSH SERPL DL<=0.05 MIU/L-ACNC: 3.49 UIU/ML (ref 0.27–4.2)
WBC # BLD: 6.8 E9/L (ref 4.5–11.5)

## 2020-09-06 PROCEDURE — 84484 ASSAY OF TROPONIN QUANT: CPT

## 2020-09-06 PROCEDURE — 85378 FIBRIN DEGRADE SEMIQUANT: CPT

## 2020-09-06 PROCEDURE — 85025 COMPLETE CBC W/AUTO DIFF WBC: CPT

## 2020-09-06 PROCEDURE — 99285 EMERGENCY DEPT VISIT HI MDM: CPT

## 2020-09-06 PROCEDURE — 96361 HYDRATE IV INFUSION ADD-ON: CPT

## 2020-09-06 PROCEDURE — 93005 ELECTROCARDIOGRAM TRACING: CPT | Performed by: STUDENT IN AN ORGANIZED HEALTH CARE EDUCATION/TRAINING PROGRAM

## 2020-09-06 PROCEDURE — 2580000003 HC RX 258: Performed by: EMERGENCY MEDICINE

## 2020-09-06 PROCEDURE — 6370000000 HC RX 637 (ALT 250 FOR IP): Performed by: EMERGENCY MEDICINE

## 2020-09-06 PROCEDURE — 36415 COLL VENOUS BLD VENIPUNCTURE: CPT

## 2020-09-06 PROCEDURE — 80048 BASIC METABOLIC PNL TOTAL CA: CPT

## 2020-09-06 PROCEDURE — 71045 X-RAY EXAM CHEST 1 VIEW: CPT

## 2020-09-06 PROCEDURE — 99284 EMERGENCY DEPT VISIT MOD MDM: CPT

## 2020-09-06 PROCEDURE — 96360 HYDRATION IV INFUSION INIT: CPT

## 2020-09-06 PROCEDURE — 84443 ASSAY THYROID STIM HORMONE: CPT

## 2020-09-06 RX ORDER — HEPARIN SODIUM (PORCINE) LOCK FLUSH IV SOLN 100 UNIT/ML 100 UNIT/ML
SOLUTION INTRAVENOUS
Status: DISCONTINUED
Start: 2020-09-06 | End: 2020-09-06 | Stop reason: HOSPADM

## 2020-09-06 RX ORDER — 0.9 % SODIUM CHLORIDE 0.9 %
1000 INTRAVENOUS SOLUTION INTRAVENOUS ONCE
Status: COMPLETED | OUTPATIENT
Start: 2020-09-06 | End: 2020-09-06

## 2020-09-06 RX ORDER — LORAZEPAM 0.5 MG/1
0.5 TABLET ORAL EVERY 6 HOURS PRN
Qty: 10 TABLET | Refills: 0 | Status: SHIPPED | OUTPATIENT
Start: 2020-09-06 | End: 2020-10-06

## 2020-09-06 RX ORDER — LORAZEPAM 0.5 MG/1
0.5 TABLET ORAL ONCE
Status: COMPLETED | OUTPATIENT
Start: 2020-09-06 | End: 2020-09-06

## 2020-09-06 RX ORDER — PAROXETINE 10 MG/1
30 TABLET, FILM COATED ORAL EVERY MORNING
COMMUNITY

## 2020-09-06 RX ADMIN — SODIUM CHLORIDE 1000 ML: 9 INJECTION, SOLUTION INTRAVENOUS at 06:45

## 2020-09-06 RX ADMIN — LORAZEPAM 0.5 MG: 0.5 TABLET ORAL at 07:34

## 2020-09-06 ASSESSMENT — ENCOUNTER SYMPTOMS
NAUSEA: 0
COUGH: 0
SHORTNESS OF BREATH: 1
SORE THROAT: 0

## 2020-09-06 NOTE — ED PROVIDER NOTES
65-year-old female with history of ductal carcinoma in situ of left breast status post mastectomy 11/2019 currently on chemo/Arimedex and hypothyroidism presenting with 2-day history of palpitations and shortness of breath. She states over the past couple months she was taken off her Paxil due to interaction with Arimedex and tried Effexor and then Lexapro with poor tolerance of these drugs. 2 weeks ago she was started back on Paxil. She states that she feels like she is about to burst.  She endorses feeling more tired as she has not been sleeping well the past few nights. She is denying chest pain, recent illness, nausea, dizziness, and lightheadedness. The history is provided by the patient. Palpitations   Palpitations quality:  Regular  Onset quality:  Unable to specify  Duration:  2 days  Timing:  Constant  Progression:  Unchanged  Chronicity:  New  Relieved by:  Nothing  Worsened by:  Nothing  Associated symptoms: shortness of breath    Associated symptoms: no chest pain, no cough, no dizziness, no lower extremity edema, no nausea, no numbness and no weakness    Risk factors: hx of thyroid disease and hypercoagulable state         Review of Systems   Constitutional: Negative for chills and fever. HENT: Negative for sore throat. Respiratory: Positive for shortness of breath. Negative for cough. Cardiovascular: Positive for palpitations. Negative for chest pain. Gastrointestinal: Negative for nausea. Neurological: Negative for dizziness, weakness and numbness. Physical Exam  Constitutional:       Appearance: Normal appearance. Comments: Appears anxious   Eyes:      Extraocular Movements: Extraocular movements intact. Conjunctiva/sclera: Conjunctivae normal.      Pupils: Pupils are equal, round, and reactive to light. Cardiovascular:      Rate and Rhythm: Normal rate and regular rhythm. Heart sounds: No murmur.    Pulmonary:      Effort: Pulmonary effort is normal. No respiratory distress. Breath sounds: Normal breath sounds. No stridor. No wheezing, rhonchi or rales. Abdominal:      General: Abdomen is flat. Palpations: Abdomen is soft. Musculoskeletal:         General: No swelling. Skin:     General: Skin is warm and dry. Neurological:      General: No focal deficit present. Mental Status: She is alert. Procedures     MDM  Number of Diagnoses or Management Options  Diagnosis management comments: 69-year-old female with hypothyroidism and ductal carcinoma in situ of the left breast status post mastectomy 11/2019 presenting with palpitations and shortness of breath for 2 days. EKG normal.  D-dimer <200  Na 127  CXR pending. Pt signed out to Dr. Abbie Lowry. I have signed this patient out to the oncoming physician, Dr. Abbie Lowry. I have discussed the patient's initial exam, treatment and plan of care with the on coming physician. I have notified the patient / family of the change in treating physician and answered their questions to this point. ED Course as of Sep 06 0814   [facility] Sep 06, 2020   8259 Pt signed out to Dr. Abbie Lowry.    [AP]      ED Course User Index  [AP] MD Priscilla Galaviz MD  Resident  09/06/20 [id number]      ED Course as of Sep 06 0814   Sun Sep 06, 2020   3429 Pt signed out to Dr. Abbie Lowry.    [AP]      ED Course User Index  [AP] Priscilla Feldman MD       --------------------------------------------- PAST HISTORY ---------------------------------------------  Past Medical History:  has a past medical history of Cancer [facility]), Depression, Hypothyroidism, Irritable bowel syndrome, Low sodium levels, and Thyroid disease. Past Surgical History:  has a past surgical history that includes Cholecystectomy; Tonsillectomy; Appendectomy; Hysterectomy, total abdominal; Mastectomy (Left, 11/20/2019); lymph node biopsy; Breast surgery; and INSERTION / REMOVAL / REPLACEMENT VENOUS ACCESS CATHETER (N/A, 12/27/2019).     Social History:  reports that she quit smoking about 21 years ago. She has a 70.00 pack-year smoking history. She has never used smokeless tobacco. She reports previous alcohol use. She reports that she does not use drugs. Family History: family history includes Cancer (age of onset: 58) in her brother; Cancer (age of onset: 66) in her mother. The patients home medications have been reviewed.     Allergies: Effexor [venlafaxine]    -------------------------------------------------- RESULTS -------------------------------------------------  Labs:  Results for orders placed or performed during the hospital encounter of 09/06/20   CBC Auto Differential   Result Value Ref Range    WBC 6.8 4.5 - 11.5 E9/L    RBC 4.28 3.50 - 5.50 E12/L    Hemoglobin 12.1 11.5 - 15.5 g/dL    Hematocrit 36.9 34.0 - 48.0 %    MCV 86.2 80.0 - 99.9 fL    MCH 28.3 26.0 - 35.0 pg    MCHC 32.8 32.0 - 34.5 %    RDW 13.2 11.5 - 15.0 fL    Platelets 227 462 - 946 E9/L    MPV 10.2 7.0 - 12.0 fL    Neutrophils % 71.3 43.0 - 80.0 %    Immature Granulocytes % 0.3 0.0 - 5.0 %    Lymphocytes % 18.4 (L) 20.0 - 42.0 %    Monocytes % 9.6 2.0 - 12.0 %    Eosinophils % 0.1 0.0 - 6.0 %    Basophils % 0.3 0.0 - 2.0 %    Neutrophils Absolute 4.83 1.80 - 7.30 E9/L    Immature Granulocytes # 0.02 E9/L    Lymphocytes Absolute 1.25 (L) 1.50 - 4.00 E9/L    Monocytes Absolute 0.65 0.10 - 0.95 E9/L    Eosinophils Absolute 0.01 (L) 0.05 - 0.50 E9/L    Basophils Absolute 0.02 0.00 - 0.20 Q4/V   Basic Metabolic Panel w/ Reflex to MG   Result Value Ref Range    Sodium 127 (L) 132 - 146 mmol/L    Potassium reflex Magnesium 3.8 3.5 - 5.0 mmol/L    Chloride 88 (L) 98 - 107 mmol/L    CO2 26 22 - 29 mmol/L    Anion Gap 13 7 - 16 mmol/L    Glucose 107 (H) 74 - 99 mg/dL    BUN 9 8 - 23 mg/dL    CREATININE 0.7 0.5 - 1.0 mg/dL    GFR Non-African American >60 >=60 mL/min/1.73    GFR African American >60     Calcium 8.7 8.6 - 10.2 mg/dL   Troponin   Result Value Ref Range    Troponin <0.01 0.00 - 0.03 ng/mL   TSH without Reflex   Result Value Ref Range    TSH 3.490 0.270 - 4.200 uIU/mL   D-Dimer, Quantitative   Result Value Ref Range    D-Dimer, Quant <200 ng/mL DDU   EKG 12 Lead   Result Value Ref Range    Ventricular Rate 71 BPM    Atrial Rate 71 BPM    P-R Interval 162 ms    QRS Duration 82 ms    Q-T Interval 380 ms    QTc Calculation (Bazett) 412 ms    P Axis 26 degrees    R Axis 18 degrees    T Axis 37 degrees       Radiology:  XR CHEST PORTABLE   Final Result   No acute cardiopulmonary process. ------------------------- NURSING NOTES AND VITALS REVIEWED ---------------------------  Date / Time Roomed:  9/6/2020  4:35 AM  ED Bed Assignment:  08/08    The nursing notes within the ED encounter and vital signs as below have been reviewed. BP (!) 156/83   Pulse 73   Temp 97.3 °F (36.3 °C) (Infrared)   Resp 18   Ht 5' 4\" (1.626 m)   Wt 132 lb (59.9 kg)   SpO2 98%   BMI 22.66 kg/m²   Oxygen Saturation Interpretation: Normal      ------------------------------------------ PROGRESS NOTES ------------------------------------------  I have spoken with the patient and discussed todays results, in addition to providing specific details for the plan of care and counseling regarding the diagnosis and prognosis. Their questions are answered at this time and they are agreeable with the plan. I discussed at length with them reasons for immediate return here for re evaluation. They will followup with primary care by calling their office tomorrow. --------------------------------- ADDITIONAL PROVIDER NOTES ---------------------------------  At this time the patient is without objective evidence of an acute process requiring hospitalization or inpatient management. They have remained hemodynamically stable throughout their entire ED visit and are stable for discharge with outpatient follow-up.      The plan has been discussed in detail and they are aware of the specific conditions for emergent return, as well as the importance of follow-up. New Prescriptions    LORAZEPAM (ATIVAN) 0.5 MG TABLET    Take 1 tablet by mouth every 6 hours as needed for Anxiety for up to 30 days. Diagnosis:  1. Palpitations    2. Anxiety        Disposition:  Patient's disposition: Discharge to home  Patient's condition is stable. ATTENDING PROVIDER ATTESTATION:     I have personally performed and/or participated in the history, exam, medical decision making, and procedures and agree with all pertinent clinical information. I have also reviewed and agree with the past medical, family and social history unless otherwise noted. I have discussed this patient in detail with the resident, and provided the instruction and education regarding palpitations, shortness of breath, anxiety and panic attacks. My findings/Plan: Heart RRR. Lungs CTA bilaterally. Abdomen soft, nontender. Bowel sounds normal. Supportive care. Discharge for outpatient follow up.          [address] [facility],   09/06/20 [id number]

## 2020-09-06 NOTE — ED NOTES
[employer] notified at [phone] to present to ED #8. PCXR done at this time.      Cathie Valente RN  09/06/20 7070

## 2020-09-06 NOTE — ED NOTES
Pt. States she feels sob.   And feels like she is 'coming apart'      Ashley Macedo RN  09/06/20 0192

## 2020-09-06 NOTE — ED NOTES
IVF stopped and needle removed from port per Princess Hughes [address] [place] [address], RN  09/06/20 6540

## 2020-09-08 LAB
EKG ATRIAL RATE: 71 BPM
EKG P AXIS: 26 DEGREES
EKG P-R INTERVAL: 162 MS
EKG Q-T INTERVAL: 380 MS
EKG QRS DURATION: 82 MS
EKG QTC CALCULATION (BAZETT): 412 MS
EKG R AXIS: 18 DEGREES
EKG T AXIS: 37 DEGREES
EKG VENTRICULAR RATE: 71 BPM

## 2020-09-10 ENCOUNTER — HOSPITAL ENCOUNTER (OUTPATIENT)
Dept: INFUSION THERAPY | Age: 75
Discharge: HOME | End: 2020-09-10
Payer: MEDICARE

## 2020-09-10 ENCOUNTER — OFFICE VISIT (OUTPATIENT)
Dept: ONCOLOGY | Age: 75
End: 2020-09-10
Payer: MEDICARE

## 2020-09-10 VITALS
DIASTOLIC BLOOD PRESSURE: 51 MMHG | HEART RATE: 73 BPM | SYSTOLIC BLOOD PRESSURE: 131 MMHG | OXYGEN SATURATION: 98 % | WEIGHT: 131 LBS | HEIGHT: 64 IN | TEMPERATURE: 97.3 F | BODY MASS INDEX: 22.36 KG/M2

## 2020-09-10 VITALS — DIASTOLIC BLOOD PRESSURE: 68 MMHG | SYSTOLIC BLOOD PRESSURE: 149 MMHG | HEART RATE: 70 BPM

## 2020-09-10 DIAGNOSIS — D05.12 DUCTAL CARCINOMA IN SITU (DCIS) OF LEFT BREAST: ICD-10-CM

## 2020-09-10 DIAGNOSIS — C50.912 MALIGNANT NEOPLASM OF LEFT FEMALE BREAST, UNSPECIFIED ESTROGEN RECEPTOR STATUS, UNSPECIFIED SITE OF BREAST (HCC): Primary | ICD-10-CM

## 2020-09-10 LAB
ALBUMIN SERPL-MCNC: 4 G/DL (ref 3.5–5.2)
ALP BLD-CCNC: 87 U/L (ref 35–104)
ALT SERPL-CCNC: 14 U/L (ref 0–32)
ANION GAP SERPL CALCULATED.3IONS-SCNC: 10 MMOL/L (ref 7–16)
AST SERPL-CCNC: 22 U/L (ref 0–31)
BASOPHILS ABSOLUTE: 0.03 E9/L (ref 0–0.2)
BASOPHILS RELATIVE PERCENT: 0.7 % (ref 0–2)
BILIRUB SERPL-MCNC: 0.3 MG/DL (ref 0–1.2)
BUN BLDV-MCNC: 14 MG/DL (ref 8–23)
CALCIUM SERPL-MCNC: 8.5 MG/DL (ref 8.6–10.2)
CHLORIDE BLD-SCNC: 93 MMOL/L (ref 98–107)
CO2: 27 MMOL/L (ref 22–29)
CREAT SERPL-MCNC: 0.9 MG/DL (ref 0.5–1)
EOSINOPHILS ABSOLUTE: 0.03 E9/L (ref 0.05–0.5)
EOSINOPHILS RELATIVE PERCENT: 0.7 % (ref 0–6)
GFR AFRICAN AMERICAN: >60
GFR NON-AFRICAN AMERICAN: >60 ML/MIN/1.73
GLUCOSE BLD-MCNC: 164 MG/DL (ref 74–99)
HCT VFR BLD CALC: 38.9 % (ref 34–48)
HEMOGLOBIN: 13.2 G/DL (ref 11.5–15.5)
IMMATURE GRANULOCYTES #: 0.01 E9/L
IMMATURE GRANULOCYTES %: 0.2 % (ref 0–5)
LYMPHOCYTES ABSOLUTE: 1.21 E9/L (ref 1.5–4)
LYMPHOCYTES RELATIVE PERCENT: 27.5 % (ref 20–42)
MAGNESIUM: 2.1 MG/DL (ref 1.6–2.6)
MCH RBC QN AUTO: 29.2 PG (ref 26–35)
MCHC RBC AUTO-ENTMCNC: 33.9 % (ref 32–34.5)
MCV RBC AUTO: 86.1 FL (ref 80–99.9)
MONOCYTES ABSOLUTE: 0.35 E9/L (ref 0.1–0.95)
MONOCYTES RELATIVE PERCENT: 8 % (ref 2–12)
NEUTROPHILS ABSOLUTE: 2.77 E9/L (ref 1.8–7.3)
NEUTROPHILS RELATIVE PERCENT: 62.9 % (ref 43–80)
PDW BLD-RTO: 13.3 FL (ref 11.5–15)
PLATELET # BLD: 250 E9/L (ref 130–450)
PMV BLD AUTO: 10.2 FL (ref 7–12)
POTASSIUM SERPL-SCNC: 3.9 MMOL/L (ref 3.5–5)
RBC # BLD: 4.52 E12/L (ref 3.5–5.5)
SODIUM BLD-SCNC: 130 MMOL/L (ref 132–146)
TOTAL PROTEIN: 6.5 G/DL (ref 6.4–8.3)
WBC # BLD: 4.4 E9/L (ref 4.5–11.5)

## 2020-09-10 PROCEDURE — 99214 OFFICE O/P EST MOD 30 MIN: CPT | Performed by: INTERNAL MEDICINE

## 2020-09-10 PROCEDURE — 1123F ACP DISCUSS/DSCN MKR DOCD: CPT | Performed by: INTERNAL MEDICINE

## 2020-09-10 PROCEDURE — G8420 CALC BMI NORM PARAMETERS: HCPCS | Performed by: INTERNAL MEDICINE

## 2020-09-10 PROCEDURE — 2580000003 HC RX 258: Performed by: INTERNAL MEDICINE

## 2020-09-10 PROCEDURE — G8399 PT W/DXA RESULTS DOCUMENT: HCPCS | Performed by: INTERNAL MEDICINE

## 2020-09-10 PROCEDURE — 85025 COMPLETE CBC W/AUTO DIFF WBC: CPT

## 2020-09-10 PROCEDURE — 3017F COLORECTAL CA SCREEN DOC REV: CPT | Performed by: INTERNAL MEDICINE

## 2020-09-10 PROCEDURE — 1036F TOBACCO NON-USER: CPT | Performed by: INTERNAL MEDICINE

## 2020-09-10 PROCEDURE — 96413 CHEMO IV INFUSION 1 HR: CPT

## 2020-09-10 PROCEDURE — 80053 COMPREHEN METABOLIC PANEL: CPT

## 2020-09-10 PROCEDURE — 6360000002 HC RX W HCPCS: Performed by: INTERNAL MEDICINE

## 2020-09-10 PROCEDURE — G8427 DOCREV CUR MEDS BY ELIG CLIN: HCPCS | Performed by: INTERNAL MEDICINE

## 2020-09-10 PROCEDURE — 4040F PNEUMOC VAC/ADMIN/RCVD: CPT | Performed by: INTERNAL MEDICINE

## 2020-09-10 PROCEDURE — 1090F PRES/ABSN URINE INCON ASSESS: CPT | Performed by: INTERNAL MEDICINE

## 2020-09-10 PROCEDURE — 83735 ASSAY OF MAGNESIUM: CPT

## 2020-09-10 RX ORDER — HEPARIN SODIUM (PORCINE) LOCK FLUSH IV SOLN 100 UNIT/ML 100 UNIT/ML
500 SOLUTION INTRAVENOUS PRN
Status: CANCELLED | OUTPATIENT
Start: 2020-09-10

## 2020-09-10 RX ORDER — SODIUM CHLORIDE 0.9 % (FLUSH) 0.9 %
10 SYRINGE (ML) INJECTION PRN
Status: DISCONTINUED | OUTPATIENT
Start: 2020-09-10 | End: 2020-09-11 | Stop reason: HOSPADM

## 2020-09-10 RX ORDER — SODIUM CHLORIDE 0.9 % (FLUSH) 0.9 %
10 SYRINGE (ML) INJECTION PRN
Status: CANCELLED | OUTPATIENT
Start: 2020-09-10

## 2020-09-10 RX ORDER — METHYLPREDNISOLONE SODIUM SUCCINATE 125 MG/2ML
125 INJECTION, POWDER, LYOPHILIZED, FOR SOLUTION INTRAMUSCULAR; INTRAVENOUS ONCE
Status: CANCELLED | OUTPATIENT
Start: 2020-09-10

## 2020-09-10 RX ORDER — HEPARIN SODIUM (PORCINE) LOCK FLUSH IV SOLN 100 UNIT/ML 100 UNIT/ML
500 SOLUTION INTRAVENOUS PRN
Status: DISCONTINUED | OUTPATIENT
Start: 2020-09-10 | End: 2020-09-11 | Stop reason: HOSPADM

## 2020-09-10 RX ORDER — EPINEPHRINE 1 MG/ML
0.3 INJECTION, SOLUTION, CONCENTRATE INTRAVENOUS PRN
Status: CANCELLED | OUTPATIENT
Start: 2020-09-10

## 2020-09-10 RX ORDER — SODIUM CHLORIDE 9 MG/ML
20 INJECTION, SOLUTION INTRAVENOUS ONCE
Status: COMPLETED | OUTPATIENT
Start: 2020-09-10 | End: 2020-09-10

## 2020-09-10 RX ORDER — SODIUM CHLORIDE 9 MG/ML
INJECTION, SOLUTION INTRAVENOUS CONTINUOUS
Status: CANCELLED | OUTPATIENT
Start: 2020-09-10

## 2020-09-10 RX ORDER — MEPERIDINE HYDROCHLORIDE 25 MG/ML
12.5 INJECTION INTRAMUSCULAR; INTRAVENOUS; SUBCUTANEOUS ONCE
Status: CANCELLED | OUTPATIENT
Start: 2020-09-10

## 2020-09-10 RX ORDER — SODIUM CHLORIDE 9 MG/ML
20 INJECTION, SOLUTION INTRAVENOUS ONCE
Status: CANCELLED | OUTPATIENT
Start: 2020-09-10

## 2020-09-10 RX ORDER — ALPRAZOLAM 0.5 MG/1
TABLET ORAL
COMMUNITY
Start: 2020-09-08 | End: 2020-12-08 | Stop reason: ALTCHOICE

## 2020-09-10 RX ORDER — DIPHENHYDRAMINE HYDROCHLORIDE 50 MG/ML
50 INJECTION INTRAMUSCULAR; INTRAVENOUS ONCE
Status: CANCELLED | OUTPATIENT
Start: 2020-09-10

## 2020-09-10 RX ADMIN — SODIUM CHLORIDE 20 ML/HR: 9 INJECTION, SOLUTION INTRAVENOUS at 11:08

## 2020-09-10 RX ADMIN — Medication 500 UNITS: at 12:10

## 2020-09-10 RX ADMIN — TRASTUZUMAB 357 MG: 150 INJECTION, POWDER, LYOPHILIZED, FOR SOLUTION INTRAVENOUS at 11:22

## 2020-09-10 RX ADMIN — SODIUM CHLORIDE, PRESERVATIVE FREE 10 ML: 5 INJECTION INTRAVENOUS at 12:10

## 2020-09-10 NOTE — PROGRESS NOTES
[address] [address]  Dept: [phone]  Loc: [phone]  Attending Progress Note      Reason for Visit:   Left breast cancer. Referring Physician: Halie Zelaya MD.    PCP:  KAREN Jimenes    History of Present Illness: The patient is a 75 y.o. lady with a past medical history significant for thyroid disease, depression, and IBS, who had presented with an abnormal screening mammogram,    8/20/19  Bilateral screening mammogram  [facility]         FINDINGS: No suspicious masses, calcifications, or distortions are   identified on the right. On the left there is a new focal nodular   asymmetry at 12:00, with adjacent linear branching calcifications. Spot compression views is recommended for the asymmetry with   ultrasound, and spot magnification views for the calcifications. .             Impression   1. Right breast no mammographic evidence of malignancy           2. Left breast new focal asymmetry at 12:00, new microcalcifications.       Recommendation:   1. Right breast annual screening mammogram.   2. Left breast additional views spot compression and spot   magnification along with ultrasound.       BI-RADS Category 0:  Incomplete- Needs Additional Imaging Evaluation             8/29/19  Left Diagnostic mammogram   [facility]         FINDINGS:        A small partially obscured mass is identified in the upper outer left   breast measuring approximately 5-6 mm. Additionally, there is a small   segmentally distributed cluster of pleomorphic microcalcifications   located superiorly near the mass extending to the middle third depth.       Ultrasound confirmed a small irregular shaped hypoechoic mass with   circumscribed margins at the 12:00 position measuring 5 mm in maximal   dimension.           Impression       1. Small solid suspicious mass at the 12:00 position.    2. Segmentally distributed pleomorphic microcalcifications located   near the breast mass likely at the 12:00 position.       RECOMMENDATION:       Recommend ultrasound core biopsy of the mass seen on ultrasound and   stereotactic biopsy of the microcalcifications.       BI-RADS Category 5: Highly Suggestive of Malignancy          8/29/19  Left Breast US   [facility]         FINDINGS:        A small partially obscured mass is identified in the upper outer left   breast measuring approximately 5-6 mm. Additionally, there is a small   segmentally distributed cluster of pleomorphic microcalcifications   located superiorly near the mass extending to the middle third depth.       Ultrasound confirmed a small irregular shaped hypoechoic mass with   circumscribed margins at the 12:00 position measuring 5 mm in maximal   dimension.           Impression       1. Small solid suspicious mass at the 12:00 position. 2. Segmentally distributed pleomorphic microcalcifications located   near the breast mass likely at the 12:00 position.       RECOMMENDATION:       Recommend ultrasound core biopsy of the mass seen on ultrasound and   stereotactic biopsy of the microcalcifications.       BI-RADS Category 5: Highly Suggestive of Malignancy              She underwent a stereotactic guided left breast core biopsy at 12 o'clock position on September 10 , 2019.a single top hat shaped marker clip was deployed into the site.     She underwent an ultrasound guided biopsy of the left breast at the 12 o'clock position on September 17, 2019, A post-surgical ribbon shaped microclip was placed.      Pathological evaluation completed at [facility]):     9/10/19  Final Surgical Pathology Report  Diagnosis:  A. Left breast, 12:00, biopsy: High-grade ductal carcinoma in situ,  cannot exclude microinvasion, see comment. B. Left breast 12:00, additional, biopsy: High-grade ductal carcinoma in  situ, cannot exclude microinvasion, see comment.     Breast Cancer Marker Studies:  Estrogen Receptors (ER): Positive         Percentage of cells a  myoepithelial layer. P63 immunostain on block A6 shows the invasive carcinoma lacking a  myoepithelial layer, with a myoepithelial layer retained around the DCIS. Breast Cancer Marker Studies (Block A6):  Negative: 0%        No internal control cells present. Progesterone Receptors (NV):  Negative: 0%        No internal control cells present. Her-2/miles (c-erb B-2) protein expression: Positive (Score 3+)    The patient was started on adjuvant treatment with Herceptin and Taxol on 1/2/2020, she completed Taxol  on 3/19/2020. She is on single agent Herceptin. The patient was started on tamoxifen on 6/4/2020, she was given Effexor for depression, she had side effects from it, was discontinued and she was not on Lexapro, she did not like it. She was started on Arimidex on 8/20/2020, the patient has been stressed out, her  has a bladder tumor and will need to have a surgery done, she was in the ED on 9/6/2020, she was feeling tired and short of breath, she feels that her symptoms were because of the Arimidex and because she was off the Paxil prior to that. Review of Systems;  CONSTITUTIONAL: No fever, chills. Good appetite, feeling tired. ENMT: Eyes: No diplopia; Nose: Positive for epistaxis. Mouth: No sore throat. RESPIRATORY: No hemoptysis, shortness of breath, cough. CARDIOVASCULAR: No chest pain, palpitations. GASTROINTESTINAL: As per HPI. GENITOURINARY: No dysuria, urinary frequency, hematuria. NEURO: No syncope, presyncope, headache.   Remainder:  ROS NEGATIVE    Past Medical History:      Diagnosis Date    Cancer [facility]) 2019    breast left    Depression     Hypothyroidism     Irritable bowel syndrome     not diagnosed, patient controls with diet    Low sodium levels 6/18/2020    Thyroid disease      Patient Active Problem List   Diagnosis    Ductal carcinoma in situ (DCIS) of left breast    Malignant neoplasm of left female breast, unspecified estrogen receptor status, unspecified site of breast ([facility] [address]    Poor venous access    Low sodium levels    Other osteoporosis without current pathological fracture        Past Surgical History:      Procedure Laterality Date    APPENDECTOMY      BREAST SURGERY      mastectomy left 2019    CHOLECYSTECTOMY      HYSTERECTOMY, TOTAL ABDOMINAL      oophorectomy    INSERTION / REMOVAL / REPLACEMENT VENOUS ACCESS CATHETER N/A 2019    MEDIPORT INSERTION performed by Jaskaran Herman MD at [address] [place] [address] Nagi Left 2019    LEFT BREAST SIMPLE  MASTECTOMY, BLUE DYE INJECTION, LEFT AXILLARY  SENTINEL NODE BIOPSY POSSIBLE LEFT AXILLARY DISSECTION -- Deleta Gold -- PECTORAL [facility]         Family History:  Family History   Problem Relation Age of Onset    Cancer Mother 66        liver    Cancer Brother 58        kidney       Medications:  Reviewed and reconciled.     Social History:  Social History     Socioeconomic History    Marital status:      Spouse name: Not on file    Number of children: Not on file    Years of education: Not on file    Highest education level: Not on file   Occupational History    Not on file   Social Needs    Financial resource strain: Not on file    Food insecurity     Worry: Not on file     Inability: Not on file   [employer] needs     Medical: Not on file     Non-medical: Not on file   Tobacco Use    Smoking status: Former Smoker     Packs/day: 2.00     Years: 35.00     Pack years: 70.00     Last attempt to quit:      Years since quittin.7    Smokeless tobacco: Never Used   Substance and Sexual Activity    Alcohol use: Not Currently    Drug use: Never    Sexual activity: Not Currently   Lifestyle    Physical activity     Days per week: Not on file     Minutes per session: Not on file    Stress: Not on file   Relationships    Social connections     Talks on phone: Not on file     Gets together: Not on file     Attends Christianity service: Not on file     Active member of club or organization: Not on file     Attends meetings of clubs or organizations: Not on file     Relationship status: Not on file    Intimate partner violence     Fear of current or ex partner: Not on file     Emotionally abused: Not on file     Physically abused: Not on file     Forced sexual activity: Not on file   Other Topics Concern    Not on file   Social History Narrative    Not on file       Allergies: Allergies   Allergen Reactions    Effexor [Venlafaxine] Other (See Comments)     Hallucination, vision issues, felt like she was going crazy, anxious        OB/GYN:  Age of menarche was 22, medically induced. Age of menopause was 31. Patient admits to hormonal therapy, progesterone, premarin. Oral contraceptives? To start periods. Patient is       Physical Exam:  BP (!) 131/51 (Site: Right Upper Arm, Position: Sitting, Cuff Size: Medium Adult)   Pulse 73   Temp 97.3 °F (36.3 °C) (Temporal)   Ht 5' 4\" (1.626 m)   Wt 131 lb (59.4 kg)   SpO2 98%   BMI 22.49 kg/m²     GENERAL: Alert, oriented x 3, not in acute distress. HEENT: PERRLA; EOMI. Oropharynx clear. NECK: Supple. No palpable cervical or supraclavicular lymphadenopathy. LUNGS: Good air entry bilaterally. No wheezing, crackles or rhonchi. CARDIOVASCULAR: Regular rate. No murmurs, rubs or gallops. BREASTS: Right breast exam is negative for any skin changes, no nipple discharge, she has nodularity and tenderness in the lower inner and outer quadrants, no palpable right axillary adenopathy. She is status post left mastectomy, the incision is healing nicely, no palpable left axillary lymphadenopathy. CHEST: This post right port placement  ABDOMEN: Soft. Non-tender, non-distended. Positive bowel sounds. EXTREMITIES: Without clubbing, cyanosis, or edema. NEUROLOGIC: No focal deficits.    ECOG PS 1      Impression/Plan:      The patient is a 75 y.o. lady with a past medical history significant for thyroid disease, depression, and IBS, who had presented with an abnormal screening mammogram, she had a left breast biopsy done revealing high-grade DCIS, ER positive less than 10%, CA negative, she underwent on 11/20/2019 a left mastectomy with sentinel lymph node excisional biopsy, she was found to have invasive ductal carcinoma, tumor size 8 mm, grade 2, with associated DCIS, margins are negative, 2 sentinel lymph nodes were removed, they were both negative for metastatic disease, final pathologic stage pT1b(sn) pN0Mx, ER -0% CA -0% HER-2/miles +3+ by IHC, prognostic stage IA. I discussed with the patient her diagnosis, risks of her tumor, prognosis and recommendations for systemic therapy. The patient has a small HER-2/miles positive disease, tumor size is 8 mm, adjuvant treatment with Taxol and Herceptin is recommended, schedule and the side effects of the treatment were reviewed with the patient. 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 The patient was started on tamoxifen on 6/4/2020, she was given Effexor for depression, she had side effects from it, was discontinued and she was not on Lexapro, she does not like it and wanted to go back on Paxil. She was started on Arimidex on 8/20/2020, and Paxil, the patient has been stressed out, and she feels that she cannot take the Arimidex until her her  is done with his surgery. She will continue to be on Paxil, she was started on Xanax by her PCP, will hold endocrine therapy for now as well as Prolia. Patient had a 2D echocardiogram done, LVEF is 65%, adequate for treatment with Herceptin, she had a port placed, she was started on adjuvant therapy with Taxol and Herceptin on 1/2/2020. She completed Taxol on 3/19/2020.     She had a repeat 2D echocardiogram done on 4/13/2020, revealing an LVEF of 73%, adequate to continue with Herceptin. For single agent Herceptin today, 9/10/2020. Labs reviewed, proceed with Herceptin, cycle #13. She had a 2D echocardiogram done on 8/28/2020, revealing an LVEF of 65 to 70%, overall stable. Right breast tenderness and lumps, ordered right diagnostic mammogram and ultrasound. They were done on 2/25/2020, there was no mammographic/sonographic evidence of malignancy, this was BI-RADS Category 1, negative. She will be due for a right breast screening mammogram in February 2021. RTC in 3 weeks for labs and treatment with single agent Herceptin. Thank you for allowing us to participate in the care of Mrs. Kenny Carrillo.     Santosh Devries MD   [facility]OLOG[facility]  [address] [address]  Dept: Saud: [phone]

## 2020-09-30 ENCOUNTER — TELEPHONE (OUTPATIENT)
Dept: INFUSION THERAPY | Age: 75
End: 2020-09-30

## 2020-09-30 ENCOUNTER — OFFICE VISIT (OUTPATIENT)
Dept: ONCOLOGY | Age: 75
End: 2020-09-30
Payer: MEDICARE

## 2020-09-30 ENCOUNTER — HOSPITAL ENCOUNTER (OUTPATIENT)
Dept: INFUSION THERAPY | Age: 75
Discharge: HOME | End: 2020-09-30
Payer: MEDICARE

## 2020-09-30 VITALS
TEMPERATURE: 97.7 F | OXYGEN SATURATION: 97 % | HEART RATE: 82 BPM | DIASTOLIC BLOOD PRESSURE: 78 MMHG | WEIGHT: 129.1 LBS | HEIGHT: 64 IN | BODY MASS INDEX: 22.04 KG/M2 | SYSTOLIC BLOOD PRESSURE: 139 MMHG

## 2020-09-30 VITALS — SYSTOLIC BLOOD PRESSURE: 154 MMHG | DIASTOLIC BLOOD PRESSURE: 74 MMHG | HEART RATE: 70 BPM

## 2020-09-30 DIAGNOSIS — C50.912 MALIGNANT NEOPLASM OF LEFT FEMALE BREAST, UNSPECIFIED ESTROGEN RECEPTOR STATUS, UNSPECIFIED SITE OF BREAST (HCC): Primary | ICD-10-CM

## 2020-09-30 DIAGNOSIS — D05.12 DUCTAL CARCINOMA IN SITU (DCIS) OF LEFT BREAST: ICD-10-CM

## 2020-09-30 LAB
ALBUMIN SERPL-MCNC: 4.2 G/DL (ref 3.5–5.2)
ALP BLD-CCNC: 81 U/L (ref 35–104)
ALT SERPL-CCNC: 14 U/L (ref 0–32)
ANION GAP SERPL CALCULATED.3IONS-SCNC: 12 MMOL/L (ref 7–16)
AST SERPL-CCNC: 19 U/L (ref 0–31)
BASOPHILS ABSOLUTE: 0.03 E9/L (ref 0–0.2)
BASOPHILS RELATIVE PERCENT: 0.4 % (ref 0–2)
BILIRUB SERPL-MCNC: 0.5 MG/DL (ref 0–1.2)
BUN BLDV-MCNC: 9 MG/DL (ref 8–23)
CALCIUM SERPL-MCNC: 9 MG/DL (ref 8.6–10.2)
CHLORIDE BLD-SCNC: 85 MMOL/L (ref 98–107)
CO2: 27 MMOL/L (ref 22–29)
CREAT SERPL-MCNC: 0.8 MG/DL (ref 0.5–1)
EOSINOPHILS ABSOLUTE: 0.01 E9/L (ref 0.05–0.5)
EOSINOPHILS RELATIVE PERCENT: 0.1 % (ref 0–6)
GFR AFRICAN AMERICAN: >60
GFR NON-AFRICAN AMERICAN: >60 ML/MIN/1.73
GLUCOSE BLD-MCNC: 101 MG/DL (ref 74–99)
HCT VFR BLD CALC: 38 % (ref 34–48)
HEMOGLOBIN: 13 G/DL (ref 11.5–15.5)
IMMATURE GRANULOCYTES #: 0.03 E9/L
IMMATURE GRANULOCYTES %: 0.4 % (ref 0–5)
LYMPHOCYTES ABSOLUTE: 1.08 E9/L (ref 1.5–4)
LYMPHOCYTES RELATIVE PERCENT: 14.4 % (ref 20–42)
MAGNESIUM: 2.1 MG/DL (ref 1.6–2.6)
MCH RBC QN AUTO: 29.1 PG (ref 26–35)
MCHC RBC AUTO-ENTMCNC: 34.2 % (ref 32–34.5)
MCV RBC AUTO: 85.2 FL (ref 80–99.9)
MONOCYTES ABSOLUTE: 0.75 E9/L (ref 0.1–0.95)
MONOCYTES RELATIVE PERCENT: 10 % (ref 2–12)
NEUTROPHILS ABSOLUTE: 5.59 E9/L (ref 1.8–7.3)
NEUTROPHILS RELATIVE PERCENT: 74.7 % (ref 43–80)
PDW BLD-RTO: 12.8 FL (ref 11.5–15)
PLATELET # BLD: 333 E9/L (ref 130–450)
PMV BLD AUTO: 9.5 FL (ref 7–12)
POTASSIUM SERPL-SCNC: 3.9 MMOL/L (ref 3.5–5)
RBC # BLD: 4.46 E12/L (ref 3.5–5.5)
SODIUM BLD-SCNC: 124 MMOL/L (ref 132–146)
TOTAL PROTEIN: 6.8 G/DL (ref 6.4–8.3)
WBC # BLD: 7.5 E9/L (ref 4.5–11.5)

## 2020-09-30 PROCEDURE — G8427 DOCREV CUR MEDS BY ELIG CLIN: HCPCS | Performed by: INTERNAL MEDICINE

## 2020-09-30 PROCEDURE — G8399 PT W/DXA RESULTS DOCUMENT: HCPCS | Performed by: INTERNAL MEDICINE

## 2020-09-30 PROCEDURE — 6360000002 HC RX W HCPCS: Performed by: INTERNAL MEDICINE

## 2020-09-30 PROCEDURE — 3017F COLORECTAL CA SCREEN DOC REV: CPT | Performed by: INTERNAL MEDICINE

## 2020-09-30 PROCEDURE — 1090F PRES/ABSN URINE INCON ASSESS: CPT | Performed by: INTERNAL MEDICINE

## 2020-09-30 PROCEDURE — 2580000003 HC RX 258: Performed by: INTERNAL MEDICINE

## 2020-09-30 PROCEDURE — 85025 COMPLETE CBC W/AUTO DIFF WBC: CPT

## 2020-09-30 PROCEDURE — 99214 OFFICE O/P EST MOD 30 MIN: CPT | Performed by: INTERNAL MEDICINE

## 2020-09-30 PROCEDURE — G8420 CALC BMI NORM PARAMETERS: HCPCS | Performed by: INTERNAL MEDICINE

## 2020-09-30 PROCEDURE — 1036F TOBACCO NON-USER: CPT | Performed by: INTERNAL MEDICINE

## 2020-09-30 PROCEDURE — 83735 ASSAY OF MAGNESIUM: CPT

## 2020-09-30 PROCEDURE — 1123F ACP DISCUSS/DSCN MKR DOCD: CPT | Performed by: INTERNAL MEDICINE

## 2020-09-30 PROCEDURE — 96361 HYDRATE IV INFUSION ADD-ON: CPT

## 2020-09-30 PROCEDURE — 2580000003 HC RX 258

## 2020-09-30 PROCEDURE — 4040F PNEUMOC VAC/ADMIN/RCVD: CPT | Performed by: INTERNAL MEDICINE

## 2020-09-30 PROCEDURE — 80053 COMPREHEN METABOLIC PANEL: CPT

## 2020-09-30 PROCEDURE — 96413 CHEMO IV INFUSION 1 HR: CPT

## 2020-09-30 RX ORDER — HEPARIN SODIUM (PORCINE) LOCK FLUSH IV SOLN 100 UNIT/ML 100 UNIT/ML
500 SOLUTION INTRAVENOUS PRN
Status: DISCONTINUED | OUTPATIENT
Start: 2020-09-30 | End: 2020-10-01 | Stop reason: HOSPADM

## 2020-09-30 RX ORDER — SODIUM CHLORIDE 9 MG/ML
INJECTION, SOLUTION INTRAVENOUS CONTINUOUS
Status: CANCELLED
Start: 2020-09-30

## 2020-09-30 RX ORDER — METHYLPREDNISOLONE SODIUM SUCCINATE 125 MG/2ML
125 INJECTION, POWDER, LYOPHILIZED, FOR SOLUTION INTRAMUSCULAR; INTRAVENOUS ONCE
Status: CANCELLED | OUTPATIENT
Start: 2020-09-30

## 2020-09-30 RX ORDER — SODIUM CHLORIDE 9 MG/ML
20 INJECTION, SOLUTION INTRAVENOUS ONCE
Status: COMPLETED | OUTPATIENT
Start: 2020-09-30 | End: 2020-09-30

## 2020-09-30 RX ORDER — MEPERIDINE HYDROCHLORIDE 25 MG/ML
12.5 INJECTION INTRAMUSCULAR; INTRAVENOUS; SUBCUTANEOUS ONCE
Status: CANCELLED | OUTPATIENT
Start: 2020-09-30

## 2020-09-30 RX ORDER — SODIUM CHLORIDE 9 MG/ML
INJECTION, SOLUTION INTRAVENOUS CONTINUOUS
Status: DISCONTINUED | OUTPATIENT
Start: 2020-09-30 | End: 2020-10-01 | Stop reason: HOSPADM

## 2020-09-30 RX ORDER — DIPHENHYDRAMINE HYDROCHLORIDE 50 MG/ML
50 INJECTION INTRAMUSCULAR; INTRAVENOUS ONCE
Status: CANCELLED | OUTPATIENT
Start: 2020-09-30

## 2020-09-30 RX ORDER — SODIUM CHLORIDE 9 MG/ML
INJECTION, SOLUTION INTRAVENOUS
Status: COMPLETED
Start: 2020-09-30 | End: 2020-09-30

## 2020-09-30 RX ORDER — EPINEPHRINE 1 MG/ML
0.3 INJECTION, SOLUTION, CONCENTRATE INTRAVENOUS PRN
Status: CANCELLED | OUTPATIENT
Start: 2020-09-30

## 2020-09-30 RX ORDER — OMEPRAZOLE 40 MG/1
40 CAPSULE, DELAYED RELEASE ORAL DAILY
COMMUNITY
Start: 2020-09-18 | End: 2021-03-02

## 2020-09-30 RX ORDER — FAMOTIDINE 40 MG/1
40 TABLET, FILM COATED ORAL NIGHTLY
COMMUNITY
Start: 2020-09-21 | End: 2020-10-21

## 2020-09-30 RX ORDER — HEPARIN SODIUM (PORCINE) LOCK FLUSH IV SOLN 100 UNIT/ML 100 UNIT/ML
500 SOLUTION INTRAVENOUS PRN
Status: CANCELLED | OUTPATIENT
Start: 2020-09-30

## 2020-09-30 RX ORDER — SODIUM CHLORIDE 0.9 % (FLUSH) 0.9 %
10 SYRINGE (ML) INJECTION PRN
Status: CANCELLED | OUTPATIENT
Start: 2020-09-30

## 2020-09-30 RX ORDER — SODIUM CHLORIDE 0.9 % (FLUSH) 0.9 %
10 SYRINGE (ML) INJECTION PRN
Status: DISCONTINUED | OUTPATIENT
Start: 2020-09-30 | End: 2020-10-01 | Stop reason: HOSPADM

## 2020-09-30 RX ORDER — SODIUM CHLORIDE 9 MG/ML
INJECTION, SOLUTION INTRAVENOUS CONTINUOUS
Status: CANCELLED | OUTPATIENT
Start: 2020-09-30

## 2020-09-30 RX ORDER — SODIUM CHLORIDE 9 MG/ML
20 INJECTION, SOLUTION INTRAVENOUS ONCE
Status: CANCELLED | OUTPATIENT
Start: 2020-09-30

## 2020-09-30 RX ADMIN — Medication 500 UNITS: at 13:25

## 2020-09-30 RX ADMIN — SODIUM CHLORIDE: 9 INJECTION, SOLUTION INTRAVENOUS at 12:00

## 2020-09-30 RX ADMIN — SODIUM CHLORIDE 20 ML/HR: 9 INJECTION, SOLUTION INTRAVENOUS at 12:25

## 2020-09-30 RX ADMIN — SODIUM CHLORIDE, PRESERVATIVE FREE 10 ML: 5 INJECTION INTRAVENOUS at 13:25

## 2020-09-30 RX ADMIN — TRASTUZUMAB 357 MG: 150 INJECTION, POWDER, LYOPHILIZED, FOR SOLUTION INTRAVENOUS at 12:25

## 2020-09-30 NOTE — PROGRESS NOTES
[address] [address]  Dept: [phone]  Loc: [phone]  Attending Progress Note      Reason for Visit:   Left breast cancer. Referring Physician: Mariposa Larose MD.    PCP:  KAREN Aleman PA-C    History of Present Illness: The patient is a 75 y.o. lady with a past medical history significant for thyroid disease, depression, and IBS, who had presented with an abnormal screening mammogram,    8/20/19  Bilateral screening mammogram  [facility]         FINDINGS: No suspicious masses, calcifications, or distortions are   identified on the right. On the left there is a new focal nodular   asymmetry at 12:00, with adjacent linear branching calcifications. Spot compression views is recommended for the asymmetry with   ultrasound, and spot magnification views for the calcifications. .             Impression   1. Right breast no mammographic evidence of malignancy           2. Left breast new focal asymmetry at 12:00, new microcalcifications.       Recommendation:   1. Right breast annual screening mammogram.   2. Left breast additional views spot compression and spot   magnification along with ultrasound.       BI-RADS Category 0:  Incomplete- Needs Additional Imaging Evaluation             8/29/19  Left Diagnostic mammogram   [facility]         FINDINGS:        A small partially obscured mass is identified in the upper outer left   breast measuring approximately 5-6 mm. Additionally, there is a small   segmentally distributed cluster of pleomorphic microcalcifications   located superiorly near the mass extending to the middle third depth.       Ultrasound confirmed a small irregular shaped hypoechoic mass with   circumscribed margins at the 12:00 position measuring 5 mm in maximal   dimension.           Impression       1. Small solid suspicious mass at the 12:00 position.    2. Segmentally distributed pleomorphic microcalcifications located   near the breast mass likely at the 12:00 position.       RECOMMENDATION:       Recommend ultrasound core biopsy of the mass seen on ultrasound and   stereotactic biopsy of the microcalcifications.       BI-RADS Category 5: Highly Suggestive of Malignancy          8/29/19  Left Breast US   [facility]         FINDINGS:        A small partially obscured mass is identified in the upper outer left   breast measuring approximately 5-6 mm. Additionally, there is a small   segmentally distributed cluster of pleomorphic microcalcifications   located superiorly near the mass extending to the middle third depth.       Ultrasound confirmed a small irregular shaped hypoechoic mass with   circumscribed margins at the 12:00 position measuring 5 mm in maximal   dimension.           Impression       1. Small solid suspicious mass at the 12:00 position. 2. Segmentally distributed pleomorphic microcalcifications located   near the breast mass likely at the 12:00 position.       RECOMMENDATION:       Recommend ultrasound core biopsy of the mass seen on ultrasound and   stereotactic biopsy of the microcalcifications.       BI-RADS Category 5: Highly Suggestive of Malignancy              She underwent a stereotactic guided left breast core biopsy at 12 o'clock position on September 10 , 2019.a single top hat shaped marker clip was deployed into the site.     She underwent an ultrasound guided biopsy of the left breast at the 12 o'clock position on September 17, 2019, A post-surgical ribbon shaped microclip was placed.      Pathological evaluation completed at [facility]):     9/10/19  Final Surgical Pathology Report  Diagnosis:  A. Left breast, 12:00, biopsy: High-grade ductal carcinoma in situ,  cannot exclude microinvasion, see comment. B. Left breast 12:00, additional, biopsy: High-grade ductal carcinoma in  situ, cannot exclude microinvasion, see comment.     Breast Cancer Marker Studies:  Estrogen Receptors (ER): Positive         Percentage of cells positive: <10%         Intensity: Weak    Progesterone Receptors (ID): Negative        Internal control cells present and stain as expected: No internal  control present     9/17/19 Final Surgical Pathology Report    Diagnosis:  Mass, Left breast, 12:00, core biopsy: Segments of benign fibroadipose  tissue and scant skeletal muscle, see comment. Comment:   Epithelial lined mammary ducts and lobules are not identified  in the tissue sample. Correlation with clinical and radiologic findings  is essential to assure adequacy of tissue sampling. In the setting of a  mass clinically or radiologically suspicious for neoplasm, additional  tissue sampling is recommended. The patient underwent on 11/20/2019 a left mastectomy with sentinel lymph node excisional biopsy, pathology:    CANCER CASE SUMMARY:  Procedure: Left simple mastectomy  Specimen laterality: Left  Tumor site: 12:00 position  Tumor size: 8.0 mm in greatest dimension  Histologic type:  Invasive carcinoma of no special type (invasive ductal  carcinoma, not otherwise specified)  Histologic grade (Deandre histologic score):   Glandular differentiation: Score 3   Nuclear pleomorphism: Score 2   Mitotic rate: Score 2   Overall grade: Grade 2 (score of 7)  Tumor focality: Single focus of invasive carcinoma  Ductal carcinoma in situ (DCIS): Present, adjacent to the invasive  carcinoma  Invasive carcinoma margins:   Margins uninvolved by invasive carcinoma    Distance from closest margin: 5.0 mm from the posterior margin  DCIS margins:   Margin uninvolved by DCIS    Distance from closest margin: 10.0 mm from the posterior margin  Regional lymph nodes: Uninvolved by tumor cells   Total number of lymph nodes examined: 2 (both sentinel nodes)  Treatment effect: No known presurgical therapy  Pathologic stage classification (pTNM, AJCC 8th edition):   pT1b   (sn) pN0    Ancillary studies:  Calponin immunostain on block A4 shows the invasive carcinoma lacking 2019    breast left    Depression     Hypothyroidism     Irritable bowel syndrome     not diagnosed, patient controls with diet    Low sodium levels 6/18/2020    Thyroid disease      Patient Active Problem List   Diagnosis    Ductal carcinoma in situ (DCIS) of left breast    Malignant neoplasm of left female breast, unspecified estrogen receptor status, unspecified site of breast ([facility] [address]    Poor venous access    Low sodium levels    Other osteoporosis without current pathological fracture        Past Surgical History:      Procedure Laterality Date    APPENDECTOMY      BREAST SURGERY      mastectomy left nov 20 2019    CHOLECYSTECTOMY      HYSTERECTOMY, TOTAL ABDOMINAL      oophorectomy    INSERTION / REMOVAL / REPLACEMENT VENOUS ACCESS CATHETER N/A 12/27/2019    MEDIPORT INSERTION performed by Thania Cloud MD at [facility] Left 11/20/2019    LEFT BREAST SIMPLE  MASTECTOMY, BLUE DYE INJECTION, LEFT AXILLARY  SENTINEL NODE BIOPSY POSSIBLE LEFT AXILLARY DISSECTION -- Maricruz Agent -- PECTORAL BLOCK performed by Mau Betancur MD at [facility] 22 TONSILLECTOMY         Family History:  Family History   Problem Relation Age of Onset    Cancer Mother 66        liver    Cancer Brother 58        kidney       Medications:  Reviewed and reconciled.     Social History:  Social History     Socioeconomic History    Marital status:      Spouse name: Not on file    Number of children: Not on file    Years of education: Not on file    Highest education level: Not on file   Occupational History    Not on file   Social Needs    Financial resource strain: Not on file    Food insecurity     Worry: Not on file     Inability: Not on file   [employer] needs     Medical: Not on file     Non-medical: Not on file   Tobacco Use    Smoking status: Former Smoker     Packs/day: 2.00     Years: 35.00     Pack years: 70.00     Last attempt to quit: 1999     Years since quitting: 21.7    Smokeless tobacco: Never Used   Substance and Sexual Activity    Alcohol use: Not Currently    Drug use: Never    Sexual activity: Not Currently   Lifestyle    Physical activity     Days per week: Not on file     Minutes per session: Not on file    Stress: Not on file   Relationships    Social connections     Talks on phone: Not on file     Gets together: Not on file     Attends Shinto service: Not on file     Active member of club or organization: Not on file     Attends meetings of clubs or organizations: Not on file     Relationship status: Not on file    Intimate partner violence     Fear of current or ex partner: Not on file     Emotionally abused: Not on file     Physically abused: Not on file     Forced sexual activity: Not on file   Other Topics Concern    Not on file   Social History Narrative    Not on file       Allergies: Allergies   Allergen Reactions    Effexor [Venlafaxine] Other (See Comments)     Hallucination, vision issues, felt like she was going crazy, anxious        OB/GYN:  Age of menarche was 22, medically induced. Age of menopause was 31. Patient admits to hormonal therapy, progesterone, premarin. Oral contraceptives? To start periods. Patient is       Physical Exam:  /78 (Site: Right Upper Arm, Position: Sitting, Cuff Size: Medium Adult)   Pulse 82   Temp 97.7 °F (36.5 °C) (Temporal)   Ht 5' 4\" (1.626 m)   Wt 129 lb 1.6 oz (58.6 kg)   SpO2 97%   BMI 22.16 kg/m²     GENERAL: Alert, oriented x 3, not in acute distress. HEENT: PERRLA; EOMI. Oropharynx clear. NECK: Supple. No palpable cervical or supraclavicular lymphadenopathy. LUNGS: Good air entry bilaterally. No wheezing, crackles or rhonchi. CARDIOVASCULAR: Regular rate. No murmurs, rubs or gallops.    BREASTS: Right breast exam is negative for any skin changes, no nipple discharge, she has nodularity and tenderness in the lower inner and outer quadrants, no palpable right axillary adenopathy. She is status post left mastectomy, the incision is healing nicely, no palpable left axillary lymphadenopathy. CHEST: This post right port placement  ABDOMEN: Soft. Non-tender, non-distended. Positive bowel sounds. EXTREMITIES: Without clubbing, cyanosis, or edema. NEUROLOGIC: No focal deficits. ECOG PS 1      Impression/Plan:      The patient is a 75 y.o. lady with a past medical history significant for thyroid disease, depression, and IBS, who had presented with an abnormal screening mammogram, she had a left breast biopsy done revealing high-grade DCIS, ER positive less than 10%, WV negative, she underwent on 11/20/2019 a left mastectomy with sentinel lymph node excisional biopsy, she was found to have invasive ductal carcinoma, tumor size 8 mm, grade 2, with associated DCIS, margins are negative, 2 sentinel lymph nodes were removed, they were both negative for metastatic disease, final pathologic stage pT1b(sn) pN0Mx, ER -0% WV -0% HER-2/miles +3+ by IHC, prognostic stage IA. I discussed with the patient her diagnosis, risks of her tumor, prognosis and recommendations for systemic therapy. The patient has a small HER-2/miles positive disease, tumor size is 8 mm, adjuvant treatment with Taxol and Herceptin is recommended, schedule and the side effects of the treatment were reviewed with the patient. 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 The patient was started on tamoxifen on 6/4/2020, she was given Effexor for depression, she had side effects from it, was discontinued and she was not on Lexapro, she does not like it and wanted to go back on Paxil. She was started on Arimidex on 8/20/2020, and Paxil, the patient has been stressed out, and she feels that she cannot take the Arimidex until her her  is done with his surgery.   She will continue to be on Paxil, she was started on Xanax by

## 2020-09-30 NOTE — TELEPHONE ENCOUNTER
Faxed referral, physician progress note and clinicals to:  Sravanthi Mcneil MD to schedule patient referral appointment.

## 2020-09-30 NOTE — PROGRESS NOTES
Per Brainard Meuse, NP okay to run liter of normal saline at the same time as herceptin patient has an appointment. Patient has no history CHF.

## 2020-09-30 NOTE — TELEPHONE ENCOUNTER
SW met with pt today re: referral for depression. She is currently living at home with a significant other of 30 years. She states she has had depression for 30 years, but was not diagnosed until 10 years ago. She does not remember who diagnosed her but states the practice is no longer there. She has a history of psychotropics including, xanax, ativan, and Paxil. She still has prescriptions for xanax and Paxil, but is not taking. She does admit to a psychiatric hospitalization over 10 years ago, but states that [facility] gave me 3 days of xanax and sent me home. \" Pt's affect flat today. She states that she is having significant anxiety and depression due to triggering event of her significant other having to have surgery for bladder cancer tomorrow. She was in prior therapy for anxiety of what would happen to her if something happened to him. Since he is undergoing surgery, this is very triggering for her. SW discussed if she is currently following with a counselor and she is not. SW inquired if she is having feelings of self harm. Pt states \"I dont want to live, but I have to because I can't be without him. \" SW explored these thoughts further with pt. She denies any plan or intent as evidenced by demonstration of forward thinking behaviors such as plans to meet with her neighbor tomorrow morning, scheduled trips, etc. SW recommended that pt need to establish with psychiatry, which she was receptive to. Discussed providers close to her and offered a referral to [facility] as they have a walk in clinic. Pt is very concerned about how she will get through tomorrow (s/o surgery) and is inquiring about medication options. SW advised that she would need to speak to her PCP, who is currently prescribing, for recommendations on what to take from what she already has, or alternative medication. Pt agreeable to do this.  SW offered to work with pt to schedule appointment vs. Walk in, but pt states that she needs to speak with neighbor Cory to see when she would be able to provide transportation. Pt requested phone number which was provided to her. In addition, SW also provided 1800 number available for phone counseling from 8am-6pm each day via [phone] [employer] to provide support tomorrow should she feel anxious before, during, after surgery. She does not know what time the surgery is. Pt receptive to this as well. SW will follow up with pt tomorrow for additional support. Pt appreciative and stated she felt better \"knowing there are resources\" and denied further needs. Will follow up tomorrow.  Lopez Brown, MSW, LISW-S, OSW-C  Oncology Social Worker

## 2020-10-01 ENCOUNTER — TELEPHONE (OUTPATIENT)
Dept: INFUSION THERAPY | Age: 75
End: 2020-10-01

## 2020-10-01 NOTE — TELEPHONE ENCOUNTER
Contacted pt in follow up to conversation yesterday due to significant anxiety and to ensure she was able to schedule counseling appointment. Pt in better spirits today, affect euthymic. She reports that her significant other's surgery went well so she is feeling much better. She is waiting to hear if he will be able to come home today or tomorrow. When SW inquired about appointment for counseling, pt states that she left messages at [employer] and was waiting for a return call but has not received one. She then called [facility] but they are only doing telehealth and she wants seen in person. Referral given for [facility] in [place] as they have walk-ins MWF 11-130pm. Pt receptive this and states she will arrange for next week. No other needs at this time. Pt will notify SW when scheduled.  Jyoti Couch, LYSSA, NEETUW-S, OSW-C  Oncology Social Worker

## 2020-10-10 ENCOUNTER — APPOINTMENT (OUTPATIENT)
Dept: CT IMAGING | Age: 75
DRG: 069 | End: 2020-10-10
Payer: MEDICARE

## 2020-10-10 ENCOUNTER — APPOINTMENT (OUTPATIENT)
Dept: GENERAL RADIOLOGY | Age: 75
DRG: 069 | End: 2020-10-10
Payer: MEDICARE

## 2020-10-10 ENCOUNTER — HOSPITAL ENCOUNTER (INPATIENT)
Age: 75
LOS: 1 days | Discharge: HOME | DRG: 069 | End: 2020-10-11
Attending: EMERGENCY MEDICINE | Admitting: INTERNAL MEDICINE
Payer: MEDICARE

## 2020-10-10 PROBLEM — G45.9 TIA (TRANSIENT ISCHEMIC ATTACK): Status: ACTIVE | Noted: 2020-10-10

## 2020-10-10 LAB
ANION GAP SERPL CALCULATED.3IONS-SCNC: 10 MMOL/L (ref 7–16)
APTT: 24.7 SEC (ref 24.5–35.1)
BACTERIA: NORMAL /HPF
BILIRUBIN URINE: NEGATIVE
BLOOD, URINE: ABNORMAL
BUN BLDV-MCNC: 13 MG/DL (ref 8–23)
CALCIUM SERPL-MCNC: 9 MG/DL (ref 8.6–10.2)
CHLORIDE BLD-SCNC: 86 MMOL/L (ref 98–107)
CHP ED QC CHECK: YES
CLARITY: CLEAR
CO2: 28 MMOL/L (ref 22–29)
COLOR: YELLOW
CREAT SERPL-MCNC: 1.1 MG/DL (ref 0.5–1)
GFR AFRICAN AMERICAN: 58
GFR NON-AFRICAN AMERICAN: 48 ML/MIN/1.73
GLUCOSE BLD-MCNC: 100 MG/DL
GLUCOSE BLD-MCNC: 112 MG/DL (ref 74–99)
GLUCOSE URINE: NEGATIVE MG/DL
HCT VFR BLD CALC: 37.7 % (ref 34–48)
HEMOGLOBIN: 13.1 G/DL (ref 11.5–15.5)
INR BLD: 1.1
KETONES, URINE: NEGATIVE MG/DL
LEUKOCYTE ESTERASE, URINE: NEGATIVE
MCH RBC QN AUTO: 30 PG (ref 26–35)
MCHC RBC AUTO-ENTMCNC: 34.7 % (ref 32–34.5)
MCV RBC AUTO: 86.5 FL (ref 80–99.9)
METER GLUCOSE: 100 MG/DL (ref 74–99)
NITRITE, URINE: NEGATIVE
PDW BLD-RTO: 12.7 FL (ref 11.5–15)
PH UA: 6.5 (ref 5–9)
PLATELET # BLD: 313 E9/L (ref 130–450)
PMV BLD AUTO: 9.2 FL (ref 7–12)
POTASSIUM SERPL-SCNC: 4.4 MMOL/L (ref 3.5–5)
PROTEIN UA: NEGATIVE MG/DL
PROTHROMBIN TIME: 12.7 SEC (ref 9.3–12.4)
RBC # BLD: 4.36 E12/L (ref 3.5–5.5)
RBC UA: NORMAL /HPF (ref 0–2)
SODIUM BLD-SCNC: 124 MMOL/L (ref 132–146)
SPECIFIC GRAVITY UA: 1.01 (ref 1–1.03)
TROPONIN: <0.01 NG/ML (ref 0–0.03)
TSH SERPL DL<=0.05 MIU/L-ACNC: 1.06 UIU/ML (ref 0.27–4.2)
UROBILINOGEN, URINE: 0.2 E.U./DL
WBC # BLD: 6.5 E9/L (ref 4.5–11.5)
WBC UA: NORMAL /HPF (ref 0–5)

## 2020-10-10 PROCEDURE — 71045 X-RAY EXAM CHEST 1 VIEW: CPT

## 2020-10-10 PROCEDURE — 85027 COMPLETE CBC AUTOMATED: CPT

## 2020-10-10 PROCEDURE — 2580000003 HC RX 258: Performed by: INTERNAL MEDICINE

## 2020-10-10 PROCEDURE — 0042T CT BRAIN PERFUSION: CPT

## 2020-10-10 PROCEDURE — 99284 EMERGENCY DEPT VISIT MOD MDM: CPT

## 2020-10-10 PROCEDURE — 80048 BASIC METABOLIC PNL TOTAL CA: CPT

## 2020-10-10 PROCEDURE — 1200000000 HC SEMI PRIVATE

## 2020-10-10 PROCEDURE — 81001 URINALYSIS AUTO W/SCOPE: CPT

## 2020-10-10 PROCEDURE — 6360000004 HC RX CONTRAST MEDICATION: Performed by: RADIOLOGY

## 2020-10-10 PROCEDURE — 70450 CT HEAD/BRAIN W/O DYE: CPT

## 2020-10-10 PROCEDURE — 93005 ELECTROCARDIOGRAM TRACING: CPT | Performed by: EMERGENCY MEDICINE

## 2020-10-10 PROCEDURE — 70498 CT ANGIOGRAPHY NECK: CPT

## 2020-10-10 PROCEDURE — 85730 THROMBOPLASTIN TIME PARTIAL: CPT

## 2020-10-10 PROCEDURE — 70496 CT ANGIOGRAPHY HEAD: CPT

## 2020-10-10 PROCEDURE — 82962 GLUCOSE BLOOD TEST: CPT

## 2020-10-10 PROCEDURE — 84443 ASSAY THYROID STIM HORMONE: CPT

## 2020-10-10 PROCEDURE — 6370000000 HC RX 637 (ALT 250 FOR IP): Performed by: EMERGENCY MEDICINE

## 2020-10-10 PROCEDURE — 99223 1ST HOSP IP/OBS HIGH 75: CPT | Performed by: INTERNAL MEDICINE

## 2020-10-10 PROCEDURE — 84484 ASSAY OF TROPONIN QUANT: CPT

## 2020-10-10 PROCEDURE — 85610 PROTHROMBIN TIME: CPT

## 2020-10-10 RX ORDER — GABAPENTIN 100 MG/1
100 CAPSULE ORAL
COMMUNITY

## 2020-10-10 RX ORDER — MIRTAZAPINE 15 MG/1
3.5 TABLET, FILM COATED ORAL NIGHTLY
COMMUNITY
End: 2022-03-03

## 2020-10-10 RX ORDER — ASPIRIN 81 MG/1
324 TABLET, CHEWABLE ORAL ONCE
Status: COMPLETED | OUTPATIENT
Start: 2020-10-10 | End: 2020-10-10

## 2020-10-10 RX ORDER — PROMETHAZINE HYDROCHLORIDE 25 MG/1
12.5 TABLET ORAL EVERY 6 HOURS PRN
Status: DISCONTINUED | OUTPATIENT
Start: 2020-10-10 | End: 2020-10-11 | Stop reason: HOSPADM

## 2020-10-10 RX ORDER — POLYETHYLENE GLYCOL 3350 17 G/17G
17 POWDER, FOR SOLUTION ORAL DAILY PRN
Status: DISCONTINUED | OUTPATIENT
Start: 2020-10-10 | End: 2020-10-11 | Stop reason: HOSPADM

## 2020-10-10 RX ORDER — SODIUM CHLORIDE 9 MG/ML
INJECTION, SOLUTION INTRAVENOUS CONTINUOUS
Status: DISCONTINUED | OUTPATIENT
Start: 2020-10-10 | End: 2020-10-11 | Stop reason: HOSPADM

## 2020-10-10 RX ORDER — ASPIRIN 81 MG/1
81 TABLET, CHEWABLE ORAL DAILY
Status: DISCONTINUED | OUTPATIENT
Start: 2020-10-11 | End: 2020-10-11 | Stop reason: HOSPADM

## 2020-10-10 RX ORDER — LEVOTHYROXINE SODIUM 88 UG/1
88 TABLET ORAL DAILY
Status: DISCONTINUED | OUTPATIENT
Start: 2020-10-11 | End: 2020-10-11 | Stop reason: HOSPADM

## 2020-10-10 RX ORDER — SODIUM CHLORIDE 0.9 % (FLUSH) 0.9 %
10 SYRINGE (ML) INJECTION EVERY 12 HOURS SCHEDULED
Status: DISCONTINUED | OUTPATIENT
Start: 2020-10-10 | End: 2020-10-11 | Stop reason: HOSPADM

## 2020-10-10 RX ORDER — ATORVASTATIN CALCIUM 40 MG/1
40 TABLET, FILM COATED ORAL NIGHTLY
Status: DISCONTINUED | OUTPATIENT
Start: 2020-10-10 | End: 2020-10-11 | Stop reason: HOSPADM

## 2020-10-10 RX ORDER — CEPHALEXIN 500 MG/1
500 CAPSULE ORAL 4 TIMES DAILY
Status: ON HOLD | COMMUNITY
End: 2020-10-11 | Stop reason: HOSPADM

## 2020-10-10 RX ORDER — ONDANSETRON 2 MG/ML
4 INJECTION INTRAMUSCULAR; INTRAVENOUS EVERY 6 HOURS PRN
Status: DISCONTINUED | OUTPATIENT
Start: 2020-10-10 | End: 2020-10-11 | Stop reason: HOSPADM

## 2020-10-10 RX ORDER — ALPRAZOLAM 0.5 MG/1
0.5 TABLET ORAL 3 TIMES DAILY PRN
Status: DISCONTINUED | OUTPATIENT
Start: 2020-10-10 | End: 2020-10-11 | Stop reason: HOSPADM

## 2020-10-10 RX ORDER — PANTOPRAZOLE SODIUM 40 MG/1
40 TABLET, DELAYED RELEASE ORAL
Status: DISCONTINUED | OUTPATIENT
Start: 2020-10-11 | End: 2020-10-11 | Stop reason: HOSPADM

## 2020-10-10 RX ORDER — SODIUM CHLORIDE 0.9 % (FLUSH) 0.9 %
10 SYRINGE (ML) INJECTION PRN
Status: DISCONTINUED | OUTPATIENT
Start: 2020-10-10 | End: 2020-10-11 | Stop reason: HOSPADM

## 2020-10-10 RX ORDER — PAROXETINE 10 MG/1
10 TABLET, FILM COATED ORAL EVERY MORNING
Status: DISCONTINUED | OUTPATIENT
Start: 2020-10-11 | End: 2020-10-11 | Stop reason: HOSPADM

## 2020-10-10 RX ORDER — PANTOPRAZOLE SODIUM 40 MG/1
40 TABLET, DELAYED RELEASE ORAL ONCE
Status: DISCONTINUED | OUTPATIENT
Start: 2020-10-10 | End: 2020-10-11 | Stop reason: HOSPADM

## 2020-10-10 RX ADMIN — ASPIRIN 81 MG CHEWABLE TABLET 324 MG: 81 TABLET CHEWABLE at 20:48

## 2020-10-10 RX ADMIN — SODIUM CHLORIDE, PRESERVATIVE FREE 10 ML: 5 INJECTION INTRAVENOUS at 23:10

## 2020-10-10 RX ADMIN — SODIUM CHLORIDE: 9 INJECTION, SOLUTION INTRAVENOUS at 23:10

## 2020-10-10 RX ADMIN — IOPAMIDOL 120 ML: 755 INJECTION, SOLUTION INTRAVENOUS at 19:32

## 2020-10-10 ASSESSMENT — ENCOUNTER SYMPTOMS
NAUSEA: 0
ABDOMINAL DISTENTION: 0
SINUS PRESSURE: 0
DIARRHEA: 0
BACK PAIN: 0
SHORTNESS OF BREATH: 0
EYE PAIN: 0
TROUBLE SWALLOWING: 0
EYE REDNESS: 0
VOMITING: 0
WHEEZING: 0
COUGH: 0
EYE DISCHARGE: 0
SORE THROAT: 0

## 2020-10-10 ASSESSMENT — PAIN SCALES - GENERAL: PAINLEVEL_OUTOF10: 0

## 2020-10-10 NOTE — ED PROVIDER NOTES
Positive for facial asymmetry, speech difficulty, weakness, numbness, disturbances in coordination and paresthesias. Negative for dizziness, vertigo, seizures and headaches. Hematological: Negative for adenopathy. All other systems reviewed and are negative. Physical Exam  Vitals signs and nursing note reviewed. Constitutional:       Appearance: She is well-developed. HENT:      Head: Normocephalic and atraumatic. Eyes:      Pupils: Pupils are equal, round, and reactive to light. Neck:      Musculoskeletal: Normal range of motion and neck supple. Cardiovascular:      Rate and Rhythm: Normal rate and regular rhythm. Heart sounds: Normal heart sounds. No murmur. Pulmonary:      Effort: Pulmonary effort is normal. No respiratory distress. Breath sounds: Normal breath sounds. No wheezing or rales. Abdominal:      General: Bowel sounds are normal.      Palpations: Abdomen is soft. Tenderness: There is no abdominal tenderness. There is no guarding or rebound. Skin:     General: Skin is warm and dry. Neurological:      Mental Status: She is alert and oriented to person, place, and time. GCS: GCS eye subscore is 4. GCS verbal subscore is 5. GCS motor subscore is 6. Cranial Nerves: No cranial nerve deficit. Coordination: Coordination normal.          Procedures     MDM      Patient states symptoms are improving by arrival.  She still has some of her symptoms but, slurred speech symptoms seems to have resolved according to the patient. Last known well time: 1600  NIH Stroke Scale at time of initial evaluation: 1500  1A: Level of Consciousness 0 - alert; keenly responsive   1B: Ask Month and Age 0 - answers both questions correctly   1C:  Tell Patient To Open and Close Eyes, then Hand  Squeeze 0 - performs both tasks correctly   2: Test Horizontal Extraocular Movements 0 - normal   3: Test Visual Fields 0 - no visual loss   4: Test Facial Palsy 1 - minor paralysis (flattened nasolabial fold, asymmetric on smiling)   5A: Test Left Arm Motor Drift 0 - no drift, limb holds 90 (or 45) degrees for full 10 seconds   5B: Test Right Arm Motor Drift 1 - drift, limb holds 90 (or 45) degrees but drifts down before full 10 seconds: does not hit bed   6A: Test Left Leg Motor Drift 0 - no drift; leg holds 30 degree position for full 5 seconds   6B: Test Right Leg Motor Drift 0 - no drift; leg holds 30 degree position for full 5 seconds   7: Test Limb Ataxia   (FNF/Heel-Shin) 0 - absent   8: Test Sensation 1 - mild to moderate sensory loss; patient feels pinprick is less sharp or is dull on the affected side; there is a loss of superficial pain with pinprick but patient is aware of being touched    9: Test Language/Aphasia 0 - no aphasia, normal   10: Test Dysarthria 0 - normal   11: Test Extinction/Inattention 0 - no abnormality   Total NIH Stroke Score: 3     tPA Criteria*  Inclusion criteria:  - Ischemic stroke onset within 3 hours of drug administration  - Age 25 or older  - No hemorrhage or non-stroke cause of deficit on CT  - Measurable deficit on NIH Stroke Scale    Exclusion criteria: If the patient. ...  - has minor or improving symptoms  - had seizure at onset of stroke  - has had another stroke or serious head trauma within the last 3 months  - has had major surgery within the last 14 days  - has known history of intracranial hemorrhage  - has sustained systolic blood pressure >233 mmHg  - has sustained diastolic blood pressure >466 mmHg  - requires aggressive treatment is necessary to lower their blood pressure  - has symptoms suggestive of subarachnoid hemorrhage  - has had GI or urinary tract hemorrhage within the last 21 days  - has had an arterial puncture at a non-compressible site within the last 7 days  - received heparin within the last 48 hours and has an elevated PTT  - has a prothrombin time (PT) >15 seconds  - has a platelet count <937,660 uL  - serum blood glucose is <50 mg/dL or >400 mg/dL    Relative Contraindications:  - NIH Stroke score >22  - Patient's CT shows evidence of large MCA territory infarction (>1/3 the MCA territory)        Radiology Procedure Waiver   Name: Giulia Pappas  : 1945  MRN: [id number]    Date:  10/10/20    Time: 6:50 PM EDT    Benefits of immediately proceeding with radiology exam(s) without pre-testing outweigh the risks or are not indicated as specified below and therefore the following is/are being waived:    [x] Benefits of immediate radiology exam(s) outweigh any risk. OR    Pre-exam testing is not indicated for the following reason(s):  [] Pregnancy test   [] Patients LMP on-time and regular.   [] Patient had Tubal Ligation or has other Contraception Device. [] Patient  is Menopausal or Premenarcheal.    [] Patient had Full or Partial Hysterectomy. [] Protocol for CT contrast allegry   [] Patient has tolerated well previously   [] Patient does not have a true allergy    [] MRI Questionnaire     [] BUN/Creatinine   [] Patient age w/no hx of renal dysfunction. [] Patient on Dialysis. [] Recent Normal Labs. Electronically signed by Anthony Tolentino DO on 10/10/20 at 6:50 PM EDT          7:20 PM EDT  Discussed with radiologist, Dr. Charlotte Liu, negative plain CT of the head. Awaiting perfusion and CTA studies. Patient symptoms continue to improve rapidly. Suspect TIA. EKG: This EKG is signed and interpreted by me. Rate: 74  Rhythm: Sinus  Interpretation: no acute changes and non-specific EKG  Comparison: stable as compared to patient's most recent EKG of 20. NIH Stroke Scale at this time:   1A: Level of Consciousness 0 - alert; keenly responsive   1B: Ask Month and Age 0 - answers both questions correctly   1C:  Tell Patient To Open and Close Eyes, then Hand  Squeeze 0 - performs both tasks correctly   2: Test Horizontal Extraocular Movements 0 - normal   3: Test Visual Fields 0 - no visual loss   4: Test Facial Palsy 0 - normal symmetric movement   5A: Test Left Arm Motor Drift 0 - no drift, limb holds 90 (or 45) degrees for full 10 seconds   5B: Test Right Arm Motor Drift 0 - no drift, limb holds 90 (or 45) degrees for full 10 seconds   6A: Test Left Leg Motor Drift 0 - no drift; leg holds 30 degree position for full 5 seconds   6B: Test Right Leg Motor Drift 0 - no drift; leg holds 30 degree position for full 5 seconds   7: Test Limb Ataxia   (FNF/Heel-Shin) 0 - absent   8: Test Sensation 0 - normal; no sensory loss   9: Test Language/Aphasia 0 - no aphasia, normal   10: Test Dysarthria 0 - normal   11: Test Extinction/Inattention 0 - no abnormality   Total NIH Stroke Score: 0     tPA Criteria*  Inclusion criteria:  - Ischemic stroke onset within 3 hours of drug administration  - Age 25 or older  - No hemorrhage or non-stroke cause of deficit on CT  - Measurable deficit on NIH Stroke Scale    8:06 PM EDT  Discussed Dr. Eren Willett at this time. He will admit the patient.           --------------------------------------------- PAST HISTORY ---------------------------------------------  Past Medical History:  has a past medical history of Cancer ([facility] [address] Depression, Hypothyroidism, Irritable bowel syndrome, Low sodium levels, and Thyroid disease. Past Surgical History:  has a past surgical history that includes Cholecystectomy; Tonsillectomy; Appendectomy; Hysterectomy, total abdominal; Mastectomy (Left, 11/20/2019); lymph node biopsy; Breast surgery; and INSERTION / REMOVAL / REPLACEMENT VENOUS ACCESS CATHETER (N/A, 12/27/2019). Social History:  reports that she quit smoking about 21 years ago. She has a 70.00 pack-year smoking history. She has never used smokeless tobacco. She reports previous alcohol use. She reports that she does not use drugs.     Family History: family history includes Cancer (age of onset: 58) in her brother; Cancer (age of onset: 66) white matter ischemic changes. CTA HEAD W CONTRAST   Final Result   1. Unenhanced CT shows no acute intracranial hemorrhage or edema. 2.  No evidence of arterial stenosis at levels [id number]. 3.  No stenosis involving proximal or distal cervical internal carotid   arteries or vertebral arteries. 4.  No evidence of stroke on CT brain perfusion. If clinical concern   persists for acute stroke, MRI brain with diffusion-weighted imaging could be   helpful for further evaluation. CT Brain Perfusion   Final Result   1. Unenhanced CT shows no acute intracranial hemorrhage or edema. 2.  No evidence of arterial stenosis at levels [id number]. 3.  No stenosis involving proximal or distal cervical internal carotid   arteries or vertebral arteries. 4.  No evidence of stroke on CT brain perfusion. If clinical concern   persists for acute stroke, MRI brain with diffusion-weighted imaging could be   helpful for further evaluation. CTA NECK W CONTRAST   Final Result   1. Unenhanced CT shows no acute intracranial hemorrhage or edema. 2.  No evidence of arterial stenosis at levels [id number]. 3.  No stenosis involving proximal or distal cervical internal carotid   arteries or vertebral arteries. 4.  No evidence of stroke on CT brain perfusion. If clinical concern   persists for acute stroke, MRI brain with diffusion-weighted imaging could be   helpful for further evaluation.             ------------------------- NURSING NOTES AND VITALS REVIEWED ---------------------------  Date / Time Roomed:  10/10/2020  6:18 PM  ED Bed Assignment:  12/12    The nursing notes within the ED encounter and vital signs as below have been reviewed.    Patient Vitals for the past 24 hrs:   BP Temp Temp src Pulse Resp SpO2 Height Weight   10/10/20 1948 (!) 148/82 -- -- 78 17 97 % -- --   10/10/20 1821 (!) 168/89 98.6 °F (37 °C) Oral 80 18 96 % 5' 4\" (1.626 m) 130 lb (59 kg)       Oxygen Saturation Interpretation: Normal      ------------------------------------------ PROGRESS NOTES ------------------------------------------  Re-evaluation(s):  Time: 2001. Patients symptoms show no change (now asymptomatic)  Repeat physical examination is not changed (no acute findings)    Time: 1921. Patients symptoms are improving  Repeat physical examination is improved    I have spoken with the patient and discussed todays results, in addition to providing specific details for the plan of care and counseling regarding the diagnosis and prognosis. Their questions are answered at this time and they are agreeable with the plan.      --------------------------------- ADDITIONAL PROVIDER NOTES ---------------------------------  Consultations:  Spoke with Dr. Eren Willett,  They will admit this patient. This patient's ED course included: a personal history and physicial examination, multiple bedside re-evaluations, cardiac monitoring and continuous pulse oximetry    This patient has remained hemodynamically stable during their ED course. Please note that the withdrawal or failure to initiate urgent interventions for this patient would likely result in a life threatening deterioration or permanent disability. Accordingly this patient received 35 minutes of critical care time, excluding separately billable procedures. Clinical Impression  1. TIA (transient ischemic attack)    2. Hyponatremia          Disposition  Patient's disposition: Admit  Patient's condition is stable.          Brittani Walker DO  10/10/20 2012

## 2020-10-11 VITALS
HEIGHT: 64 IN | BODY MASS INDEX: 21.89 KG/M2 | RESPIRATION RATE: 16 BRPM | SYSTOLIC BLOOD PRESSURE: 138 MMHG | OXYGEN SATURATION: 97 % | WEIGHT: 128.25 LBS | HEART RATE: 76 BPM | TEMPERATURE: 98.6 F | DIASTOLIC BLOOD PRESSURE: 66 MMHG

## 2020-10-11 PROBLEM — F41.9 ANXIETY: Status: ACTIVE | Noted: 2020-10-11

## 2020-10-11 PROBLEM — R29.90 STROKE-LIKE SYMPTOMS: Status: ACTIVE | Noted: 2020-10-11

## 2020-10-11 LAB
ALBUMIN SERPL-MCNC: 3.8 G/DL (ref 3.5–5.2)
ALP BLD-CCNC: 72 U/L (ref 35–104)
ALT SERPL-CCNC: 17 U/L (ref 0–32)
ANION GAP SERPL CALCULATED.3IONS-SCNC: 7 MMOL/L (ref 7–16)
ANION GAP SERPL CALCULATED.3IONS-SCNC: 9 MMOL/L (ref 7–16)
AST SERPL-CCNC: 23 U/L (ref 0–31)
BASOPHILS ABSOLUTE: 0.03 E9/L (ref 0–0.2)
BASOPHILS RELATIVE PERCENT: 0.5 % (ref 0–2)
BILIRUB SERPL-MCNC: 0.3 MG/DL (ref 0–1.2)
BUN BLDV-MCNC: 11 MG/DL (ref 8–23)
BUN BLDV-MCNC: 9 MG/DL (ref 8–23)
CALCIUM SERPL-MCNC: 8.5 MG/DL (ref 8.6–10.2)
CALCIUM SERPL-MCNC: 8.7 MG/DL (ref 8.6–10.2)
CHLORIDE BLD-SCNC: 90 MMOL/L (ref 98–107)
CHLORIDE BLD-SCNC: 93 MMOL/L (ref 98–107)
CHOLESTEROL, TOTAL: 161 MG/DL (ref 0–199)
CO2: 26 MMOL/L (ref 22–29)
CO2: 29 MMOL/L (ref 22–29)
CREAT SERPL-MCNC: 0.9 MG/DL (ref 0.5–1)
CREAT SERPL-MCNC: 0.9 MG/DL (ref 0.5–1)
EKG ATRIAL RATE: 74 BPM
EKG P AXIS: 41 DEGREES
EKG P-R INTERVAL: 158 MS
EKG Q-T INTERVAL: 368 MS
EKG QRS DURATION: 82 MS
EKG QTC CALCULATION (BAZETT): 408 MS
EKG R AXIS: 17 DEGREES
EKG T AXIS: 58 DEGREES
EKG VENTRICULAR RATE: 74 BPM
EOSINOPHILS ABSOLUTE: 0.05 E9/L (ref 0.05–0.5)
EOSINOPHILS RELATIVE PERCENT: 0.9 % (ref 0–6)
GFR AFRICAN AMERICAN: >60
GFR AFRICAN AMERICAN: >60
GFR NON-AFRICAN AMERICAN: >60 ML/MIN/1.73
GFR NON-AFRICAN AMERICAN: >60 ML/MIN/1.73
GLUCOSE BLD-MCNC: 104 MG/DL (ref 74–99)
GLUCOSE BLD-MCNC: 106 MG/DL (ref 74–99)
HBA1C MFR BLD: 5.7 % (ref 4–5.6)
HCT VFR BLD CALC: 36.3 % (ref 34–48)
HDLC SERPL-MCNC: 58 MG/DL
HEMOGLOBIN: 12.1 G/DL (ref 11.5–15.5)
IMMATURE GRANULOCYTES #: 0.01 E9/L
IMMATURE GRANULOCYTES %: 0.2 % (ref 0–5)
LDL CHOLESTEROL CALCULATED: 93 MG/DL (ref 0–99)
LYMPHOCYTES ABSOLUTE: 1.52 E9/L (ref 1.5–4)
LYMPHOCYTES RELATIVE PERCENT: 26.1 % (ref 20–42)
MCH RBC QN AUTO: 29.3 PG (ref 26–35)
MCHC RBC AUTO-ENTMCNC: 33.3 % (ref 32–34.5)
MCV RBC AUTO: 87.9 FL (ref 80–99.9)
MONOCYTES ABSOLUTE: 0.6 E9/L (ref 0.1–0.95)
MONOCYTES RELATIVE PERCENT: 10.3 % (ref 2–12)
NEUTROPHILS ABSOLUTE: 3.62 E9/L (ref 1.8–7.3)
NEUTROPHILS RELATIVE PERCENT: 62 % (ref 43–80)
PDW BLD-RTO: 12.9 FL (ref 11.5–15)
PLATELET # BLD: 274 E9/L (ref 130–450)
PMV BLD AUTO: 9.4 FL (ref 7–12)
POTASSIUM REFLEX MAGNESIUM: 4.1 MMOL/L (ref 3.5–5)
POTASSIUM SERPL-SCNC: 3.7 MMOL/L (ref 3.5–5)
RBC # BLD: 4.13 E12/L (ref 3.5–5.5)
SODIUM BLD-SCNC: 126 MMOL/L (ref 132–146)
SODIUM BLD-SCNC: 128 MMOL/L (ref 132–146)
TOTAL PROTEIN: 6 G/DL (ref 6.4–8.3)
TRIGL SERPL-MCNC: 48 MG/DL (ref 0–149)
VLDLC SERPL CALC-MCNC: 10 MG/DL
WBC # BLD: 5.8 E9/L (ref 4.5–11.5)

## 2020-10-11 PROCEDURE — 36415 COLL VENOUS BLD VENIPUNCTURE: CPT

## 2020-10-11 PROCEDURE — 80053 COMPREHEN METABOLIC PANEL: CPT

## 2020-10-11 PROCEDURE — 80061 LIPID PANEL: CPT

## 2020-10-11 PROCEDURE — 6370000000 HC RX 637 (ALT 250 FOR IP): Performed by: INTERNAL MEDICINE

## 2020-10-11 PROCEDURE — 85025 COMPLETE CBC W/AUTO DIFF WBC: CPT

## 2020-10-11 PROCEDURE — 83036 HEMOGLOBIN GLYCOSYLATED A1C: CPT

## 2020-10-11 PROCEDURE — 6360000002 HC RX W HCPCS: Performed by: INTERNAL MEDICINE

## 2020-10-11 PROCEDURE — APPSS30 APP SPLIT SHARED TIME 16-30 MINUTES: Performed by: NURSE PRACTITIONER

## 2020-10-11 PROCEDURE — 80048 BASIC METABOLIC PNL TOTAL CA: CPT

## 2020-10-11 PROCEDURE — 92522 EVALUATE SPEECH PRODUCTION: CPT

## 2020-10-11 PROCEDURE — 93010 ELECTROCARDIOGRAM REPORT: CPT | Performed by: INTERNAL MEDICINE

## 2020-10-11 PROCEDURE — 99239 HOSP IP/OBS DSCHRG MGMT >30: CPT | Performed by: INTERNAL MEDICINE

## 2020-10-11 RX ORDER — GABAPENTIN 100 MG/1
100 CAPSULE ORAL 3 TIMES DAILY
Status: DISCONTINUED | OUTPATIENT
Start: 2020-10-11 | End: 2020-10-11 | Stop reason: HOSPADM

## 2020-10-11 RX ORDER — ATORVASTATIN CALCIUM 10 MG/1
10 TABLET, FILM COATED ORAL NIGHTLY
Qty: 30 TABLET | Refills: 0 | Status: SHIPPED | OUTPATIENT
Start: 2020-10-11 | End: 2020-10-11

## 2020-10-11 RX ORDER — ASPIRIN 81 MG/1
81 TABLET ORAL DAILY
Qty: 30 TABLET | Refills: 0 | Status: SHIPPED | OUTPATIENT
Start: 2020-10-11 | End: 2020-10-11 | Stop reason: SDUPTHER

## 2020-10-11 RX ORDER — MIRTAZAPINE 15 MG/1
15 TABLET, FILM COATED ORAL NIGHTLY
Status: DISCONTINUED | OUTPATIENT
Start: 2020-10-11 | End: 2020-10-11 | Stop reason: HOSPADM

## 2020-10-11 RX ORDER — ATORVASTATIN CALCIUM 10 MG/1
10 TABLET, FILM COATED ORAL NIGHTLY
Qty: 30 TABLET | Refills: 0 | Status: SHIPPED | OUTPATIENT
Start: 2020-10-11 | End: 2022-06-09

## 2020-10-11 RX ORDER — ASPIRIN 81 MG/1
81 TABLET ORAL DAILY
Qty: 30 TABLET | Refills: 0 | Status: SHIPPED | OUTPATIENT
Start: 2020-10-11 | End: 2022-06-09

## 2020-10-11 RX ADMIN — MIRTAZAPINE 15 MG: 15 TABLET, FILM COATED ORAL at 00:17

## 2020-10-11 RX ADMIN — PANTOPRAZOLE SODIUM 40 MG: 40 TABLET, DELAYED RELEASE ORAL at 05:10

## 2020-10-11 RX ADMIN — ENOXAPARIN SODIUM 40 MG: 40 INJECTION SUBCUTANEOUS at 07:57

## 2020-10-11 RX ADMIN — PAROXETINE HYDROCHLORIDE 10 MG: 10 TABLET, FILM COATED ORAL at 07:56

## 2020-10-11 RX ADMIN — ASPIRIN 81 MG CHEWABLE TABLET 81 MG: 81 TABLET CHEWABLE at 07:56

## 2020-10-11 RX ADMIN — LEVOTHYROXINE SODIUM 88 MCG: 0.09 TABLET ORAL at 05:09

## 2020-10-11 RX ADMIN — GABAPENTIN 100 MG: 100 CAPSULE ORAL at 07:56

## 2020-10-11 ASSESSMENT — PAIN SCALES - GENERAL: PAINLEVEL_OUTOF10: 0

## 2020-10-11 NOTE — PROGRESS NOTES
Speech Language Pathology  SPEECH/LANGUAGE PATHOLOGY  SPEECH/LANGUAGE/COGNITIVE EVALUATION      PATIENT NAME:  Mariaa Catalan      :  1945      TODAY'S DATE:  10/11/2020  ROOM:  2210/9930-31      SPEECH PATHOLOGY DIAGNOSIS:   Within Functional Limits    THERAPY RECOMMENDATIONS:   Speech Pathology intervention is not warranted at this time. MOTOR SPEECH          Oral Peripheral Examination   Adequate lingual/labial strength       Parameters of Speech Production  Respiration:  Within Function Limits  Articulation:  Within Function Limits  Resonance:  Within Function Limits  Quality:   Within Function Limits  Pitch: Within Function Limits  Intensity: Within Function Limits  Fluency:  Intact  Prosody Intact      RECEPTIVE LANGUAGE       Comprehension of Yes/No Questions: Within Function Limits    Process  Simple Verbal Commands: Within Function Limits  Process Intermediate Verbal Commands: Within Function Limits  Process Complex Verbal Commands: Within Function Limits    Comprehension of Conversation: Within Function Limits      EXPRESSIVE LANGUAGE       Serials: Functional    Imitation:  Words   Functional  Sentences Functional    Naming:  (Modality used: Verbal )   Confrontation Naming Functional  Functional Description Functional  Response Naming: Functional    Conversation:     Grammatical form:  Intact       COGNITION     Attention/Orientation  Attention: Sustained attention  Oriented to:  Person, Place, Date, Reason  for hospitalization    Memory   Biographical: information.   Recalled  Address,  Birthdate, Age,   Family   Delayed recall: 3/3 words    Organization/Problem Solving/Reasoning   Sequencing:   Verbal : Functional      Problem solving:    Verbal task:   Functional        CLINICAL OBSERVATIONS NOTED DURING THE EVALUATION    Within Functional Limits                    CPT code:    59076  eval speech sound lang comprehension    Malnutrition indicators have been identified and nursing has been notified to ensure a dietary consult is ordered. [x]The admitting diagnosis and active problem list, as listed below have been reviewed prior to initiation of this evaluation. ADMITTING DIAGNOSIS: TIA (transient ischemic attack) [G45.9]     ACTIVE PROBLEM LIST:   Patient Active Problem List   Diagnosis    Ductal carcinoma in situ (DCIS) of left breast    Malignant neoplasm of left female breast, unspecified estrogen receptor status, unspecified site of breast ([facility] [address]    Poor venous access    Low sodium levels    Other osteoporosis without current pathological fracture    TIA (transient ischemic attack)    Anxiety     Pt states she was brought into ED for suspected throat and initially had anomia and slurred speech but these symptoms resolved in the ambulance ride and she has not experienced any residual speech language deficits. She drank multiple sip[s pf thin water at bedside for swallow screen and tolerated this without overt clinical indicators aspiration. SLP does not recommend further speech pathology intervention at this time. If symptoms reemerge or condition changes, please re consult SLP accordingly. Pt also politely states she does not feel \"this is necessary\" and has been educated to alert staff of changes in condition. Chucho CARDENAS.Ed. [address] [id number]

## 2020-10-11 NOTE — CONSULTS
History Of Present Illness: Patient states about 4:00 this afternoon, she was at home when she noticed she had weakness as well as numbness to her right hand. She was also suffering from right-sided facial droop and the words that she was saying were slurred and did not make any sense. She had a trying to call family friends and after about an hour and a half or so, EMS was summoned. She had significant improvement in route. By arrival here, almost all of her symptoms have resolved. She still has some residual numbness in the right hand and questionable weakness. Speech is returned to normal.  As above per ED staff. Patient is interviewed and basically corroborates above. The patient is a 75 y.o. female with significant past medical history of breast cancer who presents with above. The patient has the following symptoms:    Change in level of consciousness: alert    New Weakness: no    Numbness or Tingling: no    Difficulty Swallowing: no    Current Medications:   Scheduled Meds:   mirtazapine  15 mg Oral Nightly    gabapentin  100 mg Oral TID    atorvastatin  40 mg Oral Nightly    aspirin  81 mg Oral Daily    pantoprazole  40 mg Oral Once    levothyroxine  88 mcg Oral Daily    PARoxetine  10 mg Oral QAM    pantoprazole  40 mg Oral QAM AC    sodium chloride flush  10 mL Intravenous 2 times per day    enoxaparin  40 mg Subcutaneous Daily     Continuous Infusions:   sodium chloride 75 mL/hr at 10/10/20 2310     PRN Meds:ALPRAZolam, sodium chloride flush, polyethylene glycol, promethazine **OR** ondansetron    Allergies:  Arimidex [anastrozole]; Effexor [venlafaxine]; and Lexapro [escitalopram oxalate]    Social History:   TOBACCO:   reports that she quit smoking about 21 years ago. She has a 70.00 pack-year smoking history. She has never used smokeless tobacco.  ETOH:   reports previous alcohol use.     Past Medical History:        Diagnosis Date    Cancer [facility]) 2019    breast left    Depression     Hypothyroidism     Irritable bowel syndrome     not diagnosed, patient controls with diet    Low sodium levels 6/18/2020    Thyroid disease        Past Surgical History:        Procedure Laterality Date    APPENDECTOMY      BREAST SURGERY      mastectomy left nov 20 2019    CHOLECYSTECTOMY      HYSTERECTOMY, TOTAL ABDOMINAL      oophorectomy    INSERTION / REMOVAL / REPLACEMENT VENOUS ACCESS CATHETER N/A 12/27/2019    MEDIPORT INSERTION performed by Abigail Sotelo MD at [address] Left 11/20/2019    LEFT BREAST SIMPLE  MASTECTOMY, BLUE DYE INJECTION, LEFT AXILLARY  SENTINEL NODE BIOPSY POSSIBLE LEFT AXILLARY DISSECTION -- [place] -- PECTORAL BLOCK performed by Vladislav Diaz MD at [facility]           Outside reports reviewed: ER records, historical medical records, lab reports and radiology reports. Patient's medications, allergies, past medical, surgical, social and family histories were reviewed and updated as appropriate. Review of Systems  A comprehensive review of systems was negative except for:       Objective:     Neuro exam 138/66 with maximal blood pressure reading of 168/89; pulse 76, she is afebrile  General: normal orientation and alertness. Cranial nerve testing was normal.  Funduscopic eye exam revealed not testable. Motor exam: 5/5. Deep tendon reflexes were 1+ bilaterally. Plantar responses were flexor bilaterally. Cerebellar exam noted finger to nose without dysmetria. Sensation was normal to joint position sense, light touch and a pin prick . Juli Berrios Assessment:   Strokelike symptoms with probable left carotid distribution TIA in a patient who is right-handed. CT angiogram of carotids is negative. Head CT negative.       Plan:   Had lengthy discussion with her for over 55 minutes reviewing records and greater than 50% counseling concerning reduction in vascular risk factors especially blood pressure control and antiplatelet as well as statin therapy. Discussed above with hospitalist, Yamel Ndiaye. Patient would like to go home today which is reasonable and does not want to wait for MRI of brain which would not be performed until tomorrow. Patient is on 2 different antidepressants and suggest that prescribing physician consider reducing this to 1 agent and possible consultation with psychiatry. If echocardiogram was not performed suggest considering this as an outpatient since patient desires to be discharged today.   Thank you for consultation

## 2020-10-11 NOTE — ED NOTES
Per Dr. Edilson Wick, patient is allowed to eat at this time. Pt was given a dinner tray.      Moisés Gaona RN  10/10/20 2029

## 2020-10-11 NOTE — H&P
CHOLECYSTECTOMY      HYSTERECTOMY, TOTAL ABDOMINAL      oophorectomy    INSERTION / REMOVAL / REPLACEMENT VENOUS ACCESS CATHETER N/A 12/27/2019    MEDIPORT INSERTION performed by Ulysses Silva, MD at [address] Left 11/20/2019    LEFT BREAST SIMPLE  MASTECTOMY, BLUE DYE INJECTION, LEFT AXILLARY  SENTINEL NODE BIOPSY POSSIBLE LEFT AXILLARY DISSECTION -- Nhi Santana -- PECTORAL BLOCK performed by Irene Woods MD at [facility]         Medications Prior to Admission:    Prior to Admission medications    Medication Sig Start Date End Date Taking? Authorizing Provider   mirtazapine (REMERON) 15 MG tablet Take 15 mg by mouth nightly   Yes Historical Provider, MD   gabapentin (NEURONTIN) 100 MG capsule Take 100 mg by mouth 3 times daily. Yes Historical Provider, MD   cephALEXin (KEFLEX) 500 MG capsule Take 500 mg by mouth 4 times daily   Yes Historical Provider, MD   famotidine (PEPCID) 40 MG tablet Take 40 mg by mouth nightly 9/21/20 10/21/20 Yes Historical Provider, MD   omeprazole (PRILOSEC) 40 MG delayed release capsule Take 40 mg by mouth daily 9/18/20  Yes Historical Provider, MD   Docusate Sodium (COLACE PO) Take by mouth 2 times daily   Yes Historical Provider, MD   ALPRAZolam (XANAX) 0.5 MG tablet TAKE 1/2 TO 1 TABLET BY MOUTH EVERY 8 HOURS AS NEEDED FOR ANXIETY 9/8/20  Yes Historical Provider, MD   PARoxetine (PAXIL) 10 MG tablet Take 20 mg by mouth every morning    Yes Historical Provider, MD   levothyroxine (SYNTHROID) 88 MCG tablet Take 88 mcg by mouth Daily   Yes Historical Provider, MD       Allergies:    Arimidex [anastrozole]; Effexor [venlafaxine]; and Lexapro [escitalopram oxalate]    Social History:    reports that she quit smoking about 21 years ago. She has a 70.00 pack-year smoking history. She has never used smokeless tobacco. She reports previous alcohol use. She reports that she does not use drugs.       Family History:   family history includes Cancer (age of onset: 58) in her brother; Cancer (age of onset: 66) in her mother. PHYSICAL EXAM:  Vitals:  BP (!) 140/65   Pulse 78   Temp 98 °F (36.7 °C) (Oral)   Resp 18   Ht 5' 4\" (1.626 m)   Wt 128 lb 4 oz (58.2 kg)   SpO2 96%   BMI 22.01 kg/m²   GENERAL: No acute distress, Alert and awake, Afebrile, Appears tired and weak otherwise hemodynamically stable at present. HEENT: PERRLA, no icterus. OP clear and no exudates. NECK: Supple  no carotid/ophthalmic bruits, JVD None. RESPIRATORY:  Bilateral equal vesicular breath sound with no wheezing. Lung bases are clear. HEART: No tachycardia at bedside and regular rhythm. Normal S1 and S2, No S3 or S4 is audible. No pulsation, thrills, murmur or friction rubs. ABDOMEN: Soft, nondistended, nontender. No hepatomegaly or splenomegaly. No CVA tenderness on the both sides. Bowel sound is present. EXTREMITIES: All peripheral pulses are present. No calf tenderness or swelling. No pedal edema is present. Heddy  NEUROLOGY: Alert and awake. No new focal neuro deficit. Bilateral Pupil is equal and reactive to light. CN-ii-xii otherwise grossly intact. Motor and Sensory: Grossly Intact bilaterally with no new focal signs . Have some mild residual paresthesia in right side of the face. LABS:  Recent Labs     10/10/20  1854   WBC 6.5   RBC 4.36   HGB 13.1   HCT 37.7   MCV 86.5   MCH 30.0   MCHC 34.7*   RDW 12.7      MPV 9.2     Recent Labs     10/10/20  1854 10/10/20  1900   *  --    K 4.4  --    CL 86*  --    CO2 28  --    BUN 13  --    CREATININE 1.1*  --    GLUCOSE 112* 100   CALCIUM 9.0  --      No results for input(s): POCGLU in the last 72 hours.   Results for orders placed or performed during the hospital encounter of 58/08/31   Basic metabolic panel   Result Value Ref Range    Sodium 124 (L) 132 - 146 mmol/L    Potassium 4.4 3.5 - 5.0 mmol/L    Chloride 86 (L) 98 - 107 mmol/L    CO2 28 22 - 29 mmol/L    Anion Gap 10 7 - 16 mmol/L    Glucose 112 (H) 74 - 99 mg/dL    BUN 13 8 - 23 mg/dL    CREATININE 1.1 (H) 0.5 - 1.0 mg/dL    GFR Non-African American 48 >=60 mL/min/1.73    GFR African American 58     Calcium 9.0 8.6 - 10.2 mg/dL   CBC   Result Value Ref Range    WBC 6.5 4.5 - 11.5 E9/L    RBC 4.36 3.50 - 5.50 E12/L    Hemoglobin 13.1 11.5 - 15.5 g/dL    Hematocrit 37.7 34.0 - 48.0 %    MCV 86.5 80.0 - 99.9 fL    MCH 30.0 26.0 - 35.0 pg    MCHC 34.7 (H) 32.0 - 34.5 %    RDW 12.7 11.5 - 15.0 fL    Platelets 436 773 - 963 E9/L    MPV 9.2 7.0 - 12.0 fL   Troponin   Result Value Ref Range    Troponin <0.01 0.00 - 0.03 ng/mL   Protime-INR   Result Value Ref Range    Protime 12.7 (H) 9.3 - 12.4 sec    INR 1.1    APTT   Result Value Ref Range    aPTT 24.7 24.5 - 35.1 sec   TSH without Reflex   Result Value Ref Range    TSH 1.060 0.270 - 4.200 uIU/mL   Urinalysis   Result Value Ref Range    Color, UA Yellow Straw/Yellow    Clarity, UA Clear Clear    Glucose, Ur Negative Negative mg/dL    Bilirubin Urine Negative Negative    Ketones, Urine Negative Negative mg/dL    Specific Gravity, UA 1.010 1.005 - 1.030    Blood, Urine TRACE (A) Negative    pH, UA 6.5 5.0 - 9.0    Protein, UA Negative Negative mg/dL    Urobilinogen, Urine 0.2 <2.0 E.U./dL    Nitrite, Urine Negative Negative    Leukocyte Esterase, Urine Negative Negative   Microscopic Urinalysis   Result Value Ref Range    WBC, UA NONE 0 - 5 /HPF    RBC, UA NONE 0 - 2 /HPF    Bacteria, UA NONE SEEN None Seen /HPF   POCT Glucose   Result Value Ref Range    Glucose 100 mg/dL    QC OK?  yes    POCT Glucose   Result Value Ref Range    Meter Glucose 100 (H) 74 - 99 mg/dL   EKG 12 Lead   Result Value Ref Range    Ventricular Rate 74 BPM    Atrial Rate 74 BPM    P-R Interval 158 ms    QRS Duration 82 ms    Q-T Interval 368 ms    QTc Calculation (Bazett) 408 ms    P Axis 41 degrees    R Axis 17 degrees    T Axis 58 degrees        Radiology: Ct Head Wo Contrast    Addendum Date: 10/10/2020    ADDENDUM: Findings were CTA OF THE HEAD WITH CONTRAST 10/10/2020 5:52 pm TECHNIQUE: CTA of the neck was performed with the administration of intravenous contrast. Multiplanar reformatted images are provided for review. MIP images are provided for review. Stenosis of the internal carotid arteries measured using NASCET criteria. Dose modulation, iterative reconstruction, and/or weight based adjustment of the mA/kV was utilized to reduce the radiation dose to as low as reasonably achievable.; CTA of the head/brain was performed with the administration of intravenous contrast. Multiplanar reformatted images are provided for review. MIP images are provided for review. Dose modulation, iterative reconstruction, and/or weight based adjustment of the mA/kV was utilized to reduce the radiation dose to as low as reasonably achievable. COMPARISON: None. HISTORY: ORDERING SYSTEM PROVIDED HISTORY: stroke, right weakness TECHNOLOGIST PROVIDED HISTORY: Reason for exam:->stroke, right weakness Has a \"code stroke\" or \"stroke alert\" been called? ->Yes FINDINGS: Unenhanced CT head: No acute intracranial hemorrhage or edema. No abnormal extra-axial fluid collections. Benign calcifications are seen at level of right and left basal ganglia. There is no hydrocephalus. CTA head: Anterior cerebral arteries, middle cerebral arteries, and posterior cerebral arteries are patent. No evidence of stenosis. There is a normal vertebrobasilar junction. Basilar artery is patent without evidence of stenosis. No evidence of intracranial aneurysm or AVM. CTA neck: Common carotid arteries are patent without evidence of stenosis. There is normal appearance of the carotid bulbs. No evidence of stenosis involving proximal or distal cervical internal carotid arteries. Both vertebral arteries are patent without evidence of stenosis. CT brain perfusion: There are no abnormal areas of increased mean transit time.   No abnormal decreased cerebral blood flow or cerebral blood volume. 1.  Unenhanced CT shows no acute intracranial hemorrhage or edema. 2.  No evidence of arterial stenosis at levels [id number]. 3.  No stenosis involving proximal or distal cervical internal carotid arteries or vertebral arteries. 4.  No evidence of stroke on CT brain perfusion. If clinical concern persists for acute stroke, MRI brain with diffusion-weighted imaging could be helpful for further evaluation. Ct Brain Perfusion    Result Date: 10/10/2020  EXAMINATION: CTA OF THE NECK; CTA OF THE HEAD WITH CONTRAST WITH PERFUSION; CTA OF THE HEAD WITH CONTRAST 10/10/2020 5:52 pm TECHNIQUE: CTA of the neck was performed with the administration of intravenous contrast. Multiplanar reformatted images are provided for review. MIP images are provided for review. Stenosis of the internal carotid arteries measured using NASCET criteria. Dose modulation, iterative reconstruction, and/or weight based adjustment of the mA/kV was utilized to reduce the radiation dose to as low as reasonably achievable.; CTA of the head/brain was performed with the administration of intravenous contrast. Multiplanar reformatted images are provided for review. MIP images are provided for review. Dose modulation, iterative reconstruction, and/or weight based adjustment of the mA/kV was utilized to reduce the radiation dose to as low as reasonably achievable. COMPARISON: None. HISTORY: ORDERING SYSTEM PROVIDED HISTORY: stroke, right weakness TECHNOLOGIST PROVIDED HISTORY: Reason for exam:->stroke, right weakness Has a \"code stroke\" or \"stroke alert\" been called? ->Yes FINDINGS: Unenhanced CT head: No acute intracranial hemorrhage or edema. No abnormal extra-axial fluid collections. Benign calcifications are seen at level of right and left basal ganglia. There is no hydrocephalus. CTA head: Anterior cerebral arteries, middle cerebral arteries, and posterior cerebral arteries are patent. No evidence of stenosis.   There is a normal vertebrobasilar junction. Basilar artery is patent without evidence of stenosis. No evidence of intracranial aneurysm or AVM. CTA neck: Common carotid arteries are patent without evidence of stenosis. There is normal appearance of the carotid bulbs. No evidence of stenosis involving proximal or distal cervical internal carotid arteries. Both vertebral arteries are patent without evidence of stenosis. CT brain perfusion: There are no abnormal areas of increased mean transit time. No abnormal decreased cerebral blood flow or cerebral blood volume. 1.  Unenhanced CT shows no acute intracranial hemorrhage or edema. 2.  No evidence of arterial stenosis at levels [id number]. 3.  No stenosis involving proximal or distal cervical internal carotid arteries or vertebral arteries. 4.  No evidence of stroke on CT brain perfusion. If clinical concern persists for acute stroke, MRI brain with diffusion-weighted imaging could be helpful for further evaluation. Cta Head W Contrast    Result Date: 10/10/2020  EXAMINATION: CTA OF THE NECK; CTA OF THE HEAD WITH CONTRAST WITH PERFUSION; CTA OF THE HEAD WITH CONTRAST 10/10/2020 5:52 pm TECHNIQUE: CTA of the neck was performed with the administration of intravenous contrast. Multiplanar reformatted images are provided for review. MIP images are provided for review. Stenosis of the internal carotid arteries measured using NASCET criteria. Dose modulation, iterative reconstruction, and/or weight based adjustment of the mA/kV was utilized to reduce the radiation dose to as low as reasonably achievable.; CTA of the head/brain was performed with the administration of intravenous contrast. Multiplanar reformatted images are provided for review. MIP images are provided for review. Dose modulation, iterative reconstruction, and/or weight based adjustment of the mA/kV was utilized to reduce the radiation dose to as low as reasonably achievable. COMPARISON: None. HISTORY: ORDERING SYSTEM PROVIDED HISTORY: stroke, right weakness TECHNOLOGIST PROVIDED HISTORY: Reason for exam:->stroke, right weakness Has a \"code stroke\" or \"stroke alert\" been called? ->Yes FINDINGS: Unenhanced CT head: No acute intracranial hemorrhage or edema. No abnormal extra-axial fluid collections. Benign calcifications are seen at level of right and left basal ganglia. There is no hydrocephalus. CTA head: Anterior cerebral arteries, middle cerebral arteries, and posterior cerebral arteries are patent. No evidence of stenosis. There is a normal vertebrobasilar junction. Basilar artery is patent without evidence of stenosis. No evidence of intracranial aneurysm or AVM. CTA neck: Common carotid arteries are patent without evidence of stenosis. There is normal appearance of the carotid bulbs. No evidence of stenosis involving proximal or distal cervical internal carotid arteries. Both vertebral arteries are patent without evidence of stenosis. CT brain perfusion: There are no abnormal areas of increased mean transit time. No abnormal decreased cerebral blood flow or cerebral blood volume. 1.  Unenhanced CT shows no acute intracranial hemorrhage or edema. 2.  No evidence of arterial stenosis at levels [id number]. 3.  No stenosis involving proximal or distal cervical internal carotid arteries or vertebral arteries. 4.  No evidence of stroke on CT brain perfusion. If clinical concern persists for acute stroke, MRI brain with diffusion-weighted imaging could be helpful for further evaluation.      ASSESSMENT:    Present on Admission:   TIA (transient ischemic attack)   Malignant neoplasm of left female breast, unspecified estrogen receptor status, unspecified site of breast (HCC)   Low sodium levels   Anxiety    PLAN:  # Transient dysphasia and weakness, numbness, disturbances in coordination and paresthesias on the right side -  Due to TIA  Symptom started >4 hours ago and now resolved  Patient is A&Ox3 ,Hemodynamically stable. CTA brain and neck: No acute lesion or hemorrhage  On Antiplatelets +Statin   Close monitoring of neuro status and vitals  Put on Fall/Aspiration/Seizure precaution  PT evaluation  Plan for MRI Brain - Neurology f/u consult in AM  # Chronic Hyponatremia, Na =124   Patient is alert and oriented x3   CT brain no acute lesion. May be due to chronic hypothyroidism vs cancer vs multiple psych medications   Will monitor and slowly replace  # Anxiety with depression   Currently stable with no acute changes   Will resume/adjust medications as on chart   Psych consult in AM, if needed for discharge planning  # Hypothyroid   No active complaints   Continue home dose of thyroid meds   Will adjust the dose as needed  # Rest of the chronic medical problems are stable and will be managed with appropriately with home medications, placed nursing communication order to verify home medications before giving them to the patient. # Diet: On PO Diet  # IVF's: Yes  # Fall Precaution: Yes  # Disposition: Home/primary residence   # Code Status: Full code by default  # DVT Prophylaxis : SC Heparin or SC Lovenox as on chart  The patient at bedside was counseled about clinical status, laboratory/imaging results, diagnoses, medication side effects, risk, and treatment plan, all questions were answered to patient's satisfaction and verbalized understanding    SIGNATURE: Bertha Mitchell PATIENT NAME: Daria Blancas   CONTACT #: Hospitalist on call MRN: [id number]     Disclaimer: Portions of this note may have been generated using Dragon voice recognition software. Reasonable efforts were made to correct any dictation errors that resulted due to the programming of this software but some may still be present.

## 2020-10-11 NOTE — DISCHARGE INSTR - COC
Continuity of Care Form    Patient Name: Mariaa Catalan   :  1945  MRN:  [id number]    Admit date:  10/10/2020  Discharge date:  ***    Code Status Order: Prior   Advance Directives:     Admitting Physician:  No admitting provider for patient encounter. PCP: KAREN Matias    Discharging Nurse: [facility] Unit/Room#:   Discharging Unit Phone Number: ***    Emergency Contact:   Extended Emergency Contact Information  Primary Emergency Contact: Jan Paredes  Address: [address] [place] [address] Phone: [phone]  Relation: Other    Past Surgical History:  Past Surgical History:   Procedure Laterality Date    APPENDECTOMY      BREAST SURGERY      mastectomy left 2019    CHOLECYSTECTOMY      HYSTERECTOMY, TOTAL ABDOMINAL      oophorectomy    INSERTION / REMOVAL / REPLACEMENT VENOUS ACCESS CATHETER N/A 2019    MEDIPORT INSERTION performed by Margie Whitehead MD at [address] [facility] Left 2019    LEFT BREAST SIMPLE  MASTECTOMY, BLUE DYE INJECTION, LEFT AXILLARY  SENTINEL NODE BIOPSY POSSIBLE LEFT AXILLARY DISSECTION -- NEOPROBE -- PECTORAL BLOCK performed by Candace Staley MD at [facility]         Immunization History: There is no immunization history on file for this patient.     Active Problems:  Patient Active Problem List   Diagnosis Code    Ductal carcinoma in situ (DCIS) of left breast D05.12    Malignant neoplasm of left female breast, unspecified estrogen receptor status, unspecified site of breast ([facility] [address] C50.912    Poor venous access I87.8    Low sodium levels E87.1    Other osteoporosis without current pathological fracture M81.8       Isolation/Infection:   Isolation          No Isolation        Patient Infection Status     None to display          Nurse Assessment:  Last Vital Signs: BP (!) 148/82   Pulse 78   Temp 98.6 °F (37 °C) (Oral) Resp 17   Ht 5' 4\" (1.626 m)   Wt 130 lb (59 kg)   SpO2 97%   BMI 22.31 kg/m²     Last documented pain score (0-10 scale):    Last Weight:   Wt Readings from Last 1 Encounters:   10/10/20 130 lb (59 kg)     Mental Status:  {IP PT MENTAL STATUS:}    IV Access:  { SARANYA IV ACCESS:642694048}    Nursing Mobility/ADLs:  Walking   {P DME POFL:502212614}  Transfer  {P DME CFGL:554008213}  Bathing  {CHP DME CMTK:284102670}  Dressing  {CHP DME VVWC:032724208}  Toileting  {P DME LILIA:178566727}  Feeding  {P DME JFKK:377171066}  Med Admin  {P DME JGYV:455801140}  Med Delivery   { SARANYA MED Delivery:947580475}    Wound Care Documentation and Therapy:        Elimination:  Continence:   · Bowel: {YES / VE:15823}  · Bladder: {YES / MT:88108}  Urinary Catheter: {Urinary Catheter:459484634}   Colostomy/Ileostomy/Ileal Conduit: {YES / II:04760}       Date of Last BM: ***  No intake or output data in the 24 hours ending 10/10/20 2010  No intake/output data recorded.     Safety Concerns:     [address] [employer] Safety Concerns:528394270}    Impairments/Disabilities:      [address] [employer] Impairments/Disabilities:572796331}    Nutrition Therapy:  Current Nutrition Therapy:   [address] [employer] Diet List:573593536}    Routes of Feeding: {[facility] DME Other Feedings:287180582}  Liquids: {Slp liquid thickness:99459}  Daily Fluid Restriction: {CHP DME Yes amt example:083374643}  Last Modified Barium Swallow with Video (Video Swallowing Test): {Done Not Done CNOF:291218506}    Treatments at the Time of Hospital Discharge:   Respiratory Treatments: ***  Oxygen Therapy:  {Therapy; copd oxygen:15113}  Ventilator:    { CC Vent RKBF:742578256}    Rehab Therapies: {THERAPEUTIC INTERVENTION:7346104011}  Weight Bearing Status/Restrictions: [employer]  Weight Bearin}  Other Medical Equipment (for information only, NOT a DME order):  {EQUIPMENT:283073869}  Other Treatments: ***    Patient's personal belongings (please select all that are sent with patient):  {CHP DME Belongings:571745886}    RN SIGNATURE:  {Esignature:399863770}    CASE MANAGEMENT/SOCIAL WORK SECTION    Inpatient Status Date: ***    Readmission Risk Assessment Score:  Readmission Risk              Risk of Unplanned Readmission:        0           Discharging to Facility/ Agency   · Name:   · Address:  · Phone:  · Fax:    Dialysis Facility (if applicable)   · Name:  · Address:  · Dialysis Schedule:  · Phone:  · Fax:    / signature: {Esignature:031302187}    PHYSICIAN SECTION    Prognosis: {Prognosis:0690453332}    Condition at Discharge: [address] [place] [address] Patient Condition:827215339}    Rehab Potential (if transferring to Rehab): {Prognosis:3296237188}    Recommended Labs or Other Treatments After Discharge: ***    Physician Certification: I certify the above information and transfer of Dank Florez  is necessary for the continuing treatment of the diagnosis listed and that she requires {Admit to Appropriate Level of Care:08920} for {GREATER/LESS:600610314} 30 days.      Update Admission H&P: {CHP DME Changes in CSZPL:020868581}    PHYSICIAN SIGNATURE:  {Esignature:886237873}

## 2020-10-11 NOTE — DISCHARGE SUMMARY
[id number] [facility] Physician Discharge Summary       Faina Del Rio, [address] [facility] [address]  [facility] [phone]    In 3 days      Meghan Shell [id number] [phone]      As needed, If symptoms worsen      Activity level:  As melodie    Diet: DIET CARDIAC; Carb Control: 4 carb choices (60 gms)/meal; Low Sodium (2 GM)      Condition at discharge:  stable    Dispo: home        Patient ID:  Vera Zelaya  [id number]  75 y.o.  1945    Admit date: 10/10/2020    Discharge date and time:  10/11/2020  11:27 AM    Admission Diagnoses: Principal Problem:    Stroke-like symptoms  Active Problems:    Malignant neoplasm of left female breast, unspecified estrogen receptor status, unspecified site of breast (HCC)    Low sodium levels    TIA (transient ischemic attack)    Anxiety    Hyponatremia  Resolved Problems:    * No resolved hospital problems. *      Discharge Diagnoses: Principal Problem:    Stroke-like symptoms  Active Problems:    Malignant neoplasm of left female breast, unspecified estrogen receptor status, unspecified site of breast (HCC)    Low sodium levels    TIA (transient ischemic attack)    Anxiety    Hyponatremia  Resolved Problems:    * No resolved hospital problems. 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 Patient presented to the ER six hours after symptoms started. Her symptoms had resolved by the time she was evaluated in ED. CT head with no acute intracranial abnormality.  Mild atrophy and chronic white matter ischemic changes. CXR with no pneumonia or pleural effusion. CTA neck with no acute intracranial hemorrhage or edema, no evidence of arterial stenosis at levels [id number], no stenosis involving proximal or distal cervical internal carotid arteries or vertebral arteries and no evidence of stroke on CT brain perfusion. She was started on low dose ASA and atorvastatin. Lipid panel WNL. Consult made to neurology who did not feel MRI was warranted at this time. Patient requested to be discharged to home. We will reduce atorvastatin to 10mg and she will need to follow up with her PCP. Patient noted to have hyponatremia for which she states she was instructed to increase her salt intake at home by her PCP. Discharge Exam:  Vitals:    10/11/20 0015 10/11/20 0331 10/11/20 0430 10/11/20 0754   BP: (!) 140/65  (!) 176/81 138/66   Pulse: 73 78 77 76   Resp: 18  18 16   Temp: 98 °F (36.7 °C)  97.7 °F (36.5 °C) 98.6 °F (37 °C)   TempSrc: Oral  Oral Oral   SpO2: 96%  96% 97%   Weight:       Height:           General Appearance: alert and oriented to person, place and time and in no acute distress  Skin: warm and dry  Head: normocephalic and atraumatic  Eyes: pupils equal, round, and reactive to light, extraocular eye movements intact, conjunctivae normal  Neck: neck supple and non tender without mass   Pulmonary/Chest: clear to auscultation bilaterally- no wheezes, rales or rhonchi, normal air movement, no respiratory distress. Right chest mediport  Cardiovascular: normal rate, normal S1 and S2 and no carotid bruits  Abdomen: soft, non-tender, non-distended, normal bowel sounds, no masses or organomegaly  Extremities: no cyanosis, no clubbing and no edema  Neurologic: no cranial nerve deficit and speech normal      I/O last 3 completed shifts:   In: 530 [I.V.:530]  Out: -   I/O this shift:  In: 10 [P.O.:10]  Out: -       LABS:  Recent Labs     10/10/20  1854 10/10/20  1900 10/11/20  0621   * --  128*   K 4.4  --  4.1   CL 86*  --  93*   CO2 28  --  26   BUN 13  --  11   CREATININE 1.1*  --  0.9   GLUCOSE 112* 100 106*   CALCIUM 9.0  --  8.7       Recent Labs     10/10/20  1854 10/11/20  0621   WBC 6.5 5.8   RBC 4.36 4.13   HGB 13.1 12.1   HCT 37.7 36.3   MCV 86.5 87.9   MCH 30.0 29.3   MCHC 34.7* 33.3   RDW 12.7 12.9    274   MPV 9.2 9.4       No results for input(s): POCGLU in the last 72 hours. Imaging:  Ct Head Wo Contrast    Addendum Date: 10/10/2020    ADDENDUM: Findings were discussed with Tayler MEZA at 7:20 p.m. on 10/10/2020. Result Date: 10/10/2020  EXAMINATION: CT OF THE HEAD WITHOUT CONTRAST  10/10/2020 6:52 pm TECHNIQUE: CT of the head was performed without the administration of intravenous contrast. Dose modulation, iterative reconstruction, and/or weight based adjustment of the mA/kV was utilized to reduce the radiation dose to as low as reasonably achievable. COMPARISON: 07/14/2005 HISTORY: ORDERING SYSTEM PROVIDED HISTORY: Right weakness TECHNOLOGIST PROVIDED HISTORY: Has a \"code stroke\" or \"stroke alert\" been called? ->Yes Reason for exam:->Right weakness FINDINGS: BRAIN/VENTRICLES: There is no acute intracranial hemorrhage, mass effect or midline shift. No abnormal extra-axial fluid collection. The gray-white differentiation is maintained without evidence of an acute infarct. There is no evidence of hydrocephalus. There is mild atrophy. Bilateral deep white matter lucency is seen, consistent with chronic small vessel ischemic disease. ORBITS: The visualized portion of the orbits demonstrate no acute abnormality. SINUSES: The visualized paranasal sinuses and mastoid air cells demonstrate no acute abnormality. SOFT TISSUES/SKULL:  No acute abnormality of the visualized skull or soft tissues. No acute intracranial abnormality. Mild atrophy and chronic white matter ischemic changes.      Xr Chest Portable    Result Date: 10/10/2020  EXAMINATION: ONE XRAY VIEW OF THE CHEST 10/10/2020 6:48 pm COMPARISON: September 6, 2020 HISTORY: ORDERING SYSTEM PROVIDED HISTORY: Possible Stroke TECHNOLOGIST PROVIDED HISTORY: Reason for exam:->Possible Stroke FINDINGS: No airspace opacity or pleural effusion. Right MediPort is present. The heart is normal size. No pneumothorax. No free air beneath the hemidiaphragms. No pneumonia or pleural effusion. Cta Neck W Contrast    Result Date: 10/10/2020  EXAMINATION: CTA OF THE NECK; CTA OF THE HEAD WITH CONTRAST WITH PERFUSION; CTA OF THE HEAD WITH CONTRAST 10/10/2020 5:52 pm TECHNIQUE: CTA of the neck was performed with the administration of intravenous contrast. Multiplanar reformatted images are provided for review. MIP images are provided for review. Stenosis of the internal carotid arteries measured using NASCET criteria. Dose modulation, iterative reconstruction, and/or weight based adjustment of the mA/kV was utilized to reduce the radiation dose to as low as reasonably achievable.; CTA of the head/brain was performed with the administration of intravenous contrast. Multiplanar reformatted images are provided for review. MIP images are provided for review. Dose modulation, iterative reconstruction, and/or weight based adjustment of the mA/kV was utilized to reduce the radiation dose to as low as reasonably achievable. COMPARISON: None. HISTORY: ORDERING SYSTEM PROVIDED HISTORY: stroke, right weakness TECHNOLOGIST PROVIDED HISTORY: Reason for exam:->stroke, right weakness Has a \"code stroke\" or \"stroke alert\" been called? ->Yes FINDINGS: Unenhanced CT head: No acute intracranial hemorrhage or edema. No abnormal extra-axial fluid collections. Benign calcifications are seen at level of right and left basal ganglia. There is no hydrocephalus. CTA head: Anterior cerebral arteries, middle cerebral arteries, and posterior cerebral arteries are patent. No evidence of stenosis. There is a normal vertebrobasilar junction.   Basilar artery is patent without evidence of stenosis. No evidence of intracranial aneurysm or AVM. CTA neck: Common carotid arteries are patent without evidence of stenosis. There is normal appearance of the carotid bulbs. No evidence of stenosis involving proximal or distal cervical internal carotid arteries. Both vertebral arteries are patent without evidence of stenosis. CT brain perfusion: There are no abnormal areas of increased mean transit time. No abnormal decreased cerebral blood flow or cerebral blood volume. 1.  Unenhanced CT shows no acute intracranial hemorrhage or edema. 2.  No evidence of arterial stenosis at levels [id number]. 3.  No stenosis involving proximal or distal cervical internal carotid arteries or vertebral arteries. 4.  No evidence of stroke on CT brain perfusion. If clinical concern persists for acute stroke, MRI brain with diffusion-weighted imaging could be helpful for further evaluation. Ct Brain Perfusion    Result Date: 10/10/2020  EXAMINATION: CTA OF THE NECK; CTA OF THE HEAD WITH CONTRAST WITH PERFUSION; CTA OF THE HEAD WITH CONTRAST 10/10/2020 5:52 pm TECHNIQUE: CTA of the neck was performed with the administration of intravenous contrast. Multiplanar reformatted images are provided for review. MIP images are provided for review. Stenosis of the internal carotid arteries measured using NASCET criteria. Dose modulation, iterative reconstruction, and/or weight based adjustment of the mA/kV was utilized to reduce the radiation dose to as low as reasonably achievable.; CTA of the head/brain was performed with the administration of intravenous contrast. Multiplanar reformatted images are provided for review. MIP images are provided for review. Dose modulation, iterative reconstruction, and/or weight based adjustment of the mA/kV was utilized to reduce the radiation dose to as low as reasonably achievable. COMPARISON: None.  HISTORY: ORDERING SYSTEM PROVIDED HISTORY: stroke, right weakness TECHNOLOGIST PROVIDED HISTORY: Reason for exam:->stroke, right weakness Has a \"code stroke\" or \"stroke alert\" been called? ->Yes FINDINGS: Unenhanced CT head: No acute intracranial hemorrhage or edema. No abnormal extra-axial fluid collections. Benign calcifications are seen at level of right and left basal ganglia. There is no hydrocephalus. CTA head: Anterior cerebral arteries, middle cerebral arteries, and posterior cerebral arteries are patent. No evidence of stenosis. There is a normal vertebrobasilar junction. Basilar artery is patent without evidence of stenosis. No evidence of intracranial aneurysm or AVM. CTA neck: Common carotid arteries are patent without evidence of stenosis. There is normal appearance of the carotid bulbs. No evidence of stenosis involving proximal or distal cervical internal carotid arteries. Both vertebral arteries are patent without evidence of stenosis. CT brain perfusion: There are no abnormal areas of increased mean transit time. No abnormal decreased cerebral blood flow or cerebral blood volume. 1.  Unenhanced CT shows no acute intracranial hemorrhage or edema. 2.  No evidence of arterial stenosis at levels [id number]. 3.  No stenosis involving proximal or distal cervical internal carotid arteries or vertebral arteries. 4.  No evidence of stroke on CT brain perfusion. If clinical concern persists for acute stroke, MRI brain with diffusion-weighted imaging could be helpful for further evaluation. Cta Head W Contrast    Result Date: 10/10/2020  EXAMINATION: CTA OF THE NECK; CTA OF THE HEAD WITH CONTRAST WITH PERFUSION; CTA OF THE HEAD WITH CONTRAST 10/10/2020 5:52 pm TECHNIQUE: CTA of the neck was performed with the administration of intravenous contrast. Multiplanar reformatted images are provided for review. MIP images are provided for review. Stenosis of the internal carotid arteries measured using NASCET criteria.  Dose concern persists for acute stroke, MRI brain with diffusion-weighted imaging could be helpful for further evaluation.          Patient Instructions:      Medication List      START taking these medications    aspirin EC 81 MG EC tablet  Take 1 tablet by mouth daily     atorvastatin 10 MG tablet  Commonly known as:  LIPITOR  Take 1 tablet by mouth nightly New cholesterol medication        CONTINUE taking these medications    ALPRAZolam 0.5 MG tablet  Commonly known as:  XANAX     COLACE PO     famotidine 40 MG tablet  Commonly known as:  PEPCID     gabapentin 100 MG capsule  Commonly known as:  NEURONTIN     levothyroxine 88 MCG tablet  Commonly known as:  SYNTHROID     mirtazapine 15 MG tablet  Commonly known as:  REMERON     omeprazole 40 MG delayed release capsule  Commonly known as:  PRILOSEC     PARoxetine 10 MG tablet  Commonly known as:  PAXIL        STOP taking these medications    cephALEXin 500 MG capsule  Commonly known as:  Marian Public           Where to Get Your Medications      You can get these medications from any pharmacy    Bring a paper prescription for each of these medications  · aspirin EC 81 MG EC tablet  · atorvastatin 10 MG tablet          Note that less than 30 minutes was spent in preparing discharge papers, discussing discharge with patient, medication review, etc.       Signed:  Electronically signed by OCTAVIO Paulson CNP on 10/11/2020 at 11:27 AM

## 2020-10-19 ENCOUNTER — TELEPHONE (OUTPATIENT)
Dept: ONCOLOGY | Age: 75
End: 2020-10-19

## 2020-10-19 NOTE — TELEPHONE ENCOUNTER
Patient contacted office asking for a TSH to be added to her lab work for Thursday, patient stated she has been having Hot flashes and chills.

## 2020-10-22 ENCOUNTER — OFFICE VISIT (OUTPATIENT)
Dept: ONCOLOGY | Age: 75
End: 2020-10-22
Payer: MEDICARE

## 2020-10-22 ENCOUNTER — HOSPITAL ENCOUNTER (OUTPATIENT)
Dept: INFUSION THERAPY | Age: 75
Discharge: HOME | End: 2020-10-22
Payer: MEDICARE

## 2020-10-22 VITALS
WEIGHT: 125 LBS | DIASTOLIC BLOOD PRESSURE: 66 MMHG | SYSTOLIC BLOOD PRESSURE: 150 MMHG | HEIGHT: 64 IN | TEMPERATURE: 97.5 F | BODY MASS INDEX: 21.34 KG/M2 | HEART RATE: 90 BPM

## 2020-10-22 VITALS — RESPIRATION RATE: 16 BRPM | SYSTOLIC BLOOD PRESSURE: 151 MMHG | DIASTOLIC BLOOD PRESSURE: 72 MMHG | HEART RATE: 77 BPM

## 2020-10-22 DIAGNOSIS — D05.12 DUCTAL CARCINOMA IN SITU (DCIS) OF LEFT BREAST: ICD-10-CM

## 2020-10-22 DIAGNOSIS — C50.912 MALIGNANT NEOPLASM OF LEFT FEMALE BREAST, UNSPECIFIED ESTROGEN RECEPTOR STATUS, UNSPECIFIED SITE OF BREAST (HCC): Primary | ICD-10-CM

## 2020-10-22 LAB
ALBUMIN SERPL-MCNC: 3.9 G/DL (ref 3.5–5.2)
ALP BLD-CCNC: 92 U/L (ref 35–104)
ALT SERPL-CCNC: 20 U/L (ref 0–32)
ANION GAP SERPL CALCULATED.3IONS-SCNC: 10 MMOL/L (ref 7–16)
AST SERPL-CCNC: 22 U/L (ref 0–31)
BASOPHILS ABSOLUTE: 0.02 E9/L (ref 0–0.2)
BASOPHILS RELATIVE PERCENT: 0.4 % (ref 0–2)
BILIRUB SERPL-MCNC: 0.2 MG/DL (ref 0–1.2)
BUN BLDV-MCNC: 9 MG/DL (ref 8–23)
CALCIUM SERPL-MCNC: 8.8 MG/DL (ref 8.6–10.2)
CHLORIDE BLD-SCNC: 96 MMOL/L (ref 98–107)
CO2: 26 MMOL/L (ref 22–29)
CREAT SERPL-MCNC: 0.9 MG/DL (ref 0.5–1)
EOSINOPHILS ABSOLUTE: 0.06 E9/L (ref 0.05–0.5)
EOSINOPHILS RELATIVE PERCENT: 1.3 % (ref 0–6)
GFR AFRICAN AMERICAN: >60
GFR NON-AFRICAN AMERICAN: >60 ML/MIN/1.73
GLUCOSE BLD-MCNC: 123 MG/DL (ref 74–99)
HCT VFR BLD CALC: 38.6 % (ref 34–48)
HEMOGLOBIN: 12.4 G/DL (ref 11.5–15.5)
IMMATURE GRANULOCYTES #: 0.01 E9/L
IMMATURE GRANULOCYTES %: 0.2 % (ref 0–5)
LYMPHOCYTES ABSOLUTE: 1.14 E9/L (ref 1.5–4)
LYMPHOCYTES RELATIVE PERCENT: 23.8 % (ref 20–42)
MAGNESIUM: 2.2 MG/DL (ref 1.6–2.6)
MCH RBC QN AUTO: 29 PG (ref 26–35)
MCHC RBC AUTO-ENTMCNC: 32.1 % (ref 32–34.5)
MCV RBC AUTO: 90.4 FL (ref 80–99.9)
MONOCYTES ABSOLUTE: 0.36 E9/L (ref 0.1–0.95)
MONOCYTES RELATIVE PERCENT: 7.5 % (ref 2–12)
NEUTROPHILS ABSOLUTE: 3.21 E9/L (ref 1.8–7.3)
NEUTROPHILS RELATIVE PERCENT: 66.8 % (ref 43–80)
PDW BLD-RTO: 13.2 FL (ref 11.5–15)
PLATELET # BLD: 246 E9/L (ref 130–450)
PMV BLD AUTO: 10.4 FL (ref 7–12)
POTASSIUM SERPL-SCNC: 4 MMOL/L (ref 3.5–5)
RBC # BLD: 4.27 E12/L (ref 3.5–5.5)
SODIUM BLD-SCNC: 132 MMOL/L (ref 132–146)
TOTAL PROTEIN: 6.4 G/DL (ref 6.4–8.3)
WBC # BLD: 4.8 E9/L (ref 4.5–11.5)

## 2020-10-22 PROCEDURE — G8427 DOCREV CUR MEDS BY ELIG CLIN: HCPCS | Performed by: INTERNAL MEDICINE

## 2020-10-22 PROCEDURE — 1036F TOBACCO NON-USER: CPT | Performed by: INTERNAL MEDICINE

## 2020-10-22 PROCEDURE — G8420 CALC BMI NORM PARAMETERS: HCPCS | Performed by: INTERNAL MEDICINE

## 2020-10-22 PROCEDURE — 96413 CHEMO IV INFUSION 1 HR: CPT

## 2020-10-22 PROCEDURE — G8399 PT W/DXA RESULTS DOCUMENT: HCPCS | Performed by: INTERNAL MEDICINE

## 2020-10-22 PROCEDURE — 2580000003 HC RX 258: Performed by: INTERNAL MEDICINE

## 2020-10-22 PROCEDURE — G0008 ADMIN INFLUENZA VIRUS VAC: HCPCS | Performed by: INTERNAL MEDICINE

## 2020-10-22 PROCEDURE — 4040F PNEUMOC VAC/ADMIN/RCVD: CPT | Performed by: INTERNAL MEDICINE

## 2020-10-22 PROCEDURE — 85025 COMPLETE CBC W/AUTO DIFF WBC: CPT

## 2020-10-22 PROCEDURE — G8484 FLU IMMUNIZE NO ADMIN: HCPCS | Performed by: INTERNAL MEDICINE

## 2020-10-22 PROCEDURE — 1090F PRES/ABSN URINE INCON ASSESS: CPT | Performed by: INTERNAL MEDICINE

## 2020-10-22 PROCEDURE — 99214 OFFICE O/P EST MOD 30 MIN: CPT | Performed by: INTERNAL MEDICINE

## 2020-10-22 PROCEDURE — 6360000002 HC RX W HCPCS: Performed by: INTERNAL MEDICINE

## 2020-10-22 PROCEDURE — 90694 VACC AIIV4 NO PRSRV 0.5ML IM: CPT | Performed by: INTERNAL MEDICINE

## 2020-10-22 PROCEDURE — 1111F DSCHRG MED/CURRENT MED MERGE: CPT | Performed by: INTERNAL MEDICINE

## 2020-10-22 PROCEDURE — 83735 ASSAY OF MAGNESIUM: CPT

## 2020-10-22 PROCEDURE — 80053 COMPREHEN METABOLIC PANEL: CPT

## 2020-10-22 PROCEDURE — 1123F ACP DISCUSS/DSCN MKR DOCD: CPT | Performed by: INTERNAL MEDICINE

## 2020-10-22 PROCEDURE — 3017F COLORECTAL CA SCREEN DOC REV: CPT | Performed by: INTERNAL MEDICINE

## 2020-10-22 RX ORDER — HEPARIN SODIUM (PORCINE) LOCK FLUSH IV SOLN 100 UNIT/ML 100 UNIT/ML
500 SOLUTION INTRAVENOUS PRN
Status: CANCELLED | OUTPATIENT
Start: 2020-10-22

## 2020-10-22 RX ORDER — METHYLPREDNISOLONE SODIUM SUCCINATE 125 MG/2ML
125 INJECTION, POWDER, LYOPHILIZED, FOR SOLUTION INTRAMUSCULAR; INTRAVENOUS ONCE
Status: CANCELLED | OUTPATIENT
Start: 2020-10-22

## 2020-10-22 RX ORDER — SODIUM CHLORIDE 0.9 % (FLUSH) 0.9 %
10 SYRINGE (ML) INJECTION PRN
Status: DISCONTINUED | OUTPATIENT
Start: 2020-10-22 | End: 2020-10-23 | Stop reason: HOSPADM

## 2020-10-22 RX ORDER — SODIUM CHLORIDE 9 MG/ML
INJECTION, SOLUTION INTRAVENOUS CONTINUOUS
Status: CANCELLED | OUTPATIENT
Start: 2020-10-22

## 2020-10-22 RX ORDER — SODIUM CHLORIDE 9 MG/ML
INJECTION, SOLUTION INTRAVENOUS ONCE
Status: CANCELLED
Start: 2020-10-22

## 2020-10-22 RX ORDER — MEPERIDINE HYDROCHLORIDE 25 MG/ML
12.5 INJECTION INTRAMUSCULAR; INTRAVENOUS; SUBCUTANEOUS ONCE
Status: CANCELLED | OUTPATIENT
Start: 2020-10-22

## 2020-10-22 RX ORDER — SODIUM CHLORIDE 9 MG/ML
20 INJECTION, SOLUTION INTRAVENOUS ONCE
Status: CANCELLED | OUTPATIENT
Start: 2020-10-22

## 2020-10-22 RX ORDER — SODIUM CHLORIDE 0.9 % (FLUSH) 0.9 %
10 SYRINGE (ML) INJECTION PRN
Status: CANCELLED | OUTPATIENT
Start: 2020-10-22

## 2020-10-22 RX ORDER — DIPHENHYDRAMINE HYDROCHLORIDE 50 MG/ML
50 INJECTION INTRAMUSCULAR; INTRAVENOUS ONCE
Status: CANCELLED | OUTPATIENT
Start: 2020-10-22

## 2020-10-22 RX ORDER — SODIUM CHLORIDE 9 MG/ML
20 INJECTION, SOLUTION INTRAVENOUS ONCE
Status: COMPLETED | OUTPATIENT
Start: 2020-10-22 | End: 2020-10-22

## 2020-10-22 RX ORDER — EPINEPHRINE 1 MG/ML
0.3 INJECTION, SOLUTION, CONCENTRATE INTRAVENOUS PRN
Status: CANCELLED | OUTPATIENT
Start: 2020-10-22

## 2020-10-22 RX ORDER — HEPARIN SODIUM (PORCINE) LOCK FLUSH IV SOLN 100 UNIT/ML 100 UNIT/ML
500 SOLUTION INTRAVENOUS PRN
Status: DISCONTINUED | OUTPATIENT
Start: 2020-10-22 | End: 2020-10-23 | Stop reason: HOSPADM

## 2020-10-22 RX ADMIN — A/SINGAPORE/GP1908/2015 IVR-180 (AN A/MICHIGAN/45/2015 (H1N1)PDM09-LIKE VIRUS, A/HONG KONG/4801/2014, NYMC X-263B (H3N2) (AN A/HONG KONG/4801/2014-LIKE VIRUS), AND B/BRISBANE/60/2008, WILD TYPE (A B/BRISBANE/60/2008-LIKE VIRUS) 0.5 ML: 15; 15; 15 INJECTION, SUSPENSION INTRAMUSCULAR at 10:52

## 2020-10-22 RX ADMIN — Medication 500 UNITS: at 11:30

## 2020-10-22 RX ADMIN — TRASTUZUMAB 357 MG: 150 INJECTION, POWDER, LYOPHILIZED, FOR SOLUTION INTRAVENOUS at 10:50

## 2020-10-22 RX ADMIN — SODIUM CHLORIDE, PRESERVATIVE FREE 10 ML: 5 INJECTION INTRAVENOUS at 11:30

## 2020-10-22 RX ADMIN — SODIUM CHLORIDE 20 ML/HR: 9 INJECTION, SOLUTION INTRAVENOUS at 10:37

## 2020-10-22 NOTE — PROGRESS NOTES
[address] [address]  Dept: [phone]  Loc: [phone]  Attending Progress Note      Reason for Visit:   Left breast cancer. Referring Physician: Francisco Coppola MD.    PCP:  KARNE Jhaveri PA-C    History of Present Illness: The patient is a 75 y.o. lady with a past medical history significant for thyroid disease, depression, and IBS, who had presented with an abnormal screening mammogram,    8/20/19  Bilateral screening mammogram  [facility]         FINDINGS: No suspicious masses, calcifications, or distortions are   identified on the right. On the left there is a new focal nodular   asymmetry at 12:00, with adjacent linear branching calcifications. Spot compression views is recommended for the asymmetry with   ultrasound, and spot magnification views for the calcifications. .             Impression   1. Right breast no mammographic evidence of malignancy           2. Left breast new focal asymmetry at 12:00, new microcalcifications.       Recommendation:   1. Right breast annual screening mammogram.   2. Left breast additional views spot compression and spot   magnification along with ultrasound.       BI-RADS Category 0:  Incomplete- Needs Additional Imaging Evaluation             8/29/19  Left Diagnostic mammogram   [facility]         FINDINGS:        A small partially obscured mass is identified in the upper outer left   breast measuring approximately 5-6 mm. Additionally, there is a small   segmentally distributed cluster of pleomorphic microcalcifications   located superiorly near the mass extending to the middle third depth.       Ultrasound confirmed a small irregular shaped hypoechoic mass with   circumscribed margins at the 12:00 position measuring 5 mm in maximal   dimension.           Impression       1. Small solid suspicious mass at the 12:00 position.    2. Segmentally distributed pleomorphic microcalcifications located   near the breast mass likely at the 12:00 position.       RECOMMENDATION:       Recommend ultrasound core biopsy of the mass seen on ultrasound and   stereotactic biopsy of the microcalcifications.       BI-RADS Category 5: Highly Suggestive of Malignancy          8/29/19  Left Breast US   [facility]         FINDINGS:        A small partially obscured mass is identified in the upper outer left   breast measuring approximately 5-6 mm. Additionally, there is a small   segmentally distributed cluster of pleomorphic microcalcifications   located superiorly near the mass extending to the middle third depth.       Ultrasound confirmed a small irregular shaped hypoechoic mass with   circumscribed margins at the 12:00 position measuring 5 mm in maximal   dimension.           Impression       1. Small solid suspicious mass at the 12:00 position. 2. Segmentally distributed pleomorphic microcalcifications located   near the breast mass likely at the 12:00 position.       RECOMMENDATION:       Recommend ultrasound core biopsy of the mass seen on ultrasound and   stereotactic biopsy of the microcalcifications.       BI-RADS Category 5: Highly Suggestive of Malignancy              She underwent a stereotactic guided left breast core biopsy at 12 o'clock position on September 10 , 2019.a single top hat shaped marker clip was deployed into the site.     She underwent an ultrasound guided biopsy of the left breast at the 12 o'clock position on September 17, 2019, A post-surgical ribbon shaped microclip was placed.      Pathological evaluation completed at [facility]):     9/10/19  Final Surgical Pathology Report  Diagnosis:  A. Left breast, 12:00, biopsy: High-grade ductal carcinoma in situ,  cannot exclude microinvasion, see comment. B. Left breast 12:00, additional, biopsy: High-grade ductal carcinoma in  situ, cannot exclude microinvasion, see comment.     Breast Cancer Marker Studies:  Estrogen Receptors (ER): Positive         Percentage of cells positive: <10%         Intensity: Weak    Progesterone Receptors ([place]): Negative        Internal control cells present and stain as expected: No internal  control present     9/17/19 Final Surgical Pathology Report    Diagnosis:  Mass, Left breast, 12:00, core biopsy: Segments of benign fibroadipose  tissue and scant skeletal muscle, see comment. Comment:   Epithelial lined mammary ducts and lobules are not identified  in the tissue sample. Correlation with clinical and radiologic findings  is essential to assure adequacy of tissue sampling. In the setting of a  mass clinically or radiologically suspicious for neoplasm, additional  tissue sampling is recommended. The patient underwent on 11/20/2019 a left mastectomy with sentinel lymph node excisional biopsy, pathology:    CANCER CASE SUMMARY:  Procedure: Left simple mastectomy  Specimen laterality: Left  Tumor site: 12:00 position  Tumor size: 8.0 mm in greatest dimension  Histologic type:  Invasive carcinoma of no special type (invasive ductal  carcinoma, not otherwise specified)  Histologic grade (Deandre histologic score):   Glandular differentiation: Score 3   Nuclear pleomorphism: Score 2   Mitotic rate: Score 2   Overall grade: Grade 2 (score of 7)  Tumor focality: Single focus of invasive carcinoma  Ductal carcinoma in situ (DCIS): Present, adjacent to the invasive  carcinoma  Invasive carcinoma margins:   Margins uninvolved by invasive carcinoma    Distance from closest margin: 5.0 mm from the posterior margin  DCIS margins:   Margin uninvolved by DCIS    Distance from closest margin: 10.0 mm from the posterior margin  Regional lymph nodes: Uninvolved by tumor cells   Total number of lymph nodes examined: 2 (both sentinel nodes)  Treatment effect: No known presurgical therapy  Pathologic stage classification (pTNM, AJCC 8th edition):   pT1b   (sn) pN0    Ancillary studies:  Calponin immunostain on block A4 shows the invasive carcinoma lacking a  myoepithelial layer. P63 immunostain on block A6 shows the invasive carcinoma lacking a  myoepithelial layer, with a myoepithelial layer retained around the DCIS. Breast Cancer Marker Studies (Block A6):  Negative: 0%        No internal control cells present. Progesterone Receptors (PA):  Negative: 0%        No internal control cells present. Her-2/miles (c-erb B-2) protein expression: Positive (Score 3+)    The patient was started on adjuvant treatment with Herceptin and Taxol on 1/2/2020, she completed Taxol  on 3/19/2020. She is on single agent Herceptin. The patient was started on tamoxifen on 6/4/2020, she was given Effexor for depression, she had side effects from it, was discontinued and she was not on Lexapro, she did not like it. She was started on Arimidex on 8/20/2020, the patient has been stressed out, her  has a bladder tumor and will need to have a surgery done, she was in the ED on 9/6/2020, she was feeling tired and short of breath, she feels that her symptoms were because of the Arimidex and because she was off the Paxil prior to that. Arimidex has been discontinued. The patient returns for a follow-up visit, she is doing better, she was seen by psychiatry, her medications got adjusted. She was admitted to [facility] with TIA, was seen by neurology, she was started on aspirin and a statin. Review of Systems;  CONSTITUTIONAL: No fever, chills. Good appetite, feeling tired. ENMT: Eyes: No diplopia; Nose: Positive for epistaxis. Mouth: No sore throat. RESPIRATORY: No hemoptysis, shortness of breath, cough. CARDIOVASCULAR: No chest pain, palpitations. GASTROINTESTINAL: As per HPI. GENITOURINARY: No dysuria, urinary frequency, hematuria. NEURO: No syncope, presyncope, headache.   Remainder:  ROS NEGATIVE    Past Medical History:      Diagnosis Date    Cancer [facility]) 2019    breast left    Depression     Hypothyroidism     Irritable bowel syndrome     not diagnosed, patient controls with diet    Low sodium levels 2020    Thyroid disease      Patient Active Problem List   Diagnosis    Ductal carcinoma in situ (DCIS) of left breast    Malignant neoplasm of left female breast, unspecified estrogen receptor status, unspecified site of breast ([facility] [address]    Poor venous access    Low sodium levels    Other osteoporosis without current pathological fracture    TIA (transient ischemic attack)    Anxiety    Stroke-like symptoms    Hyponatremia        Past Surgical History:      Procedure Laterality Date    APPENDECTOMY      BREAST SURGERY      mastectomy left 2019    CHOLECYSTECTOMY      HYSTERECTOMY, TOTAL ABDOMINAL      oophorectomy    INSERTION / REMOVAL / REPLACEMENT VENOUS ACCESS CATHETER N/A 2019    MEDIPORT INSERTION performed by Dereck Giordano MD at [address] [place] [address] Left 2019    LEFT BREAST SIMPLE  MASTECTOMY, BLUE DYE INJECTION, LEFT AXILLARY  SENTINEL NODE BIOPSY POSSIBLE LEFT AXILLARY DISSECTION -- Wallene Paty -- PECTORAL BLOCK [facility]         Family History:  Family History   Problem Relation Age of Onset    Cancer Mother 66        liver    Cancer Brother 58        kidney       Medications:  Reviewed and reconciled.     Social History:  Social History     Socioeconomic History    Marital status:      Spouse name: Not on file    Number of children: Not on file    Years of education: Not on file    Highest education level: Not on file   Occupational History    Not on file   Social Needs    Financial resource strain: Not on file    Food insecurity     Worry: Not on file     Inability: Not on file   [employer] needs     Medical: Not on file     Non-medical: Not on file   Tobacco Use    Smoking status: Former Smoker     Packs/day: 2.00     Years: 35.00     Pack years: 70.00     Last attempt to quit:      Years since quittin.8    Smokeless tobacco: Never Used   Substance and Sexual Activity    Alcohol use: Not Currently    Drug use: Never    Sexual activity: Not Currently   Lifestyle    Physical activity     Days per week: Not on file     Minutes per session: Not on file    Stress: Not on file   Relationships    Social connections     Talks on phone: Not on file     Gets together: Not on file     Attends [employer] service: Not on file     Active member of club or organization: Not on file     Attends meetings of clubs or organizations: Not on file     Relationship status: Not on file    Intimate partner violence     Fear of current or ex partner: Not on file     Emotionally abused: Not on file     Physically abused: Not on file     Forced sexual activity: Not on file   Other Topics Concern    Not on file   Social History Narrative    Not on file       Allergies: Allergies   Allergen Reactions    Arimidex [Anastrozole] Other (See Comments)     Hallucination, anxious, fatigue, stomach upset       Effexor [Venlafaxine] Other (See Comments)     Hallucination, vision issues, felt like she was going crazy, anxious    Lexapro [Escitalopram Oxalate] Diarrhea and Other (See Comments)     And fatigue         OB/GYN:  Age of menarche was 22, medically induced. Age of menopause was 31. Patient admits to hormonal therapy, progesterone, premarin. Oral contraceptives? To start periods. Patient is       Physical Exam:  BP (!) 150/66 (Site: Right Upper Arm, Position: Sitting, Cuff Size: Medium Adult)   Pulse 90   Temp 97.5 °F (36.4 °C) (Temporal)   Ht 5' 4\" (1.626 m)   Wt 125 lb (56.7 kg)   BMI 21.46 kg/m²     GENERAL: Alert, oriented x 3, not in acute distress. HEENT: PERRLA; EOMI. Oropharynx clear. NECK: Supple. No palpable cervical or supraclavicular lymphadenopathy. LUNGS: Good air entry bilaterally. No wheezing, crackles or rhonchi. CARDIOVASCULAR: Regular rate. No murmurs, rubs or gallops.    BREASTS: Right breast exam is negative for any skin changes, no nipple discharge, she has nodularity and tenderness in the lower inner and outer quadrants, no palpable right axillary adenopathy. She is status post left mastectomy, the incision is healing nicely, no palpable left axillary lymphadenopathy. CHEST: This post right port placement  ABDOMEN: Soft. Non-tender, non-distended. Positive bowel sounds. EXTREMITIES: Without clubbing, cyanosis, or edema. NEUROLOGIC: No focal deficits. ECOG PS 1      Impression/Plan:      The patient is a 75 y.o. lady with a past medical history significant for thyroid disease, depression, and IBS, who had presented with an abnormal screening mammogram, she had a left breast biopsy done revealing high-grade DCIS, ER positive less than 10%, NC negative, she underwent on 11/20/2019 a left mastectomy with sentinel lymph node excisional biopsy, she was found to have invasive ductal carcinoma, tumor size 8 mm, grade 2, with associated DCIS, margins are negative, 2 sentinel lymph nodes were removed, they were both negative for metastatic disease, final pathologic stage pT1b(sn) pN0Mx, ER -0% NC -0% HER-2/miles +3+ by IHC, prognostic stage IA. I discussed with the patient her diagnosis, risks of her tumor, prognosis and recommendations for systemic therapy. The patient has a small HER-2/miles positive disease, tumor size is 8 mm, adjuvant treatment with Taxol and Herceptin is recommended, schedule and the side effects of the treatment were reviewed with the patient. 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 The patient was started on tamoxifen on 6/4/2020, she was given Effexor for depression, she had side effects from it, was discontinued and she was not on Lexapro, she does not like it and wanted to go back on Paxil.   She was started on Arimidex on 8/20/2020, and Paxil, the patient has been stressed out, and she feels that she cannot take the Arimidex until her her  is done with his surgery. She will continue to be on Paxil, she was started on Xanax by her PCP, will hold endocrine therapy for now as well as Prolia. Patient had a 2D echocardiogram done, LVEF is 65%, adequate for treatment with Herceptin, she had a port placed, she was started on adjuvant therapy with Taxol and Herceptin on 1/2/2020. She completed Taxol on 3/19/2020. She had a repeat 2D echocardiogram done on 4/13/2020, revealing an LVEF of 73%, adequate to continue with Herceptin. For single agent Herceptin today, 10/22/2020 labs reviewed, proceed with Herceptin, cycle #15. Her sodium level had improved to 132, she had increase the salt in her food, and decreased the fluids. She will reschedule her appointment with nephrology. Anxiety and depression, continue follow-up with psychiatry. She had a 2D echocardiogram done on 8/28/2020, revealing an LVEF of 65 to 70%, overall stable. Right breast tenderness and lumps, ordered right diagnostic mammogram and ultrasound. They were done on 2/25/2020, there was no mammographic/sonographic evidence of malignancy, this was BI-RADS Category 1, negative. She will be due for a right breast screening mammogram in February 2021. Flu shot today. RTC in 3 weeks for labs and treatment with single agent Herceptin. Thank you for allowing us to participate in the care of Mrs. Monica Mg.     Compa Pérez MD   HEMATOLOGY/MEDICAL [address]  [address] [id number]  Dept: [place]: [phone]

## 2020-10-23 ENCOUNTER — TELEPHONE (OUTPATIENT)
Dept: RADIATION ONCOLOGY | Age: 75
End: 2020-10-23

## 2020-10-23 NOTE — TELEPHONE ENCOUNTER
SW contacted pt in follow up today for psychosocial support and counseling services. Pt did sound markedly improved during conversation and reports that she has been seeing a psychiatrist. Due to county restrictions (pt resides in [place]) she has been attending [facility]. She has started a new medication regimen of Neurontin and, Remeron, and xanax. She reports she has been taking these for a little over a week now with noted improvement. Pt's thoughts much clearer on the phone as evidenced by logical process, euthymic and non depressed mood. She was appreciative of referral and states she will continue to follow with SW for additional support. No other needs identified at this time.  Dianna Hodge, MSW, LISW-S, OSW-C  Oncology Social Worker

## 2020-11-12 ENCOUNTER — OFFICE VISIT (OUTPATIENT)
Dept: ONCOLOGY | Age: 75
End: 2020-11-12
Payer: MEDICARE

## 2020-11-12 ENCOUNTER — HOSPITAL ENCOUNTER (OUTPATIENT)
Dept: INFUSION THERAPY | Age: 75
Discharge: HOME | End: 2020-11-12
Payer: MEDICARE

## 2020-11-12 VITALS
SYSTOLIC BLOOD PRESSURE: 141 MMHG | HEART RATE: 74 BPM | OXYGEN SATURATION: 97 % | WEIGHT: 129 LBS | TEMPERATURE: 96.8 F | BODY MASS INDEX: 22.14 KG/M2 | DIASTOLIC BLOOD PRESSURE: 66 MMHG

## 2020-11-12 VITALS — HEART RATE: 70 BPM | DIASTOLIC BLOOD PRESSURE: 68 MMHG | SYSTOLIC BLOOD PRESSURE: 145 MMHG | RESPIRATION RATE: 16 BRPM

## 2020-11-12 DIAGNOSIS — D05.12 DUCTAL CARCINOMA IN SITU (DCIS) OF LEFT BREAST: ICD-10-CM

## 2020-11-12 DIAGNOSIS — C50.912 MALIGNANT NEOPLASM OF LEFT FEMALE BREAST, UNSPECIFIED ESTROGEN RECEPTOR STATUS, UNSPECIFIED SITE OF BREAST (HCC): Primary | ICD-10-CM

## 2020-11-12 LAB
ALBUMIN SERPL-MCNC: 3.9 G/DL (ref 3.5–5.2)
ALP BLD-CCNC: 100 U/L (ref 35–104)
ALT SERPL-CCNC: 13 U/L (ref 0–32)
ANION GAP SERPL CALCULATED.3IONS-SCNC: 7 MMOL/L (ref 7–16)
AST SERPL-CCNC: 23 U/L (ref 0–31)
BASOPHILS ABSOLUTE: 0.03 E9/L (ref 0–0.2)
BASOPHILS RELATIVE PERCENT: 0.6 % (ref 0–2)
BILIRUB SERPL-MCNC: <0.2 MG/DL (ref 0–1.2)
BUN BLDV-MCNC: 18 MG/DL (ref 8–23)
CALCIUM SERPL-MCNC: 8.8 MG/DL (ref 8.6–10.2)
CHLORIDE BLD-SCNC: 103 MMOL/L (ref 98–107)
CO2: 27 MMOL/L (ref 22–29)
CREAT SERPL-MCNC: 1.1 MG/DL (ref 0.5–1)
EOSINOPHILS ABSOLUTE: 0.14 E9/L (ref 0.05–0.5)
EOSINOPHILS RELATIVE PERCENT: 2.8 % (ref 0–6)
GFR AFRICAN AMERICAN: 58
GFR NON-AFRICAN AMERICAN: 48 ML/MIN/1.73
GLUCOSE BLD-MCNC: 82 MG/DL (ref 74–99)
HCT VFR BLD CALC: 40.4 % (ref 34–48)
HEMOGLOBIN: 12.9 G/DL (ref 11.5–15.5)
IMMATURE GRANULOCYTES #: 0.01 E9/L
IMMATURE GRANULOCYTES %: 0.2 % (ref 0–5)
LYMPHOCYTES ABSOLUTE: 1.49 E9/L (ref 1.5–4)
LYMPHOCYTES RELATIVE PERCENT: 30.2 % (ref 20–42)
MAGNESIUM: 2.2 MG/DL (ref 1.6–2.6)
MCH RBC QN AUTO: 29.1 PG (ref 26–35)
MCHC RBC AUTO-ENTMCNC: 31.9 % (ref 32–34.5)
MCV RBC AUTO: 91 FL (ref 80–99.9)
MONOCYTES ABSOLUTE: 0.45 E9/L (ref 0.1–0.95)
MONOCYTES RELATIVE PERCENT: 9.1 % (ref 2–12)
NEUTROPHILS ABSOLUTE: 2.82 E9/L (ref 1.8–7.3)
NEUTROPHILS RELATIVE PERCENT: 57.1 % (ref 43–80)
PDW BLD-RTO: 13.2 FL (ref 11.5–15)
PLATELET # BLD: 261 E9/L (ref 130–450)
PMV BLD AUTO: 10 FL (ref 7–12)
POTASSIUM SERPL-SCNC: 4.8 MMOL/L (ref 3.5–5)
RBC # BLD: 4.44 E12/L (ref 3.5–5.5)
SODIUM BLD-SCNC: 137 MMOL/L (ref 132–146)
TOTAL PROTEIN: 6.7 G/DL (ref 6.4–8.3)
WBC # BLD: 4.9 E9/L (ref 4.5–11.5)

## 2020-11-12 PROCEDURE — 4040F PNEUMOC VAC/ADMIN/RCVD: CPT | Performed by: INTERNAL MEDICINE

## 2020-11-12 PROCEDURE — G8399 PT W/DXA RESULTS DOCUMENT: HCPCS | Performed by: INTERNAL MEDICINE

## 2020-11-12 PROCEDURE — G8484 FLU IMMUNIZE NO ADMIN: HCPCS | Performed by: INTERNAL MEDICINE

## 2020-11-12 PROCEDURE — 96361 HYDRATE IV INFUSION ADD-ON: CPT

## 2020-11-12 PROCEDURE — 99214 OFFICE O/P EST MOD 30 MIN: CPT | Performed by: INTERNAL MEDICINE

## 2020-11-12 PROCEDURE — 85025 COMPLETE CBC W/AUTO DIFF WBC: CPT

## 2020-11-12 PROCEDURE — 83735 ASSAY OF MAGNESIUM: CPT

## 2020-11-12 PROCEDURE — 1036F TOBACCO NON-USER: CPT | Performed by: INTERNAL MEDICINE

## 2020-11-12 PROCEDURE — 80053 COMPREHEN METABOLIC PANEL: CPT

## 2020-11-12 PROCEDURE — 6360000002 HC RX W HCPCS: Performed by: INTERNAL MEDICINE

## 2020-11-12 PROCEDURE — 2580000003 HC RX 258: Performed by: INTERNAL MEDICINE

## 2020-11-12 PROCEDURE — G8427 DOCREV CUR MEDS BY ELIG CLIN: HCPCS | Performed by: INTERNAL MEDICINE

## 2020-11-12 PROCEDURE — 2580000003 HC RX 258

## 2020-11-12 PROCEDURE — G8420 CALC BMI NORM PARAMETERS: HCPCS | Performed by: INTERNAL MEDICINE

## 2020-11-12 PROCEDURE — 1090F PRES/ABSN URINE INCON ASSESS: CPT | Performed by: INTERNAL MEDICINE

## 2020-11-12 PROCEDURE — 96413 CHEMO IV INFUSION 1 HR: CPT

## 2020-11-12 PROCEDURE — 1123F ACP DISCUSS/DSCN MKR DOCD: CPT | Performed by: INTERNAL MEDICINE

## 2020-11-12 PROCEDURE — 96360 HYDRATION IV INFUSION INIT: CPT

## 2020-11-12 RX ORDER — SODIUM CHLORIDE 0.9 % (FLUSH) 0.9 %
10 SYRINGE (ML) INJECTION PRN
Status: CANCELLED | OUTPATIENT
Start: 2020-11-12

## 2020-11-12 RX ORDER — SODIUM CHLORIDE 9 MG/ML
INJECTION, SOLUTION INTRAVENOUS ONCE
Status: COMPLETED | OUTPATIENT
Start: 2020-11-12 | End: 2020-11-12

## 2020-11-12 RX ORDER — DIPHENHYDRAMINE HYDROCHLORIDE 50 MG/ML
50 INJECTION INTRAMUSCULAR; INTRAVENOUS ONCE
Status: CANCELLED | OUTPATIENT
Start: 2020-11-12

## 2020-11-12 RX ORDER — SODIUM CHLORIDE 9 MG/ML
INJECTION, SOLUTION INTRAVENOUS ONCE
Status: CANCELLED
Start: 2020-11-12

## 2020-11-12 RX ORDER — MEPERIDINE HYDROCHLORIDE 25 MG/ML
12.5 INJECTION INTRAMUSCULAR; INTRAVENOUS; SUBCUTANEOUS ONCE
Status: CANCELLED | OUTPATIENT
Start: 2020-11-12

## 2020-11-12 RX ORDER — SODIUM CHLORIDE 9 MG/ML
20 INJECTION, SOLUTION INTRAVENOUS ONCE
Status: CANCELLED | OUTPATIENT
Start: 2020-11-12

## 2020-11-12 RX ORDER — EPINEPHRINE 1 MG/ML
0.3 INJECTION, SOLUTION, CONCENTRATE INTRAVENOUS PRN
Status: CANCELLED | OUTPATIENT
Start: 2020-11-12

## 2020-11-12 RX ORDER — HEPARIN SODIUM (PORCINE) LOCK FLUSH IV SOLN 100 UNIT/ML 100 UNIT/ML
500 SOLUTION INTRAVENOUS PRN
Status: CANCELLED | OUTPATIENT
Start: 2020-11-12

## 2020-11-12 RX ORDER — SODIUM CHLORIDE 0.9 % (FLUSH) 0.9 %
10 SYRINGE (ML) INJECTION PRN
Status: DISCONTINUED | OUTPATIENT
Start: 2020-11-12 | End: 2020-11-13 | Stop reason: HOSPADM

## 2020-11-12 RX ORDER — HEPARIN SODIUM (PORCINE) LOCK FLUSH IV SOLN 100 UNIT/ML 100 UNIT/ML
500 SOLUTION INTRAVENOUS PRN
Status: DISCONTINUED | OUTPATIENT
Start: 2020-11-12 | End: 2020-11-13 | Stop reason: HOSPADM

## 2020-11-12 RX ORDER — SODIUM CHLORIDE 9 MG/ML
INJECTION, SOLUTION INTRAVENOUS
Status: COMPLETED
Start: 2020-11-12 | End: 2020-11-12

## 2020-11-12 RX ORDER — METHYLPREDNISOLONE SODIUM SUCCINATE 125 MG/2ML
125 INJECTION, POWDER, LYOPHILIZED, FOR SOLUTION INTRAMUSCULAR; INTRAVENOUS ONCE
Status: CANCELLED | OUTPATIENT
Start: 2020-11-12

## 2020-11-12 RX ORDER — SODIUM CHLORIDE 9 MG/ML
INJECTION, SOLUTION INTRAVENOUS CONTINUOUS
Status: CANCELLED | OUTPATIENT
Start: 2020-11-12

## 2020-11-12 RX ADMIN — TRASTUZUMAB 357 MG: 150 INJECTION, POWDER, LYOPHILIZED, FOR SOLUTION INTRAVENOUS at 10:28

## 2020-11-12 RX ADMIN — SODIUM CHLORIDE: 9 INJECTION, SOLUTION INTRAVENOUS at 10:24

## 2020-11-12 RX ADMIN — Medication 500 UNITS: at 12:10

## 2020-11-12 RX ADMIN — SODIUM CHLORIDE, PRESERVATIVE FREE 10 ML: 5 INJECTION INTRAVENOUS at 12:10

## 2020-11-12 NOTE — PROGRESS NOTES
[address] [address]  Dept: [phone]  Loc: [phone]  Attending Progress Note      Reason for Visit:   Left breast cancer. Referring Physician: Tosin Love MD.    PCP:  KAREN Sanchez PA-C    History of Present Illness: The patient is a 67 y.o. lady with a past medical history significant for thyroid disease, depression, and IBS, who had presented with an abnormal screening mammogram,    8/20/19  Bilateral screening mammogram  [facility]         FINDINGS: No suspicious masses, calcifications, or distortions are   identified on the right. On the left there is a new focal nodular   asymmetry at 12:00, with adjacent linear branching calcifications. Spot compression views is recommended for the asymmetry with   ultrasound, and spot magnification views for the calcifications. .             Impression   1. Right breast no mammographic evidence of malignancy           2. Left breast new focal asymmetry at 12:00, new microcalcifications.       Recommendation:   1. Right breast annual screening mammogram.   2. Left breast additional views spot compression and spot   magnification along with ultrasound.       BI-RADS Category 0:  Incomplete- Needs Additional Imaging Evaluation             8/29/19  Left Diagnostic mammogram   [facility]         FINDINGS:        A small partially obscured mass is identified in the upper outer left   breast measuring approximately 5-6 mm. Additionally, there is a small   segmentally distributed cluster of pleomorphic microcalcifications   located superiorly near the mass extending to the middle third depth.       Ultrasound confirmed a small irregular shaped hypoechoic mass with   circumscribed margins at the 12:00 position measuring 5 mm in maximal   dimension.           Impression       1. Small solid suspicious mass at the 12:00 position.    2. Segmentally distributed pleomorphic microcalcifications located   near the breast mass likely at the 12:00 position.       RECOMMENDATION:       Recommend ultrasound core biopsy of the mass seen on ultrasound and   stereotactic biopsy of the microcalcifications.       BI-RADS Category 5: Highly Suggestive of Malignancy          8/29/19  Left Breast US   [facility]         FINDINGS:        A small partially obscured mass is identified in the upper outer left   breast measuring approximately 5-6 mm. Additionally, there is a small   segmentally distributed cluster of pleomorphic microcalcifications   located superiorly near the mass extending to the middle third depth.       Ultrasound confirmed a small irregular shaped hypoechoic mass with   circumscribed margins at the 12:00 position measuring 5 mm in maximal   dimension.           Impression       1. Small solid suspicious mass at the 12:00 position. 2. Segmentally distributed pleomorphic microcalcifications located   near the breast mass likely at the 12:00 position.       RECOMMENDATION:       Recommend ultrasound core biopsy of the mass seen on ultrasound and   stereotactic biopsy of the microcalcifications.       BI-RADS Category 5: Highly Suggestive of Malignancy              She underwent a stereotactic guided left breast core biopsy at 12 o'clock position on September 10 , 2019.a single top hat shaped marker clip was deployed into the site.     She underwent an ultrasound guided biopsy of the left breast at the 12 o'clock position on September 17, 2019, A post-surgical ribbon shaped microclip was placed.      Pathological evaluation completed at [facility]):     9/10/19  Final Surgical Pathology Report  Diagnosis:  A. Left breast, 12:00, biopsy: High-grade ductal carcinoma in situ,  cannot exclude microinvasion, see comment. B. Left breast 12:00, additional, biopsy: High-grade ductal carcinoma in  situ, cannot exclude microinvasion, see comment.     Breast Cancer Marker Studies:  Estrogen Receptors (ER): Positive         Percentage of cells positive: <10%         Intensity: Weak    Progesterone Receptors (ME): Negative        Internal control cells present and stain as expected: No internal  control present     9/17/19 Final Surgical Pathology Report    Diagnosis:  Mass, Left breast, 12:00, core biopsy: Segments of benign fibroadipose  tissue and scant skeletal muscle, see comment. Comment:   Epithelial lined mammary ducts and lobules are not identified  in the tissue sample. Correlation with clinical and radiologic findings  is essential to assure adequacy of tissue sampling. In the setting of a  mass clinically or radiologically suspicious for neoplasm, additional  tissue sampling is recommended. The patient underwent on 11/20/2019 a left mastectomy with sentinel lymph node excisional biopsy, pathology:    CANCER CASE SUMMARY:  Procedure: Left simple mastectomy  Specimen laterality: Left  Tumor site: 12:00 position  Tumor size: 8.0 mm in greatest dimension  Histologic type:  Invasive carcinoma of no special type (invasive ductal  carcinoma, not otherwise specified)  Histologic grade (Deandre histologic score):   Glandular differentiation: Score 3   Nuclear pleomorphism: Score 2   Mitotic rate: Score 2   Overall grade: Grade 2 (score of 7)  Tumor focality: Single focus of invasive carcinoma  Ductal carcinoma in situ (DCIS): Present, adjacent to the invasive  carcinoma  Invasive carcinoma margins:   Margins uninvolved by invasive carcinoma    Distance from closest margin: 5.0 mm from the posterior margin  DCIS margins:   Margin uninvolved by DCIS    Distance from closest margin: 10.0 mm from the posterior margin  Regional lymph nodes: Uninvolved by tumor cells   Total number of lymph nodes examined: 2 (both sentinel nodes)  Treatment effect: No known presurgical therapy  Pathologic stage classification (pTNM, AJCC 8th edition):   pT1b   (sn) pN0    Ancillary studies:  Calponin immunostain on block A4 shows the invasive carcinoma lacking a  myoepithelial layer. P63 immunostain on block A6 shows the invasive carcinoma lacking a  myoepithelial layer, with a myoepithelial layer retained around the DCIS. Breast Cancer Marker Studies (Block A6):  Negative: 0%        No internal control cells present. Progesterone Receptors (GA):  Negative: 0%        No internal control cells present. Her-2/miles (c-erb B-2) protein expression: Positive (Score 3+)    The patient was started on adjuvant treatment with Herceptin and Taxol on 1/2/2020, she completed Taxol  on 3/19/2020. She is on single agent Herceptin. The patient was started on tamoxifen on 6/4/2020, she was given Effexor for depression, she had side effects from it, was discontinued and she was not on Lexapro, she did not like it. She was started on Arimidex on 8/20/2020, the patient has been stressed out, her  has a bladder tumor and will need to have a surgery done, she was in the ED on 9/6/2020, she was feeling tired and short of breath, she feels that her symptoms were because of the Arimidex and because she was off the Paxil prior to that. Arimidex has been discontinued. Arlin Watters is finally feeling much better. She has been restricting her fluid intake due to hyponatremia. Review of Systems;  CONSTITUTIONAL: No fever, chills. Good appetite, feeling tired. ENMT: Eyes: No diplopia; Nose: Positive for epistaxis. Mouth: No sore throat. RESPIRATORY: No hemoptysis, shortness of breath, cough. CARDIOVASCULAR: No chest pain, palpitations. GASTROINTESTINAL: As per HPI. GENITOURINARY: No dysuria, urinary frequency, hematuria. NEURO: No syncope, presyncope, headache.   Remainder:  ROS NEGATIVE    Past Medical History:      Diagnosis Date    Cancer [facility]) 2019    breast left    Depression     Hypothyroidism     Irritable bowel syndrome     not diagnosed, patient controls with diet    Low sodium levels 6/18/2020    Thyroid disease      Patient Active Problem List   Diagnosis    Ductal carcinoma in situ (DCIS) of left breast    Malignant neoplasm of left female breast, unspecified estrogen receptor status, unspecified site of breast ([facility] [address]    Poor venous access    Low sodium levels    Other osteoporosis without current pathological fracture    TIA (transient ischemic attack)    Anxiety    Stroke-like symptoms    Hyponatremia        Past Surgical History:      Procedure Laterality Date    APPENDECTOMY      BREAST SURGERY      mastectomy left 2019    CHOLECYSTECTOMY      HYSTERECTOMY, TOTAL ABDOMINAL      oophorectomy    INSERTION / REMOVAL / REPLACEMENT VENOUS ACCESS CATHETER N/A 2019    MEDIPORT INSERTION performed by Keila Mandujano MD at [facility] Left 2019    LEFT BREAST SIMPLE  MASTECTOMY, BLUE DYE INJECTION, LEFT AXILLARY  SENTINEL NODE BIOPSY POSSIBLE LEFT AXILLARY DISSECTION -- Kaley Fair -- PECTORAL BLOCK performed by Ayan Castillo MD at [place] [id number]         Family History:  Family History   Problem Relation Age of Onset    Cancer Mother 66        liver    Cancer Brother 58        kidney       Medications:  Reviewed and reconciled.     Social History:  Social History     Socioeconomic History    Marital status:      Spouse name: Not on file    Number of children: Not on file    Years of education: Not on file    Highest education level: Not on file   Occupational History    Not on file   Social Needs    Financial resource strain: Not on file    Food insecurity     Worry: Not on file     Inability: Not on file   [employer] needs     Medical: Not on file     Non-medical: Not on file   Tobacco Use    Smoking status: Former Smoker     Packs/day: 2.00     Years: 35.00     Pack years: 70.00     Last attempt to quit:      Years since quittin.8    Smokeless tobacco: Never Used   Substance and Sexual Activity    Alcohol use: Not Currently    Drug use: Never    Sexual activity: right axillary adenopathy. She is status post left mastectomy, the incision is healing nicely, no palpable left axillary lymphadenopathy. CHEST: This post right port placement  ABDOMEN: Soft. Non-tender, non-distended. Positive bowel sounds. EXTREMITIES: Without clubbing, cyanosis, or edema. NEUROLOGIC: No focal deficits. ECOG PS 1      Impression/Plan:      The patient is a 67 y.o. lady with a past medical history significant for thyroid disease, depression, and IBS, who had presented with an abnormal screening mammogram, she had a left breast biopsy done revealing high-grade DCIS, ER positive less than 10%, TX negative, she underwent on 11/20/2019 a left mastectomy with sentinel lymph node excisional biopsy, she was found to have invasive ductal carcinoma, tumor size 8 mm, grade 2, with associated DCIS, margins are negative, 2 sentinel lymph nodes were removed, they were both negative for metastatic disease, final pathologic stage pT1b(sn) pN0Mx, ER -0% TX -0% HER-2/miles +3+ by IHC, prognostic stage IA. I discussed with the patient her diagnosis, risks of her tumor, prognosis and recommendations for systemic therapy. The patient has a small HER-2/miles positive disease, tumor size is 8 mm, adjuvant treatment with Taxol and Herceptin is recommended, schedule and the side effects of the treatment were reviewed with the patient. 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 The patient was started on tamoxifen on 6/4/2020, she was given Effexor for depression, she had side effects from it, was discontinued and she was not on Lexapro, she does not like it and wanted to go back on Paxil. She was started on Arimidex on 8/20/2020, and Paxil, the patient has been stressed out, and she feels that she cannot take the Arimidex until her her  is done with his surgery.   She will continue to be on Paxil, she was started on Xanax by her PCP, will hold endocrine therapy for now as well as Prolia. Patient had a 2D echocardiogram done, LVEF is 65%, adequate for treatment with Herceptin, she had a port placed, she was started on adjuvant therapy with Taxol and Herceptin on 1/2/2020. She completed Taxol on 3/19/2020. She had a repeat 2D echocardiogram done on 4/13/2020, revealing an LVEF of 73%, adequate to continue with Herceptin. For single agent Herceptin today 11/12/2020, labs reviewed, proceed with Herceptin, cycle #16. Her sodium level had normalized, creatinine is 1.1, she will receive 1 L of NSS. Anxiety and depression, continue follow-up with psychiatry. She had a 2D echocardiogram done on 8/28/2020, revealing an LVEF of 65 to 70%, overall stable. Right breast tenderness and lumps, ordered right diagnostic mammogram and ultrasound. They were done on 2/25/2020, there was no mammographic/sonographic evidence of malignancy, this was BI-RADS Category 1, negative. She will be due for a right breast screening mammogram in February 2021. RTC in 3 weeks for labs and treatment with single agent Herceptin (cycle #17). Thank you for allowing us to participate in the care of Mrs. Amandeep Dia.     Krista Cuevas MD   HEMATOLOGY/MEDICAL [facility]  [address] [address]  Dept: [place]: [phone]

## 2020-12-02 ENCOUNTER — TELEPHONE (OUTPATIENT)
Dept: BREAST CENTER | Age: 75
End: 2020-12-02

## 2020-12-02 NOTE — TELEPHONE ENCOUNTER
Called patient and left message to see if it is ok to change to [facility] as provider due to conflict in providers.  Will await call back

## 2020-12-03 ENCOUNTER — TELEPHONE (OUTPATIENT)
Dept: INFUSION THERAPY | Age: 75
End: 2020-12-03

## 2020-12-03 ENCOUNTER — HOSPITAL ENCOUNTER (OUTPATIENT)
Dept: INFUSION THERAPY | Age: 75
Discharge: HOME | End: 2020-12-03
Payer: MEDICARE

## 2020-12-03 ENCOUNTER — OFFICE VISIT (OUTPATIENT)
Dept: ONCOLOGY | Age: 75
End: 2020-12-03
Payer: MEDICARE

## 2020-12-03 VITALS
BODY MASS INDEX: 23.17 KG/M2 | WEIGHT: 135 LBS | HEART RATE: 67 BPM | TEMPERATURE: 96.7 F | OXYGEN SATURATION: 97 % | DIASTOLIC BLOOD PRESSURE: 73 MMHG | SYSTOLIC BLOOD PRESSURE: 143 MMHG

## 2020-12-03 VITALS
DIASTOLIC BLOOD PRESSURE: 65 MMHG | HEART RATE: 68 BPM | SYSTOLIC BLOOD PRESSURE: 141 MMHG | RESPIRATION RATE: 18 BRPM | TEMPERATURE: 97 F

## 2020-12-03 DIAGNOSIS — C50.912 MALIGNANT NEOPLASM OF LEFT FEMALE BREAST, UNSPECIFIED ESTROGEN RECEPTOR STATUS, UNSPECIFIED SITE OF BREAST (HCC): Primary | ICD-10-CM

## 2020-12-03 DIAGNOSIS — D05.12 DUCTAL CARCINOMA IN SITU (DCIS) OF LEFT BREAST: ICD-10-CM

## 2020-12-03 LAB
ALBUMIN SERPL-MCNC: 3.8 G/DL (ref 3.5–5.2)
ALP BLD-CCNC: 116 U/L (ref 35–104)
ALT SERPL-CCNC: 19 U/L (ref 0–32)
ANION GAP SERPL CALCULATED.3IONS-SCNC: 8 MMOL/L (ref 7–16)
AST SERPL-CCNC: 27 U/L (ref 0–31)
BASOPHILS ABSOLUTE: 0.05 E9/L (ref 0–0.2)
BASOPHILS RELATIVE PERCENT: 1 % (ref 0–2)
BILIRUB SERPL-MCNC: <0.2 MG/DL (ref 0–1.2)
BUN BLDV-MCNC: 12 MG/DL (ref 8–23)
CALCIUM SERPL-MCNC: 8.6 MG/DL (ref 8.6–10.2)
CHLORIDE BLD-SCNC: 98 MMOL/L (ref 98–107)
CO2: 27 MMOL/L (ref 22–29)
CREAT SERPL-MCNC: 1 MG/DL (ref 0.5–1)
EOSINOPHILS ABSOLUTE: 0.16 E9/L (ref 0.05–0.5)
EOSINOPHILS RELATIVE PERCENT: 3.3 % (ref 0–6)
GFR AFRICAN AMERICAN: >60
GFR NON-AFRICAN AMERICAN: 54 ML/MIN/1.73
GLUCOSE BLD-MCNC: 87 MG/DL (ref 74–99)
HCT VFR BLD CALC: 38.4 % (ref 34–48)
HEMOGLOBIN: 12.3 G/DL (ref 11.5–15.5)
IMMATURE GRANULOCYTES #: 0.01 E9/L
IMMATURE GRANULOCYTES %: 0.2 % (ref 0–5)
LYMPHOCYTES ABSOLUTE: 1.68 E9/L (ref 1.5–4)
LYMPHOCYTES RELATIVE PERCENT: 34.8 % (ref 20–42)
MAGNESIUM: 2.1 MG/DL (ref 1.6–2.6)
MCH RBC QN AUTO: 28.7 PG (ref 26–35)
MCHC RBC AUTO-ENTMCNC: 32 % (ref 32–34.5)
MCV RBC AUTO: 89.5 FL (ref 80–99.9)
MONOCYTES ABSOLUTE: 0.54 E9/L (ref 0.1–0.95)
MONOCYTES RELATIVE PERCENT: 11.2 % (ref 2–12)
NEUTROPHILS ABSOLUTE: 2.39 E9/L (ref 1.8–7.3)
NEUTROPHILS RELATIVE PERCENT: 49.5 % (ref 43–80)
PDW BLD-RTO: 12.8 FL (ref 11.5–15)
PLATELET # BLD: 258 E9/L (ref 130–450)
PMV BLD AUTO: 10.4 FL (ref 7–12)
POTASSIUM SERPL-SCNC: 4.1 MMOL/L (ref 3.5–5)
RBC # BLD: 4.29 E12/L (ref 3.5–5.5)
SODIUM BLD-SCNC: 133 MMOL/L (ref 132–146)
TOTAL PROTEIN: 6.4 G/DL (ref 6.4–8.3)
WBC # BLD: 4.8 E9/L (ref 4.5–11.5)

## 2020-12-03 PROCEDURE — G8420 CALC BMI NORM PARAMETERS: HCPCS | Performed by: INTERNAL MEDICINE

## 2020-12-03 PROCEDURE — 1090F PRES/ABSN URINE INCON ASSESS: CPT | Performed by: INTERNAL MEDICINE

## 2020-12-03 PROCEDURE — G8484 FLU IMMUNIZE NO ADMIN: HCPCS | Performed by: INTERNAL MEDICINE

## 2020-12-03 PROCEDURE — G8399 PT W/DXA RESULTS DOCUMENT: HCPCS | Performed by: INTERNAL MEDICINE

## 2020-12-03 PROCEDURE — 1036F TOBACCO NON-USER: CPT | Performed by: INTERNAL MEDICINE

## 2020-12-03 PROCEDURE — G8427 DOCREV CUR MEDS BY ELIG CLIN: HCPCS | Performed by: INTERNAL MEDICINE

## 2020-12-03 PROCEDURE — 96413 CHEMO IV INFUSION 1 HR: CPT

## 2020-12-03 PROCEDURE — 6360000002 HC RX W HCPCS: Performed by: INTERNAL MEDICINE

## 2020-12-03 PROCEDURE — 1123F ACP DISCUSS/DSCN MKR DOCD: CPT | Performed by: INTERNAL MEDICINE

## 2020-12-03 PROCEDURE — 2580000003 HC RX 258: Performed by: INTERNAL MEDICINE

## 2020-12-03 PROCEDURE — 99214 OFFICE O/P EST MOD 30 MIN: CPT | Performed by: INTERNAL MEDICINE

## 2020-12-03 PROCEDURE — 80053 COMPREHEN METABOLIC PANEL: CPT

## 2020-12-03 PROCEDURE — 85025 COMPLETE CBC W/AUTO DIFF WBC: CPT

## 2020-12-03 PROCEDURE — 4040F PNEUMOC VAC/ADMIN/RCVD: CPT | Performed by: INTERNAL MEDICINE

## 2020-12-03 PROCEDURE — 83735 ASSAY OF MAGNESIUM: CPT

## 2020-12-03 RX ORDER — DIPHENHYDRAMINE HYDROCHLORIDE 50 MG/ML
50 INJECTION INTRAMUSCULAR; INTRAVENOUS ONCE
Status: CANCELLED | OUTPATIENT
Start: 2020-12-03

## 2020-12-03 RX ORDER — SODIUM CHLORIDE 9 MG/ML
INJECTION, SOLUTION INTRAVENOUS CONTINUOUS
Status: CANCELLED | OUTPATIENT
Start: 2020-12-03

## 2020-12-03 RX ORDER — SODIUM CHLORIDE 0.9 % (FLUSH) 0.9 %
10 SYRINGE (ML) INJECTION PRN
Status: DISCONTINUED | OUTPATIENT
Start: 2020-12-03 | End: 2020-12-04 | Stop reason: HOSPADM

## 2020-12-03 RX ORDER — EPINEPHRINE 1 MG/ML
0.3 INJECTION, SOLUTION, CONCENTRATE INTRAVENOUS PRN
Status: CANCELLED | OUTPATIENT
Start: 2020-12-03

## 2020-12-03 RX ORDER — METHYLPREDNISOLONE SODIUM SUCCINATE 125 MG/2ML
125 INJECTION, POWDER, LYOPHILIZED, FOR SOLUTION INTRAMUSCULAR; INTRAVENOUS ONCE
Status: CANCELLED | OUTPATIENT
Start: 2020-12-03

## 2020-12-03 RX ORDER — SODIUM CHLORIDE 9 MG/ML
INJECTION, SOLUTION INTRAVENOUS ONCE
Status: CANCELLED
Start: 2020-12-03

## 2020-12-03 RX ORDER — SODIUM CHLORIDE 0.9 % (FLUSH) 0.9 %
10 SYRINGE (ML) INJECTION PRN
Status: CANCELLED | OUTPATIENT
Start: 2020-12-03

## 2020-12-03 RX ORDER — SODIUM CHLORIDE 9 MG/ML
20 INJECTION, SOLUTION INTRAVENOUS ONCE
Status: COMPLETED | OUTPATIENT
Start: 2020-12-03 | End: 2020-12-03

## 2020-12-03 RX ORDER — HEPARIN SODIUM (PORCINE) LOCK FLUSH IV SOLN 100 UNIT/ML 100 UNIT/ML
500 SOLUTION INTRAVENOUS PRN
Status: CANCELLED | OUTPATIENT
Start: 2020-12-03

## 2020-12-03 RX ORDER — HEPARIN SODIUM (PORCINE) LOCK FLUSH IV SOLN 100 UNIT/ML 100 UNIT/ML
500 SOLUTION INTRAVENOUS PRN
Status: DISCONTINUED | OUTPATIENT
Start: 2020-12-03 | End: 2020-12-04 | Stop reason: HOSPADM

## 2020-12-03 RX ORDER — MEPERIDINE HYDROCHLORIDE 25 MG/ML
12.5 INJECTION INTRAMUSCULAR; INTRAVENOUS; SUBCUTANEOUS ONCE
Status: CANCELLED | OUTPATIENT
Start: 2020-12-03

## 2020-12-03 RX ORDER — SODIUM CHLORIDE 9 MG/ML
20 INJECTION, SOLUTION INTRAVENOUS ONCE
Status: CANCELLED | OUTPATIENT
Start: 2020-12-03

## 2020-12-03 RX ADMIN — SODIUM CHLORIDE, PRESERVATIVE FREE 10 ML: 5 INJECTION INTRAVENOUS at 11:20

## 2020-12-03 RX ADMIN — SODIUM CHLORIDE 20 ML/HR: 9 INJECTION, SOLUTION INTRAVENOUS at 10:35

## 2020-12-03 RX ADMIN — Medication 500 UNITS: at 11:20

## 2020-12-03 RX ADMIN — TRASTUZUMAB 357 MG: 150 INJECTION, POWDER, LYOPHILIZED, FOR SOLUTION INTRAVENOUS at 10:37

## 2020-12-03 NOTE — PROGRESS NOTES
[address] [address]  Dept: [phone]  Loc: [phone]  Attending Progress Note      Reason for Visit:   Left breast cancer. Referring Physician: Shai Hastings MD.    PCP:  KAREN Sierra PA-C    History of Present Illness: The patient is a 67 y.o. lady with a past medical history significant for thyroid disease, depression, and IBS, who had presented with an abnormal screening mammogram,    8/20/19  Bilateral screening mammogram  [facility]         FINDINGS: No suspicious masses, calcifications, or distortions are   identified on the right. On the left there is a new focal nodular   asymmetry at 12:00, with adjacent linear branching calcifications. Spot compression views is recommended for the asymmetry with   ultrasound, and spot magnification views for the calcifications. .             Impression   1. Right breast no mammographic evidence of malignancy           2. Left breast new focal asymmetry at 12:00, new microcalcifications.       Recommendation:   1. Right breast annual screening mammogram.   2. Left breast additional views spot compression and spot   magnification along with ultrasound.       BI-RADS Category 0:  Incomplete- Needs Additional Imaging Evaluation             8/29/19  Left Diagnostic mammogram   [facility]         FINDINGS:        A small partially obscured mass is identified in the upper outer left   breast measuring approximately 5-6 mm. Additionally, there is a small   segmentally distributed cluster of pleomorphic microcalcifications   located superiorly near the mass extending to the middle third depth.       Ultrasound confirmed a small irregular shaped hypoechoic mass with   circumscribed margins at the 12:00 position measuring 5 mm in maximal   dimension.           Impression       1. Small solid suspicious mass at the 12:00 position.    2. Segmentally distributed pleomorphic microcalcifications located   near the breast mass likely at the 12:00 position.       RECOMMENDATION:       Recommend ultrasound core biopsy of the mass seen on ultrasound and   stereotactic biopsy of the microcalcifications.       BI-RADS Category 5: Highly Suggestive of Malignancy          8/29/19  Left Breast US   [facility]         FINDINGS:        A small partially obscured mass is identified in the upper outer left   breast measuring approximately 5-6 mm. Additionally, there is a small   segmentally distributed cluster of pleomorphic microcalcifications   located superiorly near the mass extending to the middle third depth.       Ultrasound confirmed a small irregular shaped hypoechoic mass with   circumscribed margins at the 12:00 position measuring 5 mm in maximal   dimension.           Impression       1. Small solid suspicious mass at the 12:00 position. 2. Segmentally distributed pleomorphic microcalcifications located   near the breast mass likely at the 12:00 position.       RECOMMENDATION:       Recommend ultrasound core biopsy of the mass seen on ultrasound and   stereotactic biopsy of the microcalcifications.       BI-RADS Category 5: Highly Suggestive of Malignancy              She underwent a stereotactic guided left breast core biopsy at 12 o'clock position on September 10 , 2019.a single top hat shaped marker clip was deployed into the site.     She underwent an ultrasound guided biopsy of the left breast at the 12 o'clock position on September 17, 2019, A post-surgical ribbon shaped microclip was placed.      Pathological evaluation completed at [facility]):     9/10/19  Final Surgical Pathology Report  Diagnosis:  A. Left breast, 12:00, biopsy: High-grade ductal carcinoma in situ,  cannot exclude microinvasion, see comment. B. Left breast 12:00, additional, biopsy: High-grade ductal carcinoma in  situ, cannot exclude microinvasion, see comment.     Breast Cancer Marker Studies:  Estrogen Receptors (ER): Positive         Percentage of cells positive: <10%         Intensity: Weak    Progesterone Receptors (ME): Negative        Internal control cells present and stain as expected: No internal  control present     9/17/19 Final Surgical Pathology Report    Diagnosis:  Mass, Left breast, 12:00, core biopsy: Segments of benign fibroadipose  tissue and scant skeletal muscle, see comment. Comment:   Epithelial lined mammary ducts and lobules are not identified  in the tissue sample. Correlation with clinical and radiologic findings  is essential to assure adequacy of tissue sampling. In the setting of a  mass clinically or radiologically suspicious for neoplasm, additional  tissue sampling is recommended. The patient underwent on 11/20/2019 a left mastectomy with sentinel lymph node excisional biopsy, pathology:    CANCER CASE SUMMARY:  Procedure: Left simple mastectomy  Specimen laterality: Left  Tumor site: 12:00 position  Tumor size: 8.0 mm in greatest dimension  Histologic type:  Invasive carcinoma of no special type (invasive ductal  carcinoma, not otherwise specified)  Histologic grade (Deandre histologic score):   Glandular differentiation: Score 3   Nuclear pleomorphism: Score 2   Mitotic rate: Score 2   Overall grade: Grade 2 (score of 7)  Tumor focality: Single focus of invasive carcinoma  Ductal carcinoma in situ (DCIS): Present, adjacent to the invasive  carcinoma  Invasive carcinoma margins:   Margins uninvolved by invasive carcinoma    Distance from closest margin: 5.0 mm from the posterior margin  DCIS margins:   Margin uninvolved by DCIS    Distance from closest margin: 10.0 mm from the posterior margin  Regional lymph nodes: Uninvolved by tumor cells   Total number of lymph nodes examined: 2 (both sentinel nodes)  Treatment effect: No known presurgical therapy  Pathologic stage classification (pTNM, AJCC 8th edition):   pT1b   (sn) pN0    Ancillary studies:  Calponin immunostain on block A4 shows the invasive carcinoma lacking a  myoepithelial layer. P63 immunostain on block A6 shows the invasive carcinoma lacking a  myoepithelial layer, with a myoepithelial layer retained around the DCIS. Breast Cancer Marker Studies (Block A6):  Negative: 0%        No internal control cells present. Progesterone Receptors (ME):  Negative: 0%        No internal control cells present. Her-2/miles (c-erb B-2) protein expression: Positive (Score 3+)    The patient was started on adjuvant treatment with Herceptin and Taxol on 1/2/2020, she completed Taxol  on 3/19/2020. She is on single agent Herceptin. The patient was started on tamoxifen on 6/4/2020, she was given Effexor for depression, she had side effects from it, was discontinued and she was not on Lexapro, she did not like it. She was started on Arimidex on 8/20/2020, the patient has been stressed out, her  has a bladder tumor and will need to have a surgery done, she was in the ED on 9/6/2020, she was feeling tired and short of breath, she feels that her symptoms were because of the Arimidex and because she was off the Paxil prior to that. Arimidex has been discontinued. Mandy Bean is finally feeling much better. Her mood have improved. Her  will need to have BCG treatment. Review of Systems;  CONSTITUTIONAL: No fever, chills. Good appetite, feeling tired. ENMT: Eyes: No diplopia; Nose: Positive for epistaxis. Mouth: No sore throat. RESPIRATORY: No hemoptysis, shortness of breath, cough. CARDIOVASCULAR: No chest pain, palpitations. GASTROINTESTINAL: As per HPI. GENITOURINARY: No dysuria, urinary frequency, hematuria. NEURO: No syncope, presyncope, headache.   Remainder:  ROS NEGATIVE    Past Medical History:      Diagnosis Date    Cancer [facility]) 2019    breast left    Depression     Hypothyroidism     Irritable bowel syndrome     not diagnosed, patient controls with diet    Low sodium levels 6/18/2020    Thyroid disease      Patient Active Problem List Diagnosis    Ductal carcinoma in situ (DCIS) of left breast    Malignant neoplasm of left female breast, unspecified estrogen receptor status, unspecified site of breast ([facility] [address]    Poor venous access    Low sodium levels    Other osteoporosis without current pathological fracture    TIA (transient ischemic attack)    Anxiety    Stroke-like symptoms    Hyponatremia        Past Surgical History:      Procedure Laterality Date    APPENDECTOMY      BREAST SURGERY      mastectomy left 2019    CHOLECYSTECTOMY      HYSTERECTOMY, TOTAL ABDOMINAL      oophorectomy    INSERTION / REMOVAL / REPLACEMENT VENOUS ACCESS CATHETER N/A 2019    MEDIPORT INSERTION performed by Brunilda Go MD at [facility] Left 2019    LEFT BREAST SIMPLE  MASTECTOMY, BLUE DYE INJECTION, LEFT AXILLARY  SENTINEL NODE BIOPSY POSSIBLE LEFT AXILLARY DISSECTION -- Jo Pang -- PECTORAL BLOCK performed by Cody Mak MD at [facility] 22 TONSILLECTOMY         Family History:  Family History   Problem Relation Age of Onset    Cancer Mother 66        liver    Cancer Brother 58        kidney       Medications:  Reviewed and reconciled.     Social History:  Social History     Socioeconomic History    Marital status:      Spouse name: Not on file    Number of children: Not on file    Years of education: Not on file    Highest education level: Not on file   Occupational History    Not on file   Social Needs    Financial resource strain: Not on file    Food insecurity     Worry: Not on file     Inability: Not on file   [employer] needs     Medical: Not on file     Non-medical: Not on file   Tobacco Use    Smoking status: Former Smoker     Packs/day: 2.00     Years: 35.00     Pack years: 70.00     Last attempt to quit:      Years since quittin.9    Smokeless tobacco: Never Used   Substance and Sexual Activity    Alcohol use: Not Currently    Drug use: Never    quadrants, no palpable right axillary adenopathy. She is status post left mastectomy, the incision is healing nicely, no palpable left axillary lymphadenopathy. CHEST: This post right port placement  ABDOMEN: Soft. Non-tender, non-distended. Positive bowel sounds. EXTREMITIES: Without clubbing, cyanosis, or edema. NEUROLOGIC: No focal deficits. ECOG PS 1      Impression/Plan:      The patient is a 67 y.o. lady with a past medical history significant for thyroid disease, depression, and IBS, who had presented with an abnormal screening mammogram, she had a left breast biopsy done revealing high-grade DCIS, ER positive less than 10%, WA negative, she underwent on 11/20/2019 a left mastectomy with sentinel lymph node excisional biopsy, she was found to have invasive ductal carcinoma, tumor size 8 mm, grade 2, with associated DCIS, margins are negative, 2 sentinel lymph nodes were removed, they were both negative for metastatic disease, final pathologic stage pT1b(sn) pN0Mx, ER -0% WA -0% HER-2/miles +3+ by IHC, prognostic stage IA. I discussed with the patient her diagnosis, risks of her tumor, prognosis and recommendations for systemic therapy. The patient has a small HER-2/miles positive disease, tumor size is 8 mm, adjuvant treatment with Taxol and Herceptin is recommended, schedule and the side effects of the treatment were reviewed with the patient. 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 The patient was started on tamoxifen on 6/4/2020, she was given Effexor for depression, she had side effects from it, was discontinued and she was not on Lexapro, she does not like it and wanted to go back on Paxil. She was started on Arimidex on 8/20/2020, and Paxil, the patient has been stressed out, and she feels that she cannot take the Arimidex until her her  is done with his surgery.   She will continue to be on Paxil, she was started on Xanax by her PCP, will hold endocrine therapy for now as well as Prolia. Patient had a 2D echocardiogram done, LVEF is 65%, adequate for treatment with Herceptin, she had a port placed, she was started on adjuvant therapy with Taxol and Herceptin on 1/2/2020. She completed Taxol on 3/19/2020. She had a 2D echocardiogram done on 8/28/2020, revealing an LVEF of 65 to 70%, overall stable. For single agent Herceptin today 5/30/2020, labs reviewed, proceed with Herceptin, cycle #17, this is her last cycle. Anxiety and depression, continue follow-up with psychiatry. Right breast tenderness and lumps, ordered right diagnostic mammogram and ultrasound. They were done on 2/25/2020, there was no mammographic/sonographic evidence of malignancy, this was BI-RADS Category 1, negative. She will be due for a right breast screening mammogram in February 2021. RTC in 3 months. Thank you for allowing us to participate in the care of Mrs. Maru Boland.     Abdias Mercer MD   HEMATOLOGY/MEDICAL [place] [facility]  [address] [address]  Dept: [facility]: [phone]

## 2020-12-03 NOTE — TELEPHONE ENCOUNTER
Per Dr. Severiano Lipschutz, \"Do not worry about the Prolia injection at this time due to the patient was having some mental and family issues. I will see this patient in three months and a port flush in 6 weeks. \" Voiced understanding and updated pharmacy and scheduling.

## 2020-12-04 ENCOUNTER — TELEPHONE (OUTPATIENT)
Dept: BREAST CENTER | Age: 75
End: 2020-12-04

## 2020-12-04 ASSESSMENT — ENCOUNTER SYMPTOMS
SORE THROAT: 0
SHORTNESS OF BREATH: 0
NAUSEA: 0
EYE ITCHING: 0
BACK PAIN: 0
COUGH: 0
BLOOD IN STOOL: 0
ABDOMINAL DISTENTION: 0
CHEST TIGHTNESS: 0
SINUS PAIN: 0
VOICE CHANGE: 0
CONSTIPATION: 0
CHOKING: 0
DIARRHEA: 0
WHEEZING: 0
SINUS PRESSURE: 0
TROUBLE SWALLOWING: 0
VOMITING: 0
ABDOMINAL PAIN: 0
RHINORRHEA: 0
EYE DISCHARGE: 0

## 2020-12-04 NOTE — PROGRESS NOTES
Subjective:    Some elements of all sections below were copied from my previous note 12/2/19 and have been re-examined and updated where appropriate. All elements reflect the medical decision making of today June 4, 2020    Patient ID: Hui Mera is a 67 y.o. female. HPI  Patient is here for a clinical follow up. She underwent a left breast simple mastectomy, blue dye injection, left axillary sentinel node biopsy, possible left axillary dissection on November 20, 2019. Pathological evaluation completed at [facility]):    A. 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   Completely excised; Margins are negative for invasive carcinoma and  negative for DCIS. Fibroadenomatoid hyperplasia. Nipple with no significant pathologic findings. B. [place] lymph node #1:  One lymph node with no evidence of carcinoma (0/1). C. [place] lymph node #2:  One lymph node with no evidence of carcinoma (0/1). CANCER CASE SUMMARY:  Procedure: Left simple mastectomy  Specimen laterality: Left  Tumor site: 12:00 position  Tumor size: 8.0 mm in greatest dimension  Histologic type:  Invasive carcinoma of no special type (invasive ductal  carcinoma, not otherwise specified)  Histologic grade (Deandre histologic score):   Glandular differentiation: Score 3   Nuclear pleomorphism: Score 2   Mitotic rate: Score 2   Overall grade: Grade 2 (score of 7)  Tumor focality: Single focus of invasive carcinoma  Ductal carcinoma in situ (DCIS): Present, adjacent to the invasive  carcinoma  Invasive carcinoma margins:   Margins uninvolved by invasive carcinoma    Distance from closest margin: 5.0 mm from the posterior margin  DCIS margins:   Margin uninvolved by DCIS    Distance from closest margin: 10.0 mm from the posterior margin  Regional lymph nodes: Uninvolved by tumor cells   Total number of lymph nodes examined: 2 (both sentinel nodes)  Treatment effect: No known presurgical therapy  Pathologic stage classification (pTNM, AJCC 8th edition):   pT1b   (sn) pN0    Ancillary studies:  Calponin immunostain on block A4 shows the invasive carcinoma lacking a  myoepithelial layer. P63 immunostain on block A6 shows the invasive carcinoma lacking a  myoepithelial layer, with a myoepithelial layer retained around the DCIS. Breast Cancer Marker Studies (Block A6):  Negative: 0%        No internal control cells present. Progesterone Receptors (ID):  Negative: 0%        No internal control cells present. Hormone receptor studies are performed by immunohistochemistry on  formalin-fixed, paraffin-embedded tissue (Roche Benchmark Immunostainer,  [place] anti-ER clone SP1, anti-ID clone 1E2, polymer-based detection  chemistry). ER and ID are evaluated based on the percentage of cells  showing nuclear staining with >1% considered positive for each.     Her-2/miles (c-erb B-2) protein expression: Positive (Score 3+)      Past Medical History:   Diagnosis Date    Cancer ([facility]) 2019    breast left    Depression     Hypothyroidism     Irritable bowel syndrome     not diagnosed, patient controls with diet    Low sodium levels 6/18/2020    Thyroid disease        Past Surgical History:   Procedure Laterality Date    APPENDECTOMY      BREAST SURGERY      mastectomy left nov 20 2019    CHOLECYSTECTOMY      HYSTERECTOMY, TOTAL ABDOMINAL      oophorectomy    INSERTION / REMOVAL / REPLACEMENT VENOUS ACCESS CATHETER N/A 12/27/2019    MEDIPORT INSERTION performed by Keila Mandujano MD at [facility] Left 11/20/2019    LEFT BREAST SIMPLE  MASTECTOMY, BLUE DYE INJECTION, LEFT AXILLARY  SENTINEL NODE BIOPSY POSSIBLE LEFT AXILLARY DISSECTION -- NEOPROBE -- PECTORAL BLOCK performed by Ayan Castillo MD at [facility] 22 TONSILLECTOMY         Current Outpatient Medications   Medication Sig Dispense Refill    aspirin EC 81 MG EC tablet Take 1 tablet by mouth daily 30 tablet 0    atorvastatin (LIPITOR) 10 MG tablet Take 1 tablet by mouth nightly New cholesterol medication 30 tablet 0    mirtazapine (REMERON) 15 MG tablet Take 15 mg by mouth nightly      gabapentin (NEURONTIN) 100 MG capsule Take 100 mg by mouth 3 times daily.  omeprazole (PRILOSEC) 40 MG delayed release capsule Take 40 mg by mouth daily      Docusate Sodium (COLACE PO) Take by mouth 2 times daily      ALPRAZolam (XANAX) 0.5 MG tablet TAKE 1/2 TO 1 TABLET BY MOUTH EVERY 8 HOURS AS NEEDED FOR ANXIETY      PARoxetine (PAXIL) 10 MG tablet Take 20 mg by mouth every morning       levothyroxine (SYNTHROID) 88 MCG tablet Take 88 mcg by mouth Daily       No current facility-administered medications for this visit.         Allergies   Allergen Reactions    Arimidex [Anastrozole] Other (See Comments)     Hallucination, anxious, fatigue, stomach upset       Effexor [Venlafaxine] Other (See Comments)     Hallucination, vision issues, felt like she was going crazy, anxious    Lexapro [Escitalopram Oxalate] Diarrhea and Other (See Comments)     And fatigue        Family History   Problem Relation Age of Onset    Cancer Mother 66        liver    Cancer Brother 58        kidney       Social History     Socioeconomic History    Marital status:      Spouse name: Not on file    Number of children: Not on file    Years of education: Not on file    Highest education level: Not on file   Occupational History    Not on file   Social Needs    Financial resource strain: Not on file    Food insecurity     Worry: Not on file     Inability: Not on file    Transportation needs     Medical: Not on file     Non-medical: Not on file   Tobacco Use    Smoking status: Former Smoker     Packs/day: 2.00     Years: 35.00     Pack years: 70.00     Last attempt to quit:      Years since quittin.9    Smokeless tobacco: Never Used   Substance and Sexual Activity    Alcohol headaches. Hematological: Negative for adenopathy. Does not bruise/bleed easily. Psychiatric/Behavioral: Negative for agitation, confusion and decreased concentration. The patient is not nervous/anxious. Objective:   Physical Exam  Vitals signs and nursing note reviewed. Constitutional:       General: She is not in acute distress. Appearance: Normal appearance. She is well-developed. She is not diaphoretic. Comments: ECOG 0   HENT:      Head: Normocephalic and atraumatic. Mouth/Throat:      Pharynx: No oropharyngeal exudate. Eyes:      General: No scleral icterus. Right eye: No discharge. Left eye: No discharge. Conjunctiva/sclera: Conjunctivae normal.   Neck:      Musculoskeletal: Normal range of motion and neck supple. Thyroid: No thyromegaly. Vascular: No JVD. Trachea: No tracheal deviation. Cardiovascular:      Rate and Rhythm: Normal rate and regular rhythm. Heart sounds: No murmur. No friction rub. No gallop. Pulmonary:      Effort: Pulmonary effort is normal. No respiratory distress or retractions. Breath sounds: Normal breath sounds. No stridor. No wheezing or rales. Chest:      Chest wall: No mass, lacerations, deformity, swelling, tenderness or edema. Breasts: Breasts are symmetrical.         Right: No inverted nipple, mass, nipple discharge, skin change or tenderness. Left: No inverted nipple, mass, nipple discharge, skin change or tenderness. Comments: Right breast supple. No skin dimpling or puckering. No nipple discharge. No clinically suspicious lumps nodules or masses appreciated. No axillary lymphadenopathy. Left mastectomy scar intact. No clinically suspicious findings. No axillary lymphadenopathy. No edema of the left upper extremity. Abdominal:      General: There is no distension. Palpations: Abdomen is soft. Tenderness: There is no abdominal tenderness.  There is no guarding or rebound. Musculoskeletal: Normal range of motion. General: No tenderness or deformity. Right shoulder: Normal.      Left shoulder: Normal.   Lymphadenopathy:      Cervical: No cervical adenopathy. Right cervical: No superficial, deep or posterior cervical adenopathy. Left cervical: No superficial, deep or posterior cervical adenopathy. Upper Body:      Right upper body: No pectoral adenopathy. Left upper body: No pectoral adenopathy. Skin:     General: Skin is warm and dry. Coloration: Skin is not pale. Findings: No erythema or rash. Neurological:      Mental Status: She is alert and oriented to person, place, and time. Coordination: Coordination normal.   Psychiatric:         Behavior: Behavior normal.         Thought Content: Thought content normal.         Judgment: Judgment normal.            Assessment:    Patient presents today for clinical follow up.    67 y.o. woman who underwent a left breast simple mastectomy, blue dye injection, left axillary sentinel node biopsy, possible left axillary dissection on November 20, 2019. Pathological evaluation completed at [address]:    A. 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   Completely excised; Margins are negative for invasive carcinoma and  negative for DCIS. Fibroadenomatoid hyperplasia. Nipple with no significant pathologic findings. B. [place] lymph node #1:  One lymph node with no evidence of carcinoma (0/1). C. [place] lymph node #2:  One lymph node with no evidence of carcinoma (0/1). CANCER CASE SUMMARY:  Procedure: Left simple mastectomy  Specimen laterality: Left  Tumor site: 12:00 position  Tumor size: 8.0 mm in greatest dimension  Histologic type:  Invasive carcinoma of no special type (invasive ductal  carcinoma, not otherwise specified)  Histologic grade (Deandre histologic score):   Glandular differentiation: Score 3   Nuclear pleomorphism: Score 2   Mitotic rate: Score 2   Overall grade: Grade 2 (score of 7)  Tumor focality: Single focus of invasive carcinoma  Ductal carcinoma in situ (DCIS): Present, adjacent to the invasive  carcinoma  Invasive carcinoma margins:   Margins uninvolved by invasive carcinoma    Distance from closest margin: 5.0 mm from the posterior margin  DCIS margins:   Margin uninvolved by DCIS    Distance from closest margin: 10.0 mm from the posterior margin  Regional lymph nodes: Uninvolved by tumor cells   Total number of lymph nodes examined: 2 (both sentinel nodes)  Treatment effect: No known presurgical therapy  Pathologic stage classification (pTNM, AJCC 8th edition):   pT1b   (sn) pN0    Ancillary studies:  Calponin immunostain on block A4 shows the invasive carcinoma lacking a  myoepithelial layer. P63 immunostain on block A6 shows the invasive carcinoma lacking a  myoepithelial layer, with a myoepithelial layer retained around the DCIS. Breast Cancer Marker Studies (Block A6):  Negative: 0%        No internal control cells present. Progesterone Receptors (CT):  Negative: 0%        No internal control cells present. Hormone receptor studies are performed by immunohistochemistry on  formalin-fixed, paraffin-embedded tissue (Roche Benchmark Immunostainer,  [place] anti-ER clone SP1, anti-CT clone 1E2, polymer-based detection  chemistry). ER and CT are evaluated based on the percentage of cells  showing nuclear staining with >1% considered positive for each. Her-2/miles (c-erb B-2) protein expression: Positive (Score 3+)    DCIS was ER positive, endocrine therapy with Arimidex is recommended to decrease the risk of contralateral DCIS and invasive carcinoma.   The patient will be started on endocrine therapy with Arimidex, but will have a DEXA scan done prior to starting it, she does have history of osteopenia, DEXA scan ordered and is scheduled for 5/29/2020.     Patient had a 2D echocardiogram done, LVEF is 65%, adequate for treatment with Herceptin, she had a port placed, she was started on adjuvant therapy with Taxol and Herceptin on 1/2/2020. She completed Taxol on 3/19/2020.     -Repeat 2D echocardiogram done on 4/13/2020, revealing an LVEF of 73%, adequate to continue with Herceptin. She will be due for a repeat 2D echocardiogram around July 13, 2020.     Single agent Herceptin, Cycle #7 was on 05/07/2020. Single agent Herceptin, Cycle #8 was on 05/28/2020. L  DEXA Bone density results, scheduled for 05/29/2020. DEXA Bone density revealing osteoporosis of the bilateral hips T-score left hip -2.6; Right hip T-Score -2.8. She has osteoporosis and Arimidex is not recommended. We discussed her bone density report and advised her she may benefit from a bisphosphonate. She adamantly refuses to take anything to promote her bone health, stating she will exercise and eat healthy.     In view of the osteoporosis, we recommend endocrine therapy with tamoxifen for her underlying breast cancer. Side effects of tamoxifen were reviewed in detail. She denies any history of a DVT or clotting disorder. She was switched from Paxil to Effexor 37.5 mg per Suraj Feng.      -ET: Tamoxifen 20 mg daily started on 06/04/2020. SE of Effexor; DC Tamoxifen, Effexor. -Arimidex 1 mg started 08/20/2020:  Poor tolerance and stress due to her  having bladder surgery for bladder tumor. In ED for panic attack  on 09/06/2020. Arimidex discontinued. Opted for active surveillance. -Seen by psychiatry for her anxiety disorder; medications adjusted and she continues to do well.  -10/10/2020 TIA admission: on ASA 81 mg daily.    -Herceptin completed 11/03/2020     -Taxol-induced peripheral neuropathy is stable to slightly improved on vitamin B complex.     -Hyponatremia:  Referred to Nephrology per Medical Oncology.     -C/O Right breast tenderness and lumps right diagnostic mammogram and right breast ultrasound on 2/25/2020 was negative for mammographic or sonographic evidence of malignancy, BI-RADS Category 1. Clinically, she continues to do well and is without evidence of recurrent disease. Plan:   1. Continue monthly breast/chest wall self examination; detailed instructions reviewed today. Bring any changes to your physician's attention. 2. Continue healthy diet and exercise routinely as tolerated. 3. Maintaining ideal body weight (20-25 BMI) may lead to optimal breast cancer outcomes. 4. Avoid alcohol. 5. Repeat mammogram Feb 2020 (not ordered). 6. Continue follow up with Medical Oncology and Primary Care. 7. Return to see February 2020 with right breast mammogram same day. During today's visit, face-to-face time 25 minutes, greater than 50% in counseling education and coordination of care. All questions were answered to her apparent satisfaction, and she is agreeable to the plan as outlined above. Genesis Reyes, RN, MSN, APRN-CNP, [address]  Advanced Oncology Certified Nurse Practitioner  Department of Breast Surgery  [facility]/  Care in collaboration with Dr. Lion Marti.  Kd/Eleanor Vásquez

## 2020-12-04 NOTE — TELEPHONE ENCOUNTER
Called patient to see if she would like to move her appointment to [facility] a mammogram. Patient states she would like to keep her appointment 12/8/20 for clinical follow up

## 2020-12-08 ENCOUNTER — OFFICE VISIT (OUTPATIENT)
Dept: BREAST CENTER | Age: 75
End: 2020-12-08
Payer: MEDICARE

## 2020-12-08 ENCOUNTER — TELEPHONE (OUTPATIENT)
Dept: BREAST CENTER | Age: 75
End: 2020-12-08

## 2020-12-08 VITALS
TEMPERATURE: 98.1 F | DIASTOLIC BLOOD PRESSURE: 70 MMHG | WEIGHT: 135 LBS | SYSTOLIC BLOOD PRESSURE: 142 MMHG | OXYGEN SATURATION: 97 % | HEART RATE: 70 BPM | RESPIRATION RATE: 20 BRPM | HEIGHT: 64 IN | BODY MASS INDEX: 23.05 KG/M2

## 2020-12-08 PROCEDURE — 1090F PRES/ABSN URINE INCON ASSESS: CPT | Performed by: NURSE PRACTITIONER

## 2020-12-08 PROCEDURE — G8484 FLU IMMUNIZE NO ADMIN: HCPCS | Performed by: NURSE PRACTITIONER

## 2020-12-08 PROCEDURE — 1123F ACP DISCUSS/DSCN MKR DOCD: CPT | Performed by: NURSE PRACTITIONER

## 2020-12-08 PROCEDURE — 1036F TOBACCO NON-USER: CPT | Performed by: NURSE PRACTITIONER

## 2020-12-08 PROCEDURE — 4040F PNEUMOC VAC/ADMIN/RCVD: CPT | Performed by: NURSE PRACTITIONER

## 2020-12-08 PROCEDURE — G8420 CALC BMI NORM PARAMETERS: HCPCS | Performed by: NURSE PRACTITIONER

## 2020-12-08 PROCEDURE — G8399 PT W/DXA RESULTS DOCUMENT: HCPCS | Performed by: NURSE PRACTITIONER

## 2020-12-08 PROCEDURE — 99214 OFFICE O/P EST MOD 30 MIN: CPT | Performed by: NURSE PRACTITIONER

## 2020-12-08 PROCEDURE — G8427 DOCREV CUR MEDS BY ELIG CLIN: HCPCS | Performed by: NURSE PRACTITIONER

## 2020-12-08 PROCEDURE — 99213 OFFICE O/P EST LOW 20 MIN: CPT | Performed by: NURSE PRACTITIONER

## 2020-12-08 NOTE — PATIENT INSTRUCTIONS

## 2020-12-08 NOTE — TELEPHONE ENCOUNTER
Patient states her blood pressure has been running about [id number] systolic.  States she will recheck it at home and if it remains elevated will call her PCP

## 2021-01-13 ENCOUNTER — HOSPITAL ENCOUNTER (OUTPATIENT)
Dept: INFUSION THERAPY | Age: 76
Discharge: HOME | End: 2021-01-13
Payer: MEDICARE

## 2021-01-13 DIAGNOSIS — D05.12 DUCTAL CARCINOMA IN SITU (DCIS) OF LEFT BREAST: Primary | ICD-10-CM

## 2021-01-13 PROCEDURE — 6360000002 HC RX W HCPCS: Performed by: INTERNAL MEDICINE

## 2021-01-13 PROCEDURE — 2580000003 HC RX 258: Performed by: INTERNAL MEDICINE

## 2021-01-13 PROCEDURE — 96523 IRRIG DRUG DELIVERY DEVICE: CPT

## 2021-01-13 RX ORDER — HEPARIN SODIUM (PORCINE) LOCK FLUSH IV SOLN 100 UNIT/ML 100 UNIT/ML
500 SOLUTION INTRAVENOUS PRN
Status: CANCELLED | OUTPATIENT
Start: 2021-01-13

## 2021-01-13 RX ORDER — HEPARIN SODIUM (PORCINE) LOCK FLUSH IV SOLN 100 UNIT/ML 100 UNIT/ML
500 SOLUTION INTRAVENOUS PRN
Status: DISCONTINUED | OUTPATIENT
Start: 2021-01-13 | End: 2021-01-14 | Stop reason: HOSPADM

## 2021-01-13 RX ORDER — SODIUM CHLORIDE 0.9 % (FLUSH) 0.9 %
10 SYRINGE (ML) INJECTION PRN
Status: DISCONTINUED | OUTPATIENT
Start: 2021-01-13 | End: 2021-01-14 | Stop reason: HOSPADM

## 2021-01-13 RX ORDER — SODIUM CHLORIDE 0.9 % (FLUSH) 0.9 %
10 SYRINGE (ML) INJECTION PRN
Status: CANCELLED | OUTPATIENT
Start: 2021-01-13

## 2021-01-13 RX ADMIN — SODIUM CHLORIDE, PRESERVATIVE FREE 10 ML: 5 INJECTION INTRAVENOUS at 09:36

## 2021-01-13 RX ADMIN — Medication 500 UNITS: at 09:37

## 2021-01-13 NOTE — PROGRESS NOTES
Patient presents to clinic for [facility] today. Right chest 20 gauge 0.75 inch  SQ port accessed per policy using 20 gauge 4.80 inch Mcguire needle for good blood return. Site flushed easily with 10 mL NSS followed by 5 mL Heparin solution 100 units/ml rinse prior to de-access. Dry sterile dressing to area. Tolerated procedure well. Encouraged to schedule port flush every 4 weeks.

## 2021-01-14 ENCOUNTER — HOSPITAL ENCOUNTER (OUTPATIENT)
Dept: INFUSION THERAPY | Age: 76
End: 2021-01-14
Payer: MEDICARE

## 2021-02-03 NOTE — PROGRESS NOTES
[address] [address]  Dept: [phone]  Loc: [phone]  Attending Progress Note      Reason for Visit:   Left breast cancer. Referring Physician: Joes D Bragg MD.    PCP:  KAREN Greenberg    History of Present Illness: The patient is a 41-year-old lady with a past medical history significant for thyroid disease, depression, and IBS, who had presented with an abnormal screening mammogram,    8/20/19  Bilateral screening mammogram  [facility]         FINDINGS: No suspicious masses, calcifications, or distortions are   identified on the right. On the left there is a new focal nodular   asymmetry at 12:00, with adjacent linear branching calcifications. Spot compression views is recommended for the asymmetry with   ultrasound, and spot magnification views for the calcifications. .             Impression   1. Right breast no mammographic evidence of malignancy           2. Left breast new focal asymmetry at 12:00, new microcalcifications.       Recommendation:   1. Right breast annual screening mammogram.   2. Left breast additional views spot compression and spot   magnification along with ultrasound.       BI-RADS Category 0:  Incomplete- Needs Additional Imaging Evaluation             8/29/19  Left Diagnostic mammogram   [facility]         FINDINGS:        A small partially obscured mass is identified in the upper outer left   breast measuring approximately 5-6 mm. Additionally, there is a small   segmentally distributed cluster of pleomorphic microcalcifications   located superiorly near the mass extending to the middle third depth.       Ultrasound confirmed a small irregular shaped hypoechoic mass with   circumscribed margins at the 12:00 position measuring 5 mm in maximal   dimension.           Impression       1. Small solid suspicious mass at the 12:00 position.    2. Segmentally distributed pleomorphic microcalcifications located   near the breast mass likely at the 12:00 position.       RECOMMENDATION:       Recommend ultrasound core biopsy of the mass seen on ultrasound and   stereotactic biopsy of the microcalcifications.       BI-RADS Category 5: Highly Suggestive of Malignancy          8/29/19  Left Breast US   [facility]         FINDINGS:        A small partially obscured mass is identified in the upper outer left   breast measuring approximately 5-6 mm. Additionally, there is a small   segmentally distributed cluster of pleomorphic microcalcifications   located superiorly near the mass extending to the middle third depth.       Ultrasound confirmed a small irregular shaped hypoechoic mass with   circumscribed margins at the 12:00 position measuring 5 mm in maximal   dimension.           Impression       1. Small solid suspicious mass at the 12:00 position. 2. Segmentally distributed pleomorphic microcalcifications located   near the breast mass likely at the 12:00 position.       RECOMMENDATION:       Recommend ultrasound core biopsy of the mass seen on ultrasound and   stereotactic biopsy of the microcalcifications.       BI-RADS Category 5: Highly Suggestive of Malignancy              She underwent a stereotactic guided left breast core biopsy at 12 o'clock position on September 10 , 2019.a single top hat shaped marker clip was deployed into the site.     She underwent an ultrasound guided biopsy of the left breast at the 12 o'clock position on September 17, 2019, A post-surgical ribbon shaped microclip was placed.      Pathological evaluation completed at [facility]):     9/10/19  Final Surgical Pathology Report  Diagnosis:  A. Left breast, 12:00, biopsy: High-grade ductal carcinoma in situ,  cannot exclude microinvasion, see comment. B. Left breast 12:00, additional, biopsy: High-grade ductal carcinoma in  situ, cannot exclude microinvasion, see comment.     Breast Cancer Marker Studies:  Estrogen Receptors (ER): Positive         Percentage Benzoyl Peroxide Pregnancy And Lactation Text: This medication is Pregnancy Category C. It is unknown if benzoyl peroxide is excreted in breast milk. Azithromycin Pregnancy And Lactation Text: This medication is considered safe during pregnancy and is also secreted in breast milk. Albendazole Counseling:  I discussed with the patient the risks of albendazole including but not limited to cytopenia, kidney damage, nausea/vomiting and severe allergy.  The patient understands that this medication is being used in an off-label manner. DISSECTION -- NEOPROBE -- PECTORAL BLOCK performed by Carlotta Dempsey MD at [facility] TONSILLECTOMY         Family History:  Family History   Problem Relation Age of Onset    Cancer Mother 66        liver    Cancer Brother 58        kidney       Medications:  Reviewed and reconciled. Social History:  Social History     Socioeconomic History    Marital status: Single     Spouse name: Not on file    Number of children: Not on file    Years of education: Not on file    Highest education level: Not on file   Occupational History    Not on file   Social Needs    Financial resource strain: Not on file    Food insecurity:     Worry: Not on file     Inability: Not on file    Transportation needs:     Medical: Not on file     Non-medical: Not on file   Tobacco Use    Smoking status: Former Smoker     Packs/day: 2.00     Years: 35.00     Pack years: 70.00     Last attempt to quit:      Years since quittin.0    Smokeless tobacco: Never Used   Substance and Sexual Activity    Alcohol use: Not Currently    Drug use: Never    Sexual activity: Not Currently   Lifestyle    Physical activity:     Days per week: Not on file     Minutes per session: Not on file    Stress: Not on file   Relationships    Social connections:     Talks on phone: Not on file     Gets together: Not on file     Attends Taoist service: Not on file     Active member of club or organization: Not on file     Attends meetings of clubs or organizations: Not on file     Relationship status: Not on file    Intimate partner violence:     Fear of current or ex partner: Not on file     Emotionally abused: Not on file     Physically abused: Not on file     Forced sexual activity: Not on file   Other Topics Concern    Not on file   Social History Narrative    Not on file       Allergies:  No Known Allergies     OB/GYN:  Age of menarche was 22, medically induced. Age of menopause was 31.     Patient admits to hormonal therapy, Enbrel Counseling:  I discussed with the patient the risks of etanercept including but not limited to myelosuppression, immunosuppression, autoimmune hepatitis, demyelinating diseases, lymphoma, and infections.  The patient understands that monitoring is required including a PPD at baseline and must alert us or the primary physician if symptoms of infection or other concerning signs are noted. Ivermectin Pregnancy And Lactation Text: This medication is Pregnancy Category C and it isn't known if it is safe during pregnancy. It is also excreted in breast milk. size is 8 mm, adjuvant treatment with Taxol and Herceptin is recommended, schedule and the side effects of the treatment were reviewed with the patient. 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 Will start endocrine therapy after she completes Taxol chemotherapy. Patient had a 2D echocardiogram done, LVEF is 65%, adequate for treatment with Herceptin, she had a port placed, labs reviewed, proceed with treatment, Herceptin and Taxol week #1. RTC in 1 week with labs, treatment. Thank you for allowing us to participate in the care of Mrs. Abhinav Velasquez.     Car Castillo MD   HEMATOLOGY/MEDICAL [address]  [address] [address]  Dept: [place]: [phone] Rifampin Counseling: I discussed with the patient the risks of rifampin including but not limited to liver damage, kidney damage, red-orange body fluids, nausea/vomiting and severe allergy. Mirvaso Counseling: Mirvaso is a topical medication which can decrease superficial blood flow where applied. Side effects are uncommon and include stinging, redness and allergic reactions. 5-Fu Pregnancy And Lactation Text: This medication is Pregnancy Category X and contraindicated in pregnancy and in women who may become pregnant. It is unknown if this medication is excreted in breast milk. Minoxidil Counseling: Minoxidil is a topical medication which can increase blood flow where it is applied. It is uncertain how this medication increases hair growth. Side effects are uncommon and include stinging and allergic reactions. Detail Level: Simple Otezla Pregnancy And Lactation Text: This medication is Pregnancy Category C and it isn't known if it is safe during pregnancy. It is unknown if it is excreted in breast milk. 5-Fu Counseling: 5-Fluorouracil Counseling:  I discussed with the patient the risks of 5-fluorouracil including but not limited to erythema, scaling, itching, weeping, crusting, and pain. Ketoconazole Pregnancy And Lactation Text: This medication is Pregnancy Category C and it isn't know if it is safe during pregnancy. It is also excreted in breast milk and breast feeding isn't recommended. Bexarotene Pregnancy And Lactation Text: This medication is Pregnancy Category X and should not be given to women who are pregnant or may become pregnant. This medication should not be used if you are breast feeding. Ivermectin Counseling:  Patient instructed to take medication on an empty stomach with a full glass of water.  Patient informed of potential adverse effects including but not limited to nausea, diarrhea, dizziness, itching, and swelling of the extremities or lymph nodes.  The patient verbalized understanding of the proper use and possible adverse effects of ivermectin.  All of the patient's questions and concerns were addressed. Bactrim Pregnancy And Lactation Text: This medication is Pregnancy Category D and is known to cause fetal risk.  It is also excreted in breast milk. Erivedge Counseling- I discussed with the patient the risks of Erivedge including but not limited to nausea, vomiting, diarrhea, constipation, weight loss, changes in the sense of taste, decreased appetite, muscle spasms, and hair loss.  The patient verbalized understanding of the proper use and possible adverse effects of Erivedge.  All of the patient's questions and concerns were addressed. Hydroxychloroquine Counseling:  I discussed with the patient that a baseline ophthalmologic exam is needed at the start of therapy and every year thereafter while on therapy. A CBC may also be warranted for monitoring.  The side effects of this medication were discussed with the patient, including but not limited to agranulocytosis, aplastic anemia, seizures, rashes, retinopathy, and liver toxicity. Patient instructed to call the office should any adverse effect occur.  The patient verbalized understanding of the proper use and possible adverse effects of Plaquenil.  All the patient's questions and concerns were addressed. Stelara Pregnancy And Lactation Text: This medication is Pregnancy Category B and is considered safe during pregnancy. It is unknown if this medication is excreted in breast milk. Drysol Pregnancy And Lactation Text: This medication is considered safe during pregnancy and breast feeding. Protopic Pregnancy And Lactation Text: This medication is Pregnancy Category C. It is unknown if this medication is excreted in breast milk when applied topically. Cyclophosphamide Pregnancy And Lactation Text: This medication is Pregnancy Category D and it isn't considered safe during pregnancy. This medication is excreted in breast milk. Propranolol Pregnancy And Lactation Text: This medication is Pregnancy Category C and it isn't known if it is safe during pregnancy. It is excreted in breast milk. Cimetidine Counseling:  I discussed with the patient the risks of Cimetidine including but not limited to gynecomastia, headache, diarrhea, nausea, drowsiness, arrhythmias, pancreatitis, skin rashes, psychosis, bone marrow suppression and kidney toxicity. Ketoconazole Counseling:   Patient counseled regarding improving absorption with orange juice.  Adverse effects include but are not limited to breast enlargement, headache, diarrhea, nausea, upset stomach, liver function test abnormalities, taste disturbance, and stomach pain.  There is a rare possibility of liver failure that can occur when taking ketoconazole. The patient understands that monitoring of LFTs may be required, especially at baseline. The patient verbalized understanding of the proper use and possible adverse effects of ketoconazole.  All of the patient's questions and concerns were addressed. Arava Counseling:  Patient counseled regarding adverse effects of Arava including but not limited to nausea, vomiting, abnormalities in liver function tests. Patients may develop mouth sores, rash, diarrhea, and abnormalities in blood counts. The patient understands that monitoring is required including LFTs and blood counts.  There is a rare possibility of scarring of the liver and lung problems that can occur when taking methotrexate. Persistent nausea, loss of appetite, pale stools, dark urine, cough, and shortness of breath should be reported immediately. Patient advised to discontinue Arava treatment and consult with a physician prior to attempting conception. The patient will have to undergo a treatment to eliminate Arava from the body prior to conception. Minocycline Counseling: Patient advised regarding possible photosensitivity and discoloration of the teeth, skin, lips, tongue and gums.  Patient instructed to avoid sunlight, if possible.  When exposed to sunlight, patients should wear protective clothing, sunglasses, and sunscreen.  The patient was instructed to call the office immediately if the following severe adverse effects occur:  hearing changes, easy bruising/bleeding, severe headache, or vision changes.  The patient verbalized understanding of the proper use and possible adverse effects of minocycline.  All of the patient's questions and concerns were addressed. Topical Retinoid counseling:  Patient advised to apply a pea-sized amount only at bedtime and wait 30 minutes after washing their face before applying.  If too drying, patient may add a non-comedogenic moisturizer. The patient verbalized understanding of the proper use and possible adverse effects of retinoids.  All of the patient's questions and concerns were addressed. Hydroxyzine Pregnancy And Lactation Text: This medication is not safe during pregnancy and should not be taken. It is also excreted in breast milk and breast feeding isn't recommended. Clofazimine Pregnancy And Lactation Text: This medication is Pregnancy Category C and isn't considered safe during pregnancy. It is excreted in breast milk. Topical Sulfur Applications Counseling: Topical Sulfur Counseling: Patient counseled that this medication may cause skin irritation or allergic reactions.  In the event of skin irritation, the patient was advised to reduce the amount of the drug applied or use it less frequently.   The patient verbalized understanding of the proper use and possible adverse effects of topical sulfur application.  All of the patient's questions and concerns were addressed. Itraconazole Pregnancy And Lactation Text: This medication is Pregnancy Category C and it isn't know if it is safe during pregnancy. It is also excreted in breast milk. Humira Counseling:  I discussed with the patient the risks of adalimumab including but not limited to myelosuppression, immunosuppression, autoimmune hepatitis, demyelinating diseases, lymphoma, and serious infections.  The patient understands that monitoring is required including a PPD at baseline and must alert us or the primary physician if symptoms of infection or other concerning signs are noted. Rhofade Counseling: Rhofade is a topical medication which can decrease superficial blood flow where applied. Side effects are uncommon and include stinging, redness and allergic reactions. SSKI Counseling:  I discussed with the patient the risks of SSKI including but not limited to thyroid abnormalities, metallic taste, GI upset, fever, headache, acne, arthralgias, paraesthesias, lymphadenopathy, easy bleeding, arrhythmias, and allergic reaction. Spironolactone Pregnancy And Lactation Text: This medication can cause feminization of the male fetus and should be avoided during pregnancy. The active metabolite is also found in breast milk. Birth Control Pills Pregnancy And Lactation Text: This medication should be avoided if pregnant and for the first 30 days post-partum. Siliq Counseling:  I discussed with the patient the risks of Siliq including but not limited to new or worsening depression, suicidal thoughts and behavior, immunosuppression, malignancy, posterior leukoencephalopathy syndrome, and serious infections.  The patient understands that monitoring is required including a PPD at baseline and must alert us or the primary physician if symptoms of infection or other concerning signs are noted. There is also a special program designed to monitor depression which is required with Siliq. Xeljanz Counseling: I discussed with the patient the risks of Xeljanz therapy including increased risk of infection, liver issues, headache, diarrhea, or cold symptoms. Live vaccines should be avoided. They were instructed to call if they have any problems. Tetracycline Pregnancy And Lactation Text: This medication is Pregnancy Category D and not consider safe during pregnancy. It is also excreted in breast milk. Hydroxyzine Counseling: Patient advised that the medication is sedating and not to drive a car after taking this medication.  Patient informed of potential adverse effects including but not limited to dry mouth, urinary retention, and blurry vision.  The patient verbalized understanding of the proper use and possible adverse effects of hydroxyzine.  All of the patient's questions and concerns were addressed. Wartpeel Counseling:  I discussed with the patient the risks of Wartpeel including but not limited to erythema, scaling, itching, weeping, crusting, and pain. Niacinamide Pregnancy And Lactation Text: These medications are considered safe during pregnancy. Tranexamic Acid Pregnancy And Lactation Text: It is unknown if this medication is safe during pregnancy or breast feeding. Nsaids Pregnancy And Lactation Text: These medications are considered safe up to 30 weeks gestation. It is excreted in breast milk. Erivedge Pregnancy And Lactation Text: This medication is Pregnancy Category X and is absolutely contraindicated during pregnancy. It is unknown if it is excreted in breast milk. Rifampin Pregnancy And Lactation Text: This medication is Pregnancy Category C and it isn't know if it is safe during pregnancy. It is also excreted in breast milk and should not be used if you are breast feeding. Doxycycline Pregnancy And Lactation Text: This medication is Pregnancy Category D and not consider safe during pregnancy. It is also excreted in breast milk but is considered safe for shorter treatment courses. Colchicine Counseling:  Patient counseled regarding adverse effects including but not limited to stomach upset (nausea, vomiting, stomach pain, or diarrhea).  Patient instructed to limit alcohol consumption while taking this medication.  Colchicine may reduce blood counts especially with prolonged use.  The patient understands that monitoring of kidney function and blood counts may be required, especially at baseline. The patient verbalized understanding of the proper use and possible adverse effects of colchicine.  All of the patient's questions and concerns were addressed. Methotrexate Pregnancy And Lactation Text: This medication is Pregnancy Category X and is known to cause fetal harm. This medication is excreted in breast milk. Zyclara Counseling:  I discussed with the patient the risks of imiquimod including but not limited to erythema, scaling, itching, weeping, crusting, and pain.  Patient understands that the inflammatory response to imiquimod is variable from person to person and was educated regarded proper titration schedule.  If flu-like symptoms develop, patient knows to discontinue the medication and contact us. Rituxan Counseling:  I discussed with the patient the risks of Rituxan infusions. Side effects can include infusion reactions, severe drug rashes including mucocutaneous reactions, reactivation of latent hepatitis and other infections and rarely progressive multifocal leukoencephalopathy.  All of the patient's questions and concerns were addressed. Fluconazole Counseling:  Patient counseled regarding adverse effects of fluconazole including but not limited to headache, diarrhea, nausea, upset stomach, liver function test abnormalities, taste disturbance, and stomach pain.  There is a rare possibility of liver failure that can occur when taking fluconazole.  The patient understands that monitoring of LFTs and kidney function test may be required, especially at baseline. The patient verbalized understanding of the proper use and possible adverse effects of fluconazole.  All of the patient's questions and concerns were addressed. Bexarotene Counseling:  I discussed with the patient the risks of bexarotene including but not limited to hair loss, dry lips/skin/eyes, liver abnormalities, hyperlipidemia, pancreatitis, depression/suicidal ideation, photosensitivity, drug rash/allergic reactions, hypothyroidism, anemia, leukopenia, infection, cataracts, and teratogenicity.  Patient understands that they will need regular blood tests to check lipid profile, liver function tests, white blood cell count, thyroid function tests and pregnancy test if applicable. Azathioprine Counseling:  I discussed with the patient the risks of azathioprine including but not limited to myelosuppression, immunosuppression, hepatotoxicity, lymphoma, and infections.  The patient understands that monitoring is required including baseline LFTs, Creatinine, possible TPMP genotyping and weekly CBCs for the first month and then every 2 weeks thereafter.  The patient verbalized understanding of the proper use and possible adverse effects of azathioprine.  All of the patient's questions and concerns were addressed. Eucrisa Counseling: Patient may experience a mild burning sensation during topical application. Eucrisa is not approved in children less than 2 years of age. Doxepin Pregnancy And Lactation Text: This medication is Pregnancy Category C and it isn't known if it is safe during pregnancy. It is also excreted in breast milk and breast feeding isn't recommended. Cimzia Pregnancy And Lactation Text: This medication crosses the placenta but can be considered safe in certain situations. Cimzia may be excreted in breast milk. Clindamycin Counseling: I counseled the patient regarding use of clindamycin as an antibiotic for prophylactic and/or therapeutic purposes. Clindamycin is active against numerous classes of bacteria, including skin bacteria. Side effects may include nausea, diarrhea, gastrointestinal upset, rash, hives, yeast infections, and in rare cases, colitis. Clindamycin Pregnancy And Lactation Text: This medication can be used in pregnancy if certain situations. Clindamycin is also present in breast milk. Calcipotriene Pregnancy And Lactation Text: This medication has not been proven safe during pregnancy. It is unknown if this medication is excreted in breast milk. Skyrizi Counseling: I discussed with the patient the risks of risankizumab-rzaa including but not limited to immunosuppression, and serious infections.  The patient understands that monitoring is required including a PPD at baseline and must alert us or the primary physician if symptoms of infection or other concerning signs are noted. Finasteride Pregnancy And Lactation Text: This medication is absolutely contraindicated during pregnancy. It is unknown if it is excreted in breast milk. Doxepin Counseling:  Patient advised that the medication is sedating and not to drive a car after taking this medication. Patient informed of potential adverse effects including but not limited to dry mouth, urinary retention, and blurry vision.  The patient verbalized understanding of the proper use and possible adverse effects of doxepin.  All of the patient's questions and concerns were addressed. Spironolactone Counseling: Patient advised regarding risks of diarrhea, abdominal pain, hyperkalemia, birth defects (for female patients), liver toxicity and renal toxicity. The patient may need blood work to monitor liver and kidney function and potassium levels while on therapy. The patient verbalized understanding of the proper use and possible adverse effects of spironolactone.  All of the patient's questions and concerns were addressed. Azithromycin Counseling:  I discussed with the patient the risks of azithromycin including but not limited to GI upset, allergic reaction, drug rash, diarrhea, and yeast infections. Topical Retinoid Pregnancy And Lactation Text: This medication is Pregnancy Category C. It is unknown if this medication is excreted in breast milk. Cephalexin Pregnancy And Lactation Text: This medication is Pregnancy Category B and considered safe during pregnancy.  It is also excreted in breast milk but can be used safely for shorter doses. Acitretin Pregnancy And Lactation Text: This medication is Pregnancy Category X and should not be given to women who are pregnant or may become pregnant in the future. This medication is excreted in breast milk. Opioid Counseling: I discussed with the patient the potential side effects of opioids including but not limited to addiction, altered mental status, and depression. I stressed avoiding alcohol, benzodiazepines, muscle relaxants and sleep aids unless specifically okayed by a physician. The patient verbalized understanding of the proper use and possible adverse effects of opioids. All of the patient's questions and concerns were addressed. They were instructed to flush the remaining pills down the toilet if they did not need them for pain. Xolair Counseling:  Patient informed of potential adverse effects including but not limited to fever, muscle aches, rash and allergic reactions.  The patient verbalized understanding of the proper use and possible adverse effects of Xolair.  All of the patient's questions and concerns were addressed. Oxybutynin Pregnancy And Lactation Text: This medication is Pregnancy Category B and is considered safe during pregnancy. It is unknown if it is excreted in breast milk. Nsaids Counseling: NSAID Counseling: I discussed with the patient that NSAIDs should be taken with food. Prolonged use of NSAIDs can result in the development of stomach ulcers.  Patient advised to stop taking NSAIDs if abdominal pain occurs.  The patient verbalized understanding of the proper use and possible adverse effects of NSAIDs.  All of the patient's questions and concerns were addressed. Cyclophosphamide Counseling:  I discussed with the patient the risks of cyclophosphamide including but not limited to hair loss, hormonal abnormalities, decreased fertility, abdominal pain, diarrhea, nausea and vomiting, bone marrow suppression and infection. The patient understands that monitoring is required while taking this medication. Methotrexate Counseling:  Patient counseled regarding adverse effects of methotrexate including but not limited to nausea, vomiting, abnormalities in liver function tests. Patients may develop mouth sores, rash, diarrhea, and abnormalities in blood counts. The patient understands that monitoring is required including LFT's and blood counts.  There is a rare possibility of scarring of the liver and lung problems that can occur when taking methotrexate. Persistent nausea, loss of appetite, pale stools, dark urine, cough, and shortness of breath should be reported immediately. Patient advised to discontinue methotrexate treatment at least three months before attempting to become pregnant.  I discussed the need for folate supplements while taking methotrexate.  These supplements can decrease side effects during methotrexate treatment. The patient verbalized understanding of the proper use and possible adverse effects of methotrexate.  All of the patient's questions and concerns were addressed. Prednisone Counseling:  I discussed with the patient the risks of prolonged use of prednisone including but not limited to weight gain, insomnia, osteoporosis, mood changes, diabetes, susceptibility to infection, glaucoma and high blood pressure.  In cases where prednisone use is prolonged, patients should be monitored with blood pressure checks, serum glucose levels and an eye exam.  Additionally, the patient may need to be placed on GI prophylaxis, PCP prophylaxis, and calcium and vitamin D supplementation and/or a bisphosphonate.  The patient verbalized understanding of the proper use and the possible adverse effects of prednisone.  All of the patient's questions and concerns were addressed. Minoxidil Pregnancy And Lactation Text: This medication has not been assigned a Pregnancy Risk Category but animal studies failed to show danger with the topical medication. It is unknown if the medication is excreted in breast milk. Skyrizi Pregnancy And Lactation Text: The risk during pregnancy and breastfeeding is uncertain with this medication. Carac Counseling:  I discussed with the patient the risks of Carac including but not limited to erythema, scaling, itching, weeping, crusting, and pain. Cellcept Counseling:  I discussed with the patient the risks of mycophenolate mofetil including but not limited to infection/immunosuppression, GI upset, hypokalemia, hypercholesterolemia, bone marrow suppression, lymphoproliferative disorders, malignancy, GI ulceration/bleed/perforation, colitis, interstitial lung disease, kidney failure, progressive multifocal leukoencephalopathy, and birth defects.  The patient understands that monitoring is required including a baseline creatinine and regular CBC testing. In addition, patient must alert us immediately if symptoms of infection or other concerning signs are noted. Glycopyrrolate Counseling:  I discussed with the patient the risks of glycopyrrolate including but not limited to skin rash, drowsiness, dry mouth, difficulty urinating, and blurred vision. Acitretin Counseling:  I discussed with the patient the risks of acitretin including but not limited to hair loss, dry lips/skin/eyes, liver damage, hyperlipidemia, depression/suicidal ideation, photosensitivity.  Serious rare side effects can include but are not limited to pancreatitis, pseudotumor cerebri, bony changes, clot formation/stroke/heart attack.  Patient understands that alcohol is contraindicated since it can result in liver toxicity and significantly prolong the elimination of the drug by many years. Benzoyl Peroxide Counseling: Patient counseled that medicine may cause skin irritation and bleach clothing.  In the event of skin irritation, the patient was advised to reduce the amount of the drug applied or use it less frequently.   The patient verbalized understanding of the proper use and possible adverse effects of benzoyl peroxide.  All of the patient's questions and concerns were addressed. Stelara Counseling:  I discussed with the patient the risks of ustekinumab including but not limited to immunosuppression, malignancy, posterior leukoencephalopathy syndrome, and serious infections.  The patient understands that monitoring is required including a PPD at baseline and must alert us or the primary physician if symptoms of infection or other concerning signs are noted. Oxybutynin Counseling:  I discussed with the patient the risks of oxybutynin including but not limited to skin rash, drowsiness, dry mouth, difficulty urinating, and blurred vision. Cyclosporine Counseling:  I discussed with the patient the risks of cyclosporine including but not limited to hypertension, gingival hyperplasia,myelosuppression, immunosuppression, liver damage, kidney damage, neurotoxicity, lymphoma, and serious infections. The patient understands that monitoring is required including baseline blood pressure, CBC, CMP, lipid panel and uric acid, and then 1-2 times monthly CMP and blood pressure. Isotretinoin Pregnancy And Lactation Text: This medication is Pregnancy Category X and is considered extremely dangerous during pregnancy. It is unknown if it is excreted in breast milk. Opioid Pregnancy And Lactation Text: These medications can lead to premature delivery and should be avoided during pregnancy. These medications are also present in breast milk in small amounts. Cellcept Pregnancy And Lactation Text: This medication is Pregnancy Category D and isn't considered safe during pregnancy. It is unknown if this medication is excreted in breast milk. High Dose Vitamin A Counseling: Side effects reviewed, pt to contact office should one occur. Sski Pregnancy And Lactation Text: This medication is Pregnancy Category D and isn't considered safe during pregnancy. It is excreted in breast milk. Erythromycin Counseling:  I discussed with the patient the risks of erythromycin including but not limited to GI upset, allergic reaction, drug rash, diarrhea, increase in liver enzymes, and yeast infections. Dupixent Pregnancy And Lactation Text: This medication likely crosses the placenta but the risk for the fetus is uncertain. This medication is excreted in breast milk. Use Enhanced Medication Counseling?: No Prednisone Pregnancy And Lactation Text: This medication is Pregnancy Category C and it isn't know if it is safe during pregnancy. This medication is excreted in breast milk. Hydroquinone Counseling:  Patient advised that medication may result in skin irritation, lightening (hypopigmentation), dryness, and burning.  In the event of skin irritation, the patient was advised to reduce the amount of the drug applied or use it less frequently.  Rarely, spots that are treated with hydroquinone can become darker (pseudoochronosis).  Should this occur, patient instructed to stop medication and call the office. The patient verbalized understanding of the proper use and possible adverse effects of hydroquinone.  All of the patient's questions and concerns were addressed. Niacinamide Counseling: I recommended taking niacin or niacinamide, also know as vitamin B3, twice daily. Recent evidence suggests that taking vitamin B3 (500 mg twice daily) can reduce the risk of actinic keratoses and non-melanoma skin cancers. Side effects of vitamin B3 include flushing and headache. Xelvinayz Pregnancy And Lactation Text: This medication is Pregnancy Category D and is not considered safe during pregnancy.  The risk during breast feeding is also uncertain. Tazorac Counseling:  Patient advised that medication is irritating and drying.  Patient may need to apply sparingly and wash off after an hour before eventually leaving it on overnight.  The patient verbalized understanding of the proper use and possible adverse effects of tazorac.  All of the patient's questions and concerns were addressed. Griseofulvin Pregnancy And Lactation Text: This medication is Pregnancy Category X and is known to cause serious birth defects. It is unknown if this medication is excreted in breast milk but breast feeding should be avoided. Ilumya Counseling: I discussed with the patient the risks of tildrakizumab including but not limited to immunosuppression, malignancy, posterior leukoencephalopathy syndrome, and serious infections.  The patient understands that monitoring is required including a PPD at baseline and must alert us or the primary physician if symptoms of infection or other concerning signs are noted. Odomzo Counseling- I discussed with the patient the risks of Odomzo including but not limited to nausea, vomiting, diarrhea, constipation, weight loss, changes in the sense of taste, decreased appetite, muscle spasms, and hair loss.  The patient verbalized understanding of the proper use and possible adverse effects of Odomzo.  All of the patient's questions and concerns were addressed. Cephalexin Counseling: I counseled the patient regarding use of cephalexin as an antibiotic for prophylactic and/or therapeutic purposes. Cephalexin (commonly prescribed under brand name Keflex) is a cephalosporin antibiotic which is active against numerous classes of bacteria, including most skin bacteria. Side effects may include nausea, diarrhea, gastrointestinal upset, rash, hives, yeast infections, and in rare cases, hepatitis, kidney disease, seizures, fever, confusion, neurologic symptoms, and others. Patients with severe allergies to penicillin medications are cautioned that there is about a 10% incidence of cross-reactivity with cephalosporins. When possible, patients with penicillin allergies should use alternatives to cephalosporins for antibiotic therapy. Cimzia Counseling:  I discussed with the patient the risks of Cimzia including but not limited to immunosuppression, allergic reactions and infections.  The patient understands that monitoring is required including a PPD at baseline and must alert us or the primary physician if symptoms of infection or other concerning signs are noted. Metronidazole Counseling:  I discussed with the patient the risks of metronidazole including but not limited to seizures, nausea/vomiting, a metallic taste in the mouth, nausea/vomiting and severe allergy. Drysol Counseling:  I discussed with the patient the risks of drysol/aluminum chloride including but not limited to skin rash, itching, irritation, burning. Taltz Counseling: I discussed with the patient the risks of ixekizumab including but not limited to immunosuppression, serious infections, worsening of inflammatory bowel disease and drug reactions.  The patient understands that monitoring is required including a PPD at baseline and must alert us or the primary physician if symptoms of infection or other concerning signs are noted. Dupixent Counseling: I discussed with the patient the risks of dupilumab including but not limited to eye infection and irritation, cold sores, injection site reactions, worsening of asthma, allergic reactions and increased risk of parasitic infection.  Live vaccines should be avoided while taking dupilumab. Dupilumab will also interact with certain medications such as warfarin and cyclosporine. The patient understands that monitoring is required and they must alert us or the primary physician if symptoms of infection or other concerning signs are noted. Doxycycline Counseling:  Patient counseled regarding possible photosensitivity and increased risk for sunburn.  Patient instructed to avoid sunlight, if possible.  When exposed to sunlight, patients should wear protective clothing, sunglasses, and sunscreen.  The patient was instructed to call the office immediately if the following severe adverse effects occur:  hearing changes, easy bruising/bleeding, severe headache, or vision changes.  The patient verbalized understanding of the proper use and possible adverse effects of doxycycline.  All of the patient's questions and concerns were addressed. Cosentyx Counseling:  I discussed with the patient the risks of Cosentyx including but not limited to worsening of Crohn's disease, immunosuppression, allergic reactions and infections.  The patient understands that monitoring is required including a PPD at baseline and must alert us or the primary physician if symptoms of infection or other concerning signs are noted. Hydroxychloroquine Pregnancy And Lactation Text: This medication has been shown to cause fetal harm but it isn't assigned a Pregnancy Risk Category. There are small amounts excreted in breast milk. Simponi Counseling:  I discussed with the patient the risks of golimumab including but not limited to myelosuppression, immunosuppression, autoimmune hepatitis, demyelinating diseases, lymphoma, and serious infections.  The patient understands that monitoring is required including a PPD at baseline and must alert us or the primary physician if symptoms of infection or other concerning signs are noted. Calcipotriene Counseling:  I discussed with the patient the risks of calcipotriene including but not limited to erythema, scaling, itching, and irritation. Solaraze Pregnancy And Lactation Text: This medication is Pregnancy Category B and is considered safe. There is some data to suggest avoiding during the third trimester. It is unknown if this medication is excreted in breast milk. Protopic Counseling: Patient may experience a mild burning sensation during topical application. Protopic is not approved in children less than 2 years of age. There have been case reports of hematologic and skin malignancies in patients using topical calcineurin inhibitors although causality is questionable. Griseofulvin Counseling:  I discussed with the patient the risks of griseofulvin including but not limited to photosensitivity, cytopenia, liver damage, nausea/vomiting and severe allergy.  The patient understands that this medication is best absorbed when taken with a fatty meal (e.g., ice cream or french fries). Solaraze Counseling:  I discussed with the patient the risks of Solaraze including but not limited to erythema, scaling, itching, weeping, crusting, and pain. Elidel Counseling: Patient may experience a mild burning sensation during topical application. Elidel is not approved in children less than 2 years of age. There have been case reports of hematologic and skin malignancies in patients using topical calcineurin inhibitors although causality is questionable. Terbinafine Pregnancy And Lactation Text: This medication is Pregnancy Category B and is considered safe during pregnancy. It is also excreted in breast milk and breast feeding isn't recommended. Dapsone Pregnancy And Lactation Text: This medication is Pregnancy Category C and is not considered safe during pregnancy or breast feeding. Sarecycline Counseling: Patient advised regarding possible photosensitivity and discoloration of the teeth, skin, lips, tongue and gums.  Patient instructed to avoid sunlight, if possible.  When exposed to sunlight, patients should wear protective clothing, sunglasses, and sunscreen.  The patient was instructed to call the office immediately if the following severe adverse effects occur:  hearing changes, easy bruising/bleeding, severe headache, or vision changes.  The patient verbalized understanding of the proper use and possible adverse effects of sarecycline.  All of the patient's questions and concerns were addressed. Tranexamic Acid Counseling:  Patient advised of the small risk of bleeding problems with tranexamic acid. They were also instructed to call if they developed any nausea, vomiting or diarrhea. All of the patient's questions and concerns were addressed. Quinolones Counseling:  I discussed with the patient the risks of fluoroquinolones including but not limited to GI upset, allergic reaction, drug rash, diarrhea, dizziness, photosensitivity, yeast infections, liver function test abnormalities, tendonitis/tendon rupture. Isotretinoin Counseling: Patient should get monthly blood tests, not donate blood, not drive at night if vision affected, not share medication, and not undergo elective surgery for 6 months after tx completed. Side effects reviewed, pt to contact office should one occur. Xolair Pregnancy And Lactation Text: This medication is Pregnancy Category B and is considered safe during pregnancy. This medication is excreted in breast milk. Propranolol Counseling:  I discussed with the patient the risks of propranolol including but not limited to low heart rate, low blood pressure, low blood sugar, restlessness and increased cold sensitivity. They should call the office if they experience any of these side effects. Topical Clindamycin Counseling: Patient counseled that this medication may cause skin irritation or allergic reactions.  In the event of skin irritation, the patient was advised to reduce the amount of the drug applied or use it less frequently.   The patient verbalized understanding of the proper use and possible adverse effects of clindamycin.  All of the patient's questions and concerns were addressed. Picato Counseling:  I discussed with the patient the risks of Picato including but not limited to erythema, scaling, itching, weeping, crusting, and pain. Valtrex Pregnancy And Lactation Text: this medication is Pregnancy Category B and is considered safe during pregnancy. This medication is not directly found in breast milk but it's metabolite acyclovir is present. Bactrim Counseling:  I discussed with the patient the risks of sulfa antibiotics including but not limited to GI upset, allergic reaction, drug rash, diarrhea, dizziness, photosensitivity, and yeast infections.  Rarely, more serious reactions can occur including but not limited to aplastic anemia, agranulocytosis, methemoglobinemia, blood dyscrasias, liver or kidney failure, lung infiltrates or desquamative/blistering drug rashes. Glycopyrrolate Pregnancy And Lactation Text: This medication is Pregnancy Category B and is considered safe during pregnancy. It is unknown if it is excreted breast milk. Tremfya Counseling: I discussed with the patient the risks of guselkumab including but not limited to immunosuppression, serious infections, worsening of inflammatory bowel disease and drug reactions.  The patient understands that monitoring is required including a PPD at baseline and must alert us or the primary physician if symptoms of infection or other concerning signs are noted. Tazorac Pregnancy And Lactation Text: This medication is not safe during pregnancy. It is unknown if this medication is excreted in breast milk. Tetracycline Counseling: Patient counseled regarding possible photosensitivity and increased risk for sunburn.  Patient instructed to avoid sunlight, if possible.  When exposed to sunlight, patients should wear protective clothing, sunglasses, and sunscreen.  The patient was instructed to call the office immediately if the following severe adverse effects occur:  hearing changes, easy bruising/bleeding, severe headache, or vision changes.  The patient verbalized understanding of the proper use and possible adverse effects of tetracycline.  All of the patient's questions and concerns were addressed. Patient understands to avoid pregnancy while on therapy due to potential birth defects. Imiquimod Counseling:  I discussed with the patient the risks of imiquimod including but not limited to erythema, scaling, itching, weeping, crusting, and pain.  Patient understands that the inflammatory response to imiquimod is variable from person to person and was educated regarded proper titration schedule.  If flu-like symptoms develop, patient knows to discontinue the medication and contact us. Finasteride Male Counseling: Finasteride Counseling:  I discussed with the patient the risks of use of finasteride including but not limited to decreased libido, decreased ejaculate volume, gynecomastia, and depression. Women should not handle medication.  All of the patient's questions and concerns were addressed. Rituxan Pregnancy And Lactation Text: This medication is Pregnancy Category C and it isn't know if it is safe during pregnancy. It is unknown if this medication is excreted in breast milk but similar antibodies are known to be excreted. Itraconazole Counseling:  I discussed with the patient the risks of itraconazole including but not limited to liver damage, nausea/vomiting, neuropathy, and severe allergy.  The patient understands that this medication is best absorbed when taken with acidic beverages such as non-diet cola or ginger ale.  The patient understands that monitoring is required including baseline LFTs and repeat LFTs at intervals.  The patient understands that they are to contact us or the primary physician if concerning signs are noted. Otezla Counseling: The side effects of Otezla were discussed with the patient, including but not limited to worsening or new depression, weight loss, diarrhea, nausea, upper respiratory tract infection, and headache. Patient instructed to call the office should any adverse effect occur.  The patient verbalized understanding of the proper use and possible adverse effects of Otezla.  All the patient's questions and concerns were addressed. Birth Control Pills Counseling: Birth Control Pill Counseling: I discussed with the patient the potential side effects of OCPs including but not limited to increased risk of stroke, heart attack, thrombophlebitis, deep venous thrombosis, hepatic adenomas, breast changes, GI upset, headaches, and depression.  The patient verbalized understanding of the proper use and possible adverse effects of OCPs. All of the patient's questions and concerns were addressed. Erythromycin Pregnancy And Lactation Text: This medication is Pregnancy Category B and is considered safe during pregnancy. It is also excreted in breast milk. Topical Sulfur Applications Pregnancy And Lactation Text: This medication is Pregnancy Category C and has an unknown safety profile during pregnancy. It is unknown if this topical medication is excreted in breast milk. Terbinafine Counseling: Patient counseling regarding adverse effects of terbinafine including but not limited to headache, diarrhea, rash, upset stomach, liver function test abnormalities, itching, taste/smell disturbance, nausea, abdominal pain, and flatulence.  There is a rare possibility of liver failure that can occur when taking terbinafine.  The patient understands that a baseline LFT and kidney function test may be required. The patient verbalized understanding of the proper use and possible adverse effects of terbinafine.  All of the patient's questions and concerns were addressed. Infliximab Counseling:  I discussed with the patient the risks of infliximab including but not limited to myelosuppression, immunosuppression, autoimmune hepatitis, demyelinating diseases, lymphoma, and serious infections.  The patient understands that monitoring is required including a PPD at baseline and must alert us or the primary physician if symptoms of infection or other concerning signs are noted. Clofazimine Counseling:  I discussed with the patient the risks of clofazimine including but not limited to skin and eye pigmentation, liver damage, nausea/vomiting, gastrointestinal bleeding and allergy. Dapsone Counseling: I discussed with the patient the risks of dapsone including but not limited to hemolytic anemia, agranulocytosis, rashes, methemoglobinemia, kidney failure, peripheral neuropathy, headaches, GI upset, and liver toxicity.  Patients who start dapsone require monitoring including baseline LFTs and weekly CBCs for the first month, then every month thereafter.  The patient verbalized understanding of the proper use and possible adverse effects of dapsone.  All of the patient's questions and concerns were addressed. Metronidazole Pregnancy And Lactation Text: This medication is Pregnancy Category B and considered safe during pregnancy.  It is also excreted in breast milk. Gabapentin Counseling: I discussed with the patient the risks of gabapentin including but not limited to dizziness, somnolence, fatigue and ataxia. Thalidomide Counseling: I discussed with the patient the risks of thalidomide including but not limited to birth defects, anxiety, weakness, chest pain, dizziness, cough and severe allergy. High Dose Vitamin A Pregnancy And Lactation Text: High dose vitamin A therapy is contraindicated during pregnancy and breast feeding. Valtrex Counseling: I discussed with the patient the risks of valacyclovir including but not limited to kidney damage, nausea, vomiting and severe allergy.  The patient understands that if the infection seems to be worsening or is not improving, they are to call. Mirvaso Pregnancy And Lactation Text: This medication has not been assigned a Pregnancy Risk Category. It is unknown if the medication is excreted in breast milk.

## 2021-02-23 ENCOUNTER — TELEPHONE (OUTPATIENT)
Dept: CASE MANAGEMENT | Age: 76
End: 2021-02-23

## 2021-02-23 ASSESSMENT — ENCOUNTER SYMPTOMS
EYE ITCHING: 0
EYE DISCHARGE: 0
ABDOMINAL DISTENTION: 0
CONSTIPATION: 0
VOMITING: 0
BACK PAIN: 0
WHEEZING: 0
CHEST TIGHTNESS: 0
SINUS PRESSURE: 0
CHOKING: 0
ABDOMINAL PAIN: 0
DIARRHEA: 0
RHINORRHEA: 0
BLOOD IN STOOL: 0
SORE THROAT: 0
SHORTNESS OF BREATH: 0
SINUS PAIN: 0
COUGH: 0
TROUBLE SWALLOWING: 0
NAUSEA: 0
VOICE CHANGE: 0

## 2021-02-23 NOTE — PROGRESS NOTES
Subjective:    Some elements of all sections below were copied from my previous note 12/2/19 and have been re-examined and updated where appropriate. All elements reflect the medical decision making of today June 4, 2020  This note was copied forward from the last encounter. Essential components for this patient record were reviewed and verified on this visit including:  recent hospitalizations, recent imaging, PMH, PSH, FH, SOC HX, Allergies, and Medications were reviewed and updated as appropriate. In addition, the assessment and plan were copied from prior office note and updated accordingly. Patient ID: Jennifer Villalpando is a 67 y.o. female. HPI   She underwent a stereotactic guided left breast core biopsy at 12 o'clock position on [place], 2019.a single top hat shaped marker clip was deployed into the site.     She underwent an ultrasound guided biopsy of the left breast at the 12 o'clock position on September 17, 2019, A post-surgical ribbon shaped microclip was placed.      Pathological evaluation completed at [facility]):     9/10/19  Final Surgical Pathology Report  Diagnosis:  A. Left breast, 12:00, biopsy: High-grade ductal carcinoma in situ,  cannot exclude microinvasion, see comment. B. Left breast 12:00, additional, biopsy: High-grade ductal carcinoma in  situ, cannot exclude microinvasion, see comment. Breast Cancer Marker Studies:  Estrogen Receptors (ER): Positive         Percentage of cells positive: <10%         Intensity: Weak    Progesterone Receptors (ME): Negative        Internal control cells present and stain as expected: No internal  control present     9/17/19 Final Surgical Pathology Report    Diagnosis:  Mass, Left breast, 12:00, core biopsy: Segments of benign fibroadipose  tissue and scant skeletal muscle, see comment. Comment:   Epithelial lined mammary ducts and lobules are not identified  in the tissue sample.  Correlation with clinical and radiologic findings  is essential to assure adequacy of tissue sampling. In the setting of a  mass clinically or radiologically suspicious for neoplasm, additional  tissue sampling is recommended. S/P left breast simple mastectomy, blue dye injection, left axillary sentinel node biopsy, possible left axillary dissection on November 20, 2019. Pathological evaluation completed at [address]:    A. 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   Completely excised; Margins are negative for invasive carcinoma and  negative for DCIS. Fibroadenomatoid hyperplasia. Nipple with no significant pathologic findings. B. [place] lymph node #1:  One lymph node with no evidence of carcinoma (0/1). C. [place] lymph node #2:  One lymph node with no evidence of carcinoma (0/1). CANCER CASE SUMMARY:  Procedure: Left simple mastectomy  Specimen laterality: Left  Tumor site: 12:00 position  Tumor size: 8.0 mm in greatest dimension  Histologic type:  Invasive carcinoma of no special type (invasive ductal  carcinoma, not otherwise specified)  Histologic grade (Deandre histologic score):   Glandular differentiation: Score 3   Nuclear pleomorphism: Score 2   Mitotic rate: Score 2   Overall grade: Grade 2 (score of 7)  Tumor focality: Single focus of invasive carcinoma  Ductal carcinoma in situ (DCIS): Present, adjacent to the invasive  carcinoma  Invasive carcinoma margins:   Margins uninvolved by invasive carcinoma    Distance from closest margin: 5.0 mm from the posterior margin  DCIS margins:   Margin uninvolved by DCIS    Distance from closest margin: 10.0 mm from the posterior margin  Regional lymph nodes: Uninvolved by tumor cells   Total number of lymph nodes examined: 2 (both sentinel nodes)  Treatment effect: No known presurgical therapy  Pathologic stage classification (pTNM, AJCC 8th edition):   pT1b   (sn) pN0    -3/19/2020 completed adjuvant HT per medical oncology, Dr. Keene Divers  -6/4/2020 started endocrine therapy with tamoxifen. Poor tolerance, tamoxifen discontinued. -8/20/2020 endocrine therapy with Arimidex. Poor tolerance, Arimidex discontinued 9/6/2020. Past Medical History:   Diagnosis Date    Cancer [facility]) 2019    breast left    Depression     Hypothyroidism     Irritable bowel syndrome     not diagnosed, patient controls with diet    Low sodium levels 6/18/2020    Thyroid disease        Past Surgical History:   Procedure Laterality Date    APPENDECTOMY      BREAST SURGERY      mastectomy left nov 20 2019    CHOLECYSTECTOMY      HYSTERECTOMY, TOTAL ABDOMINAL      oophorectomy    INSERTION / REMOVAL / REPLACEMENT VENOUS ACCESS CATHETER N/A 12/27/2019    MEDIPORT INSERTION performed by Denilson Soni MD at [address] [facility] Left 11/20/2019    LEFT BREAST SIMPLE  MASTECTOMY, BLUE DYE INJECTION, LEFT AXILLARY  SENTINEL NODE BIOPSY POSSIBLE LEFT AXILLARY DISSECTION -- NEOPROBE -- PECTORAL BLOCK performed by Emily Ty [facility] at [facility] 22 TONSILLECTOMY         Current Outpatient Medications   Medication Sig Dispense Refill    aspirin EC 81 MG EC tablet Take 1 tablet by mouth daily 30 tablet 0    atorvastatin (LIPITOR) 10 MG tablet Take 1 tablet by mouth nightly New cholesterol medication 30 tablet 0    mirtazapine (REMERON) 15 MG tablet Take 15 mg by mouth nightly      gabapentin (NEURONTIN) 100 MG capsule Take 100 mg by mouth 2 times daily.  omeprazole (PRILOSEC) 40 MG delayed release capsule Take 40 mg by mouth daily      Docusate Sodium (COLACE PO) Take by mouth 2 times daily      PARoxetine (PAXIL) 10 MG tablet Take 20 mg by mouth every morning       levothyroxine (SYNTHROID) 88 MCG tablet Take 88 mcg by mouth Daily       No current facility-administered medications for this visit.         Allergies   Allergen Reactions    Arimidex [Anastrozole] Other (See Comments) Hallucination, anxious, fatigue, stomach upset       Effexor [Venlafaxine] Other (See Comments)     Hallucination, vision issues, felt like she was going crazy, anxious    Lexapro [Escitalopram Oxalate] Diarrhea and Other (See Comments)     And fatigue        Family History   Problem Relation Age of Onset    Cancer Mother 66        liver    Cancer Brother 58        kidney       Social History     Socioeconomic History    Marital status:      Spouse name: Not on file    Number of children: Not on file    Years of education: Not on file    Highest education level: Not on file   Occupational History    Not on file   Social Needs    Financial resource strain: Not on file    Food insecurity     Worry: Not on file     Inability: Not on file    Transportation needs     Medical: Not on file     Non-medical: Not on file   Tobacco Use    Smoking status: Former Smoker     Packs/day: 2.00     Years: 35.00     Pack years: 70.00     Quit date:      Years since quittin.    Smokeless tobacco: Never Used   Substance and Sexual Activity    Alcohol use: Not Currently    Drug use: Never    Sexual activity: Not Currently   Lifestyle    Physical activity     Days per week: Not on file     Minutes per session: Not on file    Stress: Not on file   Relationships    Social connections     Talks on phone: Not on file     Gets together: Not on file     Attends Buddhist service: Not on file     Active member of club or organization: Not on file     Attends meetings of clubs or organizations: Not on file     Relationship status: Not on file    Intimate partner violence     Fear of current or ex partner: Not on file     Emotionally abused: Not on file     Physically abused: Not on file     Forced sexual activity: Not on file   Other Topics Concern    Not on file   Social History Narrative    Not on file       Review of Systems   Constitutional: Negative for activity change, appetite change, chills, fatigue, fever and unexpected weight change. Anahi Ching continues to do well. She participates in yoga classes 4 times a week. HENT: Negative for congestion, postnasal drip, rhinorrhea, sinus pressure, sinus pain, sore throat, trouble swallowing and voice change. Eyes: Negative for discharge, itching and visual disturbance. Respiratory: Negative for cough, choking, chest tightness, shortness of breath and wheezing. Cardiovascular: Negative for chest pain, palpitations and leg swelling. Gastrointestinal: Negative for abdominal distention, abdominal pain, blood in stool, constipation, diarrhea, nausea and vomiting. Endocrine: Negative for cold intolerance and heat intolerance. Genitourinary: Negative for difficulty urinating, dysuria, frequency and hematuria. Musculoskeletal: Negative for arthralgias, back pain, gait problem, joint swelling, myalgias, neck pain and neck stiffness. Allergic/Immunologic: Negative for environmental allergies and food allergies. Neurological: Negative for dizziness, seizures, syncope, speech difficulty, weakness, light-headedness and headaches. Hematological: Negative for adenopathy. Does not bruise/bleed easily. Psychiatric/Behavioral: Negative for agitation, confusion and decreased concentration. The patient is not nervous/anxious. Objective:   Physical Exam  Vitals signs and nursing note reviewed. Constitutional:       General: She is not in acute distress. Appearance: Normal appearance. She is well-developed. She is not diaphoretic. Comments: ECOG remains stable at 0   HENT:      Head: Normocephalic and atraumatic. Mouth/Throat:      Pharynx: No oropharyngeal exudate. Eyes:      General: No scleral icterus. Right eye: No discharge. Left eye: No discharge. Conjunctiva/sclera: Conjunctivae normal.   Neck:      Musculoskeletal: Normal range of motion and neck supple. Thyroid: No thyromegaly.       Vascular: No JVD.      Trachea: No tracheal deviation. Cardiovascular:      Rate and Rhythm: Normal rate and regular rhythm. Heart sounds: No murmur. No friction rub. No gallop. Pulmonary:      Effort: Pulmonary effort is normal. No respiratory distress or retractions. Breath sounds: Normal breath sounds. No stridor. No wheezing or rales. Chest:      Chest wall: No mass, lacerations, deformity, swelling, tenderness or edema. Breasts: Breasts are symmetrical.         Right: No inverted nipple, mass, nipple discharge, skin change or tenderness. Left: No inverted nipple, mass, nipple discharge, skin change or tenderness. Comments: Right breast supple. No skin dimpling or puckering. No nipple discharge. No clinically suspicious lumps nodules or masses appreciated. No axillary lymphadenopathy. Her bilateral exam remains stable. Left mastectomy scar intact. No clinically suspicious findings. No axillary lymphadenopathy. No edema of the left upper extremity. She has good range of motion of both upper extremities. Abdominal:      General: There is no distension. Palpations: Abdomen is soft. Tenderness: There is no abdominal tenderness. There is no guarding or rebound. Musculoskeletal: Normal range of motion. General: No tenderness or deformity. Right shoulder: Normal.      Left shoulder: Normal.   Lymphadenopathy:      Cervical: No cervical adenopathy. Right cervical: No superficial, deep or posterior cervical adenopathy. Left cervical: No superficial, deep or posterior cervical adenopathy. Upper Body:      Right upper body: No pectoral adenopathy. Left upper body: No pectoral adenopathy. Skin:     General: Skin is warm and dry. Coloration: Skin is not pale. Findings: No erythema or rash. Neurological:      Mental Status: She is alert and oriented to person, place, and time.       Coordination: Coordination normal.   Psychiatric: Behavior: Behavior normal.         Thought Content: Thought content normal.         Judgment: Judgment normal.      Comments: Pleasant and conversant. Reports her depression is well controlled on current regimen. Assessment:    Patient presents today for clinical follow up.    67 y.o. who had left breast biopsy revealing high-grade DCIS, ER positive less than 10%, MT negative. -11/20/2019 left mastectomy with sentinel lymph node excision noted invasive ductal carcinoma, tumor size 8 mm, grade 2 disease with associated DCIS. Margins were negative, 2 sentinel lymph nodes were negative for metastatic disease. Pathologic stage pT1b pN0 MX. ER negative MT negative HER-2/miles positive disease. Stage Ia      -3/19/2020 completed adjuvant HT per medical oncology, Dr. Meredith Handler  -6/4/2020 started endocrine therapy with tamoxifen (DCIS +). Poor tolerance, tamoxifen discontinued. -8/20/2020 endocrine therapy with Arimidex. Poor tolerance, Arimidex discontinued 9/6/2020.  -10/10/2020 TIA admission: on ASA 81 mg daily. -11/3/2020 completed single agent Herceptin; still has MediPort right chest.  -2/25/2020 right diagnostic mammogram and right breast ultrasound for c/o tenderness was negative for malignancy. -03/02/2021 Right screening mammogram negative, BI-RADS 1. Imaging reviewed with Aaron Kirkpatrick. -03/02/2021 clinical follow up is without evidence of recurrent disease. Reviewed follow-up schedule. Plan:   1. Continue monthly breast/chest wall self examination; detailed instructions reviewed today. Bring any changes to your physician's attention. 2. Continue healthy diet and exercise routinely as tolerated. 3. Maintaining ideal body weight (20-25 BMI) may lead to optimal breast cancer outcomes. 4. Avoid alcohol. 5. Repeat mammogram [facility] 2022 (not ordered). 6. Continue follow up with Medical Oncology and Primary Care.   7. Return to Clinic 6 months        During today's visit, face-to-face time 15  minutes, greater than 50% in counseling education and coordination of care. All questions were answered to her apparent satisfaction, and she is agreeable to the plan as outlined above.

## 2021-02-23 NOTE — TELEPHONE ENCOUNTER
Prepared patient's Survivorship Care Plan and Treatment Summary for her follow up appointment with Dr. Alton Douglas at [facility] on 3/3/2021. Copy sent to patient's PCP. Folder prepared with additional written literature. JOSE AlejandraW, RN, OCN  Oncology Nurse Navigator

## 2021-02-26 DIAGNOSIS — Z12.31 VISIT FOR SCREENING MAMMOGRAM: Primary | ICD-10-CM

## 2021-03-02 ENCOUNTER — HOSPITAL ENCOUNTER (OUTPATIENT)
Dept: GENERAL RADIOLOGY | Age: 76
Discharge: HOME | End: 2021-03-04
Payer: MEDICARE

## 2021-03-02 ENCOUNTER — OFFICE VISIT (OUTPATIENT)
Dept: BREAST CENTER | Age: 76
End: 2021-03-02
Payer: MEDICARE

## 2021-03-02 VITALS
DIASTOLIC BLOOD PRESSURE: 62 MMHG | WEIGHT: 140 LBS | HEIGHT: 64 IN | BODY MASS INDEX: 23.9 KG/M2 | OXYGEN SATURATION: 96 % | HEART RATE: 66 BPM | TEMPERATURE: 97 F | SYSTOLIC BLOOD PRESSURE: 132 MMHG | RESPIRATION RATE: 18 BRPM

## 2021-03-02 DIAGNOSIS — Z85.3 PERSONAL HISTORY OF MALIGNANT NEOPLASM OF BREAST: ICD-10-CM

## 2021-03-02 DIAGNOSIS — Z85.3 HISTORY OF BREAST CANCER: ICD-10-CM

## 2021-03-02 DIAGNOSIS — Z12.31 VISIT FOR SCREENING MAMMOGRAM: ICD-10-CM

## 2021-03-02 PROCEDURE — G8420 CALC BMI NORM PARAMETERS: HCPCS | Performed by: NURSE PRACTITIONER

## 2021-03-02 PROCEDURE — 99212 OFFICE O/P EST SF 10 MIN: CPT | Performed by: NURSE PRACTITIONER

## 2021-03-02 PROCEDURE — 1090F PRES/ABSN URINE INCON ASSESS: CPT | Performed by: NURSE PRACTITIONER

## 2021-03-02 PROCEDURE — G8427 DOCREV CUR MEDS BY ELIG CLIN: HCPCS | Performed by: NURSE PRACTITIONER

## 2021-03-02 PROCEDURE — 1123F ACP DISCUSS/DSCN MKR DOCD: CPT | Performed by: NURSE PRACTITIONER

## 2021-03-02 PROCEDURE — G8484 FLU IMMUNIZE NO ADMIN: HCPCS | Performed by: NURSE PRACTITIONER

## 2021-03-02 PROCEDURE — 4040F PNEUMOC VAC/ADMIN/RCVD: CPT | Performed by: NURSE PRACTITIONER

## 2021-03-02 PROCEDURE — 77063 BREAST TOMOSYNTHESIS BI: CPT

## 2021-03-02 PROCEDURE — 99213 OFFICE O/P EST LOW 20 MIN: CPT

## 2021-03-02 PROCEDURE — 1036F TOBACCO NON-USER: CPT | Performed by: NURSE PRACTITIONER

## 2021-03-02 PROCEDURE — G8399 PT W/DXA RESULTS DOCUMENT: HCPCS | Performed by: NURSE PRACTITIONER

## 2021-03-02 NOTE — PATIENT INSTRUCTIONS

## 2021-03-03 ENCOUNTER — TELEPHONE (OUTPATIENT)
Dept: CASE MANAGEMENT | Age: 76
End: 2021-03-03

## 2021-03-03 ENCOUNTER — HOSPITAL ENCOUNTER (OUTPATIENT)
Dept: INFUSION THERAPY | Age: 76
Discharge: HOME | End: 2021-03-03
Payer: MEDICARE

## 2021-03-03 ENCOUNTER — OFFICE VISIT (OUTPATIENT)
Dept: ONCOLOGY | Age: 76
End: 2021-03-03
Payer: MEDICARE

## 2021-03-03 VITALS
WEIGHT: 145 LBS | RESPIRATION RATE: 18 BRPM | HEART RATE: 67 BPM | HEIGHT: 64 IN | TEMPERATURE: 97.5 F | BODY MASS INDEX: 24.75 KG/M2 | SYSTOLIC BLOOD PRESSURE: 152 MMHG | OXYGEN SATURATION: 98 % | DIASTOLIC BLOOD PRESSURE: 70 MMHG

## 2021-03-03 DIAGNOSIS — D05.12 DUCTAL CARCINOMA IN SITU (DCIS) OF LEFT BREAST: Primary | ICD-10-CM

## 2021-03-03 DIAGNOSIS — C50.912 MALIGNANT NEOPLASM OF LEFT FEMALE BREAST, UNSPECIFIED ESTROGEN RECEPTOR STATUS, UNSPECIFIED SITE OF BREAST (HCC): Primary | ICD-10-CM

## 2021-03-03 DIAGNOSIS — C50.912 MALIGNANT NEOPLASM OF LEFT FEMALE BREAST, UNSPECIFIED ESTROGEN RECEPTOR STATUS, UNSPECIFIED SITE OF BREAST (HCC): ICD-10-CM

## 2021-03-03 LAB
ALBUMIN SERPL-MCNC: 3.9 G/DL (ref 3.5–5.2)
ALP BLD-CCNC: 116 U/L (ref 35–104)
ALT SERPL-CCNC: 15 U/L (ref 0–32)
ANION GAP SERPL CALCULATED.3IONS-SCNC: 8 MMOL/L (ref 7–16)
AST SERPL-CCNC: 25 U/L (ref 0–31)
BASOPHILS ABSOLUTE: 0.03 E9/L (ref 0–0.2)
BASOPHILS RELATIVE PERCENT: 0.6 % (ref 0–2)
BILIRUB SERPL-MCNC: 0.2 MG/DL (ref 0–1.2)
BUN BLDV-MCNC: 12 MG/DL (ref 8–23)
CALCIUM SERPL-MCNC: 8.6 MG/DL (ref 8.6–10.2)
CHLORIDE BLD-SCNC: 103 MMOL/L (ref 98–107)
CO2: 27 MMOL/L (ref 22–29)
CREAT SERPL-MCNC: 0.9 MG/DL (ref 0.5–1)
EOSINOPHILS ABSOLUTE: 0.11 E9/L (ref 0.05–0.5)
EOSINOPHILS RELATIVE PERCENT: 2.3 % (ref 0–6)
GFR AFRICAN AMERICAN: >60
GFR NON-AFRICAN AMERICAN: >60 ML/MIN/1.73
GLUCOSE BLD-MCNC: 76 MG/DL (ref 74–99)
HCT VFR BLD CALC: 39.7 % (ref 34–48)
HEMOGLOBIN: 13 G/DL (ref 11.5–15.5)
IMMATURE GRANULOCYTES #: 0.01 E9/L
IMMATURE GRANULOCYTES %: 0.2 % (ref 0–5)
LYMPHOCYTES ABSOLUTE: 1.59 E9/L (ref 1.5–4)
LYMPHOCYTES RELATIVE PERCENT: 33.9 % (ref 20–42)
MAGNESIUM: 2.1 MG/DL (ref 1.6–2.6)
MCH RBC QN AUTO: 28.8 PG (ref 26–35)
MCHC RBC AUTO-ENTMCNC: 32.7 % (ref 32–34.5)
MCV RBC AUTO: 88 FL (ref 80–99.9)
MONOCYTES ABSOLUTE: 0.47 E9/L (ref 0.1–0.95)
MONOCYTES RELATIVE PERCENT: 10 % (ref 2–12)
NEUTROPHILS ABSOLUTE: 2.48 E9/L (ref 1.8–7.3)
NEUTROPHILS RELATIVE PERCENT: 53 % (ref 43–80)
PDW BLD-RTO: 13.1 FL (ref 11.5–15)
PLATELET # BLD: 235 E9/L (ref 130–450)
PMV BLD AUTO: 10.1 FL (ref 7–12)
POTASSIUM SERPL-SCNC: 4.3 MMOL/L (ref 3.5–5)
RBC # BLD: 4.51 E12/L (ref 3.5–5.5)
SODIUM BLD-SCNC: 138 MMOL/L (ref 132–146)
TOTAL PROTEIN: 6.7 G/DL (ref 6.4–8.3)
WBC # BLD: 4.7 E9/L (ref 4.5–11.5)

## 2021-03-03 PROCEDURE — 99214 OFFICE O/P EST MOD 30 MIN: CPT | Performed by: INTERNAL MEDICINE

## 2021-03-03 PROCEDURE — 2580000003 HC RX 258: Performed by: INTERNAL MEDICINE

## 2021-03-03 PROCEDURE — 85025 COMPLETE CBC W/AUTO DIFF WBC: CPT

## 2021-03-03 PROCEDURE — 4040F PNEUMOC VAC/ADMIN/RCVD: CPT | Performed by: INTERNAL MEDICINE

## 2021-03-03 PROCEDURE — G8427 DOCREV CUR MEDS BY ELIG CLIN: HCPCS | Performed by: INTERNAL MEDICINE

## 2021-03-03 PROCEDURE — 6360000002 HC RX W HCPCS: Performed by: INTERNAL MEDICINE

## 2021-03-03 PROCEDURE — 83735 ASSAY OF MAGNESIUM: CPT

## 2021-03-03 PROCEDURE — 36591 DRAW BLOOD OFF VENOUS DEVICE: CPT

## 2021-03-03 PROCEDURE — 99214 OFFICE O/P EST MOD 30 MIN: CPT

## 2021-03-03 PROCEDURE — G8399 PT W/DXA RESULTS DOCUMENT: HCPCS | Performed by: INTERNAL MEDICINE

## 2021-03-03 PROCEDURE — 1090F PRES/ABSN URINE INCON ASSESS: CPT | Performed by: INTERNAL MEDICINE

## 2021-03-03 PROCEDURE — G8420 CALC BMI NORM PARAMETERS: HCPCS | Performed by: INTERNAL MEDICINE

## 2021-03-03 PROCEDURE — 1123F ACP DISCUSS/DSCN MKR DOCD: CPT | Performed by: INTERNAL MEDICINE

## 2021-03-03 PROCEDURE — G8484 FLU IMMUNIZE NO ADMIN: HCPCS | Performed by: INTERNAL MEDICINE

## 2021-03-03 PROCEDURE — 80053 COMPREHEN METABOLIC PANEL: CPT

## 2021-03-03 PROCEDURE — 1036F TOBACCO NON-USER: CPT | Performed by: INTERNAL MEDICINE

## 2021-03-03 RX ORDER — HEPARIN SODIUM (PORCINE) LOCK FLUSH IV SOLN 100 UNIT/ML 100 UNIT/ML
500 SOLUTION INTRAVENOUS PRN
Status: DISCONTINUED | OUTPATIENT
Start: 2021-03-03 | End: 2021-03-04 | Stop reason: HOSPADM

## 2021-03-03 RX ORDER — HEPARIN SODIUM (PORCINE) LOCK FLUSH IV SOLN 100 UNIT/ML 100 UNIT/ML
500 SOLUTION INTRAVENOUS PRN
Status: CANCELLED | OUTPATIENT
Start: 2021-03-03

## 2021-03-03 RX ORDER — SODIUM CHLORIDE 0.9 % (FLUSH) 0.9 %
10 SYRINGE (ML) INJECTION PRN
Status: CANCELLED | OUTPATIENT
Start: 2021-03-03

## 2021-03-03 RX ORDER — SODIUM CHLORIDE 0.9 % (FLUSH) 0.9 %
10 SYRINGE (ML) INJECTION PRN
Status: DISCONTINUED | OUTPATIENT
Start: 2021-03-03 | End: 2021-03-04 | Stop reason: HOSPADM

## 2021-03-03 RX ADMIN — Medication 10 ML: at 09:30

## 2021-03-03 RX ADMIN — SODIUM CHLORIDE, PRESERVATIVE FREE 500 UNITS: 5 INJECTION INTRAVENOUS at 09:31

## 2021-03-03 NOTE — PROGRESS NOTES
mass likely at the 12:00 position.       RECOMMENDATION:       Recommend ultrasound core biopsy of the mass seen on ultrasound and   stereotactic biopsy of the microcalcifications.       BI-RADS Category 5: Highly Suggestive of Malignancy          8/29/19  Left Breast US   [facility]         FINDINGS:        A small partially obscured mass is identified in the upper outer left   breast measuring approximately 5-6 mm. Additionally, there is a small   segmentally distributed cluster of pleomorphic microcalcifications   located superiorly near the mass extending to the middle third depth.       Ultrasound confirmed a small irregular shaped hypoechoic mass with   circumscribed margins at the 12:00 position measuring 5 mm in maximal   dimension.           Impression       1. Small solid suspicious mass at the 12:00 position. 2. Segmentally distributed pleomorphic microcalcifications located   near the breast mass likely at the 12:00 position.       RECOMMENDATION:       Recommend ultrasound core biopsy of the mass seen on ultrasound and   stereotactic biopsy of the microcalcifications.       BI-RADS Category 5: Highly Suggestive of Malignancy              She underwent a stereotactic guided left breast core biopsy at 12 o'clock position on September 10 , 2019.a single top hat shaped marker clip was deployed into the site.     She underwent an ultrasound guided biopsy of the left breast at the 12 o'clock position on September 17, 2019, A post-surgical ribbon shaped microclip was placed.      Pathological evaluation completed at [facility]):     9/10/19  Final Surgical Pathology Report  Diagnosis:  A. Left breast, 12:00, biopsy: High-grade ductal carcinoma in situ,  cannot exclude microinvasion, see comment. B. Left breast 12:00, additional, biopsy: High-grade ductal carcinoma in  situ, cannot exclude microinvasion, see comment.     Breast Cancer Marker Studies:  Estrogen Receptors (ER): Positive         Percentage of cells positive: <10%         Intensity: Weak    Progesterone Receptors (SD): Negative        Internal control cells present and stain as expected: No internal  control present     9/17/19 Final Surgical Pathology Report    Diagnosis:  Mass, Left breast, 12:00, core biopsy: Segments of benign fibroadipose  tissue and scant skeletal muscle, see comment. Comment:   Epithelial lined mammary ducts and lobules are not identified  in the tissue sample. Correlation with clinical and radiologic findings  is essential to assure adequacy of tissue sampling. In the setting of a  mass clinically or radiologically suspicious for neoplasm, additional  tissue sampling is recommended. The patient underwent on 11/20/2019 a left mastectomy with sentinel lymph node excisional biopsy, pathology:    CANCER CASE SUMMARY:  Procedure: Left simple mastectomy  Specimen laterality: Left  Tumor site: 12:00 position  Tumor size: 8.0 mm in greatest dimension  Histologic type:  Invasive carcinoma of no special type (invasive ductal  carcinoma, not otherwise specified)  Histologic grade (Deandre histologic score):   Glandular differentiation: Score 3   Nuclear pleomorphism: Score 2   Mitotic rate: Score 2   Overall grade: Grade 2 (score of 7)  Tumor focality: Single focus of invasive carcinoma  Ductal carcinoma in situ (DCIS): Present, adjacent to the invasive  carcinoma  Invasive carcinoma margins:   Margins uninvolved by invasive carcinoma    Distance from closest margin: 5.0 mm from the posterior margin  DCIS margins:   Margin uninvolved by DCIS    Distance from closest margin: 10.0 mm from the posterior margin  Regional lymph nodes: Uninvolved by tumor cells   Total number of lymph nodes examined: 2 (both sentinel nodes)  Treatment effect: No known presurgical therapy  Pathologic stage classification (pTNM, AJCC 8th edition):   pT1b   (sn) pN0    Ancillary studies:  Calponin immunostain on block A4 shows the invasive carcinoma lacking a  myoepithelial layer. P63 immunostain on block A6 shows the invasive carcinoma lacking a  myoepithelial layer, with a myoepithelial layer retained around the DCIS. Breast Cancer Marker Studies (Block A6):  Negative: 0%        No internal control cells present. Progesterone Receptors (OK):  Negative: 0%        No internal control cells present. Her-2/miles (c-erb B-2) protein expression: Positive (Score 3+)    The patient was started on adjuvant treatment with Herceptin and Taxol on 1/2/2020, she completed Taxol  on 3/19/2020. She is on single agent Herceptin. The patient was started on tamoxifen on 6/4/2020, she was given Effexor for depression, she had side effects from it, was discontinued and she was not on Lexapro, she did not like it. She was started on Arimidex on 8/20/2020, the patient has been stressed out, her  has a bladder tumor and will need to have a surgery done, she was in the ED on 9/6/2020, she was feeling tired and short of breath, she feels that her symptoms were because of the Arimidex and because she was off the Paxil prior to that. Arimidex has been discontinued. Aaron Kirkpatrick is finally feeling much better. Her mood have improved. She is following with psychiatry. She has residual neuropathy, her psychiatrist would like to take her off the gabapentin. Review of Systems;  CONSTITUTIONAL: No fever, chills. Good appetite, feeling tired. ENMT: Eyes: No diplopia; Nose: Positive for epistaxis. Mouth: No sore throat. RESPIRATORY: No hemoptysis, shortness of breath, cough. CARDIOVASCULAR: No chest pain, palpitations. GASTROINTESTINAL: As per HPI. GENITOURINARY: No dysuria, urinary frequency, hematuria. NEURO: No syncope, presyncope, headache.   Remainder:  ROS NEGATIVE    Past Medical History:      Diagnosis Date    Cancer [facility]) 2019    breast left    Cerebrovascular disease     tia without issues    Depression     Hypothyroidism     Irritable bowel syndrome     not diagnosed, patient controls with diet    Low sodium levels 6/18/2020    Thyroid disease     TIA (transient ischemic attack) 11/2020     Patient Active Problem List   Diagnosis    Ductal carcinoma in situ (DCIS) of left breast    Malignant neoplasm of left female breast, unspecified estrogen receptor status, unspecified site of breast ([facility] [address] [facility]    Poor venous access    Low sodium levels    Other osteoporosis without current pathological fracture    TIA (transient ischemic attack)    Anxiety    Stroke-like symptoms    Hyponatremia        Past Surgical History:      Procedure Laterality Date    APPENDECTOMY      BREAST SURGERY      mastectomy left nov 20 2019    CHOLECYSTECTOMY      HYSTERECTOMY, TOTAL ABDOMINAL      oophorectomy    INSERTION / REMOVAL / REPLACEMENT VENOUS ACCESS CATHETER N/A 12/27/2019    MEDIPORT INSERTION performed by Dari Booker MD at [facility] Left 11/20/2019    LEFT BREAST SIMPLE  MASTECTOMY, BLUE DYE INJECTION, LEFT AXILLARY  SENTINEL NODE BIOPSY POSSIBLE LEFT AXILLARY DISSECTION -- Nisa Ortiz -- PECTORAL BLOCK performed by Ayana Acuña MD at [facility] [place]    TONSILLECTOMY         Family History:  Family History   Problem Relation Age of Onset    Cancer Mother 66        liver    Cancer Brother 58        kidney       Medications:  Reviewed and reconciled.     Social History:  Social History     Socioeconomic History    Marital status:      Spouse name: Not on file    Number of children: Not on file    Years of education: Not on file    Highest education level: Not on file   Occupational History    Not on file   Social Needs    Financial resource strain: Not on file    Food insecurity     Worry: Not on file     Inability: Not on file   [employer] needs     Medical: Not on file     Non-medical: Not on file   Tobacco Use    Smoking status: Former Smoker     Packs/day: 2.00     Years: 35.00     Pack years: 70.00     Quit date: for any skin changes, no nipple discharge, she has nodularity and tenderness in the lower inner and outer quadrants, no palpable right axillary adenopathy. She is status post left mastectomy, the incision is healing nicely, no palpable left axillary lymphadenopathy. CHEST: This post right port placement  ABDOMEN: Soft. Non-tender, non-distended. Positive bowel sounds. EXTREMITIES: Without clubbing, cyanosis, or edema. NEUROLOGIC: No focal deficits. ECOG PS 1      Impression/Plan:      The patient is a 67 y.o. lady with a past medical history significant for thyroid disease, depression, and IBS, who had presented with an abnormal screening mammogram, she had a left breast biopsy done revealing high-grade DCIS, ER positive less than 10%, NE negative, she underwent on 11/20/2019 a left mastectomy with sentinel lymph node excisional biopsy, she was found to have invasive ductal carcinoma, tumor size 8 mm, grade 2, with associated DCIS, margins are negative, 2 sentinel lymph nodes were removed, they were both negative for metastatic disease, final pathologic stage pT1b(sn) pN0Mx, ER -0% NE -0% HER-2/miles +3+ by IHC, prognostic stage IA. I discussed with the patient her diagnosis, risks of her tumor, prognosis and recommendations for systemic therapy. The patient has a small HER-2/miles positive disease, tumor size is 8 mm, adjuvant treatment with Taxol and Herceptin is recommended, schedule and the side effects of the treatment were reviewed with the patient. 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 The patient was started on tamoxifen on 6/4/2020, she was given Effexor for depression, she had side effects from it, was discontinued and she was not on Lexapro, she does not like it and wanted to go back on Paxil. She was started on Arimidex on 8/20/2020, with poor tolerance, Arimidex was discontinued.     Patient had a 2D echocardiogram done, LVEF is 65%, adequate for treatment with Herceptin, she had a port placed, she was started on adjuvant therapy with Taxol and Herceptin on 1/2/2020. She completed Taxol on 3/19/2020. The patient is doing well clinically with no evidence of disease recurrence. Will refer to Dr. Diane Russ to have the port removed. The patient has residual peripheral neuropathy, continue vitamin B complex, she is on gabapentin, but her psychiatrist would like to take her off the gabapentin. She would like to hold off on having PT/OT. We discussed referral to neurology if the neuropathy does not improve. The patient completed single agent Herceptin on 12/3/2020. Right breast tenderness and lumps, ordered right diagnostic mammogram and ultrasound. They were done on 2/25/2020, there was no mammographic/sonographic evidence of malignancy, this was BI-RADS Category 1, negative. She had a right breast screening mammogram done on 3/2/2021, was negative for malignancy, she will be due again for a repeat right breast screening mammogram on 3/3/2022. Anxiety and depression, continue follow-up with psychiatry. RTC in 3 months. Thank you for allowing us to participate in the care of Mrs. Rakel Miranda.     Codi Sung MD   HEMATOLOGY/MEDICAL [address] [address] [address]  Dept: [place]aud: [phone]

## 2021-03-05 ENCOUNTER — TELEPHONE (OUTPATIENT)
Dept: SURGERY | Age: 76
End: 2021-03-05

## 2021-03-05 NOTE — TELEPHONE ENCOUNTER
Prior Authorization Form:      DEMOGRAPHICS:                     Patient Name:  Johnnie Basurto  Patient :  1945            Insurance:  Payor: MEDICARE / Plan: MEDICARE PART A AND B / Product Type: *No Product type* /   Insurance ID Number:    Payor/Plan Subscr  Sex Relation Sub. Ins. ID Effective Group Num   1. [place] - [place]* RICCI PARKER* 1945 Female Self [id number] 1/1/15                                    [address] [address]   2.  [place] * RICCI PARKER* 1945 Female Self [id number] 1/1/15                                    [address]         DIAGNOSIS & PROCEDURE:                       Procedure/Operation: mediport removal         CPT Code: 15302    Diagnosis:  Port in place    ICD10 Code: Z95.828    Location:  [address] [place] [address]    Surgeon:  Eli Marie INFORMATION:                          Date: 2021    Time:               Anesthesia:                                                         Status:  Outpatient        Special Comments:           Electronically signed by Julian Cantrell MA on 3/5/2021 at 2:10 PM

## 2021-03-08 ENCOUNTER — PREP FOR PROCEDURE (OUTPATIENT)
Dept: SURGERY | Age: 76
End: 2021-03-08

## 2021-03-08 ENCOUNTER — HOSPITAL ENCOUNTER (OUTPATIENT)
Age: 76
Discharge: HOME | End: 2021-03-10
Payer: MEDICARE

## 2021-03-08 DIAGNOSIS — Z01.818 PRE-OP TESTING: ICD-10-CM

## 2021-03-08 DIAGNOSIS — Z01.818 PRE-OP TESTING: Primary | ICD-10-CM

## 2021-03-08 PROCEDURE — U0003 INFECTIOUS AGENT DETECTION BY NUCLEIC ACID (DNA OR RNA); SEVERE ACUTE RESPIRATORY SYNDROME CORONAVIRUS 2 (SARS-COV-2) (CORONAVIRUS DISEASE [COVID-19]), AMPLIFIED PROBE TECHNIQUE, MAKING USE OF HIGH THROUGHPUT TECHNOLOGIES AS DESCRIBED BY CMS-2020-01-R: HCPCS

## 2021-03-08 RX ORDER — SODIUM CHLORIDE, SODIUM LACTATE, POTASSIUM CHLORIDE, CALCIUM CHLORIDE 600; 310; 30; 20 MG/100ML; MG/100ML; MG/100ML; MG/100ML
INJECTION, SOLUTION INTRAVENOUS CONTINUOUS
Status: CANCELLED | OUTPATIENT
Start: 2021-03-08

## 2021-03-08 RX ORDER — SODIUM CHLORIDE 0.9 % (FLUSH) 0.9 %
10 SYRINGE (ML) INJECTION PRN
Status: CANCELLED | OUTPATIENT
Start: 2021-03-08

## 2021-03-08 RX ORDER — SODIUM CHLORIDE 0.9 % (FLUSH) 0.9 %
10 SYRINGE (ML) INJECTION EVERY 12 HOURS SCHEDULED
Status: CANCELLED | OUTPATIENT
Start: 2021-03-08

## 2021-03-10 LAB
SARS-COV-2: NOT DETECTED
SOURCE: NORMAL

## 2021-03-11 ENCOUNTER — ANESTHESIA EVENT (OUTPATIENT)
Dept: OPERATING ROOM | Age: 76
End: 2021-03-11
Payer: MEDICARE

## 2021-03-11 NOTE — PROGRESS NOTES
[facility]                                                                                                                    PRE OP INSTRUCTIONS FOR  Flavio Bishop        Date: 3/11/2021    Date of surgery: 03/12/2021   Arrival Time: Hospital will call you between 5pm and 7pm with your final arrival time for surgery    1. Do not eat or drink anything after midnight prior to surgery. This includes no water, chewing gum, mints or ice chips. 2. Take the following medications with a small sip of water on the morning of Surgery: synthroid-paxil-gabapentin     3. Diabetics may take evening dose of insulin but none after midnight. If you feel symptomatic or low blood sugar morning of surgery drink 1-2 ounces of apple juice only. 4. Aspirin, Ibuprofen, Advil, Naproxen, Vitamin E and other Anti-inflammatory products should be stopped  before surgery  as directed by your physician. Take Tylenol only unless instructed otherwise by your surgeon. 5. Check with your Doctor regarding stopping Plavix, Coumadin, Lovenox, Eliquis, Effient, or other blood thinners. 6. Do not smoke,use illicit drugs and do not drink any alcoholic beverages 24 hours prior to surgery. 7. You may brush your teeth the morning of surgery. DO NOT SWALLOW WATER    8. You MUST make arrangements for a responsible adult to take you home after your surgery. You will not be allowed to leave alone or drive yourself home. It is strongly suggested someone stay with you the first 24 hrs. Your surgery will be cancelled if you do not have a ride home. 9. PEDIATRIC PATIENTS ONLY:  A parent/legal guardian must accompany a child scheduled for surgery and plan to stay at the hospital until the child is discharged. Please do not bring other children with you.     10. Please wear simple, loose fitting clothing to the hospital.  Do not bring valuables (money, credit cards, checkbooks, etc.) Do not wear any makeup (including no eye makeup) or nail polish on your fingers or toes. 11. DO NOT wear any jewelry or piercings on day of surgery. All body piercing jewelry must be removed. 12. Shower the night before surgery with ___Antibacterial soap /JACLYN WIPES________    13. TOTAL JOINT REPLACEMENT/HYSTERECTOMY PATIENTS ONLY---Remember to bring Blood Bank bracelet to the hospital on the day of surgery. 14. If you have a Living Will and Durable Power of  for Healthcare, please bring in a copy. 15. If appropriate bring crutches, inspirex, WALKER, CANE etc... 12. Notify your Surgeon if you develop any illness between now and surgery time, cough, cold, fever, sore throat, nausea, vomiting, etc.  Please notify your surgeon if you experience dizziness, shortness of breath or blurred vision between now & the time of your surgery. 17. If you have ___dentures, they will be removed before going to the OR; we will provide you a container. If you wear ___contact lenses or ___glasses, they will be removed; please bring a case for them. 18. To provide excellent care visitors will be limited to 2 in the room at any given time. 19. Please bring picture ID and insurance card. 20. Sleep apnea patients need to bring CPAP AND SETTINGS to hospital on day of surgery. 21. During flu season no children under the age of 15 are permitted in the hospital for the safety of all patients. 22. The day of your procedure please enter through the main lobby entrance and report to the information desk and you will be directed where to go. Please call AMBULATORY CARE if you have any further questions.    [facility]     [address]

## 2021-03-11 NOTE — ANESTHESIA PRE PROCEDURE
Department of Anesthesiology  Preprocedure Note       Name:  David Holguin   Age:  67 y.o.  :  1945                                          MRN:  [id number]         Date:  3/11/2021      Surgeon: Brie Healy):  Dari Booker MD    Procedure: Procedure(s): MEDIPORT REMOVAL    Medications prior to admission:   Prior to Admission medications    Medication Sig Start Date End Date Taking? Authorizing Provider   B Complex-C (SUPER B COMPLEX PO) Take 1 Dose by mouth daily   Yes Historical Provider, MD   aspirin EC 81 MG EC tablet Take 1 tablet by mouth daily 10/11/20 3/11/21 Yes OCTAVIO Guadalupe CNP   mirtazapine (REMERON) 15 MG tablet Take 15 mg by mouth nightly   Yes Historical Provider, MD   gabapentin (NEURONTIN) 100 MG capsule Take 100 mg by mouth 2 times daily. Yes Historical Provider, MD   Docusate Sodium (COLACE PO) Take by mouth 2 times daily   Yes Historical Provider, MD   PARoxetine (PAXIL) 10 MG tablet Take 20 mg by mouth every morning    Yes Historical Provider, MD   levothyroxine (SYNTHROID) 88 MCG tablet Take 88 mcg by mouth Daily   Yes Historical Provider, MD   atorvastatin (LIPITOR) 10 MG tablet Take 1 tablet by mouth nightly New cholesterol medication 10/11/20 3/2/21  OCTAVIO Guadalupe CNP       Current medications:    No current facility-administered medications for this encounter. Current Outpatient Medications   Medication Sig Dispense Refill    B Complex-C (SUPER B COMPLEX PO) Take 1 Dose by mouth daily      aspirin EC 81 MG EC tablet Take 1 tablet by mouth daily 30 tablet 0    mirtazapine (REMERON) 15 MG tablet Take 15 mg by mouth nightly      gabapentin (NEURONTIN) 100 MG capsule Take 100 mg by mouth 2 times daily.        Docusate Sodium (COLACE PO) Take by mouth 2 times daily      PARoxetine (PAXIL) 10 MG tablet Take 20 mg by mouth every morning       levothyroxine (SYNTHROID) 88 MCG tablet Take 88 mcg by mouth Daily      atorvastatin (LIPITOR) 10 MG tablet Take 1 tablet by mouth nightly New cholesterol medication 30 tablet 0       Allergies:     Allergies   Allergen Reactions    Arimidex [Anastrozole] Other (See Comments)     Hallucination, anxious, fatigue, stomach upset       Effexor [Venlafaxine] Other (See Comments)     Hallucination, vision issues, felt like she was going crazy, anxious    Lexapro [Escitalopram Oxalate] Diarrhea and Other (See Comments)     And fatigue        Problem List:    Patient Active Problem List   Diagnosis Code    Ductal carcinoma in situ (DCIS) of left breast D05.12    Malignant neoplasm of left female breast, unspecified estrogen receptor status, unspecified site of breast ([facility] C50.912    Poor venous access I87.8    Low sodium levels E87.1    Other osteoporosis without current pathological fracture M81.8    TIA (transient ischemic attack) G45.9    Anxiety F41.9    Stroke-like symptoms R29.90    Hyponatremia E87.1       Past Medical History:        Diagnosis Date    Cancer ([facility] 2019    breast left    Cerebrovascular disease     tia without issues    Depression     Hypothyroidism     Irritable bowel syndrome     not diagnosed, patient controls with diet    Low sodium levels 6/18/2020    Thyroid disease     TIA (transient ischemic attack) 11/2020       Past Surgical History:        Procedure Laterality Date    APPENDECTOMY      BREAST SURGERY      mastectomy left nov 20 2019    CHOLECYSTECTOMY      HYSTERECTOMY, TOTAL ABDOMINAL      oophorectomy    INSERTION / REMOVAL / REPLACEMENT VENOUS ACCESS CATHETER N/A 12/27/2019    MEDIPORT INSERTION performed by Mike Lyman MD at [address] [place] [address] Left 11/20/2019    LEFT BREAST SIMPLE  MASTECTOMY, BLUE DYE INJECTION, LEFT AXILLARY  SENTINEL NODE BIOPSY POSSIBLE LEFT AXILLARY DISSECTION -- NEOPROBE -- PECTORAL BLOCK performed by Stephanie Martin MD at [address]         Social History:    Social History     Tobacco Use  Smoking status: Former Smoker     Packs/day: 2.00     Years: 35.00     Pack years: 70.00     Quit date:      Years since quittin.2    Smokeless tobacco: Never Used   Substance Use Topics    Alcohol use: Not Currently                                Counseling given: Not Answered      Vital Signs (Current): There were no vitals filed for this visit. BP Readings from Last 3 Encounters:   21 (!) 152/70   21 132/62   20 (!) 142/70       NPO Status:                                                                                 BMI:   Wt Readings from Last 3 Encounters:   21 145 lb (65.8 kg)   21 140 lb (63.5 kg)   20 135 lb (61.2 kg)     There is no height or weight on file to calculate BMI.    CBC:   Lab Results   Component Value Date    WBC 4.7 2021    RBC 4.51 2021    HGB 13.0 2021    HCT 39.7 2021    MCV 88.0 2021    RDW 13.1 2021     2021       CMP:   Lab Results   Component Value Date     2021    K 4.3 2021    K 4.1 10/11/2020     2021    CO2 27 2021    BUN 12 2021    CREATININE 0.9 2021    GFRAA >60 2021    LABGLOM >60 2021    GLUCOSE 76 2021    PROT 6.7 2021    CALCIUM 8.6 2021    BILITOT 0.2 2021    ALKPHOS 116 2021    AST 25 2021    ALT 15 2021       POC Tests: No results for input(s): POCGLU, POCNA, POCK, POCCL, POCBUN, POCHEMO, POCHCT in the last 72 hours.     Coags:   Lab Results   Component Value Date    PROTIME 12.7 10/10/2020    INR 1.1 10/10/2020    APTT 24.7 10/10/2020       HCG (If Applicable): No results found for: PREGTESTUR, PREGSERUM, HCG, HCGQUANT     ABGs: No results found for: PHART, PO2ART, VBT8RKA, VFF5QFX, BEART, T5CBHQBS     Type & Screen (If Applicable):  No results found for: LABABO, LABRH    Drug/Infectious Status (If Applicable):  No results found for: HIV, HEPCAB    COVID-19 Screening (If Applicable):   Lab Results   Component Value Date    COVID19 Not Detected 03/08/2021         Anesthesia Evaluation  Patient summary reviewed and Nursing notes reviewed no history of anesthetic complications:   Airway: Mallampati: III  TM distance: >3 FB   Neck ROM: full  Mouth opening: > = 3 FB Dental:          Pulmonary:Negative Pulmonary ROS   (+) decreased breath sounds,                            PE comment: Mild rales at the bases Cardiovascular:  Exercise tolerance: good (>4 METS),   (+) murmur, hyperlipidemia      ECG reviewed  Rhythm: regular  Rate: normal  Echocardiogram reviewed         Beta Blocker:  Not on Beta Blocker      ROS comment: Normal sinus rhythm  Cannot rule out Anterior infarct , age undetermined  Abnormal ECG    Summary  Normal left ventricular systolic function, LVEF is 65-70% (Compared to  prior Echo on 4/13/2020 with LVEF 73%). Global GS strain -18.7% to -21%. Normal left ventricular chamber size. Stage I diastolic dysfunction. Interatrial septum not well visualized but appears intact. Normal right ventricle size and function. There is trace mitral regurgitation. No mitral valve prolapse. No hemodynamically significant aortic stenosis is present. There is trace tricuspid regurgitation. Normal aortic root size. No evidence of pericardial effusion. Pericardium appears normal.  No intra cardiac mass or thrombus. Pt. Denies anginal symptoms, respiratory difficulties, major physical restrictions or any recent      Neuro/Psych:   (+) neuromuscular disease (Neuropathy):, TIA, psychiatric history: stable with treatmentdepression/anxiety             GI/Hepatic/Renal:            ROS comment: IBS. Endo/Other:    (+) hypothyroidism::., malignancy/cancer (Left breast cancer s/p mastectomy and AND. Denies LUE lymphedema. ). Pt had no PAT visit       Abdominal:           Vascular: negative vascular ROS. Anesthesia Plan      MAC     ASA 3       Induction: intravenous. MIPS: Prophylactic antiemetics administered. Anesthetic plan and risks discussed with patient. Plan discussed with CRNA.                   Mike Mitchell DO   3/11/2021

## 2021-03-12 ENCOUNTER — ANESTHESIA (OUTPATIENT)
Dept: OPERATING ROOM | Age: 76
End: 2021-03-12
Payer: MEDICARE

## 2021-03-12 ENCOUNTER — HOSPITAL ENCOUNTER (OUTPATIENT)
Age: 76
Setting detail: OUTPATIENT SURGERY
Discharge: HOME | End: 2021-03-12
Attending: SURGERY | Admitting: SURGERY
Payer: MEDICARE

## 2021-03-12 VITALS
RESPIRATION RATE: 16 BRPM | TEMPERATURE: 98 F | HEART RATE: 67 BPM | DIASTOLIC BLOOD PRESSURE: 58 MMHG | BODY MASS INDEX: 24.89 KG/M2 | OXYGEN SATURATION: 97 % | SYSTOLIC BLOOD PRESSURE: 126 MMHG | HEIGHT: 64 IN

## 2021-03-12 VITALS
DIASTOLIC BLOOD PRESSURE: 54 MMHG | SYSTOLIC BLOOD PRESSURE: 114 MMHG | RESPIRATION RATE: 7 BRPM | OXYGEN SATURATION: 99 %

## 2021-03-12 LAB
APTT: 29.6 SEC (ref 24.5–35.1)
INR BLD: 1.1
PROTHROMBIN TIME: 12.7 SEC (ref 9.3–12.4)

## 2021-03-12 PROCEDURE — 7100000010 HC PHASE II RECOVERY - FIRST 15 MIN: Performed by: SURGERY

## 2021-03-12 PROCEDURE — 3700000001 HC ADD 15 MINUTES (ANESTHESIA): Performed by: SURGERY

## 2021-03-12 PROCEDURE — 6360000002 HC RX W HCPCS: Performed by: SURGERY

## 2021-03-12 PROCEDURE — 85610 PROTHROMBIN TIME: CPT

## 2021-03-12 PROCEDURE — 36589 REMOVAL TUNNELED CV CATH: CPT | Performed by: SURGERY

## 2021-03-12 PROCEDURE — 2709999900 HC NON-CHARGEABLE SUPPLY: Performed by: SURGERY

## 2021-03-12 PROCEDURE — 3600000012 HC SURGERY LEVEL 2 ADDTL 15MIN: Performed by: SURGERY

## 2021-03-12 PROCEDURE — 85730 THROMBOPLASTIN TIME PARTIAL: CPT

## 2021-03-12 PROCEDURE — 36415 COLL VENOUS BLD VENIPUNCTURE: CPT

## 2021-03-12 PROCEDURE — 7100000011 HC PHASE II RECOVERY - ADDTL 15 MIN: Performed by: SURGERY

## 2021-03-12 PROCEDURE — 99214 OFFICE O/P EST MOD 30 MIN: CPT | Performed by: SURGERY

## 2021-03-12 PROCEDURE — 3700000000 HC ANESTHESIA ATTENDED CARE: Performed by: SURGERY

## 2021-03-12 PROCEDURE — 2500000003 HC RX 250 WO HCPCS: Performed by: SURGERY

## 2021-03-12 PROCEDURE — 3600000002 HC SURGERY LEVEL 2 BASE: Performed by: SURGERY

## 2021-03-12 PROCEDURE — 2580000003 HC RX 258: Performed by: SURGERY

## 2021-03-12 PROCEDURE — 6360000002 HC RX W HCPCS: Performed by: NURSE ANESTHETIST, CERTIFIED REGISTERED

## 2021-03-12 RX ORDER — SODIUM CHLORIDE 0.9 % (FLUSH) 0.9 %
10 SYRINGE (ML) INJECTION PRN
Status: DISCONTINUED | OUTPATIENT
Start: 2021-03-12 | End: 2021-03-12 | Stop reason: HOSPADM

## 2021-03-12 RX ORDER — FENTANYL CITRATE 50 UG/ML
INJECTION, SOLUTION INTRAMUSCULAR; INTRAVENOUS PRN
Status: DISCONTINUED | OUTPATIENT
Start: 2021-03-12 | End: 2021-03-12 | Stop reason: SDUPTHER

## 2021-03-12 RX ORDER — SODIUM CHLORIDE 0.9 % (FLUSH) 0.9 %
10 SYRINGE (ML) INJECTION EVERY 12 HOURS SCHEDULED
Status: DISCONTINUED | OUTPATIENT
Start: 2021-03-12 | End: 2021-03-12 | Stop reason: HOSPADM

## 2021-03-12 RX ORDER — IBUPROFEN 800 MG/1
800 TABLET ORAL EVERY 6 HOURS PRN
Qty: 20 TABLET | Refills: 0 | Status: SHIPPED | OUTPATIENT
Start: 2021-03-12 | End: 2021-09-02

## 2021-03-12 RX ORDER — CEFAZOLIN SODIUM 2 G/50ML
2000 SOLUTION INTRAVENOUS
Status: COMPLETED | OUTPATIENT
Start: 2021-03-12 | End: 2021-03-12

## 2021-03-12 RX ORDER — HYDROCODONE BITARTRATE AND ACETAMINOPHEN 5; 325 MG/1; MG/1
1 TABLET ORAL
Status: DISCONTINUED | OUTPATIENT
Start: 2021-03-12 | End: 2021-03-12 | Stop reason: HOSPADM

## 2021-03-12 RX ORDER — BUPIVACAINE HYDROCHLORIDE AND EPINEPHRINE 2.5; 5 MG/ML; UG/ML
INJECTION, SOLUTION EPIDURAL; INFILTRATION; INTRACAUDAL; PERINEURAL PRN
Status: DISCONTINUED | OUTPATIENT
Start: 2021-03-12 | End: 2021-03-12 | Stop reason: ALTCHOICE

## 2021-03-12 RX ORDER — MIDAZOLAM HYDROCHLORIDE 1 MG/ML
INJECTION INTRAMUSCULAR; INTRAVENOUS PRN
Status: DISCONTINUED | OUTPATIENT
Start: 2021-03-12 | End: 2021-03-12 | Stop reason: SDUPTHER

## 2021-03-12 RX ORDER — SODIUM CHLORIDE, SODIUM LACTATE, POTASSIUM CHLORIDE, CALCIUM CHLORIDE 600; 310; 30; 20 MG/100ML; MG/100ML; MG/100ML; MG/100ML
INJECTION, SOLUTION INTRAVENOUS CONTINUOUS
Status: DISCONTINUED | OUTPATIENT
Start: 2021-03-12 | End: 2021-03-12 | Stop reason: HOSPADM

## 2021-03-12 RX ADMIN — FENTANYL CITRATE 50 MCG: 50 INJECTION, SOLUTION INTRAMUSCULAR; INTRAVENOUS at 08:34

## 2021-03-12 RX ADMIN — FENTANYL CITRATE 50 MCG: 50 INJECTION, SOLUTION INTRAMUSCULAR; INTRAVENOUS at 08:40

## 2021-03-12 RX ADMIN — MIDAZOLAM 2 MG: 1 INJECTION INTRAMUSCULAR; INTRAVENOUS at 08:34

## 2021-03-12 RX ADMIN — CEFAZOLIN SODIUM 2000 MG: 2 SOLUTION INTRAVENOUS at 08:33

## 2021-03-12 RX ADMIN — SODIUM CHLORIDE, POTASSIUM CHLORIDE, SODIUM LACTATE AND CALCIUM CHLORIDE: 600; 310; 30; 20 INJECTION, SOLUTION INTRAVENOUS at 07:36

## 2021-03-12 ASSESSMENT — PULMONARY FUNCTION TESTS
PIF_VALUE: 0
PIF_VALUE: 0
PIF_VALUE: 1
PIF_VALUE: 0
PIF_VALUE: 1
PIF_VALUE: 0
PIF_VALUE: 1

## 2021-03-12 ASSESSMENT — PAIN SCALES - GENERAL
PAINLEVEL_OUTOF10: 0
PAINLEVEL_OUTOF10: 0

## 2021-03-12 NOTE — H&P
Done day of due to covid 19 risk    General Surgery History and Physical    Patient's Name/Date of Birth: Hoda Rutherford / 1945    Date: March 12, 2021     Surgeon: Ileana Cox M.D.    PCP: KAREN Ward PA-C     Chief Complaint: poor venous access and no longer needing port    HPI:   Hoda Rutherford is a 67 y.o. female who presents for evaluation of cancer and poor venous access for port removal as they have completed therapy.        Past Medical History:   Diagnosis Date    Cancer [facility]) 2019    breast left    Cerebrovascular disease     tia without issues    Depression     Hypothyroidism     Irritable bowel syndrome     not diagnosed, patient controls with diet    Low sodium levels 6/18/2020    Thyroid disease     TIA (transient ischemic attack) 11/2020       Past Surgical History:   Procedure Laterality Date    APPENDECTOMY      BREAST SURGERY      mastectomy left nov 20 2019    CHOLECYSTECTOMY      HYSTERECTOMY, TOTAL ABDOMINAL      oophorectomy    INSERTION / REMOVAL / REPLACEMENT VENOUS ACCESS CATHETER N/A 12/27/2019    MEDIPORT INSERTION performed by Winsome Pineda MD at [address] [facility] Left 11/20/2019    LEFT BREAST SIMPLE  MASTECTOMY, BLUE DYE INJECTION, LEFT AXILLARY  SENTINEL NODE BIOPSY POSSIBLE LEFT AXILLARY DISSECTION -- NEOPROBE -- PECTORAL BLOCK performed by Hood Lawson MD at [address] [facility]         Current Facility-Administered Medications   Medication Dose Route Frequency Provider Last Rate Last Admin    ceFAZolin (ANCEF) 2000 mg in dextrose 3 % 50 mL IVPB (duplex)  2,000 mg Intravenous On Call to [address] [place] [address] Vicki, MD        lactated ringers infusion   Intravenous Continuous Winsome Pineda  mL/hr at 03/12/21 0736 New Bag at 03/12/21 0736    sodium chloride flush 0.9 % injection 10 mL  10 mL Intravenous 2 times per day Winsome Pineda MD        sodium chloride flush 0.9 % injection 10 mL  10 mL Intravenous PRN Gui Cavazos MD        HYDROcodone-acetaminophen (NORCO) 5-325 MG per tablet 1 tablet  1 tablet Oral Once PRN Jm Desai,            Allergies   Allergen Reactions    Arimidex [Anastrozole] Other (See Comments)     Hallucination, anxious, fatigue, stomach upset       Effexor [Venlafaxine] Other (See Comments)     Hallucination, vision issues, felt like she was going crazy, anxious    Lexapro [Escitalopram Oxalate] Diarrhea and Other (See Comments)     And fatigue        The patient has a family history that is negative for severe cardiovascular or respiratory issues, negative for reaction to anesthesia.     Social History     Socioeconomic History    Marital status:      Spouse name: Not on file    Number of children: Not on file    Years of education: Not on file    Highest education level: Not on file   Occupational History    Not on file   Social Needs    Financial resource strain: Not on file    Food insecurity     Worry: Not on file     Inability: Not on file    Transportation needs     Medical: Not on file     Non-medical: Not on file   Tobacco Use    Smoking status: Former Smoker     Packs/day: 2.00     Years: 35.00     Pack years: 70.00     Quit date:      Years since quittin.2    Smokeless tobacco: Never Used   Substance and Sexual Activity    Alcohol use: Not Currently    Drug use: Never    Sexual activity: Not Currently   Lifestyle    Physical activity     Days per week: Not on file     Minutes per session: Not on file    Stress: Not on file   Relationships    Social connections     Talks on phone: Not on file     Gets together: Not on file     Attends Yazdanism service: Not on file     Active member of club or organization: Not on file     Attends meetings of clubs or organizations: Not on file     Relationship status: Not on file    Intimate partner violence     Fear of current or ex partner: Not on file     Emotionally abused: Not on file Physically abused: Not on file     Forced sexual activity: Not on file   Other Topics Concern    Not on file   Social History Narrative    Not on file           Review of Systems  Review of Systems -  General ROS: negative for - chills, fatigue or malaise  ENT ROS: negative for - hearing change, nasal congestion or nasal discharge  Allergy and Immunology ROS: negative for - hives, itchy/watery eyes or nasal congestion  Hematological and Lymphatic ROS: negative for - blood clots, blood transfusions, bruising or fatigue  Endocrine ROS: negative for - malaise/lethargy, mood swings, palpitations or polydipsia/polyuria  Breast ROS: negative for - new or changing breast lumps or nipple changes  Respiratory ROS: negative for - sputum changes, stridor, tachypnea or wheezing  Cardiovascular ROS: negative for - irregular heartbeat, loss of consciousness, murmur or orthopnea  Gastrointestinal ROS: negative for - constipation, diarrhea, gas/bloating, heartburn or hematemesis  Genito-Urinary ROS: negative for -  genital discharge, genital ulcers or hematuria  Musculoskeletal ROS: negative for - gait disturbance, muscle pain or muscular weakness    Physical exam:   BP (!) 141/63   Pulse 73   Temp 98.6 °F (37 °C) (Infrared)   Resp 16   Ht 5' 4\" (1.626 m)   SpO2 93%   BMI 24.89 kg/m²   General appearance:  NAD  Pyscho/social: negative for tremors and hallucinations  Head: NCAT, PERRLA, EOMI, red conjunctiva  Neck: supple, no masses  Lungs: CTAB, equal chest rise bilateral  Heart: Reg rate  Abdomen: soft, nontender, nondistended  Skin; no lesions  Gu: no cva tenderness  Extremities: extremities normal, atraumatic, no cyanosis or edema      Assessment:  67 y.o. female with poor venous access  for mediport removal    Plan:   To OR for removal  Discussed the risk, benefits and alternatives of surgery including wound infections, bleeding, scar  and the risks of  anesthetic including MI, CVA, sudden death or reactions to anesthetic medications. The patient understands the risks and alternatives. All questions were answered to the patient's satisfaction and they freely signed the consent.     Shilpi Rodríguez MD  8:13 AM  3/12/2021

## 2021-03-12 NOTE — ANESTHESIA POSTPROCEDURE EVALUATION
Department of Anesthesiology  Postprocedure Note    Patient: Hoda Rutherford  MRN: [id number]  YOB: 1945  Date of evaluation: 3/12/2021  Time:  9:07 AM     Procedure Summary     Date: 03/12/21 Room / Location: [address] [place] [address] [facility]    Anesthesia Start: 4781 Anesthesia Stop: 9043    Procedure: MEDIPORT REMOVAL (N/A Neck) Diagnosis: (PORT IN PLACE)    Surgeons: Winsome Pineda MD Responsible Provider: Alex Barajas DO    Anesthesia Type: MAC ASA Status: 3          Anesthesia Type: MAC    Ulises Phase I: Ulises Score: 10    Ulises Phase II:      Last vitals: Reviewed and per EMR flowsheets.        Anesthesia Post Evaluation    Patient location during evaluation: bedside  Patient participation: complete - patient participated  Level of consciousness: awake  Pain score: 2  Airway patency: patent  Nausea & Vomiting: no vomiting and no nausea  Complications: no  Cardiovascular status: hemodynamically stable  Respiratory status: acceptable  Hydration status: stable

## 2021-03-12 NOTE — PROGRESS NOTES
Ambulated in the hallway, steady gait. Voided. Requesting discharge, met criteria. Instructions reviewed including escribed med.

## 2021-06-02 DIAGNOSIS — C50.912 MALIGNANT NEOPLASM OF LEFT FEMALE BREAST, UNSPECIFIED ESTROGEN RECEPTOR STATUS, UNSPECIFIED SITE OF BREAST (HCC): Primary | ICD-10-CM

## 2021-06-03 ENCOUNTER — OFFICE VISIT (OUTPATIENT)
Dept: ONCOLOGY | Age: 76
End: 2021-06-03
Payer: MEDICARE

## 2021-06-03 ENCOUNTER — HOSPITAL ENCOUNTER (OUTPATIENT)
Dept: INFUSION THERAPY | Age: 76
Discharge: HOME | End: 2021-06-03
Payer: MEDICARE

## 2021-06-03 VITALS
OXYGEN SATURATION: 97 % | HEART RATE: 57 BPM | WEIGHT: 142.4 LBS | SYSTOLIC BLOOD PRESSURE: 148 MMHG | TEMPERATURE: 96.9 F | HEIGHT: 64 IN | BODY MASS INDEX: 24.31 KG/M2 | DIASTOLIC BLOOD PRESSURE: 67 MMHG

## 2021-06-03 DIAGNOSIS — C50.912 MALIGNANT NEOPLASM OF LEFT FEMALE BREAST, UNSPECIFIED ESTROGEN RECEPTOR STATUS, UNSPECIFIED SITE OF BREAST (HCC): Primary | ICD-10-CM

## 2021-06-03 DIAGNOSIS — C50.912 MALIGNANT NEOPLASM OF LEFT FEMALE BREAST, UNSPECIFIED ESTROGEN RECEPTOR STATUS, UNSPECIFIED SITE OF BREAST (HCC): ICD-10-CM

## 2021-06-03 DIAGNOSIS — D05.12 DUCTAL CARCINOMA IN SITU (DCIS) OF LEFT BREAST: ICD-10-CM

## 2021-06-03 DIAGNOSIS — R53.83 OTHER FATIGUE: ICD-10-CM

## 2021-06-03 LAB
ALBUMIN SERPL-MCNC: 4.1 G/DL (ref 3.5–5.2)
ALP BLD-CCNC: 119 U/L (ref 35–104)
ALT SERPL-CCNC: 15 U/L (ref 0–32)
ANION GAP SERPL CALCULATED.3IONS-SCNC: 8 MMOL/L (ref 7–16)
AST SERPL-CCNC: 30 U/L (ref 0–31)
BASOPHILS ABSOLUTE: 0.04 E9/L (ref 0–0.2)
BASOPHILS RELATIVE PERCENT: 1 % (ref 0–2)
BILIRUB SERPL-MCNC: 0.3 MG/DL (ref 0–1.2)
BUN BLDV-MCNC: 14 MG/DL (ref 6–23)
CALCIUM SERPL-MCNC: 8.7 MG/DL (ref 8.6–10.2)
CHLORIDE BLD-SCNC: 101 MMOL/L (ref 98–107)
CO2: 26 MMOL/L (ref 22–29)
CREAT SERPL-MCNC: 1 MG/DL (ref 0.5–1)
EOSINOPHILS ABSOLUTE: 0.1 E9/L (ref 0.05–0.5)
EOSINOPHILS RELATIVE PERCENT: 2.5 % (ref 0–6)
GFR AFRICAN AMERICAN: >60
GFR NON-AFRICAN AMERICAN: 54 ML/MIN/1.73
GLUCOSE BLD-MCNC: 75 MG/DL (ref 74–99)
HCT VFR BLD CALC: 41.7 % (ref 34–48)
HEMOGLOBIN: 13.3 G/DL (ref 11.5–15.5)
IMMATURE GRANULOCYTES #: 0.01 E9/L
IMMATURE GRANULOCYTES %: 0.2 % (ref 0–5)
LYMPHOCYTES ABSOLUTE: 1.58 E9/L (ref 1.5–4)
LYMPHOCYTES RELATIVE PERCENT: 39.2 % (ref 20–42)
MAGNESIUM: 2.1 MG/DL (ref 1.6–2.6)
MCH RBC QN AUTO: 28.1 PG (ref 26–35)
MCHC RBC AUTO-ENTMCNC: 31.9 % (ref 32–34.5)
MCV RBC AUTO: 88.2 FL (ref 80–99.9)
MONOCYTES ABSOLUTE: 0.38 E9/L (ref 0.1–0.95)
MONOCYTES RELATIVE PERCENT: 9.4 % (ref 2–12)
NEUTROPHILS ABSOLUTE: 1.92 E9/L (ref 1.8–7.3)
NEUTROPHILS RELATIVE PERCENT: 47.7 % (ref 43–80)
PDW BLD-RTO: 13.5 FL (ref 11.5–15)
PLATELET # BLD: 227 E9/L (ref 130–450)
PMV BLD AUTO: 10.8 FL (ref 7–12)
POTASSIUM SERPL-SCNC: 4.5 MMOL/L (ref 3.5–5)
RBC # BLD: 4.73 E12/L (ref 3.5–5.5)
SODIUM BLD-SCNC: 135 MMOL/L (ref 132–146)
TOTAL PROTEIN: 6.8 G/DL (ref 6.4–8.3)
WBC # BLD: 4 E9/L (ref 4.5–11.5)

## 2021-06-03 PROCEDURE — 83735 ASSAY OF MAGNESIUM: CPT

## 2021-06-03 PROCEDURE — G8427 DOCREV CUR MEDS BY ELIG CLIN: HCPCS | Performed by: INTERNAL MEDICINE

## 2021-06-03 PROCEDURE — 1036F TOBACCO NON-USER: CPT | Performed by: INTERNAL MEDICINE

## 2021-06-03 PROCEDURE — 80053 COMPREHEN METABOLIC PANEL: CPT

## 2021-06-03 PROCEDURE — 85025 COMPLETE CBC W/AUTO DIFF WBC: CPT

## 2021-06-03 PROCEDURE — G8420 CALC BMI NORM PARAMETERS: HCPCS | Performed by: INTERNAL MEDICINE

## 2021-06-03 PROCEDURE — 99214 OFFICE O/P EST MOD 30 MIN: CPT | Performed by: INTERNAL MEDICINE

## 2021-06-03 PROCEDURE — G8399 PT W/DXA RESULTS DOCUMENT: HCPCS | Performed by: INTERNAL MEDICINE

## 2021-06-03 PROCEDURE — 99212 OFFICE O/P EST SF 10 MIN: CPT

## 2021-06-03 PROCEDURE — 1090F PRES/ABSN URINE INCON ASSESS: CPT | Performed by: INTERNAL MEDICINE

## 2021-06-03 PROCEDURE — 1123F ACP DISCUSS/DSCN MKR DOCD: CPT | Performed by: INTERNAL MEDICINE

## 2021-06-03 PROCEDURE — 4040F PNEUMOC VAC/ADMIN/RCVD: CPT | Performed by: INTERNAL MEDICINE

## 2021-06-03 PROCEDURE — 36415 COLL VENOUS BLD VENIPUNCTURE: CPT

## 2021-06-03 NOTE — PROGRESS NOTES
[address] [address]  Dept: [phone]  Loc: [phone]  Attending Progress Note      Reason for Visit:   Left breast cancer. Referring Physician: Antionette Herrera MD.    PCP:  KAREN Connell PA-C    History of Present Illness: The patient is a 67 y.o. lady with a past medical history significant for thyroid disease, depression, and IBS, who had presented with an abnormal screening mammogram,    8/20/19  Bilateral screening mammogram  [facility]         FINDINGS: No suspicious masses, calcifications, or distortions are   identified on the right. On the left there is a new focal nodular   asymmetry at 12:00, with adjacent linear branching calcifications. Spot compression views is recommended for the asymmetry with   ultrasound, and spot magnification views for the calcifications. .             Impression   1. Right breast no mammographic evidence of malignancy           2. Left breast new focal asymmetry at 12:00, new microcalcifications.       Recommendation:   1. Right breast annual screening mammogram.   2. Left breast additional views spot compression and spot   magnification along with ultrasound.       BI-RADS Category 0:  Incomplete- Needs Additional Imaging Evaluation             8/29/19  Left Diagnostic mammogram   [facility]         FINDINGS:        A small partially obscured mass is identified in the upper outer left   breast measuring approximately 5-6 mm. Additionally, there is a small   segmentally distributed cluster of pleomorphic microcalcifications   located superiorly near the mass extending to the middle third depth.       Ultrasound confirmed a small irregular shaped hypoechoic mass with   circumscribed margins at the 12:00 position measuring 5 mm in maximal   dimension.           Impression       1. Small solid suspicious mass at the 12:00 position.    2. Segmentally distributed pleomorphic microcalcifications located   near the breast mass likely at the 12:00 position.       RECOMMENDATION:       Recommend ultrasound core biopsy of the mass seen on ultrasound and   stereotactic biopsy of the microcalcifications.       BI-RADS Category 5: Highly Suggestive of Malignancy          8/29/19  Left Breast US   [facility]         FINDINGS:        A small partially obscured mass is identified in the upper outer left   breast measuring approximately 5-6 mm. Additionally, there is a small   segmentally distributed cluster of pleomorphic microcalcifications   located superiorly near the mass extending to the middle third depth.       Ultrasound confirmed a small irregular shaped hypoechoic mass with   circumscribed margins at the 12:00 position measuring 5 mm in maximal   dimension.           Impression       1. Small solid suspicious mass at the 12:00 position. 2. Segmentally distributed pleomorphic microcalcifications located   near the breast mass likely at the 12:00 position.       RECOMMENDATION:       Recommend ultrasound core biopsy of the mass seen on ultrasound and   stereotactic biopsy of the microcalcifications.       BI-RADS Category 5: Highly Suggestive of Malignancy              She underwent a stereotactic guided left breast core biopsy at 12 o'clock position on September 10 , 2019.a single top hat shaped marker clip was deployed into the site.     She underwent an ultrasound guided biopsy of the left breast at the 12 o'clock position on September 17, 2019, A post-surgical ribbon shaped microclip was placed.      Pathological evaluation completed at [facility]):     9/10/19  Final Surgical Pathology Report  Diagnosis:  A. Left breast, 12:00, biopsy: High-grade ductal carcinoma in situ,  cannot exclude microinvasion, see comment. B. Left breast 12:00, additional, biopsy: High-grade ductal carcinoma in  situ, cannot exclude microinvasion, see comment.     Breast Cancer Marker Studies:  Estrogen Receptors (ER): Positive         Percentage of cells positive: <10%         Intensity: Weak    Progesterone Receptors (MI): Negative        Internal control cells present and stain as expected: No internal  control present     9/17/19 Final Surgical Pathology Report    Diagnosis:  Mass, Left breast, 12:00, core biopsy: Segments of benign fibroadipose  tissue and scant skeletal muscle, see comment. Comment:   Epithelial lined mammary ducts and lobules are not identified  in the tissue sample. Correlation with clinical and radiologic findings  is essential to assure adequacy of tissue sampling. In the setting of a  mass clinically or radiologically suspicious for neoplasm, additional  tissue sampling is recommended. The patient underwent on 11/20/2019 a left mastectomy with sentinel lymph node excisional biopsy, pathology:    CANCER CASE SUMMARY:  Procedure: Left simple mastectomy  Specimen laterality: Left  Tumor site: 12:00 position  Tumor size: 8.0 mm in greatest dimension  Histologic type:  Invasive carcinoma of no special type (invasive ductal  carcinoma, not otherwise specified)  Histologic grade (Deandre histologic score):   Glandular differentiation: Score 3   Nuclear pleomorphism: Score 2   Mitotic rate: Score 2   Overall grade: Grade 2 (score of 7)  Tumor focality: Single focus of invasive carcinoma  Ductal carcinoma in situ (DCIS): Present, adjacent to the invasive  carcinoma  Invasive carcinoma margins:   Margins uninvolved by invasive carcinoma    Distance from closest margin: 5.0 mm from the posterior margin  DCIS margins:   Margin uninvolved by DCIS    Distance from closest margin: 10.0 mm from the posterior margin  Regional lymph nodes: Uninvolved by tumor cells   Total number of lymph nodes examined: 2 (both sentinel nodes)  Treatment effect: No known presurgical therapy  Pathologic stage classification (pTNM, AJCC 8th edition):   pT1b   (sn) pN0    Ancillary studies:  Calponin immunostain on block A4 shows the invasive carcinoma lacking without issues    Depression     Hypothyroidism     Irritable bowel syndrome     not diagnosed, patient controls with diet    Low sodium levels 6/18/2020    Thyroid disease     TIA (transient ischemic attack) 11/2020     Patient Active Problem List   Diagnosis    Ductal carcinoma in situ (DCIS) of left breast    Malignant neoplasm of left female breast, unspecified estrogen receptor status, unspecified site of breast ([facility] [address]    Poor venous access    Low sodium levels    Other osteoporosis without current pathological fracture    TIA (transient ischemic attack)    Anxiety    Stroke-like symptoms    Hyponatremia        Past Surgical History:      Procedure Laterality Date    APPENDECTOMY      BREAST SURGERY      mastectomy left nov 20 2019    CATHETER REMOVAL N/A 3/12/2021    MEDIPORT [address] [facility] [address] Jenni Lewis MD [address] TOTAL ABDOMINAL      oophorectomy    INSERTION / REMOVAL / [address] N/A 12/27/2019    MEDIPORT INSERTION performed by Jenni Lewis MD at [address] Left 11/20/2019    LEFT BREAST SIMPLE  MASTECTOMY, BLUE DYE INJECTION, LEFT AXILLARY  SENTINEL NODE BIOPSY POSSIBLE LEFT AXILLARY DISSECTION -- Del Bible -- PECTORAL BLOCK performed by Luis Simeon MD at [facility] [place]    TONSILLECTOMY         Family History:  Family History   Problem Relation Age of Onset    Cancer Mother 66        liver    Cancer Brother 58        kidney       Medications:  Reviewed and reconciled.     Social History:  Social History     Socioeconomic History    Marital status:      Spouse name: Not on file    Number of children: Not on file    Years of education: Not on file    Highest education level: Not on file   Occupational History    Not on file   Tobacco Use    Smoking status: Former Smoker     Packs/day: 2.00     Years: 35.00     Pack years: 70.00     Quit date: 1999     Years since quittin.4    Smokeless tobacco: Never Used   Vaping Use    Vaping Use: Never used   Substance and Sexual Activity    Alcohol use: Not Currently    Drug use: Never    Sexual activity: Not Currently   Other Topics Concern    Not on file   Social History Narrative    Not on file     Social Determinants of Health     Financial Resource Strain:     Difficulty of Paying Living Expenses:    Food Insecurity:     Worried About Running Out of Food in the Last Year:     [address] in the Last Year:    Transportation Needs:     Lack of Transportation (Medical):  Lack of Transportation (Non-Medical):    Physical Activity:     Days of Exercise per Week:     Minutes of Exercise per Session:    Stress:     Feeling of Stress :    Social Connections:     Frequency of Communication with Friends and Family:     Frequency of Social Gatherings with Friends and Family:     Attends [employer] Services:     Active Member of Clubs or Organizations:     Attends Club or Organization Meetings:     Marital Status:    Intimate Partner Violence:     Fear of Current or Ex-Partner:     Emotionally Abused:     Physically Abused:     Sexually Abused: Allergies: Allergies   Allergen Reactions    Arimidex [Anastrozole] Other (See Comments)     Hallucination, anxious, fatigue, stomach upset       Effexor [Venlafaxine] Other (See Comments)     Hallucination, vision issues, felt like she was going crazy, anxious    Lexapro [Escitalopram Oxalate] Diarrhea and Other (See Comments)     And fatigue         OB/GYN:  Age of menarche was 22, medically induced. Age of menopause was 31. Patient admits to hormonal therapy, progesterone, premarin. Oral contraceptives? To start periods.   Patient is       Physical Exam:  BP (!) 148/67   Pulse 57   Temp 96.9 °F (36.1 °C)   Ht 5' 4\" (1.626 m)   Wt 142 lb 6.4 oz (64.6 kg)   SpO2 97%   BMI 24.44 kg/m²     GENERAL: Alert, oriented x 3, not in acute distress. HEENT: PERRLA; EOMI. Oropharynx clear. NECK: Supple. No palpable cervical or supraclavicular lymphadenopathy. LUNGS: Good air entry bilaterally. No wheezing, crackles or rhonchi. CARDIOVASCULAR: Regular rate. No murmurs, rubs or gallops. BREASTS: Right breast exam is negative for any skin changes, no nipple discharge, she has nodularity and tenderness in the lower inner and outer quadrants, no palpable right axillary adenopathy. She is status post left mastectomy, the incision is healing nicely, no palpable left axillary lymphadenopathy. CHEST: This post right port placement  ABDOMEN: Soft. Non-tender, non-distended. Positive bowel sounds. EXTREMITIES: Without clubbing, cyanosis, or edema. NEUROLOGIC: No focal deficits. ECOG PS 1      Impression/Plan:      The patient is a 67 y.o. lady with a past medical history significant for thyroid disease, depression, and IBS, who had presented with an abnormal screening mammogram, she had a left breast biopsy done revealing high-grade DCIS, ER positive less than 10%, OH negative, she underwent on 11/20/2019 a left mastectomy with sentinel lymph node excisional biopsy, she was found to have invasive ductal carcinoma, tumor size 8 mm, grade 2, with associated DCIS, margins are negative, 2 sentinel lymph nodes were removed, they were both negative for metastatic disease, final pathologic stage pT1b(sn) pN0Mx, ER -0% OH -0% HER-2/miles +3+ by IHC, prognostic stage IA. I discussed with the patient her diagnosis, risks of her tumor, prognosis and recommendations for systemic therapy. The patient has a small HER-2/miles positive disease, tumor size is 8 mm, adjuvant treatment with Taxol and Herceptin is recommended, schedule and the side effects of the treatment were reviewed with the patient. 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   The patient was started on

## 2021-09-01 DIAGNOSIS — D05.12 DUCTAL CARCINOMA IN SITU (DCIS) OF LEFT BREAST: ICD-10-CM

## 2021-09-01 DIAGNOSIS — C50.912 MALIGNANT NEOPLASM OF LEFT FEMALE BREAST, UNSPECIFIED ESTROGEN RECEPTOR STATUS, UNSPECIFIED SITE OF BREAST (HCC): Primary | ICD-10-CM

## 2021-09-02 ENCOUNTER — HOSPITAL ENCOUNTER (OUTPATIENT)
Dept: INFUSION THERAPY | Age: 76
End: 2021-09-02
Payer: MEDICARE

## 2021-09-02 ENCOUNTER — OFFICE VISIT (OUTPATIENT)
Dept: ONCOLOGY | Age: 76
End: 2021-09-02
Payer: MEDICARE

## 2021-09-02 ENCOUNTER — HOSPITAL ENCOUNTER (OUTPATIENT)
Dept: INFUSION THERAPY | Age: 76
Discharge: HOME | End: 2021-09-02
Payer: MEDICARE

## 2021-09-02 VITALS
OXYGEN SATURATION: 99 % | TEMPERATURE: 97.5 F | RESPIRATION RATE: 18 BRPM | HEART RATE: 65 BPM | SYSTOLIC BLOOD PRESSURE: 140 MMHG | WEIGHT: 145.8 LBS | HEIGHT: 64 IN | BODY MASS INDEX: 24.89 KG/M2 | DIASTOLIC BLOOD PRESSURE: 69 MMHG

## 2021-09-02 DIAGNOSIS — C50.912 MALIGNANT NEOPLASM OF LEFT FEMALE BREAST, UNSPECIFIED ESTROGEN RECEPTOR STATUS, UNSPECIFIED SITE OF BREAST (HCC): Primary | ICD-10-CM

## 2021-09-02 DIAGNOSIS — C50.912 MALIGNANT NEOPLASM OF LEFT FEMALE BREAST, UNSPECIFIED ESTROGEN RECEPTOR STATUS, UNSPECIFIED SITE OF BREAST (HCC): ICD-10-CM

## 2021-09-02 DIAGNOSIS — R53.83 OTHER FATIGUE: ICD-10-CM

## 2021-09-02 DIAGNOSIS — D05.12 DUCTAL CARCINOMA IN SITU (DCIS) OF LEFT BREAST: Primary | ICD-10-CM

## 2021-09-02 DIAGNOSIS — Z12.31 ENCOUNTER FOR SCREENING MAMMOGRAM FOR MALIGNANT NEOPLASM OF BREAST: ICD-10-CM

## 2021-09-02 DIAGNOSIS — Z12.39 BREAST SCREENING: ICD-10-CM

## 2021-09-02 LAB
ALBUMIN SERPL-MCNC: 4.1 G/DL (ref 3.5–5.2)
ALP BLD-CCNC: 105 U/L (ref 35–104)
ALT SERPL-CCNC: 13 U/L (ref 0–32)
ANION GAP SERPL CALCULATED.3IONS-SCNC: 8 MMOL/L (ref 7–16)
AST SERPL-CCNC: 20 U/L (ref 0–31)
BASOPHILS ABSOLUTE: 0.04 E9/L (ref 0–0.2)
BASOPHILS RELATIVE PERCENT: 0.9 % (ref 0–2)
BILIRUB SERPL-MCNC: 0.3 MG/DL (ref 0–1.2)
BUN BLDV-MCNC: 14 MG/DL (ref 6–23)
CALCIUM SERPL-MCNC: 8.9 MG/DL (ref 8.6–10.2)
CHLORIDE BLD-SCNC: 99 MMOL/L (ref 98–107)
CO2: 26 MMOL/L (ref 22–29)
CREAT SERPL-MCNC: 1 MG/DL (ref 0.5–1)
EOSINOPHILS ABSOLUTE: 0.1 E9/L (ref 0.05–0.5)
EOSINOPHILS RELATIVE PERCENT: 2.3 % (ref 0–6)
GFR AFRICAN AMERICAN: >60
GFR NON-AFRICAN AMERICAN: 54 ML/MIN/1.73
GLUCOSE BLD-MCNC: 91 MG/DL (ref 74–99)
HCT VFR BLD CALC: 40 % (ref 34–48)
HEMOGLOBIN: 12.9 G/DL (ref 11.5–15.5)
IMMATURE GRANULOCYTES #: 0.01 E9/L
IMMATURE GRANULOCYTES %: 0.2 % (ref 0–5)
LYMPHOCYTES ABSOLUTE: 1.63 E9/L (ref 1.5–4)
LYMPHOCYTES RELATIVE PERCENT: 36.7 % (ref 20–42)
MAGNESIUM: 2 MG/DL (ref 1.6–2.6)
MCH RBC QN AUTO: 28.5 PG (ref 26–35)
MCHC RBC AUTO-ENTMCNC: 32.3 % (ref 32–34.5)
MCV RBC AUTO: 88.5 FL (ref 80–99.9)
MONOCYTES ABSOLUTE: 0.45 E9/L (ref 0.1–0.95)
MONOCYTES RELATIVE PERCENT: 10.1 % (ref 2–12)
NEUTROPHILS ABSOLUTE: 2.21 E9/L (ref 1.8–7.3)
NEUTROPHILS RELATIVE PERCENT: 49.8 % (ref 43–80)
PDW BLD-RTO: 13 FL (ref 11.5–15)
PLATELET # BLD: 211 E9/L (ref 130–450)
PMV BLD AUTO: 10.1 FL (ref 7–12)
POTASSIUM SERPL-SCNC: 4.4 MMOL/L (ref 3.5–5)
RBC # BLD: 4.52 E12/L (ref 3.5–5.5)
SODIUM BLD-SCNC: 133 MMOL/L (ref 132–146)
TOTAL PROTEIN: 6.3 G/DL (ref 6.4–8.3)
WBC # BLD: 4.4 E9/L (ref 4.5–11.5)

## 2021-09-02 PROCEDURE — 36591 DRAW BLOOD OFF VENOUS DEVICE: CPT

## 2021-09-02 PROCEDURE — 4040F PNEUMOC VAC/ADMIN/RCVD: CPT | Performed by: INTERNAL MEDICINE

## 2021-09-02 PROCEDURE — G8399 PT W/DXA RESULTS DOCUMENT: HCPCS | Performed by: INTERNAL MEDICINE

## 2021-09-02 PROCEDURE — 99214 OFFICE O/P EST MOD 30 MIN: CPT | Performed by: INTERNAL MEDICINE

## 2021-09-02 PROCEDURE — 83735 ASSAY OF MAGNESIUM: CPT

## 2021-09-02 PROCEDURE — 99213 OFFICE O/P EST LOW 20 MIN: CPT

## 2021-09-02 PROCEDURE — 1036F TOBACCO NON-USER: CPT | Performed by: INTERNAL MEDICINE

## 2021-09-02 PROCEDURE — 1090F PRES/ABSN URINE INCON ASSESS: CPT | Performed by: INTERNAL MEDICINE

## 2021-09-02 PROCEDURE — 36415 COLL VENOUS BLD VENIPUNCTURE: CPT

## 2021-09-02 PROCEDURE — 85025 COMPLETE CBC W/AUTO DIFF WBC: CPT

## 2021-09-02 PROCEDURE — G8427 DOCREV CUR MEDS BY ELIG CLIN: HCPCS | Performed by: INTERNAL MEDICINE

## 2021-09-02 PROCEDURE — 1123F ACP DISCUSS/DSCN MKR DOCD: CPT | Performed by: INTERNAL MEDICINE

## 2021-09-02 PROCEDURE — 80053 COMPREHEN METABOLIC PANEL: CPT

## 2021-09-02 PROCEDURE — G8417 CALC BMI ABV UP PARAM F/U: HCPCS | Performed by: INTERNAL MEDICINE

## 2021-09-02 RX ORDER — HEPARIN SODIUM (PORCINE) LOCK FLUSH IV SOLN 100 UNIT/ML 100 UNIT/ML
500 SOLUTION INTRAVENOUS PRN
OUTPATIENT
Start: 2021-09-02

## 2021-09-02 RX ORDER — SODIUM CHLORIDE 0.9 % (FLUSH) 0.9 %
10 SYRINGE (ML) INJECTION PRN
Status: DISCONTINUED | OUTPATIENT
Start: 2021-09-02 | End: 2021-09-03 | Stop reason: HOSPADM

## 2021-09-02 RX ORDER — HEPARIN SODIUM (PORCINE) LOCK FLUSH IV SOLN 100 UNIT/ML 100 UNIT/ML
500 SOLUTION INTRAVENOUS PRN
Status: DISCONTINUED | OUTPATIENT
Start: 2021-09-02 | End: 2021-09-03 | Stop reason: HOSPADM

## 2021-09-02 RX ORDER — SODIUM CHLORIDE 0.9 % (FLUSH) 0.9 %
10 SYRINGE (ML) INJECTION PRN
OUTPATIENT
Start: 2021-09-02

## 2021-09-02 NOTE — PROGRESS NOTES
[address] [address]  Dept: [phone]  Loc: [phone]  Attending Progress Note      Reason for Visit:   Left breast cancer. Referring Physician: Panchito Simental MD.    PCP:  KAREN Marley PA-C    History of Present Illness: The patient is a 67 y.o. lady with a past medical history significant for thyroid disease, depression, and IBS, who had presented with an abnormal screening mammogram,    8/20/19  Bilateral screening mammogram  [facility]         FINDINGS: No suspicious masses, calcifications, or distortions are   identified on the right. On the left there is a new focal nodular   asymmetry at 12:00, with adjacent linear branching calcifications. Spot compression views is recommended for the asymmetry with   ultrasound, and spot magnification views for the calcifications. .             Impression   1. Right breast no mammographic evidence of malignancy           2. Left breast new focal asymmetry at 12:00, new microcalcifications.       Recommendation:   1. Right breast annual screening mammogram.   2. Left breast additional views spot compression and spot   magnification along with ultrasound.       BI-RADS Category 0:  Incomplete- Needs Additional Imaging Evaluation             8/29/19  Left Diagnostic mammogram   [facility]         FINDINGS:        A small partially obscured mass is identified in the upper outer left   breast measuring approximately 5-6 mm. Additionally, there is a small   segmentally distributed cluster of pleomorphic microcalcifications   located superiorly near the mass extending to the middle third depth.       Ultrasound confirmed a small irregular shaped hypoechoic mass with   circumscribed margins at the 12:00 position measuring 5 mm in maximal   dimension.           Impression       1. Small solid suspicious mass at the 12:00 position.    2. Segmentally distributed pleomorphic microcalcifications located   near the breast positive: <10%         Intensity: Weak    Progesterone Receptors (MN): Negative        Internal control cells present and stain as expected: No internal  control present     9/17/19 Final Surgical Pathology Report    Diagnosis:  Mass, Left breast, 12:00, core biopsy: Segments of benign fibroadipose  tissue and scant skeletal muscle, see comment. Comment:   Epithelial lined mammary ducts and lobules are not identified  in the tissue sample. Correlation with clinical and radiologic findings  is essential to assure adequacy of tissue sampling. In the setting of a  mass clinically or radiologically suspicious for neoplasm, additional  tissue sampling is recommended. The patient underwent on 11/20/2019 a left mastectomy with sentinel lymph node excisional biopsy, pathology:    CANCER CASE SUMMARY:  Procedure: Left simple mastectomy  Specimen laterality: Left  Tumor site: 12:00 position  Tumor size: 8.0 mm in greatest dimension  Histologic type:  Invasive carcinoma of no special type (invasive ductal  carcinoma, not otherwise specified)  Histologic grade (Deandre histologic score):   Glandular differentiation: Score 3   Nuclear pleomorphism: Score 2   Mitotic rate: Score 2   Overall grade: Grade 2 (score of 7)  Tumor focality: Single focus of invasive carcinoma  Ductal carcinoma in situ (DCIS): Present, adjacent to the invasive  carcinoma  Invasive carcinoma margins:   Margins uninvolved by invasive carcinoma    Distance from closest margin: 5.0 mm from the posterior margin  DCIS margins:   Margin uninvolved by DCIS    Distance from closest margin: 10.0 mm from the posterior margin  Regional lymph nodes: Uninvolved by tumor cells   Total number of lymph nodes examined: 2 (both sentinel nodes)  Treatment effect: No known presurgical therapy  Pathologic stage classification (pTNM, AJCC 8th edition):   pT1b   (sn) pN0    Ancillary studies:  Calponin immunostain on block A4 shows the invasive carcinoma lacking a  myoepithelial layer. P63 immunostain on block A6 shows the invasive carcinoma lacking a  myoepithelial layer, with a myoepithelial layer retained around the DCIS. Breast Cancer Marker Studies (Block A6):  Negative: 0%        No internal control cells present. Progesterone Receptors (MD):  Negative: 0%        No internal control cells present. Her-2/miles (c-erb B-2) protein expression: Positive (Score 3+)    The patient was started on adjuvant treatment with Herceptin and Taxol on 1/2/2020, she completed Taxol  on 3/19/2020. She is on single agent Herceptin. The patient was started on tamoxifen on 6/4/2020, she was given Effexor for depression, she had side effects from it, was discontinued and she was not on Lexapro, she did not like it. She was started on Arimidex on 8/20/2020, the patient has been stressed out, her  has a bladder tumor and will need to have a surgery done, she was in the ED on 9/6/2020, she was feeling tired and short of breath, she feels that her symptoms were because of the Arimidex and because she was off the Paxil prior to that. Arimidex has been discontinued. Nino Frias is doing really well at this time. She is following with psychiatry. She has residual neuropathy, taking vitamin B complex and gabapentin, which has been helping. No new bony pain, no headaches or dizziness. Her spouse was told he was cancer free at this time, has history of bladder cancer. Review of Systems;  CONSTITUTIONAL: No fever, chills. Good appetite, feeling tired. ENMT: Eyes: No diplopia; Nose: Positive for epistaxis. Mouth: No sore throat. RESPIRATORY: No hemoptysis, shortness of breath, cough. CARDIOVASCULAR: No chest pain, palpitations. GASTROINTESTINAL: As per HPI. GENITOURINARY: No dysuria, urinary frequency, hematuria. NEURO: No syncope, presyncope, headache.   Remainder:  ROS NEGATIVE    Past Medical History:      Diagnosis Date    Cancer [facility]) 2019    breast left    Cerebrovascular disease     tia without issues    Depression     Hypothyroidism     Irritable bowel syndrome     not diagnosed, patient controls with diet    Low sodium levels 6/18/2020    Thyroid disease     TIA (transient ischemic attack) 11/2020     Patient Active Problem List   Diagnosis    Ductal carcinoma in situ (DCIS) of left breast    Malignant neoplasm of left female breast, unspecified estrogen receptor status, unspecified site of breast ([facility] [address]    Poor venous access    Low sodium levels    Other osteoporosis without current pathological fracture    TIA (transient ischemic attack)    Anxiety    Stroke-like symptoms    Hyponatremia        Past Surgical History:      Procedure Laterality Date    APPENDECTOMY      BREAST SURGERY      mastectomy left nov 20 2019    CATHETER REMOVAL N/A 3/12/2021    MEDIPORT REMOVAL performed by Dario Suarez MD at [address] TOTAL ABDOMINAL      oophorectomy    INSERTION / REMOVAL / [address] N/A 12/27/2019    MEDIPORT INSERTION performed by Dario Suarez MD at [address] 11/20/2019    LEFT BREAST SIMPLE  MASTECTOMY, BLUE DYE INJECTION, LEFT AXILLARY  SENTINEL NODE BIOPSY POSSIBLE LEFT AXILLARY DISSECTION -- Johnathan Guy -- PECTORAL BLOCK performed by Jeimy Cottrell MD at [facility] OR    TONSILLECTOMY         Family History:  Family History   Problem Relation Age of Onset    Cancer Mother 66        liver    Cancer Brother 58        kidney       Medications:  Reviewed and reconciled.     Social History:  Social History     Socioeconomic History    Marital status:      Spouse name: Not on file    Number of children: Not on file    Years of education: Not on file    Highest education level: Not on file   Occupational History    Not on file   Tobacco Use    Smoking status: Former Smoker     Packs/day: 2.00     Years: 35.00     Pack years: 70.00 Quit date: 36     Years since quittin.6    Smokeless tobacco: Never Used   Vaping Use    Vaping Use: Never used   Substance and Sexual Activity    Alcohol use: Not Currently    Drug use: Never    Sexual activity: Not Currently   Other Topics Concern    Not on file   Social History Narrative    Not on file     Social Determinants of Health     Financial Resource Strain:     Difficulty of Paying Living Expenses:    Food Insecurity:     Worried About Running Out of Food in the Last Year:     [address] in the Last Year:    Transportation Needs:     Lack of Transportation (Medical):  Lack of Transportation (Non-Medical):    Physical Activity:     Days of Exercise per Week:     Minutes of Exercise per Session:    Stress:     Feeling of Stress :    Social Connections:     Frequency of Communication with Friends and Family:     Frequency of Social Gatherings with Friends and Family:     Attends [employer] Services:     Active Member of Clubs or Organizations:     Attends Club or Organization Meetings:     Marital Status:    Intimate Partner Violence:     Fear of Current or Ex-Partner:     Emotionally Abused:     Physically Abused:     Sexually Abused: Allergies: Allergies   Allergen Reactions    Arimidex [Anastrozole] Other (See Comments)     Hallucination, anxious, fatigue, stomach upset       Effexor [Venlafaxine] Other (See Comments)     Hallucination, vision issues, felt like she was going crazy, anxious    Lexapro [Escitalopram Oxalate] Diarrhea and Other (See Comments)     And fatigue         OB/GYN:  Age of menarche was 22, medically induced. Age of menopause was 31. Patient admits to hormonal therapy, progesterone, premarin. Oral contraceptives? To start periods.   Patient is       Physical Exam:  BP (!) 140/69   Pulse 65   Temp 97.5 °F (36.4 °C)   Resp 18   Ht 5' 4\" (1.626 m)   Wt 145 lb 12.8 oz (66.1 kg)   SpO2 99%   BMI 25.03 kg/m²     GENERAL: Alert, oriented x 3, not in acute distress. HEENT: PERRLA; EOMI. Oropharynx clear. NECK: Supple. No palpable cervical or supraclavicular lymphadenopathy. LUNGS: Good air entry bilaterally. No wheezing, crackles or rhonchi. CARDIOVASCULAR: Regular rate. No murmurs, rubs or gallops. BREASTS: Right breast exam is negative for any skin changes, no nipple discharge, she has nodularity and tenderness in the lower inner and outer quadrants, no palpable right axillary adenopathy. She is status post left mastectomy, the incision is healing nicely, no palpable left axillary lymphadenopathy. CHEST: This post right port placement  ABDOMEN: Soft. Non-tender, non-distended. Positive bowel sounds. EXTREMITIES: Without clubbing, cyanosis, or edema. NEUROLOGIC: No focal deficits. ECOG PS 1      Impression/Plan:      The patient is a 67 y.o. lady with a past medical history significant for thyroid disease, depression, and IBS, who had presented with an abnormal screening mammogram, she had a left breast biopsy done revealing high-grade DCIS, ER positive less than 10%, [place] negative, she underwent on 11/20/2019 a left mastectomy with sentinel lymph node excisional biopsy, she was found to have invasive ductal carcinoma, tumor size 8 mm, grade 2, with associated DCIS, margins are negative, 2 sentinel lymph nodes were removed, they were both negative for metastatic disease, final pathologic stage pT1b(sn) pN0Mx, ER -0% [place] -0% HER-2/miles +3+ by IHC, prognostic stage IA. I discussed with the patient her diagnosis, risks of her tumor, prognosis and recommendations for systemic therapy. The patient has a small HER-2/miles positive disease, tumor size is 8 mm, adjuvant treatment with Taxol and Herceptin is recommended, schedule and the side effects of the treatment were reviewed with the patient.     DCIS was ER positive, endocrine therapy with Arimidex is recommended to decrease the risk of contralateral DCIS and invasive

## 2021-10-01 ASSESSMENT — ENCOUNTER SYMPTOMS
WHEEZING: 0
VOMITING: 0
DIARRHEA: 0
CHEST TIGHTNESS: 0
TROUBLE SWALLOWING: 0
ABDOMINAL PAIN: 0
VOICE CHANGE: 0
SORE THROAT: 0
SINUS PAIN: 0
SINUS PRESSURE: 0
BACK PAIN: 0
BLOOD IN STOOL: 0
EYE ITCHING: 0
ABDOMINAL DISTENTION: 0
COUGH: 0
CONSTIPATION: 0
EYE DISCHARGE: 0
NAUSEA: 0
RHINORRHEA: 0
CHOKING: 0
SHORTNESS OF BREATH: 0

## 2021-10-01 NOTE — PROGRESS NOTES
Subjective:    Some elements of all sections below were copied from my previous note 12/2/19 and have been re-examined and updated where appropriate. All elements reflect the medical decision making of today June 4, 2020  This note was copied forward from the last encounter. Essential components for this patient record were reviewed and verified on this visit including:  recent hospitalizations, recent imaging, PMH, PSH, FH, SOC HX, Allergies, and Medications were reviewed and updated as appropriate. In addition, the assessment and plan were copied from prior office note and updated accordingly. Patient ID: Martín Vasquez is a 67 y.o. female. HPI   She underwent a stereotactic guided left breast core biopsy at 12 o'clock position on [place], 2019.a single top hat shaped marker clip was deployed into the site.     She underwent an ultrasound guided biopsy of the left breast at the 12 o'clock position on September 17, 2019, A post-surgical ribbon shaped microclip was placed.      Pathological evaluation completed at [facility]):     9/10/19  Final Surgical Pathology Report  Diagnosis:  A. Left breast, 12:00, biopsy: High-grade ductal carcinoma in situ,  cannot exclude microinvasion, see comment. B. Left breast 12:00, additional, biopsy: High-grade ductal carcinoma in  situ, cannot exclude microinvasion, see comment. Breast Cancer Marker Studies:  Estrogen Receptors (ER): Positive         Percentage of cells positive: <10%         Intensity: Weak    Progesterone Receptors (MS): Negative        Internal control cells present and stain as expected: No internal  control present     9/17/19 Final Surgical Pathology Report    Diagnosis:  Mass, Left breast, 12:00, core biopsy: Segments of benign fibroadipose  tissue and scant skeletal muscle, see comment. Comment:   Epithelial lined mammary ducts and lobules are not identified  in the tissue sample.  Correlation with clinical and radiologic completed adjuvant HT per medical oncology, Dr. Jory Chaudhary  -6/4/2020 started endocrine therapy with tamoxifen. Poor tolerance, tamoxifen discontinued. -8/20/2020 endocrine therapy with Arimidex. Poor tolerance, Arimidex discontinued 9/6/2020. opted for surveillance    Past Medical History:   Diagnosis Date    Cancer [facility]) 2019    breast left    Cerebrovascular disease     tia without issues    Depression     Hypothyroidism     Irritable bowel syndrome     not diagnosed, patient controls with diet    Low sodium levels 6/18/2020    Thyroid disease     TIA (transient ischemic attack) 11/2020       Past Surgical History:   Procedure Laterality Date    APPENDECTOMY      BREAST SURGERY      mastectomy left nov 20 2019    CATHETER REMOVAL N/A 3/12/2021    MEDIPORT REMOVAL performed by Apolinar Potter MD at [address] TOTAL ABDOMINAL      oophorectomy    INSERTION / REMOVAL / [address] N/A 12/27/2019    MEDIPORT INSERTION performed by Apolinar Potter MD at [address] 11/20/2019    LEFT BREAST SIMPLE  MASTECTOMY, BLUE DYE INJECTION, LEFT AXILLARY  SENTINEL NODE BIOPSY POSSIBLE LEFT AXILLARY DISSECTION -- NEOPROBE -- PECTORAL BLOCK performed [facility] Oriana Campos MD at [facility]         Current Outpatient Medications   Medication Sig Dispense Refill    B Complex-C (SUPER B COMPLEX PO) Take 1 Dose by mouth daily      aspirin EC 81 MG EC tablet Take 1 tablet by mouth daily 30 tablet 0    atorvastatin (LIPITOR) 10 MG tablet Take 1 tablet by mouth nightly New cholesterol medication 30 tablet 0    mirtazapine (REMERON) 15 MG tablet Take 15 mg by mouth nightly      gabapentin (NEURONTIN) 100 MG capsule Take 100 mg by mouth 2 times daily.        Docusate Sodium (COLACE PO) Take by mouth 2 times daily      PARoxetine (PAXIL) 10 MG tablet Take 20 mg by mouth every morning       levothyroxine (SYNTHROID) 88 MCG tablet Take 88 mcg by mouth Daily       No current facility-administered medications for this visit. Allergies   Allergen Reactions    Arimidex [Anastrozole] Other (See Comments)     Hallucination, anxious, fatigue, stomach upset       Effexor [Venlafaxine] Other (See Comments)     Hallucination, vision issues, felt like she was going crazy, anxious    Lexapro [Escitalopram Oxalate] Diarrhea and Other (See Comments)     And fatigue        Family History   Problem Relation Age of Onset    Cancer Mother 66        liver    Cancer Brother 58        kidney       Social History     Socioeconomic History    Marital status:      Spouse name: Not on file    Number of children: Not on file    Years of education: Not on file    Highest education level: Not on file   Occupational History    Not on file   Tobacco Use    Smoking status: Former Smoker     Packs/day: 2.00     Years: 35.00     Pack years: 70.00     Quit date:      Years since quittin.7    Smokeless tobacco: Never Used   Vaping Use    Vaping Use: Never used   Substance and Sexual Activity    Alcohol use: Not Currently    Drug use: Never    Sexual activity: Not Currently   Other Topics Concern    Not on file   Social History Narrative    Not on file     Social Determinants of Health     Financial Resource Strain:     Difficulty of Paying Living Expenses:    Food Insecurity:     Worried About Running Out of Food in the Last Year:     Ran Out of Food in the Last Year:    Transportation Needs:     Lack of Transportation (Medical):      Lack of Transportation (Non-Medical):    Physical Activity:     Days of Exercise per Week:     Minutes of Exercise per Session:    Stress:     Feeling of Stress :    Social Connections:     Frequency of Communication with Friends and Family:     Frequency of Social Gatherings with Friends and Family:     Attends [employer] Services:     Active Member of Clubs or Organizations:     Attends Club or Organization Meetings:     Marital Status:    Intimate Partner Violence:     Fear of Current or Ex-Partner:     Emotionally Abused:     Physically Abused:     Sexually Abused:        Review of Systems   Constitutional: Negative for activity change, appetite change, chills, fatigue, fever and unexpected weight change. Bettye Wilkes is doing well; She participates in yoga classes 2-4 times a week. HENT: Negative for congestion, postnasal drip, rhinorrhea, sinus pressure, sinus pain, sore throat, trouble swallowing and voice change. Eyes: Negative for discharge, itching and visual disturbance. Respiratory: Negative for cough, choking, chest tightness, shortness of breath and wheezing. Cardiovascular: Negative for chest pain, palpitations and leg swelling. Gastrointestinal: Negative for abdominal distention, abdominal pain, blood in stool, constipation, diarrhea, nausea and vomiting. Endocrine: Negative for cold intolerance and heat intolerance. Genitourinary: Negative for difficulty urinating, dysuria, frequency and hematuria. Musculoskeletal: Negative for arthralgias, back pain, gait problem, joint swelling, myalgias, neck pain and neck stiffness. Allergic/Immunologic: Negative for environmental allergies and food allergies. Neurological: Negative for dizziness, seizures, syncope, speech difficulty, weakness, light-headedness and headaches. Hematological: Negative for adenopathy. Does not bruise/bleed easily. Psychiatric/Behavioral: Negative for agitation, confusion and decreased concentration. The patient is not nervous/anxious. Objective:   Physical Exam  Vitals and nursing note reviewed. Constitutional:       General: She is not in acute distress. Appearance: Normal appearance. She is well-developed. She is not diaphoretic. Comments: ECOG is stable at 0; pleasant and cooperative. HENT:      Head: Normocephalic and atraumatic. Mouth/Throat:      Pharynx: No oropharyngeal exudate. Eyes:      General: No scleral icterus. Right eye: No discharge. Left eye: No discharge. Conjunctiva/sclera: Conjunctivae normal.   Neck:      Thyroid: No thyromegaly. Vascular: No JVD. Trachea: No tracheal deviation. Cardiovascular:      Rate and Rhythm: Normal rate and regular rhythm. Heart sounds: No murmur heard. No friction rub. No gallop. Pulmonary:      Effort: Pulmonary effort is normal. No respiratory distress or retractions. Breath sounds: Normal breath sounds. No stridor. No wheezing or rales. Chest:      Chest wall: No mass, lacerations, deformity, swelling, tenderness or edema. Breasts: Breasts are symmetrical.         Right: No inverted nipple, mass, nipple discharge, skin change or tenderness. Left: No inverted nipple, mass, nipple discharge, skin change or tenderness. Comments: Right breast exam is unremarkable. Abdominal:      General: There is no distension. Palpations: Abdomen is soft. Tenderness: There is no abdominal tenderness. There is no guarding or rebound. Musculoskeletal:         General: No tenderness or deformity. Normal range of motion. Right shoulder: Normal.      Left shoulder: Normal.      Cervical back: Normal range of motion and neck supple. Lymphadenopathy:      Cervical: No cervical adenopathy. Right cervical: No superficial, deep or posterior cervical adenopathy. Left cervical: No superficial, deep or posterior cervical adenopathy. Upper Body:      Right upper body: No pectoral adenopathy. Left upper body: No pectoral adenopathy. Skin:     General: Skin is warm and dry. Coloration: Skin is not pale. Findings: No erythema or rash. Neurological:      Mental Status: She is alert and oriented to person, place, and time.       Coordination: Coordination normal.   Psychiatric:         Behavior: Behavior normal.         Thought Content: Thought content normal.         Judgment: Judgment normal.      Comments: Pleasant and conversant. Reports her depression is well controlled on current regimen. Assessment:    Patient presents today for clinical follow up.    67 y.o. who had left breast biopsy revealing high-grade DCIS, ER positive less than 10%, [place] negative. -11/20/2019 left mastectomy with sentinel lymph node excision noted invasive ductal carcinoma, tumor size 8 mm, grade 2 disease with associated DCIS. Margins were negative, 2 sentinel lymph nodes were negative for metastatic disease. Pathologic stage pT1b pN0 MX. ER negative TN negative HER-2/miles positive disease. Stage Ia      -3/19/2020 completed adjuvant HT per medical oncology, Dr. Paulette Fairbanks  -6/4/2020 started endocrine therapy with tamoxifen (DCIS +). Poor tolerance, tamoxifen discontinued. -8/20/2020 endocrine therapy with Arimidex. Poor tolerance, Arimidex discontinued 9/6/2020.  -10/10/2020 TIA admission: on ASA 81 mg daily. -11/3/2020 completed single agent Herceptin; still has MediPort right chest.  -2/25/2020 right diagnostic mammogram and right breast ultrasound for c/o tenderness was negative for malignancy. -03/02/2021 Right screening mammogram negative, BI-RADS 1.    -03/02/2021 clinical follow up is without evidence of recurrent disease. Reviewed follow-up schedule.  -10/07/2020 Clinical follow-up without evidence of recurrent disease. She continues to do yoga 2-4 times weekly and has good range of motion of her upper extremities. Plan to return to see in March with mammogram same day. Plan:   1. Continue monthly breast/chest wall self examination; detailed instructions reviewed today. Bring any changes to your physician's attention. 2. Continue healthy diet and exercise routinely as tolerated. 3. Maintaining ideal body weight (20-25 BMI) may lead to optimal breast cancer outcomes. 4. Avoid alcohol. 5. Repeat mammogram [facility] 2022 (ordered). 6. Continue follow up with Medical Oncology and Primary Care. 7. Return to Clinic 6 months with right screening mammogram same day      During today's visit, face-to-face time 15  minutes, greater than 50% in counseling education and coordination of care. All questions were answered to her apparent satisfaction, and she is agreeable to the plan as outlined above. Lori Paige, RN, MSN, APRN-CNP, [address]  Advanced Oncology Certified Nurse Practitioner  Department of Breast Surgery  [facility] [place]/  [facility] in collaboration with Dr. Tera Gilbert.  Kd/Dr. Andrew Skill

## 2021-10-07 ENCOUNTER — OFFICE VISIT (OUTPATIENT)
Dept: BREAST CENTER | Age: 76
End: 2021-10-07
Payer: MEDICARE

## 2021-10-07 VITALS
HEART RATE: 62 BPM | OXYGEN SATURATION: 98 % | WEIGHT: 140 LBS | RESPIRATION RATE: 20 BRPM | TEMPERATURE: 97 F | DIASTOLIC BLOOD PRESSURE: 60 MMHG | HEIGHT: 64 IN | BODY MASS INDEX: 23.9 KG/M2 | SYSTOLIC BLOOD PRESSURE: 118 MMHG

## 2021-10-07 DIAGNOSIS — Z85.3 HISTORY OF BREAST CANCER: ICD-10-CM

## 2021-10-07 PROCEDURE — 1036F TOBACCO NON-USER: CPT | Performed by: NURSE PRACTITIONER

## 2021-10-07 PROCEDURE — G8420 CALC BMI NORM PARAMETERS: HCPCS | Performed by: NURSE PRACTITIONER

## 2021-10-07 PROCEDURE — G8399 PT W/DXA RESULTS DOCUMENT: HCPCS | Performed by: NURSE PRACTITIONER

## 2021-10-07 PROCEDURE — 99212 OFFICE O/P EST SF 10 MIN: CPT | Performed by: NURSE PRACTITIONER

## 2021-10-07 PROCEDURE — 1123F ACP DISCUSS/DSCN MKR DOCD: CPT | Performed by: NURSE PRACTITIONER

## 2021-10-07 PROCEDURE — 1090F PRES/ABSN URINE INCON ASSESS: CPT | Performed by: NURSE PRACTITIONER

## 2021-10-07 PROCEDURE — G8427 DOCREV CUR MEDS BY ELIG CLIN: HCPCS | Performed by: NURSE PRACTITIONER

## 2021-10-07 PROCEDURE — G8484 FLU IMMUNIZE NO ADMIN: HCPCS | Performed by: NURSE PRACTITIONER

## 2021-10-07 PROCEDURE — 4040F PNEUMOC VAC/ADMIN/RCVD: CPT | Performed by: NURSE PRACTITIONER

## 2021-10-07 PROCEDURE — 99213 OFFICE O/P EST LOW 20 MIN: CPT | Performed by: NURSE PRACTITIONER

## 2021-10-07 RX ORDER — GABAPENTIN 100 MG/1
200 CAPSULE ORAL NIGHTLY
COMMUNITY

## 2021-10-07 NOTE — PATIENT INSTRUCTIONS

## 2021-12-02 ENCOUNTER — HOSPITAL ENCOUNTER (OUTPATIENT)
Dept: INFUSION THERAPY | Age: 76
Discharge: HOME | End: 2021-12-02
Payer: MEDICARE

## 2021-12-02 ENCOUNTER — OFFICE VISIT (OUTPATIENT)
Dept: ONCOLOGY | Age: 76
End: 2021-12-02
Payer: MEDICARE

## 2021-12-02 VITALS
OXYGEN SATURATION: 97 % | TEMPERATURE: 96.4 F | HEIGHT: 64 IN | SYSTOLIC BLOOD PRESSURE: 143 MMHG | DIASTOLIC BLOOD PRESSURE: 69 MMHG | BODY MASS INDEX: 25 KG/M2 | HEART RATE: 61 BPM | WEIGHT: 146.4 LBS

## 2021-12-02 DIAGNOSIS — R53.83 OTHER FATIGUE: ICD-10-CM

## 2021-12-02 DIAGNOSIS — D05.12 DUCTAL CARCINOMA IN SITU (DCIS) OF LEFT BREAST: ICD-10-CM

## 2021-12-02 DIAGNOSIS — C50.912 MALIGNANT NEOPLASM OF LEFT FEMALE BREAST, UNSPECIFIED ESTROGEN RECEPTOR STATUS, UNSPECIFIED SITE OF BREAST (HCC): Primary | ICD-10-CM

## 2021-12-02 DIAGNOSIS — C50.912 MALIGNANT NEOPLASM OF LEFT FEMALE BREAST, UNSPECIFIED ESTROGEN RECEPTOR STATUS, UNSPECIFIED SITE OF BREAST (HCC): ICD-10-CM

## 2021-12-02 LAB
ALBUMIN SERPL-MCNC: 4.3 G/DL (ref 3.5–5.2)
ALP BLD-CCNC: 108 U/L (ref 35–104)
ALT SERPL-CCNC: 10 U/L (ref 0–32)
ANION GAP SERPL CALCULATED.3IONS-SCNC: 9 MMOL/L (ref 7–16)
AST SERPL-CCNC: 22 U/L (ref 0–31)
BASOPHILS ABSOLUTE: 0.02 E9/L (ref 0–0.2)
BASOPHILS RELATIVE PERCENT: 0.5 % (ref 0–2)
BILIRUB SERPL-MCNC: 0.3 MG/DL (ref 0–1.2)
BUN BLDV-MCNC: 11 MG/DL (ref 6–23)
CALCIUM SERPL-MCNC: 9.2 MG/DL (ref 8.6–10.2)
CHLORIDE BLD-SCNC: 101 MMOL/L (ref 98–107)
CO2: 27 MMOL/L (ref 22–29)
CREAT SERPL-MCNC: 1 MG/DL (ref 0.5–1)
EOSINOPHILS ABSOLUTE: 0.11 E9/L (ref 0.05–0.5)
EOSINOPHILS RELATIVE PERCENT: 2.7 % (ref 0–6)
GFR AFRICAN AMERICAN: >60
GFR NON-AFRICAN AMERICAN: 54 ML/MIN/1.73
GLUCOSE BLD-MCNC: 91 MG/DL (ref 74–99)
HCT VFR BLD CALC: 41.1 % (ref 34–48)
HEMOGLOBIN: 13.2 G/DL (ref 11.5–15.5)
IMMATURE GRANULOCYTES #: 0 E9/L
IMMATURE GRANULOCYTES %: 0 % (ref 0–5)
LYMPHOCYTES ABSOLUTE: 1.54 E9/L (ref 1.5–4)
LYMPHOCYTES RELATIVE PERCENT: 38.1 % (ref 20–42)
MAGNESIUM: 2 MG/DL (ref 1.6–2.6)
MCH RBC QN AUTO: 28.6 PG (ref 26–35)
MCHC RBC AUTO-ENTMCNC: 32.1 % (ref 32–34.5)
MCV RBC AUTO: 89 FL (ref 80–99.9)
MONOCYTES ABSOLUTE: 0.5 E9/L (ref 0.1–0.95)
MONOCYTES RELATIVE PERCENT: 12.4 % (ref 2–12)
NEUTROPHILS ABSOLUTE: 1.87 E9/L (ref 1.8–7.3)
NEUTROPHILS RELATIVE PERCENT: 46.3 % (ref 43–80)
PDW BLD-RTO: 12.8 FL (ref 11.5–15)
PLATELET # BLD: 209 E9/L (ref 130–450)
PMV BLD AUTO: 10.7 FL (ref 7–12)
POTASSIUM SERPL-SCNC: 4.8 MMOL/L (ref 3.5–5)
RBC # BLD: 4.62 E12/L (ref 3.5–5.5)
SODIUM BLD-SCNC: 137 MMOL/L (ref 132–146)
TOTAL PROTEIN: 6.9 G/DL (ref 6.4–8.3)
WBC # BLD: 4 E9/L (ref 4.5–11.5)

## 2021-12-02 PROCEDURE — 85025 COMPLETE CBC W/AUTO DIFF WBC: CPT

## 2021-12-02 PROCEDURE — G8417 CALC BMI ABV UP PARAM F/U: HCPCS | Performed by: INTERNAL MEDICINE

## 2021-12-02 PROCEDURE — 99212 OFFICE O/P EST SF 10 MIN: CPT

## 2021-12-02 PROCEDURE — G8399 PT W/DXA RESULTS DOCUMENT: HCPCS | Performed by: INTERNAL MEDICINE

## 2021-12-02 PROCEDURE — 80053 COMPREHEN METABOLIC PANEL: CPT

## 2021-12-02 PROCEDURE — 4040F PNEUMOC VAC/ADMIN/RCVD: CPT | Performed by: INTERNAL MEDICINE

## 2021-12-02 PROCEDURE — 1123F ACP DISCUSS/DSCN MKR DOCD: CPT | Performed by: INTERNAL MEDICINE

## 2021-12-02 PROCEDURE — 83735 ASSAY OF MAGNESIUM: CPT

## 2021-12-02 PROCEDURE — G8427 DOCREV CUR MEDS BY ELIG CLIN: HCPCS | Performed by: INTERNAL MEDICINE

## 2021-12-02 PROCEDURE — 1036F TOBACCO NON-USER: CPT | Performed by: INTERNAL MEDICINE

## 2021-12-02 PROCEDURE — 36415 COLL VENOUS BLD VENIPUNCTURE: CPT

## 2021-12-02 PROCEDURE — 99214 OFFICE O/P EST MOD 30 MIN: CPT | Performed by: INTERNAL MEDICINE

## 2021-12-02 PROCEDURE — 1090F PRES/ABSN URINE INCON ASSESS: CPT | Performed by: INTERNAL MEDICINE

## 2021-12-02 PROCEDURE — G8484 FLU IMMUNIZE NO ADMIN: HCPCS | Performed by: INTERNAL MEDICINE

## 2021-12-02 NOTE — PROGRESS NOTES
[address] [address]  Dept: [phone]  Loc: [phone]  Attending Progress Note      Reason for Visit:   Left breast cancer. Referring Physician: Yaya Gutierres MD.    PCP:  KAREN Nickerson PA-C    History of Present Illness: The patient is a 67 y.o. lady with a past medical history significant for thyroid disease, depression, and IBS, who had presented with an abnormal screening mammogram,    8/20/19  Bilateral screening mammogram  [facility]         FINDINGS: No suspicious masses, calcifications, or distortions are   identified on the right. On the left there is a new focal nodular   asymmetry at 12:00, with adjacent linear branching calcifications. Spot compression views is recommended for the asymmetry with   ultrasound, and spot magnification views for the calcifications. .             Impression   1. Right breast no mammographic evidence of malignancy           2. Left breast new focal asymmetry at 12:00, new microcalcifications.       Recommendation:   1. Right breast annual screening mammogram.   2. Left breast additional views spot compression and spot   magnification along with ultrasound.       BI-RADS Category 0:  Incomplete- Needs Additional Imaging Evaluation             8/29/19  Left Diagnostic mammogram   [facility]         FINDINGS:        A small partially obscured mass is identified in the upper outer left   breast measuring approximately 5-6 mm. Additionally, there is a small   segmentally distributed cluster of pleomorphic microcalcifications   located superiorly near the mass extending to the middle third depth.       Ultrasound confirmed a small irregular shaped hypoechoic mass with   circumscribed margins at the 12:00 position measuring 5 mm in maximal   dimension.           Impression       1. Small solid suspicious mass at the 12:00 position.    2. Segmentally distributed pleomorphic microcalcifications located   near the breast mass likely at the 12:00 position.       RECOMMENDATION:       Recommend ultrasound core biopsy of the mass seen on ultrasound and   stereotactic biopsy of the microcalcifications.       BI-RADS Category 5: Highly Suggestive of Malignancy          8/29/19  Left Breast US   [facility]         FINDINGS:        A small partially obscured mass is identified in the upper outer left   breast measuring approximately 5-6 mm. Additionally, there is a small   segmentally distributed cluster of pleomorphic microcalcifications   located superiorly near the mass extending to the middle third depth.       Ultrasound confirmed a small irregular shaped hypoechoic mass with   circumscribed margins at the 12:00 position measuring 5 mm in maximal   dimension.           Impression       1. Small solid suspicious mass at the 12:00 position. 2. Segmentally distributed pleomorphic microcalcifications located   near the breast mass likely at the 12:00 position.       RECOMMENDATION:       Recommend ultrasound core biopsy of the mass seen on ultrasound and   stereotactic biopsy of the microcalcifications.       BI-RADS Category 5: Highly Suggestive of Malignancy              She underwent a stereotactic guided left breast core biopsy at 12 o'clock position on September 10 , 2019.a single top hat shaped marker clip was deployed into the site.     She underwent an ultrasound guided biopsy of the left breast at the 12 o'clock position on September 17, 2019, A post-surgical ribbon shaped microclip was placed.      Pathological evaluation completed at [facility]):     9/10/19  Final Surgical Pathology Report  Diagnosis:  A. Left breast, 12:00, biopsy: High-grade ductal carcinoma in situ,  cannot exclude microinvasion, see comment. B. Left breast 12:00, additional, biopsy: High-grade ductal carcinoma in  situ, cannot exclude microinvasion, see comment.     Breast Cancer Marker Studies:  Estrogen Receptors (ER): Positive         Percentage of cells positive: <10%         Intensity: Weak    Progesterone Receptors (WV): Negative        Internal control cells present and stain as expected: No internal  control present     9/17/19 Final Surgical Pathology Report    Diagnosis:  Mass, Left breast, 12:00, core biopsy: Segments of benign fibroadipose  tissue and scant skeletal muscle, see comment. Comment:   Epithelial lined mammary ducts and lobules are not identified  in the tissue sample. Correlation with clinical and radiologic findings  is essential to assure adequacy of tissue sampling. In the setting of a  mass clinically or radiologically suspicious for neoplasm, additional  tissue sampling is recommended. The patient underwent on 11/20/2019 a left mastectomy with sentinel lymph node excisional biopsy, pathology:    CANCER CASE SUMMARY:  Procedure: Left simple mastectomy  Specimen laterality: Left  Tumor site: 12:00 position  Tumor size: 8.0 mm in greatest dimension  Histologic type:  Invasive carcinoma of no special type (invasive ductal  carcinoma, not otherwise specified)  Histologic grade (Deandre histologic score):   Glandular differentiation: Score 3   Nuclear pleomorphism: Score 2   Mitotic rate: Score 2   Overall grade: Grade 2 (score of 7)  Tumor focality: Single focus of invasive carcinoma  Ductal carcinoma in situ (DCIS): Present, adjacent to the invasive  carcinoma  Invasive carcinoma margins:   Margins uninvolved by invasive carcinoma    Distance from closest margin: 5.0 mm from the posterior margin  DCIS margins:   Margin uninvolved by DCIS    Distance from closest margin: 10.0 mm from the posterior margin  Regional lymph nodes: Uninvolved by tumor cells   Total number of lymph nodes examined: 2 (both sentinel nodes)  Treatment effect: No known presurgical therapy  Pathologic stage classification (pTNM, AJCC 8th edition):   pT1b   (sn) pN0    Ancillary studies:  Calponin immunostain on block A4 shows the invasive carcinoma lacking a  myoepithelial layer. P63 immunostain on block A6 shows the invasive carcinoma lacking a  myoepithelial layer, with a myoepithelial layer retained around the DCIS. Breast Cancer Marker Studies (Block A6):  Negative: 0%        No internal control cells present. Progesterone Receptors (RI):  Negative: 0%        No internal control cells present. Her-2/miles (c-erb B-2) protein expression: Positive (Score 3+)    The patient was started on adjuvant treatment with Herceptin and Taxol on 1/2/2020, she completed Taxol  on 3/19/2020. She is on single agent Herceptin. The patient was started on tamoxifen on 6/4/2020, she was given Effexor for depression, she had side effects from it, was discontinued and she was not on Lexapro, she did not like it. She was started on Arimidex on 8/20/2020, the patient has been stressed out, her  has a bladder tumor and will need to have a surgery done, she was in the ED on 9/6/2020, she was feeling tired and short of breath, she feels that her symptoms were because of the Arimidex and because she was off the Paxil prior to that. Arimidex has been discontinued. Peyton Mcgraw is doing really well at this time. She is following with psychiatry. She has residual neuropathy, taking vitamin B complex and gabapentin, which has been helping. She is feeling well. No new bony pain, no headaches or dizziness. Her spouse was told he was cancer free at this time, has history of bladder cancer. Review of Systems;  CONSTITUTIONAL: No fever, chills. Good appetite, feeling tired. ENMT: Eyes: No diplopia; Nose: Positive for epistaxis. Mouth: No sore throat. RESPIRATORY: No hemoptysis, shortness of breath, cough. CARDIOVASCULAR: No chest pain, palpitations. GASTROINTESTINAL: As per HPI. GENITOURINARY: No dysuria, urinary frequency, hematuria. NEURO: No syncope, presyncope, headache.   Remainder:  ROS NEGATIVE    Past Medical History:      Diagnosis Date    Cancer ([facility] [address] 2019 breast left    Cerebrovascular disease     tia without issues    Depression     Hypothyroidism     Irritable bowel syndrome     not diagnosed, patient controls with diet    Low sodium levels 6/18/2020    Thyroid disease     TIA (transient ischemic attack) 11/2020     Patient Active Problem List   Diagnosis    Ductal carcinoma in situ (DCIS) of left breast    Malignant neoplasm of left female breast, unspecified estrogen receptor status, unspecified site of breast ([facility] [address]    Poor venous access    Low sodium levels    Other osteoporosis without current pathological fracture    TIA (transient ischemic attack)    Anxiety    Stroke-like symptoms    Hyponatremia    Acquired hypothyroidism    Dyslipidemia        Past Surgical History:      Procedure Laterality Date    APPENDECTOMY      BREAST SURGERY      mastectomy left nov 20 2019    CATHETER REMOVAL N/A 3/12/2021    MEDIPORT REMOVAL performed by Grupo Hooper MD at [address], TOTAL ABDOMINAL      oophorectomy    INSERTION / REMOVAL / [address] N/A 12/27/2019    MEDIPORT INSERTION performed by Grupo Hooper MD at [address] 11/20/2019    LEFT BREAST SIMPLE  MASTECTOMY, BLUE DYE INJECTION, LEFT AXILLARY  SENTINEL NODE BIOPSY POSSIBLE LEFT AXILLARY DISSECTION -- Gabriela Douglas -- PECTORAL BLOCK performed by Sandy Hickman MD at [facility] OR    TONSILLECTOMY         Family History:  Family History   Problem Relation Age of Onset    Cancer Mother 66        liver    Cancer Brother 58        kidney       Medications:  Reviewed and reconciled.     Social History:  Social History     Socioeconomic History    Marital status:      Spouse name: Not on file    Number of children: Not on file    Years of education: Not on file    Highest education level: Not on file   Occupational History    Not on file   Tobacco Use    Smoking status: Former Smoker Packs/day: 2.00     Years: 35.00     Pack years: 70.00     Quit date:      Years since quittin.9    Smokeless tobacco: Never Used   Vaping Use    Vaping Use: Never used   Substance and Sexual Activity    Alcohol use: Not Currently    Drug use: Never    Sexual activity: Not Currently   Other Topics Concern    Not on file   Social History Narrative    Not on file     Social Determinants of Health     Financial Resource Strain:     Difficulty of Paying Living Expenses: Not on file   Food Insecurity:     Worried About Running Out of Food in the Last Year: Not on file    Jair of Food in the Last Year: Not on file   Transportation Needs:     Lack of Transportation (Medical): Not on file    Lack of Transportation (Non-Medical): Not on file   Physical Activity:     Days of Exercise per Week: Not on file    Minutes of Exercise per Session: Not on file   Stress:     Feeling of Stress : Not on file   Social Connections:     Frequency of Communication with Friends and Family: Not on file    Frequency of Social Gatherings with Friends and Family: Not on file    Attends Christianity Services: Not on file    Active Member of [address] [place] [address] or Organizations: Not on file    Attends Club or Organization Meetings: Not on file    Marital Status: Not on file   Intimate Partner Violence:     Fear of Current or Ex-Partner: Not on file    Emotionally Abused: Not on file    Physically Abused: Not on file    Sexually Abused: Not on file   Housing Stability:     Unable to Pay for Housing in the Last Year: Not on file    Number of [place] in the Last Year: Not on file    Unstable Housing in the Last Year: Not on file       Allergies:   Allergies   Allergen Reactions    Arimidex [Anastrozole] Other (See Comments)     Hallucination, anxious, fatigue, stomach upset       Effexor [Venlafaxine] Other (See Comments)     Hallucination, vision issues, felt like she was going crazy, anxious    Lexapro [Escitalopram Oxalate] Diarrhea and Other (See Comments)     And fatigue         OB/GYN:  Age of menarche was 22, medically induced. Age of menopause was 31. Patient admits to hormonal therapy, progesterone, premarin. Oral contraceptives? To start periods. Patient is       Physical Exam:  BP (!) 143/69   Pulse 61   Temp 96.4 °F (35.8 °C)   Ht 5' 4\" (1.626 m)   Wt 146 lb 6.4 oz (66.4 kg)   SpO2 97%   BMI 25.13 kg/m²     GENERAL: Alert, oriented x 3, not in acute distress. HEENT: PERRLA; EOMI. Oropharynx clear. NECK: Supple. No palpable cervical or supraclavicular lymphadenopathy. LUNGS: Good air entry bilaterally. No wheezing, crackles or rhonchi. CARDIOVASCULAR: Regular rate. No murmurs, rubs or gallops. BREASTS: Right breast exam is negative for any skin changes, no nipple discharge, she has nodularity and tenderness in the lower inner and outer quadrants, no palpable right axillary adenopathy. She is status post left mastectomy, the incision is healing nicely, no palpable left axillary lymphadenopathy. CHEST: This post right port placement  ABDOMEN: Soft. Non-tender, non-distended. Positive bowel sounds. EXTREMITIES: Without clubbing, cyanosis, or edema. NEUROLOGIC: No focal deficits. ECOG PS 1      Impression/Plan:      The patient is a 67 y.o. lady with a past medical history significant for thyroid disease, depression, and IBS, who had presented with an abnormal screening mammogram, she had a left breast biopsy done revealing high-grade DCIS, ER positive less than 10%, NM negative, she underwent on 2019 a left mastectomy with sentinel lymph node excisional biopsy, she was found to have invasive ductal carcinoma, tumor size 8 mm, grade 2, with associated DCIS, margins are negative, 2 sentinel lymph nodes were removed, they were both negative for metastatic disease, final pathologic stage pT1b(sn) pN0Mx, ER -0% NM -0% HER-2/miles +3+ by IHC, prognostic stage IA.     I discussed with the patient her diagnosis, risks of her tumor, prognosis and recommendations for systemic therapy. The patient has a small HER-2/miles positive disease, tumor size is 8 mm, adjuvant treatment with Taxol and Herceptin is recommended, schedule and the side effects of the treatment were reviewed with the patient. 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 The patient was started on tamoxifen on 6/4/2020, she was given Effexor for depression, she had side effects from it, was discontinued and she was not on Lexapro, she does not like it and wanted to go back on Paxil. She was started on Arimidex on 8/20/2020, with poor tolerance, Arimidex was discontinued. We decided to hold off on adjuvant endocrine therapy. Onofre Avila would like to hold off on treatment for the osteoporosis at this time. Patient had a 2D echocardiogram done, LVEF is 65%, adequate for treatment with Herceptin, she had a port placed, she was started on adjuvant therapy with Taxol and Herceptin on 1/2/2020. She completed Taxol on 3/19/2020. The patient is doing well clinically with no evidence of disease recurrence. The patient has residual peripheral neuropathy, continue vitamin B complex, she is on gabapentin. Residual leukopenia, with normal ANC, will continue to monitor her CBCD. The patient completed single agent Herceptin on 12/3/2020. The patient doing well without any clinical evidence of disease recurrence. Right breast tenderness and lumps, ordered right diagnostic mammogram and ultrasound. They were done on 2/25/2020, there was no mammographic/sonographic evidence of malignancy, this was BI-RADS Category 1, negative. She had a right breast screening mammogram done on 3/2/2021, was negative for malignancy, she will be due again for a repeat right breast screening mammogram on 3/3/2022, ordered.     Anxiety and depression, continue follow-up with psychiatry. RTC in 3 months. Thank you for allowing us to participate in the care of Mrs. Martell Drake.     Yisel Summers MD   HEMATOLOGY/MEDICAL [facility] 98  [address] [address]  Dept: [place]: [phone]

## 2022-02-21 ASSESSMENT — ENCOUNTER SYMPTOMS
CHOKING: 0
COUGH: 0
CHEST TIGHTNESS: 0
BACK PAIN: 0
DIARRHEA: 0
SHORTNESS OF BREATH: 0
ABDOMINAL DISTENTION: 0
SORE THROAT: 0
TROUBLE SWALLOWING: 0
EYE DISCHARGE: 0
RHINORRHEA: 0
SINUS PAIN: 0
NAUSEA: 0
SINUS PRESSURE: 0
ABDOMINAL PAIN: 0
BLOOD IN STOOL: 0
VOICE CHANGE: 0
VOMITING: 0
WHEEZING: 0
CONSTIPATION: 0
EYE ITCHING: 0

## 2022-02-21 NOTE — PROGRESS NOTES
Subjective:    Some elements of all sections below were copied from my previous note 12/2/19 and have been re-examined and updated where appropriate. All elements reflect the medical decision making of today June 4, 2020  This note was copied forward from the last encounter. Essential components for this patient record were reviewed and verified on this visit including:  recent hospitalizations, recent imaging, PMH, PSH, FH, SOC HX, Allergies, and Medications were reviewed and updated as appropriate. In addition, the assessment and plan were copied from prior office note and updated accordingly. Patient ID: Suzi Wright is a 67 y.o. female. HPI   She underwent a stereotactic guided left breast core biopsy at 12 o'clock position on September 10 , 2019. a single top hat shaped marker clip was deployed into the site.     She underwent an ultrasound guided biopsy of the left breast at the 12 o'clock position on September 17, 2019, A post-surgical ribbon shaped microclip was placed.      Pathological evaluation completed at [facility]):     9/10/19  Final Surgical Pathology Report  Diagnosis:  A. Left breast, 12:00, biopsy: High-grade ductal carcinoma in situ,  cannot exclude microinvasion, see comment. B. Left breast 12:00, additional, biopsy: High-grade ductal carcinoma in  situ, cannot exclude microinvasion, see comment. Breast Cancer Marker Studies:  Estrogen Receptors (ER): Positive         Percentage of cells positive: <10%         Intensity: Weak    Progesterone Receptors (MN): Negative        Internal control cells present and stain as expected: No internal  control present     9/17/19 Final Surgical Pathology Report    Diagnosis:  Mass, Left breast, 12:00, core biopsy: Segments of benign fibroadipose  tissue and scant skeletal muscle, see comment. Comment:   Epithelial lined mammary ducts and lobules are not identified  in the tissue sample.  Correlation with clinical and radiologic findings  is essential to assure adequacy of tissue sampling. In the setting of a  mass clinically or radiologically suspicious for neoplasm, additional  tissue sampling is recommended. S/P left breast simple mastectomy, blue dye injection, left axillary sentinel node biopsy, possible left axillary dissection on November 20, 2019. Pathological evaluation completed at [facility]:    A. 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   Completely excised; Margins are negative for invasive carcinoma and  negative for DCIS. Fibroadenomatoid hyperplasia. Nipple with no significant pathologic findings. B. [place] lymph node #1:  One lymph node with no evidence of carcinoma (0/1). C. [place] lymph node #2:  One lymph node with no evidence of carcinoma (0/1). CANCER CASE SUMMARY:  Procedure: Left simple mastectomy  Specimen laterality: Left  Tumor site: 12:00 position  Tumor size: 8.0 mm in greatest dimension  Histologic type:  Invasive carcinoma of no special type (invasive ductal  carcinoma, not otherwise specified)  Histologic grade ([place] Weir histologic score):   Glandular differentiation: Score 3   Nuclear pleomorphism: Score 2   Mitotic rate: Score 2   Overall grade: Grade 2 (score of 7)  Tumor focality: Single focus of invasive carcinoma  Ductal carcinoma in situ (DCIS): Present, adjacent to the invasive  carcinoma  Invasive carcinoma margins:   Margins uninvolved by invasive carcinoma    Distance from closest margin: 5.0 mm from the posterior margin  DCIS margins:   Margin uninvolved by DCIS    Distance from closest margin: 10.0 mm from the posterior margin  Regional lymph nodes: Uninvolved by tumor cells   Total number of lymph nodes examined: 2 (both sentinel nodes)  Treatment effect: No known presurgical therapy  Pathologic stage classification (pTNM, AJCC 8th edition):   pT1b   (sn) pN0    -3/19/2020 completed adjuvant HT per medical oncology, Dr. Lars Kendrick  -6/4/2020 started endocrine therapy with tamoxifen. Poor tolerance, tamoxifen discontinued. -8/20/2020 endocrine therapy with Arimidex. Poor tolerance, Arimidex discontinued 9/6/2020. opted for surveillance    Past Medical History:   Diagnosis Date    Cancer [facility]) 2019    breast left    Cerebrovascular disease     tia without issues    Depression     Hypothyroidism     Irritable bowel syndrome     not diagnosed, patient controls with diet    Low sodium levels 6/18/2020    Thyroid disease     TIA (transient ischemic attack) 11/2020       Past Surgical History:   Procedure Laterality Date    APPENDECTOMY      BREAST SURGERY      mastectomy left nov 20 2019    CATHETER REMOVAL N/A 3/12/2021    MEDIPORT REMOVAL performed by Jessy Hernández MD at [address], TOTAL ABDOMINAL      oophorectomy    INSERTION / REMOVAL / [address] Said N/A 12/27/2019    MEDIPORT INSERTION performed by Jessy Hernández MD at [address] Left 11/20/2019    LEFT BREAST SIMPLE  MASTECTOMY, BLUE DYE INJECTION, LEFT AXILLARY  SENTINEL NODE BIOPSY POSSIBLE LEFT AXILLARY DISSECTION -- NEOPROBE -- PECTORAL BLOCK performed by Bishop Hwang MD at [facility]         Current Outpatient Medications   Medication Sig Dispense Refill    gabapentin (NEURONTIN) 100 MG capsule Take 200 mg by mouth nightly.  B Complex-C (SUPER B COMPLEX PO) Take 1 Dose by mouth daily      aspirin EC 81 MG EC tablet Take 1 tablet by mouth daily 30 tablet 0    atorvastatin (LIPITOR) 10 MG tablet Take 1 tablet by mouth nightly New cholesterol medication 30 tablet 0    mirtazapine (REMERON) 15 MG tablet Take 3.5 mg by mouth nightly       gabapentin (NEURONTIN) 100 MG capsule Take 100 mg by mouth every morning (before breakfast).        PARoxetine (PAXIL) 10 MG tablet Take 20 mg by mouth every morning       levothyroxine (SYNTHROID) 88 MCG tablet Take 88 mcg by mouth Daily       No current facility-administered medications for this visit. Allergies   Allergen Reactions    Arimidex [Anastrozole] Other (See Comments)     Hallucination, anxious, fatigue, stomach upset       Effexor [Venlafaxine] Other (See Comments)     Hallucination, vision issues, felt like she was going crazy, anxious    Lexapro [Escitalopram Oxalate] Diarrhea and Other (See Comments)     And fatigue        Family History   Problem Relation Age of Onset    Cancer Mother 66        liver    Cancer Brother 58        kidney       Social History     Socioeconomic History    Marital status:      Spouse name: Not on file    Number of children: Not on file    Years of education: Not on file    Highest education level: Not on file   Occupational History    Not on file   Tobacco Use    Smoking status: Former Smoker     Packs/day: 2.00     Years: 35.00     Pack years: 70.00     Quit date:      Years since quittin.1    Smokeless tobacco: Never Used   Vaping Use    Vaping Use: Never used   Substance and Sexual Activity    Alcohol use: Not Currently    Drug use: Never    Sexual activity: Not Currently   Other Topics Concern    Not on file   Social History Narrative    Not on file     Social Determinants of Health     Financial Resource Strain:     Difficulty of Paying Living Expenses: Not on file   Food Insecurity:     Worried About Running Out of Food in the Last Year: Not on file    Jair of Food in the Last Year: Not on file   Transportation Needs:     Lack of Transportation (Medical): Not on file    Lack of Transportation (Non-Medical):  Not on file   Physical Activity:     Days of Exercise per Week: Not on file    Minutes of Exercise per Session: Not on file   Stress:     Feeling of Stress : Not on file   Social Connections:     Frequency of Communication with Friends and Family: Not on file    Frequency of Social Gatherings with Friends and Family: Not on file    Attends Anabaptist Services: Not on file    Active Member of Clubs or Organizations: Not on file    Attends Club or Organization Meetings: Not on file    Marital Status: Not on file   Intimate Partner Violence:     Fear of Current or Ex-Partner: Not on file    Emotionally Abused: Not on file    Physically Abused: Not on file    Sexually Abused: Not on file   Housing Stability:     Unable to Pay for Housing in the Last Year: Not on file    Number of [place] in the Last Year: Not on file    Unstable Housing in the Last Year: Not on file       Review of Systems   Constitutional: Negative for activity change, appetite change, chills, fatigue, fever and unexpected weight change. [place] continues to do well. She continues to participates in [employer] and yoga classes 2-4 times a week. HENT: Negative for congestion, postnasal drip, rhinorrhea, sinus pressure, sinus pain, sore throat, trouble swallowing and voice change. Eyes: Negative for discharge, itching and visual disturbance. Respiratory: Negative for cough, choking, chest tightness, shortness of breath and wheezing. Cardiovascular: Negative for chest pain, palpitations and leg swelling. Gastrointestinal: Negative for abdominal distention, abdominal pain, blood in stool, constipation, diarrhea, nausea and vomiting. Endocrine: Negative for cold intolerance and heat intolerance. Genitourinary: Negative for difficulty urinating, dysuria, frequency and hematuria. Musculoskeletal: Negative for arthralgias, back pain, gait problem, joint swelling, myalgias, neck pain and neck stiffness. Allergic/Immunologic: Negative for environmental allergies and food allergies. Neurological: Negative for dizziness, seizures, syncope, speech difficulty, weakness, light-headedness and headaches. Hematological: Negative for adenopathy. Does not bruise/bleed easily. Psychiatric/Behavioral: Negative for agitation, confusion and decreased concentration. The patient is not nervous/anxious. Objective:   Physical Exam  Vitals and nursing note reviewed. Constitutional:       General: She is not in acute distress. Appearance: Normal appearance. She is well-developed. She is not diaphoretic. Comments: ECOG is once again stable at 0; pleasant and cooperative. HENT:      Head: Normocephalic and atraumatic. Mouth/Throat:      Pharynx: No oropharyngeal exudate. Eyes:      General: No scleral icterus. Right eye: No discharge. Left eye: No discharge. Conjunctiva/sclera: Conjunctivae normal.   Neck:      Thyroid: No thyromegaly. Vascular: No JVD. Trachea: No tracheal deviation. Cardiovascular:      Rate and Rhythm: Normal rate and regular rhythm. Heart sounds: No murmur heard. No friction rub. No gallop. Pulmonary:      Effort: Pulmonary effort is normal. No respiratory distress or retractions. Breath sounds: Normal breath sounds. No stridor. No wheezing or rales. Chest:      Chest wall: No mass, lacerations, deformity, swelling, tenderness or edema. Breasts: Breasts are symmetrical.      Right: No inverted nipple, mass, nipple discharge, skin change or tenderness. Left: No inverted nipple, mass, nipple discharge, skin change or tenderness. Abdominal:      General: There is no distension. Palpations: Abdomen is soft. Tenderness: There is no abdominal tenderness. There is no guarding or rebound. Musculoskeletal:         General: No tenderness or deformity. Normal range of motion. Right shoulder: Normal.      Left shoulder: Normal.      Cervical back: Normal range of motion and neck supple. Lymphadenopathy:      Cervical: No cervical adenopathy. Right cervical: No superficial, deep or posterior cervical adenopathy.      Left cervical: No superficial, deep or posterior cervical adenopathy. Upper Body:      Right upper body: No pectoral adenopathy. Left upper body: No pectoral adenopathy. Skin:     General: Skin is warm and dry. Coloration: Skin is not pale. Findings: No erythema or rash. Neurological:      Mental Status: She is alert and oriented to person, place, and time. Coordination: Coordination normal.   Psychiatric:         Behavior: Behavior normal.         Thought Content: Thought content normal.         Judgment: Judgment normal.      Comments: Pleasant and conversant. Reports her depression is well controlled on current regimen. Assessment:    Patient presents today for clinical follow up.    67 y.o. extremely pleasant female who had left breast biopsy revealing high-grade DCIS, ER positive less than 10%, DE negative. -11/20/2019 left mastectomy with sentinel lymph node excision noted invasive ductal carcinoma, tumor size 8 mm, grade 2 disease with associated DCIS. Margins were negative, 2 sentinel lymph nodes were negative for metastatic disease. Pathologic stage pT1b pN0 MX. ER negative DE negative HER-2/miles positive disease. Stage Ia      -3/19/2020 completed adjuvant HT per medical oncology, Dr. Diamond Salazar  -6/4/2020 started endocrine therapy with tamoxifen (DCIS +). Poor tolerance, tamoxifen discontinued. -8/20/2020 endocrine therapy with Arimidex. Poor tolerance, Arimidex discontinued 9/6/2020.  -10/10/2020 TIA admission: on ASA 81 mg daily. -11/3/2020 completed single agent Herceptin; still has MediPort right chest.  -2/25/2020 right diagnostic mammogram and right breast ultrasound for c/o tenderness was negative for malignancy. -03/02/2021 Right screening mammogram negative, BI-RADS 1.    -03/02/2021 clinical follow up is without evidence of recurrent disease. Reviewed follow-up schedule.  -03/12/2021 Mediport removed. -10/07/2020 Clinical follow-up without evidence of recurrent disease.   She continues to do yoga 2-4 times weekly and has good range of motion of her upper extremities. Plan to return to see in March with mammogram same day.  -03/03/2022 Right screening mammogram Negative, BI-RADS 1.  -03/03/2022 clinical follow up is without evidence of recurrent disease. Imaging reviewed, including her BI-RADS result. She continues to do well, has excellent ROM secondary to Yoga and [employer] and living an active lifestyle. At this time, will plan to see her in 1 year due to travel distance. She continue to follow with medical oncology at [facility] in the interim. Plan:   1. Continue monthly breast/chest wall self examination; detailed instructions reviewed today. Bring any changes to your physician's attention. 2. Continue healthy diet and exercise routinely as tolerated. 3. Maintaining ideal body weight (20-25 BMI) may lead to optimal breast cancer outcomes. 4. Avoid alcohol. 5. Repeat mammogram [facility] 2023 (not ordered). 6. Continue follow up with Medical Oncology and Primary Care. 7. Return to Clinic 1 year with right screening mammogram same day      During today's visit, face-to-face time 15  minutes, greater than 50% in counseling education and coordination of care. All questions were answered to her apparent satisfaction, and she is agreeable to the plan as outlined above. Jane Boas Herby Jenkins) Jesus Calico, RN, MSN, APRN-CNP, [address]  Advanced Oncology Certified Nurse Practitioner  Department of Breast Surgery  [facility] [place]/  [facility] [place] in collaboration with Dr. Abner Yi/Dr. Jeannie Mchugh

## 2022-03-03 ENCOUNTER — OFFICE VISIT (OUTPATIENT)
Dept: BREAST CENTER | Age: 77
End: 2022-03-03
Payer: MEDICARE

## 2022-03-03 ENCOUNTER — HOSPITAL ENCOUNTER (OUTPATIENT)
Dept: GENERAL RADIOLOGY | Age: 77
Discharge: HOME | End: 2022-03-05
Payer: MEDICARE

## 2022-03-03 VITALS
HEIGHT: 64 IN | RESPIRATION RATE: 18 BRPM | HEART RATE: 72 BPM | BODY MASS INDEX: 24.07 KG/M2 | WEIGHT: 141 LBS | DIASTOLIC BLOOD PRESSURE: 66 MMHG | OXYGEN SATURATION: 98 % | TEMPERATURE: 97.2 F | SYSTOLIC BLOOD PRESSURE: 128 MMHG

## 2022-03-03 VITALS — HEIGHT: 64 IN | WEIGHT: 145 LBS | BODY MASS INDEX: 24.75 KG/M2

## 2022-03-03 DIAGNOSIS — Z12.31 ENCOUNTER FOR SCREENING MAMMOGRAM FOR MALIGNANT NEOPLASM OF BREAST: ICD-10-CM

## 2022-03-03 DIAGNOSIS — Z12.39 BREAST SCREENING: ICD-10-CM

## 2022-03-03 DIAGNOSIS — Z85.3 HISTORY OF BREAST CANCER: ICD-10-CM

## 2022-03-03 PROBLEM — E87.1 LOW SODIUM LEVELS: Status: RESOLVED | Noted: 2020-06-18 | Resolved: 2022-03-03

## 2022-03-03 PROCEDURE — 77063 BREAST TOMOSYNTHESIS BI: CPT

## 2022-03-03 PROCEDURE — G8484 FLU IMMUNIZE NO ADMIN: HCPCS | Performed by: NURSE PRACTITIONER

## 2022-03-03 PROCEDURE — G8420 CALC BMI NORM PARAMETERS: HCPCS | Performed by: NURSE PRACTITIONER

## 2022-03-03 PROCEDURE — 4040F PNEUMOC VAC/ADMIN/RCVD: CPT | Performed by: NURSE PRACTITIONER

## 2022-03-03 PROCEDURE — G8427 DOCREV CUR MEDS BY ELIG CLIN: HCPCS | Performed by: NURSE PRACTITIONER

## 2022-03-03 PROCEDURE — 1123F ACP DISCUSS/DSCN MKR DOCD: CPT | Performed by: NURSE PRACTITIONER

## 2022-03-03 PROCEDURE — 99213 OFFICE O/P EST LOW 20 MIN: CPT | Performed by: NURSE PRACTITIONER

## 2022-03-03 PROCEDURE — 1036F TOBACCO NON-USER: CPT | Performed by: NURSE PRACTITIONER

## 2022-03-03 PROCEDURE — 99212 OFFICE O/P EST SF 10 MIN: CPT | Performed by: NURSE PRACTITIONER

## 2022-03-03 PROCEDURE — 1090F PRES/ABSN URINE INCON ASSESS: CPT | Performed by: NURSE PRACTITIONER

## 2022-03-03 PROCEDURE — G8399 PT W/DXA RESULTS DOCUMENT: HCPCS | Performed by: NURSE PRACTITIONER

## 2022-03-03 RX ORDER — OMEGA-3S/DHA/EPA/FISH OIL/D3 300MG-1000
200 CAPSULE ORAL DAILY
COMMUNITY

## 2022-03-10 ENCOUNTER — HOSPITAL ENCOUNTER (OUTPATIENT)
Dept: INFUSION THERAPY | Age: 77
Discharge: HOME | End: 2022-03-10
Payer: MEDICARE

## 2022-03-10 ENCOUNTER — OFFICE VISIT (OUTPATIENT)
Dept: ONCOLOGY | Age: 77
End: 2022-03-10
Payer: MEDICARE

## 2022-03-10 VITALS
HEART RATE: 64 BPM | BODY MASS INDEX: 24.48 KG/M2 | OXYGEN SATURATION: 98 % | WEIGHT: 143.4 LBS | SYSTOLIC BLOOD PRESSURE: 116 MMHG | HEIGHT: 64 IN | TEMPERATURE: 98 F | DIASTOLIC BLOOD PRESSURE: 69 MMHG

## 2022-03-10 DIAGNOSIS — C50.912 MALIGNANT NEOPLASM OF LEFT FEMALE BREAST, UNSPECIFIED ESTROGEN RECEPTOR STATUS, UNSPECIFIED SITE OF BREAST (HCC): Primary | ICD-10-CM

## 2022-03-10 DIAGNOSIS — D05.12 DUCTAL CARCINOMA IN SITU (DCIS) OF LEFT BREAST: Primary | ICD-10-CM

## 2022-03-10 LAB
ALBUMIN SERPL-MCNC: 4.1 G/DL (ref 3.5–5.2)
ALP BLD-CCNC: 99 U/L (ref 35–104)
ALT SERPL-CCNC: 17 U/L (ref 0–32)
ANION GAP SERPL CALCULATED.3IONS-SCNC: 10 MMOL/L (ref 7–16)
AST SERPL-CCNC: 29 U/L (ref 0–31)
BASOPHILS ABSOLUTE: 0.02 E9/L (ref 0–0.2)
BASOPHILS RELATIVE PERCENT: 0.5 % (ref 0–2)
BILIRUB SERPL-MCNC: 0.4 MG/DL (ref 0–1.2)
BUN BLDV-MCNC: 13 MG/DL (ref 6–23)
CALCIUM SERPL-MCNC: 9.1 MG/DL (ref 8.6–10.2)
CHLORIDE BLD-SCNC: 98 MMOL/L (ref 98–107)
CO2: 26 MMOL/L (ref 22–29)
CREAT SERPL-MCNC: 1 MG/DL (ref 0.5–1)
EOSINOPHILS ABSOLUTE: 0.08 E9/L (ref 0.05–0.5)
EOSINOPHILS RELATIVE PERCENT: 2 % (ref 0–6)
GFR AFRICAN AMERICAN: >60
GFR NON-AFRICAN AMERICAN: 54 ML/MIN/1.73
GLUCOSE BLD-MCNC: 97 MG/DL (ref 74–99)
HCT VFR BLD CALC: 42.4 % (ref 34–48)
HEMOGLOBIN: 13.5 G/DL (ref 11.5–15.5)
IMMATURE GRANULOCYTES #: 0.01 E9/L
IMMATURE GRANULOCYTES %: 0.3 % (ref 0–5)
LYMPHOCYTES ABSOLUTE: 1.47 E9/L (ref 1.5–4)
LYMPHOCYTES RELATIVE PERCENT: 37.5 % (ref 20–42)
MAGNESIUM: 2.2 MG/DL (ref 1.6–2.6)
MCH RBC QN AUTO: 28.8 PG (ref 26–35)
MCHC RBC AUTO-ENTMCNC: 31.8 % (ref 32–34.5)
MCV RBC AUTO: 90.4 FL (ref 80–99.9)
MONOCYTES ABSOLUTE: 0.37 E9/L (ref 0.1–0.95)
MONOCYTES RELATIVE PERCENT: 9.4 % (ref 2–12)
NEUTROPHILS ABSOLUTE: 1.97 E9/L (ref 1.8–7.3)
NEUTROPHILS RELATIVE PERCENT: 50.3 % (ref 43–80)
PDW BLD-RTO: 12.8 FL (ref 11.5–15)
PLATELET # BLD: 216 E9/L (ref 130–450)
PMV BLD AUTO: 10.1 FL (ref 7–12)
POTASSIUM SERPL-SCNC: 4.4 MMOL/L (ref 3.5–5)
RBC # BLD: 4.69 E12/L (ref 3.5–5.5)
SODIUM BLD-SCNC: 134 MMOL/L (ref 132–146)
TOTAL PROTEIN: 6.9 G/DL (ref 6.4–8.3)
WBC # BLD: 3.9 E9/L (ref 4.5–11.5)

## 2022-03-10 PROCEDURE — G8484 FLU IMMUNIZE NO ADMIN: HCPCS | Performed by: INTERNAL MEDICINE

## 2022-03-10 PROCEDURE — 83735 ASSAY OF MAGNESIUM: CPT

## 2022-03-10 PROCEDURE — 1036F TOBACCO NON-USER: CPT | Performed by: INTERNAL MEDICINE

## 2022-03-10 PROCEDURE — G8399 PT W/DXA RESULTS DOCUMENT: HCPCS | Performed by: INTERNAL MEDICINE

## 2022-03-10 PROCEDURE — 1090F PRES/ABSN URINE INCON ASSESS: CPT | Performed by: INTERNAL MEDICINE

## 2022-03-10 PROCEDURE — G8427 DOCREV CUR MEDS BY ELIG CLIN: HCPCS | Performed by: INTERNAL MEDICINE

## 2022-03-10 PROCEDURE — 4040F PNEUMOC VAC/ADMIN/RCVD: CPT | Performed by: INTERNAL MEDICINE

## 2022-03-10 PROCEDURE — 85025 COMPLETE CBC W/AUTO DIFF WBC: CPT

## 2022-03-10 PROCEDURE — 80053 COMPREHEN METABOLIC PANEL: CPT

## 2022-03-10 PROCEDURE — 99212 OFFICE O/P EST SF 10 MIN: CPT

## 2022-03-10 PROCEDURE — 36415 COLL VENOUS BLD VENIPUNCTURE: CPT

## 2022-03-10 PROCEDURE — G8420 CALC BMI NORM PARAMETERS: HCPCS | Performed by: INTERNAL MEDICINE

## 2022-03-10 PROCEDURE — 99214 OFFICE O/P EST MOD 30 MIN: CPT | Performed by: INTERNAL MEDICINE

## 2022-03-10 PROCEDURE — 1123F ACP DISCUSS/DSCN MKR DOCD: CPT | Performed by: INTERNAL MEDICINE

## 2022-03-10 NOTE — PROGRESS NOTES
microcalcifications located   near the breast mass likely at the 12:00 position.       RECOMMENDATION:       Recommend ultrasound core biopsy of the mass seen on ultrasound and   stereotactic biopsy of the microcalcifications.       BI-RADS Category 5: Highly Suggestive of Malignancy          8/29/19  Left Breast US   [facility]         FINDINGS:        A small partially obscured mass is identified in the upper outer left   breast measuring approximately 5-6 mm. Additionally, there is a small   segmentally distributed cluster of pleomorphic microcalcifications   located superiorly near the mass extending to the middle third depth.       Ultrasound confirmed a small irregular shaped hypoechoic mass with   circumscribed margins at the 12:00 position measuring 5 mm in maximal   dimension.           Impression       1. Small solid suspicious mass at the 12:00 position. 2. Segmentally distributed pleomorphic microcalcifications located   near the breast mass likely at the 12:00 position.       RECOMMENDATION:       Recommend ultrasound core biopsy of the mass seen on ultrasound and   stereotactic biopsy of the microcalcifications.       BI-RADS Category 5: Highly Suggestive of Malignancy              She underwent a stereotactic guided left breast core biopsy at 12 o'clock position on September 10 , 2019.a single top hat shaped marker clip was deployed into the site.     She underwent an ultrasound guided biopsy of the left breast at the 12 o'clock position on September 17, 2019, A post-surgical ribbon shaped microclip was placed.      Pathological evaluation completed at [facility]):     9/10/19  Final Surgical Pathology Report  Diagnosis:  A. Left breast, 12:00, biopsy: High-grade ductal carcinoma in situ,  cannot exclude microinvasion, see comment. B. Left breast 12:00, additional, biopsy: High-grade ductal carcinoma in  situ, cannot exclude microinvasion, see comment.     Breast Cancer Marker Studies:  Estrogen block A4 shows the invasive carcinoma lacking a  myoepithelial layer. P63 immunostain on block A6 shows the invasive carcinoma lacking a  myoepithelial layer, with a myoepithelial layer retained around the DCIS. Breast Cancer Marker Studies (Block A6):  Negative: 0%        No internal control cells present. Progesterone Receptors (VT):  Negative: 0%        No internal control cells present. Her-2/miles (c-erb B-2) protein expression: Positive (Score 3+)    The patient was started on adjuvant treatment with Herceptin and Taxol on 1/2/2020, she completed Taxol  on 3/19/2020. She is on single agent Herceptin. The patient was started on tamoxifen on 6/4/2020, she was given Effexor for depression, she had side effects from it, was discontinued and she was not on Lexapro, she did not like it. She was started on Arimidex on 8/20/2020, the patient has been stressed out, her  has a bladder tumor and will need to have a surgery done, she was in the ED on 9/6/2020, she was feeling tired and short of breath, she feels that her symptoms were because of the Arimidex and because she was off the Paxil prior to that. Arimidex has been discontinued. Duy Love is doing really well at this time. She is following with psychiatry. She has residual neuropathy, taking vitamin B complex and gabapentin, which has been helping. She is feeling well. No new bony pain, no headaches or dizziness. Her spouse has bladder cancer. The right breast is lumpy. Remeron has been discontinued, she continues to be on Paxil. Review of Systems;  CONSTITUTIONAL: No fever, chills. Good appetite, feeling tired. ENMT: Eyes: No diplopia; Nose: Positive for epistaxis. Mouth: No sore throat. RESPIRATORY: No hemoptysis, shortness of breath, cough. CARDIOVASCULAR: No chest pain, palpitations. GASTROINTESTINAL: As per HPI. GENITOURINARY: No dysuria, urinary frequency, hematuria. NEURO: No syncope, presyncope, headache.   Remainder: ROS NEGATIVE    Past Medical History:      Diagnosis Date    Cancer [facility]) 2019    breast left    Cerebrovascular disease     tia without issues    Depression     Hypothyroidism     Irritable bowel syndrome     not diagnosed, patient controls with diet    Low sodium levels 6/18/2020    Thyroid disease     TIA (transient ischemic attack) 11/2020     Patient Active Problem List   Diagnosis    Ductal carcinoma in situ (DCIS) of left breast    Malignant neoplasm of left female breast, unspecified estrogen receptor status, unspecified site of breast ([address]    Poor venous access    Other osteoporosis without current pathological fracture    TIA (transient ischemic attack)    Anxiety    Stroke-like symptoms    Acquired hypothyroidism    Dyslipidemia        Past Surgical History:      Procedure Laterality Date    APPENDECTOMY      BREAST SURGERY      mastectomy left nov 20 2019    CATHETER REMOVAL N/A 3/12/2021    MEDIPORT REMOVAL performed by Bebe Fox MD at [address], TOTAL ABDOMINAL      oophorectomy    INSERTION / REMOVAL / [address] Said N/A 12/27/2019    MEDIPORT INSERTION performed by Bebe Fox MD at [address] Left 11/20/2019    LEFT BREAST SIMPLE  MASTECTOMY, BLUE DYE INJECTION, LEFT AXILLARY  SENTINEL NODE BIOPSY POSSIBLE LEFT AXILLARY DISSECTION -- Renaee Beards -- PECTORAL BLOCK performed by Dee Ruiz MD at [facility] OR    TONSILLECTOMY         Family History:  Family History   Problem Relation Age of Onset    Cancer Mother 66        liver    Cancer Brother 58        kidney    Cancer Maternal Grandmother        Medications:  Reviewed and reconciled.     Social History:  Social History     Socioeconomic History    Marital status:      Spouse name: Not on file    Number of children: Not on file    Years of education: Not on file    Highest education level: Not on file   Occupational History    Not on file   Tobacco Use    Smoking status: Former Smoker     Packs/day: 2.00     Years: 35.00     Pack years: 70.00     Quit date:      Years since quittin.2    Smokeless tobacco: Never Used   Vaping Use    Vaping Use: Never used   Substance and Sexual Activity    Alcohol use: Not Currently    Drug use: Never    Sexual activity: Not Currently   Other Topics Concern    Not on file   Social History Narrative    Not on file     Social Determinants of Health     Financial Resource Strain:     Difficulty of Paying Living Expenses: Not on file   Food Insecurity:     Worried About Running Out of Food in the Last Year: Not on file    Jair of Food in the Last Year: Not on file   Transportation Needs:     Lack of Transportation (Medical): Not on file    Lack of Transportation (Non-Medical): Not on file   Physical Activity:     Days of Exercise per Week: Not on file    Minutes of Exercise per Session: Not on file   Stress:     Feeling of Stress : Not on file   Social Connections:     Frequency of Communication with Friends and Family: Not on file    Frequency of Social Gatherings with Friends and Family: Not on file    Attends Restorationist Services: Not on file    Active Member of [address] [place] [address] [employer] or Organizations: Not on file    Attends Club or Organization Meetings: Not on file    Marital Status: Not on file   Intimate Partner Violence:     Fear of Current or Ex-Partner: Not on file    Emotionally Abused: Not on file    Physically Abused: Not on file    Sexually Abused: Not on file   Housing Stability:     Unable to Pay for Housing in the Last Year: Not on file    Number of [place] in the Last Year: Not on file    Unstable Housing in the Last Year: Not on file       Allergies:   Allergies   Allergen Reactions    Arimidex [Anastrozole] Other (See Comments)     Hallucination, anxious, fatigue, stomach upset       Effexor [Venlafaxine] Other (See Comments)     Hallucination, vision issues, felt like she was going crazy, anxious    Lexapro [Escitalopram Oxalate] Diarrhea and Other (See Comments)     And fatigue         OB/GYN:  Age of menarche was 22, medically induced. Age of menopause was 31. Patient admits to hormonal therapy, progesterone, premarin. Oral contraceptives? To start periods. Patient is       Physical Exam:  /69   Pulse 64   Temp 98 °F (36.7 °C)   Ht 5' 4\" (1.626 m)   Wt 143 lb 6.4 oz (65 kg)   SpO2 98%   BMI 24.61 kg/m²     GENERAL: Alert, oriented x 3, not in acute distress. HEENT: PERRLA; EOMI. Oropharynx clear. NECK: Supple. No palpable cervical or supraclavicular lymphadenopathy. LUNGS: Good air entry bilaterally. No wheezing, crackles or rhonchi. CARDIOVASCULAR: Regular rate. No murmurs, rubs or gallops. BREASTS: Right breast exam is negative for any skin changes, no nipple discharge, she has nodularity and tenderness in the lower inner and outer quadrants, no palpable right axillary adenopathy. She is status post left mastectomy, the incision is healing nicely, no palpable left axillary lymphadenopathy. CHEST: This post right port placement  ABDOMEN: Soft. Non-tender, non-distended. Positive bowel sounds. EXTREMITIES: Without clubbing, cyanosis, or edema. NEUROLOGIC: No focal deficits.    ECOG PS 1      Impression/Plan:      The patient is a 67 y.o. lady with a past medical history significant for thyroid disease, depression, and IBS, who had presented with an abnormal screening mammogram, she had a left breast biopsy done revealing high-grade DCIS, ER positive less than 10%, DC negative, she underwent on 2019 a left mastectomy with sentinel lymph node excisional biopsy, she was found to have invasive ductal carcinoma, tumor size 8 mm, grade 2, with associated DCIS, margins are negative, 2 sentinel lymph nodes were removed, they were both negative for metastatic disease, final pathologic stage pT1b(sn) pN0Mx, ER -0% DC -0% HER-2/miles +3+ by IHC, prognostic stage IA. I discussed with the patient her diagnosis, risks of her tumor, prognosis and recommendations for systemic therapy. The patient has a small HER-2/miles positive disease, tumor size is 8 mm, adjuvant treatment with Taxol and Herceptin is recommended, schedule and the side effects of the treatment were reviewed with the patient. 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 The patient was started on tamoxifen on 6/4/2020, she was given Effexor for depression, she had side effects from it, was discontinued and she was not on Lexapro, she does not like it and wanted to go back on Paxil. She was started on Arimidex on 8/20/2020, with poor tolerance, Arimidex was discontinued. We decided to hold off on adjuvant endocrine therapy. Mauro Lozano would like to hold off on treatment for the osteoporosis at this time. Patient had a 2D echocardiogram done, LVEF is 65%, adequate for treatment with Herceptin, she had a port placed, she was started on adjuvant therapy with Taxol and Herceptin on 1/2/2020. She completed Taxol on 3/19/2020. The patient is doing well clinically with no evidence of disease recurrence. The patient has residual peripheral neuropathy, continue vitamin B complex, she is on gabapentin. Residual leukopenia, with normal ANC, will continue to monitor her CBCD. The patient completed single agent Herceptin on 12/3/2020. The patient doing well without any clinical evidence of disease recurrence. Right breast tenderness and lumps, ordered right diagnostic mammogram and ultrasound. They were done on 2/25/2020, there was no mammographic/sonographic evidence of malignancy, this was BI-RADS Category 1, negative.   She had a right breast screening mammogram done on 3/3/2022, was negative for malignancy, she will be due again for a repeat right breast screening mammogram on 3/4/2023    Anxiety and depression, continue follow-up with psychiatry, Remeron was discontinued, she is on Paxil. RTC in 3 months. Thank you for allowing us to participate in the care of Mrs. Chas Jordan.     Marie Jarrell MD   HEMATOLOGY/MEDICAL [address] [address] [address]  [address] [address]  [facility] [phone]

## 2022-06-09 ENCOUNTER — OFFICE VISIT (OUTPATIENT)
Dept: ONCOLOGY | Age: 77
End: 2022-06-09
Payer: MEDICARE

## 2022-06-09 ENCOUNTER — HOSPITAL ENCOUNTER (OUTPATIENT)
Dept: INFUSION THERAPY | Age: 77
Discharge: HOME | End: 2022-06-09
Payer: MEDICARE

## 2022-06-09 VITALS
WEIGHT: 138 LBS | TEMPERATURE: 98.1 F | OXYGEN SATURATION: 98 % | SYSTOLIC BLOOD PRESSURE: 130 MMHG | HEART RATE: 60 BPM | BODY MASS INDEX: 23.56 KG/M2 | HEIGHT: 64 IN | DIASTOLIC BLOOD PRESSURE: 61 MMHG

## 2022-06-09 DIAGNOSIS — C50.912 MALIGNANT NEOPLASM OF LEFT FEMALE BREAST, UNSPECIFIED ESTROGEN RECEPTOR STATUS, UNSPECIFIED SITE OF BREAST (HCC): Primary | ICD-10-CM

## 2022-06-09 LAB
ALBUMIN SERPL-MCNC: 4.2 G/DL (ref 3.5–5.2)
ALP BLD-CCNC: 89 U/L (ref 35–104)
ALT SERPL-CCNC: 13 U/L (ref 0–32)
ANION GAP SERPL CALCULATED.3IONS-SCNC: 6 MMOL/L (ref 7–16)
AST SERPL-CCNC: 28 U/L (ref 0–31)
BASOPHILS ABSOLUTE: 0.03 E9/L (ref 0–0.2)
BASOPHILS RELATIVE PERCENT: 0.7 % (ref 0–2)
BILIRUB SERPL-MCNC: 0.4 MG/DL (ref 0–1.2)
BUN BLDV-MCNC: 8 MG/DL (ref 6–23)
CALCIUM SERPL-MCNC: 8.9 MG/DL (ref 8.6–10.2)
CHLORIDE BLD-SCNC: 100 MMOL/L (ref 98–107)
CO2: 29 MMOL/L (ref 22–29)
CREAT SERPL-MCNC: 1 MG/DL (ref 0.5–1)
EOSINOPHILS ABSOLUTE: 0.07 E9/L (ref 0.05–0.5)
EOSINOPHILS RELATIVE PERCENT: 1.6 % (ref 0–6)
GFR AFRICAN AMERICAN: >60
GFR NON-AFRICAN AMERICAN: 54 ML/MIN/1.73
GLUCOSE BLD-MCNC: 85 MG/DL (ref 74–99)
HCT VFR BLD CALC: 39.8 % (ref 34–48)
HEMOGLOBIN: 13 G/DL (ref 11.5–15.5)
IMMATURE GRANULOCYTES #: 0.01 E9/L
IMMATURE GRANULOCYTES %: 0.2 % (ref 0–5)
LYMPHOCYTES ABSOLUTE: 1.57 E9/L (ref 1.5–4)
LYMPHOCYTES RELATIVE PERCENT: 36.7 % (ref 20–42)
MAGNESIUM: 2.2 MG/DL (ref 1.6–2.6)
MCH RBC QN AUTO: 28.9 PG (ref 26–35)
MCHC RBC AUTO-ENTMCNC: 32.7 % (ref 32–34.5)
MCV RBC AUTO: 88.4 FL (ref 80–99.9)
MONOCYTES ABSOLUTE: 0.43 E9/L (ref 0.1–0.95)
MONOCYTES RELATIVE PERCENT: 10 % (ref 2–12)
NEUTROPHILS ABSOLUTE: 2.17 E9/L (ref 1.8–7.3)
NEUTROPHILS RELATIVE PERCENT: 50.8 % (ref 43–80)
PDW BLD-RTO: 12.9 FL (ref 11.5–15)
PLATELET # BLD: 235 E9/L (ref 130–450)
PMV BLD AUTO: 10 FL (ref 7–12)
POTASSIUM SERPL-SCNC: 4.6 MMOL/L (ref 3.5–5)
RBC # BLD: 4.5 E12/L (ref 3.5–5.5)
SODIUM BLD-SCNC: 135 MMOL/L (ref 132–146)
TOTAL PROTEIN: 6.7 G/DL (ref 6.4–8.3)
WBC # BLD: 4.3 E9/L (ref 4.5–11.5)

## 2022-06-09 PROCEDURE — 1123F ACP DISCUSS/DSCN MKR DOCD: CPT | Performed by: INTERNAL MEDICINE

## 2022-06-09 PROCEDURE — 36415 COLL VENOUS BLD VENIPUNCTURE: CPT

## 2022-06-09 PROCEDURE — 85025 COMPLETE CBC W/AUTO DIFF WBC: CPT

## 2022-06-09 PROCEDURE — G8420 CALC BMI NORM PARAMETERS: HCPCS | Performed by: INTERNAL MEDICINE

## 2022-06-09 PROCEDURE — G8427 DOCREV CUR MEDS BY ELIG CLIN: HCPCS | Performed by: INTERNAL MEDICINE

## 2022-06-09 PROCEDURE — 99212 OFFICE O/P EST SF 10 MIN: CPT

## 2022-06-09 PROCEDURE — G8399 PT W/DXA RESULTS DOCUMENT: HCPCS | Performed by: INTERNAL MEDICINE

## 2022-06-09 PROCEDURE — 1090F PRES/ABSN URINE INCON ASSESS: CPT | Performed by: INTERNAL MEDICINE

## 2022-06-09 PROCEDURE — 83735 ASSAY OF MAGNESIUM: CPT

## 2022-06-09 PROCEDURE — 80053 COMPREHEN METABOLIC PANEL: CPT

## 2022-06-09 PROCEDURE — 99214 OFFICE O/P EST MOD 30 MIN: CPT | Performed by: INTERNAL MEDICINE

## 2022-06-09 PROCEDURE — 1036F TOBACCO NON-USER: CPT | Performed by: INTERNAL MEDICINE

## 2022-06-09 NOTE — PROGRESS NOTES
[facility] ONCOLOGY  [facility] [address]  Dept: [place]: [phone]  Attending Progress Note      Reason for Visit:   Left breast cancer. Referring Physician: Triston Amor MD.    PCP:  KAREN Guadarrama PA-C    History of Present Illness: The patient is a 67 y.o. lady with a past medical history significant for thyroid disease, depression, and IBS, who had presented with an abnormal screening mammogram,    8/20/19  Bilateral screening mammogram  [facility]         FINDINGS: No suspicious masses, calcifications, or distortions are   identified on the right. On the left there is a new focal nodular   asymmetry at 12:00, with adjacent linear branching calcifications. Spot compression views is recommended for the asymmetry with   ultrasound, and spot magnification views for the calcifications. .             Impression   1. Right breast no mammographic evidence of malignancy           2. Left breast new focal asymmetry at 12:00, new microcalcifications.       Recommendation:   1. Right breast annual screening mammogram.   2. Left breast additional views spot compression and spot   magnification along with ultrasound.       BI-RADS Category 0:  Incomplete- Needs Additional Imaging Evaluation             8/29/19  Left Diagnostic mammogram   [facility]         FINDINGS:        A small partially obscured mass is identified in the upper outer left   breast measuring approximately 5-6 mm. Additionally, there is a small   segmentally distributed cluster of pleomorphic microcalcifications   located superiorly near the mass extending to the middle third depth.       Ultrasound confirmed a small irregular shaped hypoechoic mass with   circumscribed margins at the 12:00 position measuring 5 mm in maximal   dimension.           Impression       1. Small solid suspicious mass at the 12:00 position.    2. Segmentally distributed pleomorphic microcalcifications located   near the breast mass likely at the 12:00 position.       RECOMMENDATION:       Recommend ultrasound core biopsy of the mass seen on ultrasound and   stereotactic biopsy of the microcalcifications.       BI-RADS Category 5: Highly Suggestive of Malignancy          8/29/19  Left Breast US   [facility]         FINDINGS:        A small partially obscured mass is identified in the upper outer left   breast measuring approximately 5-6 mm. Additionally, there is a small   segmentally distributed cluster of pleomorphic microcalcifications   located superiorly near the mass extending to the middle third depth.       Ultrasound confirmed a small irregular shaped hypoechoic mass with   circumscribed margins at the 12:00 position measuring 5 mm in maximal   dimension.           Impression       1. Small solid suspicious mass at the 12:00 position. 2. Segmentally distributed pleomorphic microcalcifications located   near the breast mass likely at the 12:00 position.       RECOMMENDATION:       Recommend ultrasound core biopsy of the mass seen on ultrasound and   stereotactic biopsy of the microcalcifications.       BI-RADS Category 5: Highly Suggestive of Malignancy              She underwent a stereotactic guided left breast core biopsy at 12 o'clock position on September 10 , 2019.a single top hat shaped marker clip was deployed into the site.     She underwent an ultrasound guided biopsy of the left breast at the 12 o'clock position on September 17, 2019, A post-surgical ribbon shaped microclip was placed.      Pathological evaluation completed at [facility]):     9/10/19  Final Surgical Pathology Report  Diagnosis:  A. Left breast, 12:00, biopsy: High-grade ductal carcinoma in situ,  cannot exclude microinvasion, see comment. B. Left breast 12:00, additional, biopsy: High-grade ductal carcinoma in  situ, cannot exclude microinvasion, see comment.     Breast Cancer Marker Studies:  Estrogen Receptors (ER): Positive         Percentage of cells positive: <10%         Intensity: Weak    Progesterone Receptors ([place]): Negative        Internal control cells present and stain as expected: No internal  control present     9/17/19 Final Surgical Pathology Report    Diagnosis:  Mass, Left breast, 12:00, core biopsy: Segments of benign fibroadipose  tissue and scant skeletal muscle, see comment. Comment:   Epithelial lined mammary ducts and lobules are not identified  in the tissue sample. Correlation with clinical and radiologic findings  is essential to assure adequacy of tissue sampling. In the setting of a  mass clinically or radiologically suspicious for neoplasm, additional  tissue sampling is recommended. The patient underwent on 11/20/2019 a left mastectomy with sentinel lymph node excisional biopsy, pathology:    CANCER CASE SUMMARY:  Procedure: Left simple mastectomy  Specimen laterality: Left  Tumor site: 12:00 position  Tumor size: 8.0 mm in greatest dimension  Histologic type:  Invasive carcinoma of no special type (invasive ductal  carcinoma, not otherwise specified)  Histologic grade ([place] histologic score):   Glandular differentiation: Score 3   Nuclear pleomorphism: Score 2   Mitotic rate: Score 2   Overall grade: Grade 2 (score of 7)  Tumor focality: Single focus of invasive carcinoma  Ductal carcinoma in situ (DCIS): Present, adjacent to the invasive  carcinoma  Invasive carcinoma margins:   Margins uninvolved by invasive carcinoma    Distance from closest margin: 5.0 mm from the posterior margin  DCIS margins:   Margin uninvolved by DCIS    Distance from closest margin: 10.0 mm from the posterior margin  Regional lymph nodes: Uninvolved by tumor cells   Total number of lymph nodes examined: 2 (both sentinel nodes)  Treatment effect: No known presurgical therapy  Pathologic stage classification (pTNM, AJCC 8th edition):   pT1b   (sn) pN0    Ancillary studies:  Calponin immunostain on block A4 shows the invasive carcinoma lacking a  myoepithelial layer. P63 immunostain on block A6 shows the invasive carcinoma lacking a  myoepithelial layer, with a myoepithelial layer retained around the DCIS. Breast Cancer Marker Studies (Block A6):  Negative: 0%        No internal control cells present. Progesterone Receptors (OK):  Negative: 0%        No internal control cells present. Her-2/miles (c-erb B-2) protein expression: Positive (Score 3+)    The patient was started on adjuvant treatment with Herceptin and Taxol on 1/2/2020, she completed Taxol  on 3/19/2020. She is on single agent Herceptin. The patient was started on tamoxifen on 6/4/2020, she was given Effexor for depression, she had side effects from it, was discontinued and she was not on Lexapro, she did not like it. She was started on Arimidex on 8/20/2020, the patient has been stressed out, her  has a bladder tumor and will need to have a surgery done, she was in the ED on 9/6/2020, she was feeling tired and short of breath, she feels that her symptoms were because of the Arimidex and because she was off the Paxil prior to that. Arimidex has been discontinued. Errol Carter is doing really well at this time. She is following with psychiatry. She has residual neuropathy, taking vitamin B complex and gabapentin, which has been helping. She is feeling well. No new bony pain, no headaches or dizziness. Her spouse has bladder cancer. He is doing well at this time. She has a new skin lesion in the left upper chest, she is scheduled to see dermatology in July. Review of Systems;  CONSTITUTIONAL: No fever, chills. Good appetite, feeling tired. ENMT: Eyes: No diplopia; Nose: Positive for epistaxis. Mouth: No sore throat. RESPIRATORY: No hemoptysis, shortness of breath, cough. CARDIOVASCULAR: No chest pain, palpitations. GASTROINTESTINAL: As per HPI. GENITOURINARY: No dysuria, urinary frequency, hematuria.   NEURO: No syncope, presyncope, headache. Remainder:  ROS NEGATIVE    Past Medical History:      Diagnosis Date    Cancer [facility]) 2019    breast left    Cerebrovascular disease     tia without issues    Depression     Hypothyroidism     Irritable bowel syndrome     not diagnosed, patient controls with diet    Low sodium levels 6/18/2020    Thyroid disease     TIA (transient ischemic attack) 11/2020     Patient Active Problem List   Diagnosis    Ductal carcinoma in situ (DCIS) of left breast    Malignant neoplasm of left female breast, unspecified estrogen receptor status, unspecified site of breast ([facility] [address]    Poor venous access    Other osteoporosis without current pathological fracture    TIA (transient ischemic attack)    Anxiety    Stroke-like symptoms    Acquired hypothyroidism    Dyslipidemia        Past Surgical History:      Procedure Laterality Date    APPENDECTOMY      BREAST SURGERY      mastectomy left nov 20 2019    CATHETER REMOVAL N/A 3/12/2021    MEDIPORT REMOVAL performed by Nimco Schwartz MD at [address] [place] / REMOVAL / [address] Said N/A 12/27/2019    MEDIPORT INSERTION performed by Nimco Schwartz MD at [address] Left 11/20/2019    LEFT BREAST SIMPLE  MASTECTOMY, BLUE DYE INJECTION, LEFT AXILLARY  SENTINEL NODE BIOPSY POSSIBLE LEFT AXILLARY DISSECTION -- Vonchristosla Gins -- PECTORAL BLOCK performed by Francisco Coppola MD at . [facility]      oophorectomy       Family History:  Family History   Problem Relation Age of Onset    Cancer Mother 66        liver    Cancer Brother 58        kidney    Cancer Maternal Grandmother        Medications:  Reviewed and reconciled.     Social History:  Social History     Socioeconomic History    Marital status:      Spouse name: Not on file    Number of children: Not on file    Years of education: Not on file    Highest education level: Not on file   Occupational History    Not on file   Tobacco Use    Smoking status: Former Smoker     Packs/day: 2.00     Years: 35.00     Pack years: 70.00     Quit date:      Years since quittin.4    Smokeless tobacco: Never Used   Vaping Use    Vaping Use: Never used   Substance and Sexual Activity    Alcohol use: Not Currently    Drug use: Never    Sexual activity: Not Currently   Other Topics Concern    Not on file   Social History Narrative    Not on file     Social Determinants of Health     Financial Resource Strain:     Difficulty of Paying Living Expenses: Not on file   Food Insecurity:     Worried About Running Out of Food in the Last Year: Not on file    Jair of Food in the Last Year: Not on file   Transportation Needs:     Lack of Transportation (Medical): Not on file    Lack of Transportation (Non-Medical): Not on file   Physical Activity:     Days of Exercise per Week: Not on file    Minutes of Exercise per Session: Not on file   Stress:     Feeling of Stress : Not on file   Social Connections:     Frequency of Communication with Friends and Family: Not on file    Frequency of Social Gatherings with Friends and Family: Not on file    Attends Christian Services: Not on file    Active Member of [address] [place] [address] [employer] or Organizations: Not on file    Attends Club or Organization Meetings: Not on file    Marital Status: Not on file   Intimate Partner Violence:     Fear of Current or Ex-Partner: Not on file    Emotionally Abused: Not on file    Physically Abused: Not on file    Sexually Abused: Not on file   Housing Stability:     Unable to Pay for Housing in the Last Year: Not on file    Number of [place] in the Last Year: Not on file    Unstable Housing in the Last Year: Not on file       Allergies:   Allergies   Allergen Reactions    Arimidex [Anastrozole] Other (See Comments)     Hallucination, anxious, fatigue, stomach upset       Effexor [Venlafaxine] Other (See Comments)     Hallucination, vision issues, felt like she was going crazy, anxious    Lexapro [Escitalopram Oxalate] Diarrhea and Other (See Comments)     And fatigue         OB/GYN:  Age of menarche was 22, medically induced. Age of menopause was 31. Patient admits to hormonal therapy, progesterone, premarin. Oral contraceptives? To start periods. Patient is       Physical Exam:  /61   Pulse 60   Temp 98.1 °F (36.7 °C)   Ht 5' 4\" (1.626 m)   Wt 138 lb (62.6 kg)   SpO2 98%   BMI 23.69 kg/m²     GENERAL: Alert, oriented x 3, not in acute distress. HEENT: PERRLA; EOMI. Oropharynx clear. NECK: Supple. No palpable cervical or supraclavicular lymphadenopathy. LUNGS: Good air entry bilaterally. No wheezing, crackles or rhonchi. CARDIOVASCULAR: Regular rate. No murmurs, rubs or gallops. BREASTS: Right breast exam is negative for any skin changes, no nipple discharge, she has nodularity and tenderness in the lower inner and outer quadrants, no palpable right axillary adenopathy. She is status post left mastectomy. She has a small raised skin lesion in the left upper chest.  CHEST: This post right port placement  ABDOMEN: Soft. Non-tender, non-distended. Positive bowel sounds. EXTREMITIES: Without clubbing, cyanosis, or edema. NEUROLOGIC: No focal deficits.    ECOG PS 1      Impression/Plan:      The patient is a 67 y.o. lady with a past medical history significant for thyroid disease, depression, and IBS, who had presented with an abnormal screening mammogram, she had a left breast biopsy done revealing high-grade DCIS, ER positive less than 10%, OK negative, she underwent on 2019 a left mastectomy with sentinel lymph node excisional biopsy, she was found to have invasive ductal carcinoma, tumor size 8 mm, grade 2, with associated DCIS, margins are negative, 2 sentinel lymph nodes were removed, they were both negative for metastatic disease, final pathologic stage pT1b(sn) pN0Mx, ER -0% VT -0% HER-2/miles +3+ by IHC, prognostic stage IA. I discussed with the patient her diagnosis, risks of her tumor, prognosis and recommendations for systemic therapy. The patient has a small HER-2/miles positive disease, tumor size is 8 mm, adjuvant treatment with Taxol and Herceptin is recommended, schedule and the side effects of the treatment were reviewed with the patient. 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 The patient was started on tamoxifen on 6/4/2020, she was given Effexor for depression, she had side effects from it, was discontinued and she was not on Lexapro, she does not like it and wanted to go back on Paxil. She was started on Arimidex on 8/20/2020, with poor tolerance, Arimidex was discontinued. We decided to hold off on adjuvant endocrine therapy. Marian Drummond would like to hold off on treatment for the osteoporosis at this time. I discussed with Marian Hoda having a repeat DEXA scan done, she is declining having it done at this time. Patient had a 2D echocardiogram done, LVEF is 65%, adequate for treatment with Herceptin, she had a port placed, she was started on adjuvant therapy with Taxol and Herceptin on 1/2/2020. She completed Taxol on 3/19/2020. The patient is doing well clinically with no evidence of disease recurrence. The patient has residual peripheral neuropathy, continue vitamin B complex, she is on gabapentin. Residual leukopenia, with normal ANC, improving, will continue to monitor her CBCD. The patient completed single agent Herceptin on 12/3/2020. The patient doing well without any clinical evidence of disease recurrence. Follow-up with dermatology. Right breast tenderness and lumps, ordered right diagnostic mammogram and ultrasound.   They were done on 2/25/2020, there was no mammographic/sonographic evidence of malignancy, this was BI-RADS Category 1, negative. She had a right breast screening mammogram done on 3/3/2022, was negative for malignancy, she will be due again for a repeat right breast screening mammogram on 3/4/2023    Anxiety and depression, continue follow-up with psychiatry, Remeron was discontinued, she is on Paxil. RTC in 3 months. Thank you for allowing us to participate in the care of Mrs. Emma Nunez.     Alexa Barton MD   HEMATOLOGY/MEDICAL [address] [address] [address]  [address] [address]  Dept: [facility]: [phone]

## 2022-09-08 ENCOUNTER — OFFICE VISIT (OUTPATIENT)
Dept: ONCOLOGY | Age: 77
End: 2022-09-08
Payer: MEDICARE

## 2022-09-08 ENCOUNTER — HOSPITAL ENCOUNTER (OUTPATIENT)
Dept: INFUSION THERAPY | Age: 77
Discharge: HOME | End: 2022-09-08
Payer: MEDICARE

## 2022-09-08 VITALS
SYSTOLIC BLOOD PRESSURE: 143 MMHG | OXYGEN SATURATION: 97 % | HEART RATE: 61 BPM | BODY MASS INDEX: 23.54 KG/M2 | HEIGHT: 64 IN | TEMPERATURE: 97.7 F | WEIGHT: 137.9 LBS | DIASTOLIC BLOOD PRESSURE: 67 MMHG

## 2022-09-08 DIAGNOSIS — C50.912 MALIGNANT NEOPLASM OF LEFT FEMALE BREAST, UNSPECIFIED ESTROGEN RECEPTOR STATUS, UNSPECIFIED SITE OF BREAST (HCC): Primary | ICD-10-CM

## 2022-09-08 LAB
ALBUMIN SERPL-MCNC: 4.1 G/DL (ref 3.5–5.2)
ALP BLD-CCNC: 89 U/L (ref 35–104)
ALT SERPL-CCNC: 12 U/L (ref 0–32)
ANION GAP SERPL CALCULATED.3IONS-SCNC: 9 MMOL/L (ref 7–16)
AST SERPL-CCNC: 30 U/L (ref 0–31)
BASOPHILS ABSOLUTE: 0.03 E9/L (ref 0–0.2)
BASOPHILS RELATIVE PERCENT: 0.6 % (ref 0–2)
BILIRUB SERPL-MCNC: 0.3 MG/DL (ref 0–1.2)
BUN BLDV-MCNC: 11 MG/DL (ref 6–23)
CALCIUM SERPL-MCNC: 9.3 MG/DL (ref 8.6–10.2)
CHLORIDE BLD-SCNC: 98 MMOL/L (ref 98–107)
CO2: 25 MMOL/L (ref 22–29)
CREAT SERPL-MCNC: 1 MG/DL (ref 0.5–1)
EOSINOPHILS ABSOLUTE: 0.09 E9/L (ref 0.05–0.5)
EOSINOPHILS RELATIVE PERCENT: 1.9 % (ref 0–6)
GFR AFRICAN AMERICAN: >60
GFR NON-AFRICAN AMERICAN: 54 ML/MIN/1.73
GLUCOSE BLD-MCNC: 69 MG/DL (ref 74–99)
HCT VFR BLD CALC: 40.4 % (ref 34–48)
HEMOGLOBIN: 13.1 G/DL (ref 11.5–15.5)
IMMATURE GRANULOCYTES #: 0.01 E9/L
IMMATURE GRANULOCYTES %: 0.2 % (ref 0–5)
LYMPHOCYTES ABSOLUTE: 1.69 E9/L (ref 1.5–4)
LYMPHOCYTES RELATIVE PERCENT: 36.2 % (ref 20–42)
MAGNESIUM: 2.2 MG/DL (ref 1.6–2.6)
MCH RBC QN AUTO: 29.2 PG (ref 26–35)
MCHC RBC AUTO-ENTMCNC: 32.4 % (ref 32–34.5)
MCV RBC AUTO: 90 FL (ref 80–99.9)
MONOCYTES ABSOLUTE: 0.45 E9/L (ref 0.1–0.95)
MONOCYTES RELATIVE PERCENT: 9.6 % (ref 2–12)
NEUTROPHILS ABSOLUTE: 2.4 E9/L (ref 1.8–7.3)
NEUTROPHILS RELATIVE PERCENT: 51.5 % (ref 43–80)
PDW BLD-RTO: 12.8 FL (ref 11.5–15)
PLATELET # BLD: 211 E9/L (ref 130–450)
PMV BLD AUTO: 10.2 FL (ref 7–12)
POTASSIUM SERPL-SCNC: 4.4 MMOL/L (ref 3.5–5)
RBC # BLD: 4.49 E12/L (ref 3.5–5.5)
SODIUM BLD-SCNC: 132 MMOL/L (ref 132–146)
TOTAL PROTEIN: 6.8 G/DL (ref 6.4–8.3)
WBC # BLD: 4.7 E9/L (ref 4.5–11.5)

## 2022-09-08 PROCEDURE — 99212 OFFICE O/P EST SF 10 MIN: CPT

## 2022-09-08 PROCEDURE — G8399 PT W/DXA RESULTS DOCUMENT: HCPCS | Performed by: INTERNAL MEDICINE

## 2022-09-08 PROCEDURE — 80053 COMPREHEN METABOLIC PANEL: CPT

## 2022-09-08 PROCEDURE — G8427 DOCREV CUR MEDS BY ELIG CLIN: HCPCS | Performed by: INTERNAL MEDICINE

## 2022-09-08 PROCEDURE — 99214 OFFICE O/P EST MOD 30 MIN: CPT | Performed by: INTERNAL MEDICINE

## 2022-09-08 PROCEDURE — 1123F ACP DISCUSS/DSCN MKR DOCD: CPT | Performed by: INTERNAL MEDICINE

## 2022-09-08 PROCEDURE — 36415 COLL VENOUS BLD VENIPUNCTURE: CPT

## 2022-09-08 PROCEDURE — G8420 CALC BMI NORM PARAMETERS: HCPCS | Performed by: INTERNAL MEDICINE

## 2022-09-08 PROCEDURE — 1036F TOBACCO NON-USER: CPT | Performed by: INTERNAL MEDICINE

## 2022-09-08 PROCEDURE — 1090F PRES/ABSN URINE INCON ASSESS: CPT | Performed by: INTERNAL MEDICINE

## 2022-09-08 PROCEDURE — 83735 ASSAY OF MAGNESIUM: CPT

## 2022-09-08 PROCEDURE — 85025 COMPLETE CBC W/AUTO DIFF WBC: CPT

## 2022-09-08 NOTE — PROGRESS NOTES
[facility] ONCOLOGY  [facility] [address]  [facility] [phone]  Attending Progress Note      Reason for Visit:   Left breast cancer. Referring Physician: Deneen Whitfield MD.    PCP:  KAREN Rosenbaum PA-C    History of Present Illness: The patient is a 67 y.o. lady with a past medical history significant for thyroid disease, depression, and IBS, who had presented with an abnormal screening mammogram,    8/20/19  Bilateral screening mammogram  [facility]         FINDINGS: No suspicious masses, calcifications, or distortions are   identified on the right. On the left there is a new focal nodular   asymmetry at 12:00, with adjacent linear branching calcifications. Spot compression views is recommended for the asymmetry with   ultrasound, and spot magnification views for the calcifications. .             Impression   1. Right breast no mammographic evidence of malignancy           2. Left breast new focal asymmetry at 12:00, new microcalcifications. Recommendation:   1. Right breast annual screening mammogram.   2. Left breast additional views spot compression and spot   magnification along with ultrasound. BI-RADS Category 0: Incomplete- Needs Additional Imaging Evaluation             8/29/19  Left Diagnostic mammogram   [facility]         FINDINGS:        A small partially obscured mass is identified in the upper outer left   breast measuring approximately 5-6 mm. Additionally, there is a small   segmentally distributed cluster of pleomorphic microcalcifications   located superiorly near the mass extending to the middle third depth. Ultrasound confirmed a small irregular shaped hypoechoic mass with   circumscribed margins at the 12:00 position measuring 5 mm in maximal   dimension. Impression       1. Small solid suspicious mass at the 12:00 position.    2. Segmentally distributed pleomorphic microcalcifications located   near the breast mass likely at the 12:00 position. RECOMMENDATION:       Recommend ultrasound core biopsy of the mass seen on ultrasound and   stereotactic biopsy of the microcalcifications. BI-RADS Category 5: Highly Suggestive of Malignancy          8/29/19  Left Breast US   [facility]         FINDINGS:        A small partially obscured mass is identified in the upper outer left   breast measuring approximately 5-6 mm. Additionally, there is a small   segmentally distributed cluster of pleomorphic microcalcifications   located superiorly near the mass extending to the middle third depth. Ultrasound confirmed a small irregular shaped hypoechoic mass with   circumscribed margins at the 12:00 position measuring 5 mm in maximal   dimension. Impression       1. Small solid suspicious mass at the 12:00 position. 2. Segmentally distributed pleomorphic microcalcifications located   near the breast mass likely at the 12:00 position. RECOMMENDATION:       Recommend ultrasound core biopsy of the mass seen on ultrasound and   stereotactic biopsy of the microcalcifications. BI-RADS Category 5: Highly Suggestive of Malignancy              She underwent a stereotactic guided left breast core biopsy at 12 o'clock position on September 10 , 2019.a single top hat shaped marker clip was deployed into the site. She underwent an ultrasound guided biopsy of the left breast at the 12 o'clock position on September 17, 2019, A post-surgical ribbon shaped microclip was placed. Pathological evaluation completed at [facility]):     9/10/19  Final Surgical Pathology Report  Diagnosis:  A. Left breast, 12:00, biopsy: High-grade ductal carcinoma in situ,  cannot exclude microinvasion, see comment. B. Left breast 12:00, additional, biopsy: High-grade ductal carcinoma in  situ, cannot exclude microinvasion, see comment.     Breast Cancer Marker Studies:  Estrogen Receptors (ER): Positive         Percentage of cells positive: <10%         Intensity: Weak    Progesterone Receptors (MS): Negative        Internal control cells present and stain as expected: No internal  control present     9/17/19 Final Surgical Pathology Report    Diagnosis:  Mass, Left breast, 12:00, core biopsy: Segments of benign fibroadipose  tissue and scant skeletal muscle, see comment. Comment:   Epithelial lined mammary ducts and lobules are not identified  in the tissue sample. Correlation with clinical and radiologic findings  is essential to assure adequacy of tissue sampling. In the setting of a  mass clinically or radiologically suspicious for neoplasm, additional  tissue sampling is recommended. The patient underwent on 11/20/2019 a left mastectomy with sentinel lymph node excisional biopsy, pathology:    CANCER CASE SUMMARY:  Procedure: Left simple mastectomy  Specimen laterality: Left  Tumor site: 12:00 position  Tumor size: 8.0 mm in greatest dimension  Histologic type:  Invasive carcinoma of no special type (invasive ductal  carcinoma, not otherwise specified)  Histologic grade ([place] histologic score):   Glandular differentiation: Score 3   Nuclear pleomorphism: Score 2   Mitotic rate: Score 2   Overall grade: Grade 2 (score of 7)  Tumor focality: Single focus of invasive carcinoma  Ductal carcinoma in situ (DCIS): Present, adjacent to the invasive  carcinoma  Invasive carcinoma margins:   Margins uninvolved by invasive carcinoma    Distance from closest margin: 5.0 mm from the posterior margin  DCIS margins:   Margin uninvolved by DCIS    Distance from closest margin: 10.0 mm from the posterior margin  Regional lymph nodes: Uninvolved by tumor cells   Total number of lymph nodes examined: 2 (both sentinel nodes)  Treatment effect: No known presurgical therapy  Pathologic stage classification (pTNM, AJCC 8th edition):   pT1b   (sn) pN0    Ancillary studies:  Calponin immunostain on block A4 shows the invasive carcinoma lacking a  myoepithelial layer. P63 immunostain on block A6 shows the invasive carcinoma lacking a  myoepithelial layer, with a myoepithelial layer retained around the DCIS. Breast Cancer Marker Studies (Block A6):  Negative: 0%        No internal control cells present. Progesterone Receptors (OR):  Negative: 0%        No internal control cells present. Her-2/miles (c-erb B-2) protein expression: Positive (Score 3+)    The patient was started on adjuvant treatment with Herceptin and Taxol on 1/2/2020, she completed Taxol  on 3/19/2020. She is on single agent Herceptin. The patient was started on tamoxifen on 6/4/2020, she was given Effexor for depression, she had side effects from it, was discontinued and she was not on Lexapro, she did not like it. She was started on Arimidex on 8/20/2020, the patient has been stressed out, her  has a bladder tumor and will need to have a surgery done, she was in the ED on 9/6/2020, she was feeling tired and short of breath, she feels that her symptoms were because of the Arimidex and because she was off the Paxil prior to that. Arimidex has been discontinued. Severa Saba is doing really well at this time. She is following with psychiatry. She has residual neuropathy, taking vitamin B complex and gabapentin, which has been helping. She is feeling well. No new bony pain, no headaches or dizziness. Her spouse has bladder cancer. No new problems. Review of Systems;  CONSTITUTIONAL: No fever, chills. Good appetite, feeling tired. ENMT: Eyes: No diplopia; Nose: Positive for epistaxis. Mouth: No sore throat. RESPIRATORY: No hemoptysis, shortness of breath, cough. CARDIOVASCULAR: No chest pain, palpitations. GASTROINTESTINAL: As per HPI. GENITOURINARY: No dysuria, urinary frequency, hematuria. NEURO: No syncope, presyncope, headache.   Remainder:  ROS NEGATIVE    Past Medical History:      Diagnosis Date    Cancer ([facility] [address] 2019    breast left    Cerebrovascular disease     tia without issues    Depression     Hypothyroidism     Irritable bowel syndrome     not diagnosed, patient controls with diet    Low sodium levels 6/18/2020    Thyroid disease     TIA (transient ischemic attack) 11/2020     Patient Active Problem List   Diagnosis    Ductal carcinoma in situ (DCIS) of left breast    Malignant neoplasm of left female breast, unspecified estrogen receptor status, unspecified site of breast ([facility] [address]    Poor venous access    Other osteoporosis without current pathological fracture    TIA (transient ischemic attack)    Anxiety    Stroke-like symptoms    Acquired hypothyroidism    Dyslipidemia        Past Surgical History:      Procedure Laterality Date    APPENDECTOMY      BREAST SURGERY      mastectomy left nov 20 2019    CATHETER REMOVAL N/A 3/12/2021    MEDIPORT REMOVAL performed by Yusra Braden MD at [address], [address] ([address])      oophorectomy    INSERTION / REMOVAL / REPLACEMENT VENOUS ACCESS CATHETER N/A 12/27/2019    MEDIPORT INSERTION performed by Yusra Braden MD at [address] Left 11/20/2019    LEFT BREAST SIMPLE  MASTECTOMY, BLUE DYE INJECTION, LEFT AXILLARY  SENTINEL NODE BIOPSY POSSIBLE LEFT AXILLARY DISSECTION -- Evelyn Gould -- PECTORAL BLOCK performed by Pierre Scales MD at [address]         Family History:  Family History   Problem Relation Age of Onset    Cancer Mother 66        liver    Cancer Brother 58        kidney    Cancer Maternal Grandmother        Medications:  Reviewed and reconciled.     Social History:  Social History     Socioeconomic History    Marital status:      Spouse name: Not on file    Number of children: Not on file    Years of education: Not on file    Highest education level: Not on file   Occupational History    Not on file   Tobacco Use    Smoking status: Former     Packs/day: 2.00 Years: 35.00     Pack years: 70.00     Types: Cigarettes     Quit date: 36     Years since quittin.7    Smokeless tobacco: Never   Vaping Use    Vaping Use: Never used   Substance and Sexual Activity    Alcohol use: Not Currently    Drug use: Never    Sexual activity: Not Currently   Other Topics Concern    Not on file   Social History Narrative    Not on file     Social Determinants of Health     Financial Resource Strain: Not on file   Food Insecurity: Not on file   Transportation Needs: Not on file   Physical Activity: Not on file   Stress: Not on file   Social Connections: Not on file   Intimate Partner Violence: Not on file   Housing Stability: Not on file       Allergies: Allergies   Allergen Reactions    Arimidex [Anastrozole] Other (See Comments)     Hallucination, anxious, fatigue, stomach upset       Effexor [Venlafaxine] Other (See Comments)     Hallucination, vision issues, felt like she was going crazy, anxious    Lexapro [Escitalopram Oxalate] Diarrhea and Other (See Comments)     And fatigue         OB/GYN:  Age of menarche was 22, medically induced. Age of menopause was 31. Patient admits to hormonal therapy, progesterone, premarin. Oral contraceptives? To start periods. Patient is       Physical Exam:  BP (!) 143/67   Pulse 61   Temp 97.7 °F (36.5 °C)   Ht 5' 4\" (1.626 m)   Wt 137 lb 14.4 oz (62.6 kg)   SpO2 97%   BMI 23.67 kg/m²     GENERAL: Alert, oriented x 3, not in acute distress. HEENT: PERRLA; EOMI. Oropharynx clear. NECK: Supple. No palpable cervical or supraclavicular lymphadenopathy. LUNGS: Good air entry bilaterally. No wheezing, crackles or rhonchi. CARDIOVASCULAR: Regular rate. No murmurs, rubs or gallops. BREASTS: Right breast exam is negative for any skin changes, no nipple discharge, she has nodularity and tenderness in the lower inner and outer quadrants, no palpable right axillary adenopathy. She is status post left mastectomy.   She has a small raised skin lesion in the left upper chest.  CHEST: This post right port placement  ABDOMEN: Soft. Non-tender, non-distended. Positive bowel sounds. EXTREMITIES: Without clubbing, cyanosis, or edema. NEUROLOGIC: No focal deficits. ECOG PS 1      Impression/Plan:      The patient is a 67 y.o. lady with a past medical history significant for thyroid disease, depression, and IBS, who had presented with an abnormal screening mammogram, she had a left breast biopsy done revealing high-grade DCIS, ER positive less than 10%, NH negative, she underwent on 11/20/2019 a left mastectomy with sentinel lymph node excisional biopsy, she was found to have invasive ductal carcinoma, tumor size 8 mm, grade 2, with associated DCIS, margins are negative, 2 sentinel lymph nodes were removed, they were both negative for metastatic disease, final pathologic stage pT1b(sn) pN0Mx, ER -0% NH -0% HER-2/miles +3+ by IHC, prognostic stage IA. I discussed with the patient her diagnosis, risks of her tumor, prognosis and recommendations for systemic therapy. The patient has a small HER-2/miles positive disease, tumor size is 8 mm, adjuvant treatment with Taxol and Herceptin is recommended, schedule and the side effects of the treatment were reviewed with the patient. 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 The patient was started on tamoxifen on 6/4/2020, she was given Effexor for depression, she had side effects from it, was discontinued and she was not on Lexapro, she does not like it and wanted to go back on Paxil. She was started on Arimidex on 8/20/2020, with poor tolerance, Arimidex was discontinued. We decided to hold off on adjuvant endocrine therapy. Sherri Garcia would like to hold off on treatment for the osteoporosis at this time.   I discussed with Sherri Garcia having a repeat DEXA scan done, she is declining having it done at this time.    Patient had a 2D echocardiogram done, LVEF is 65%, adequate for treatment with Herceptin, she had a port placed, she was started on adjuvant therapy with Taxol and Herceptin on 1/2/2020. She completed Taxol on 3/19/2020. The patient is doing well clinically with no evidence of disease recurrence. The patient has residual peripheral neuropathy, continue vitamin B complex, she is on gabapentin. Reviewed, she had leukopenia, was treatment related, her white count had normalized. The patient completed single agent Herceptin on 12/3/2020. The patient doing well without any clinical evidence of disease recurrence. Continue with surveillance. Right breast tenderness and lumps, ordered right diagnostic mammogram and ultrasound. They were done on 2/25/2020, there was no mammographic/sonographic evidence of malignancy, this was BI-RADS Category 1, negative. She had a right breast screening mammogram done on 3/3/2022, was negative for malignancy, she will be due again for a repeat right breast screening mammogram on 3/4/2023    Anxiety and depression, continue follow-up with psychiatry,  she is on Paxil. RTC in 3 months. Thank you for allowing us to participate in the care of Mrs. Evelia Hebert.     Evans Mtz MD   HEMATOLOGY/MEDICAL [address] [address] [address]  [address] [address] [address] [place] [id number]  Dept: [place]: [phone]

## 2022-12-08 ENCOUNTER — HOSPITAL ENCOUNTER (OUTPATIENT)
Dept: INFUSION THERAPY | Age: 77
Discharge: HOME | End: 2022-12-08
Payer: MEDICARE

## 2022-12-08 ENCOUNTER — OFFICE VISIT (OUTPATIENT)
Dept: ONCOLOGY | Age: 77
End: 2022-12-08
Payer: MEDICARE

## 2022-12-08 VITALS
OXYGEN SATURATION: 99 % | SYSTOLIC BLOOD PRESSURE: 120 MMHG | WEIGHT: 136.2 LBS | HEIGHT: 64 IN | DIASTOLIC BLOOD PRESSURE: 64 MMHG | TEMPERATURE: 97.6 F | BODY MASS INDEX: 23.25 KG/M2 | HEART RATE: 66 BPM

## 2022-12-08 DIAGNOSIS — Z12.39 BREAST SCREENING: ICD-10-CM

## 2022-12-08 DIAGNOSIS — Z12.31 ENCOUNTER FOR SCREENING MAMMOGRAM FOR MALIGNANT NEOPLASM OF BREAST: ICD-10-CM

## 2022-12-08 DIAGNOSIS — C50.912 MALIGNANT NEOPLASM OF LEFT FEMALE BREAST, UNSPECIFIED ESTROGEN RECEPTOR STATUS, UNSPECIFIED SITE OF BREAST (HCC): Primary | ICD-10-CM

## 2022-12-08 LAB
ALBUMIN SERPL-MCNC: 4.2 G/DL (ref 3.5–5.2)
ALP BLD-CCNC: 108 U/L (ref 35–104)
ALT SERPL-CCNC: 17 U/L (ref 0–32)
ANION GAP SERPL CALCULATED.3IONS-SCNC: 8 MMOL/L (ref 7–16)
AST SERPL-CCNC: 27 U/L (ref 0–31)
BASOPHILS ABSOLUTE: 0.05 E9/L (ref 0–0.2)
BASOPHILS RELATIVE PERCENT: 1.1 % (ref 0–2)
BILIRUB SERPL-MCNC: 0.4 MG/DL (ref 0–1.2)
BUN BLDV-MCNC: 12 MG/DL (ref 6–23)
CALCIUM SERPL-MCNC: 8.9 MG/DL (ref 8.6–10.2)
CHLORIDE BLD-SCNC: 98 MMOL/L (ref 98–107)
CO2: 28 MMOL/L (ref 22–29)
CREAT SERPL-MCNC: 1 MG/DL (ref 0.5–1)
EOSINOPHILS ABSOLUTE: 0.07 E9/L (ref 0.05–0.5)
EOSINOPHILS RELATIVE PERCENT: 1.5 % (ref 0–6)
GFR SERPL CREATININE-BSD FRML MDRD: 58 ML/MIN/1.73
GLUCOSE BLD-MCNC: 75 MG/DL (ref 74–99)
HCT VFR BLD CALC: 40.7 % (ref 34–48)
HEMOGLOBIN: 13.3 G/DL (ref 11.5–15.5)
IMMATURE GRANULOCYTES #: 0.01 E9/L
IMMATURE GRANULOCYTES %: 0.2 % (ref 0–5)
LYMPHOCYTES ABSOLUTE: 1.43 E9/L (ref 1.5–4)
LYMPHOCYTES RELATIVE PERCENT: 30 % (ref 20–42)
MAGNESIUM: 2 MG/DL (ref 1.6–2.6)
MCH RBC QN AUTO: 29.4 PG (ref 26–35)
MCHC RBC AUTO-ENTMCNC: 32.7 % (ref 32–34.5)
MCV RBC AUTO: 89.8 FL (ref 80–99.9)
MONOCYTES ABSOLUTE: 0.46 E9/L (ref 0.1–0.95)
MONOCYTES RELATIVE PERCENT: 9.7 % (ref 2–12)
NEUTROPHILS ABSOLUTE: 2.74 E9/L (ref 1.8–7.3)
NEUTROPHILS RELATIVE PERCENT: 57.5 % (ref 43–80)
PDW BLD-RTO: 12.9 FL (ref 11.5–15)
PLATELET # BLD: 250 E9/L (ref 130–450)
PMV BLD AUTO: 10.4 FL (ref 7–12)
POTASSIUM SERPL-SCNC: 4.2 MMOL/L (ref 3.5–5)
RBC # BLD: 4.53 E12/L (ref 3.5–5.5)
SODIUM BLD-SCNC: 134 MMOL/L (ref 132–146)
TOTAL PROTEIN: 6.7 G/DL (ref 6.4–8.3)
WBC # BLD: 4.8 E9/L (ref 4.5–11.5)

## 2022-12-08 PROCEDURE — 85025 COMPLETE CBC W/AUTO DIFF WBC: CPT

## 2022-12-08 PROCEDURE — G8420 CALC BMI NORM PARAMETERS: HCPCS | Performed by: INTERNAL MEDICINE

## 2022-12-08 PROCEDURE — 83735 ASSAY OF MAGNESIUM: CPT

## 2022-12-08 PROCEDURE — G8399 PT W/DXA RESULTS DOCUMENT: HCPCS | Performed by: INTERNAL MEDICINE

## 2022-12-08 PROCEDURE — 1123F ACP DISCUSS/DSCN MKR DOCD: CPT | Performed by: INTERNAL MEDICINE

## 2022-12-08 PROCEDURE — 99213 OFFICE O/P EST LOW 20 MIN: CPT

## 2022-12-08 PROCEDURE — 80053 COMPREHEN METABOLIC PANEL: CPT

## 2022-12-08 PROCEDURE — G8427 DOCREV CUR MEDS BY ELIG CLIN: HCPCS | Performed by: INTERNAL MEDICINE

## 2022-12-08 PROCEDURE — G8484 FLU IMMUNIZE NO ADMIN: HCPCS | Performed by: INTERNAL MEDICINE

## 2022-12-08 PROCEDURE — 99214 OFFICE O/P EST MOD 30 MIN: CPT | Performed by: INTERNAL MEDICINE

## 2022-12-08 PROCEDURE — 1090F PRES/ABSN URINE INCON ASSESS: CPT | Performed by: INTERNAL MEDICINE

## 2022-12-08 PROCEDURE — 1036F TOBACCO NON-USER: CPT | Performed by: INTERNAL MEDICINE

## 2022-12-08 PROCEDURE — 36415 COLL VENOUS BLD VENIPUNCTURE: CPT

## 2022-12-08 NOTE — PROGRESS NOTES
[address] [facility] ONCOLOGY  [facility] [address]  Dept: Saud: [phone]  Attending Progress Note      Reason for Visit:   Left breast cancer. Referring Physician: Lou Ulrich MD.    PCP:  KAREN Sen PA-C    History of Present Illness: The patient is a 65 y.o. lady with a past medical history significant for thyroid disease, depression, and IBS, who had presented with an abnormal screening mammogram,    8/20/19  Bilateral screening mammogram  [facility]         FINDINGS: No suspicious masses, calcifications, or distortions are   identified on the right. On the left there is a new focal nodular   asymmetry at 12:00, with adjacent linear branching calcifications. Spot compression views is recommended for the asymmetry with   ultrasound, and spot magnification views for the calcifications. .             Impression   1. Right breast no mammographic evidence of malignancy           2. Left breast new focal asymmetry at 12:00, new microcalcifications. Recommendation:   1. Right breast annual screening mammogram.   2. Left breast additional views spot compression and spot   magnification along with ultrasound. BI-RADS Category 0: Incomplete- Needs Additional Imaging Evaluation             8/29/19  Left Diagnostic mammogram   [facility]         FINDINGS:        A small partially obscured mass is identified in the upper outer left   breast measuring approximately 5-6 mm. Additionally, there is a small   segmentally distributed cluster of pleomorphic microcalcifications   located superiorly near the mass extending to the middle third depth. Ultrasound confirmed a small irregular shaped hypoechoic mass with   circumscribed margins at the 12:00 position measuring 5 mm in maximal   dimension. Impression       1. Small solid suspicious mass at the 12:00 position.    2. Segmentally distributed pleomorphic microcalcifications located   near the breast mass likely at the 12:00 position. RECOMMENDATION:       Recommend ultrasound core biopsy of the mass seen on ultrasound and   stereotactic biopsy of the microcalcifications. BI-RADS Category 5: Highly Suggestive of Malignancy          8/29/19  Left Breast US   [facility]         FINDINGS:        A small partially obscured mass is identified in the upper outer left   breast measuring approximately 5-6 mm. Additionally, there is a small   segmentally distributed cluster of pleomorphic microcalcifications   located superiorly near the mass extending to the middle third depth. Ultrasound confirmed a small irregular shaped hypoechoic mass with   circumscribed margins at the 12:00 position measuring 5 mm in maximal   dimension. Impression       1. Small solid suspicious mass at the 12:00 position. 2. Segmentally distributed pleomorphic microcalcifications located   near the breast mass likely at the 12:00 position. RECOMMENDATION:       Recommend ultrasound core biopsy of the mass seen on ultrasound and   stereotactic biopsy of the microcalcifications. BI-RADS Category 5: Highly Suggestive of Malignancy              She underwent a stereotactic guided left breast core biopsy at 12 o'clock position on September 10 , 2019.a single top hat shaped marker clip was deployed into the site. She underwent an ultrasound guided biopsy of the left breast at the 12 o'clock position on September 17, 2019, A post-surgical ribbon shaped microclip was placed. Pathological evaluation completed at [facility]):     9/10/19  Final Surgical Pathology Report  Diagnosis:  A. Left breast, 12:00, biopsy: High-grade ductal carcinoma in situ,  cannot exclude microinvasion, see comment. B. Left breast 12:00, additional, biopsy: High-grade ductal carcinoma in  situ, cannot exclude microinvasion, see comment.     Breast Cancer Marker Studies:  Estrogen Receptors (ER): Positive         Percentage of cells positive: <10%         Intensity: Weak    Progesterone Receptors (VA): Negative        Internal control cells present and stain as expected: No internal  control present     9/17/19 Final Surgical Pathology Report    Diagnosis:  Mass, Left breast, 12:00, core biopsy: Segments of benign fibroadipose  tissue and scant skeletal muscle, see comment. Comment:   Epithelial lined mammary ducts and lobules are not identified  in the tissue sample. Correlation with clinical and radiologic findings  is essential to assure adequacy of tissue sampling. In the setting of a  mass clinically or radiologically suspicious for neoplasm, additional  tissue sampling is recommended. The patient underwent on 11/20/2019 a left mastectomy with sentinel lymph node excisional biopsy, pathology:    CANCER CASE SUMMARY:  Procedure: Left simple mastectomy  Specimen laterality: Left  Tumor site: 12:00 position  Tumor size: 8.0 mm in greatest dimension  Histologic type:  Invasive carcinoma of no special type (invasive ductal  carcinoma, not otherwise specified)  Histologic grade (Crowder histologic score):   Glandular differentiation: Score 3   Nuclear pleomorphism: Score 2   Mitotic rate: Score 2   Overall grade: Grade 2 (score of 7)  Tumor focality: Single focus of invasive carcinoma  Ductal carcinoma in situ (DCIS): Present, adjacent to the invasive  carcinoma  Invasive carcinoma margins:   Margins uninvolved by invasive carcinoma    Distance from closest margin: 5.0 mm from the posterior margin  DCIS margins:   Margin uninvolved by DCIS    Distance from closest margin: 10.0 mm from the posterior margin  Regional lymph nodes: Uninvolved by tumor cells   Total number of lymph nodes examined: 2 (both sentinel nodes)  Treatment effect: No known presurgical therapy  Pathologic stage classification (pTNM, AJCC 8th edition):   pT1b   (sn) pN0    Ancillary studies:  Calponin immunostain on block A4 shows the invasive carcinoma lacking a  myoepithelial layer. P63 immunostain on block A6 shows the invasive carcinoma lacking a  myoepithelial layer, with a myoepithelial layer retained around the DCIS. Breast Cancer Marker Studies (Block A6):  Negative: 0%        No internal control cells present. Progesterone Receptors (VA):  Negative: 0%        No internal control cells present. Her-2/miles (c-erb B-2) protein expression: Positive (Score 3+)    The patient was started on adjuvant treatment with Herceptin and Taxol on 1/2/2020, she completed Taxol  on 3/19/2020. She is on single agent Herceptin. The patient was started on tamoxifen on 6/4/2020, she was given Effexor for depression, she had side effects from it, was discontinued and she was not on Lexapro, she did not like it. She was started on Arimidex on 8/20/2020, the patient has been stressed out, her  has a bladder tumor and will need to have a surgery done, she was in the ED on 9/6/2020, she was feeling tired and short of breath, she feels that her symptoms were because of the Arimidex and because she was off the Paxil prior to that. Arimidex has been discontinued. Charlie Toney is doing really well at this time. She is following with psychiatry. She has residual neuropathy, taking vitamin B complex and gabapentin, which has been helping. She is feeling well. No new bony pain, no headaches or dizziness. No right breast changes. Her spouse has bladder cancer. He is scheduled for a repeat cystoscopy soon. Review of Systems;  CONSTITUTIONAL: No fever, chills. Good appetite, feeling tired. ENMT: Eyes: No diplopia; Nose: Positive for epistaxis. Mouth: No sore throat. RESPIRATORY: No hemoptysis, shortness of breath, cough. CARDIOVASCULAR: No chest pain, palpitations. GASTROINTESTINAL: As per HPI. GENITOURINARY: No dysuria, urinary frequency, hematuria. NEURO: No syncope, presyncope, headache.   Remainder:  ROS NEGATIVE    Past Medical History:      Diagnosis Date    Cancer ([facility] [address] 2019    breast left    Cerebrovascular disease     tia without issues    Depression     Hypothyroidism     Irritable bowel syndrome     not diagnosed, patient controls with diet    Low sodium levels 6/18/2020    Thyroid disease     TIA (transient ischemic attack) 11/2020     Patient Active Problem List   Diagnosis    Ductal carcinoma in situ (DCIS) of left breast    Malignant neoplasm of left female breast, unspecified estrogen receptor status, unspecified site of breast ([facility] [address]    Poor venous access    Other osteoporosis without current pathological fracture    TIA (transient ischemic attack)    Anxiety    Stroke-like symptoms    Acquired hypothyroidism    Dyslipidemia        Past Surgical History:      Procedure Laterality Date    APPENDECTOMY      BREAST SURGERY      mastectomy left nov 20 2019    CATHETER REMOVAL N/A 3/12/2021    MEDIPORT REMOVAL performed by Guy Kayser, MD at [address] , [address] ([place])      oophorectomy    INSERTION / REMOVAL / REPLACEMENT VENOUS ACCESS CATHETER N/A 12/27/2019    MEDIPORT INSERTION performed by Guy Kayser, MD at [address] Left 11/20/2019    LEFT BREAST SIMPLE  MASTECTOMY, BLUE DYE INJECTION, LEFT AXILLARY  SENTINEL NODE BIOPSY POSSIBLE LEFT AXILLARY DISSECTION -- Martina Pair -- PECTORAL BLOCK performed by Galo Farley MD at [facility] OR    TONSILLECTOMY         Family History:  Family History   Problem Relation Age of Onset    Cancer Mother 66        liver    Cancer Brother 58        kidney    Cancer Maternal Grandmother        Medications:  Reviewed and reconciled.     Social History:  Social History     Socioeconomic History    Marital status:      Spouse name: Not on file    Number of children: Not on file    Years of education: Not on file    Highest education level: Not on file   Occupational History    Not on file   Tobacco Use    Smoking status: Former     Packs/day: 2.00     Years: 35.00     Pack years: 70.00     Types: Cigarettes     Quit date:      Years since quittin.9    Smokeless tobacco: Never   Vaping Use    Vaping Use: Never used   Substance and Sexual Activity    Alcohol use: Not Currently    Drug use: Never    Sexual activity: Not Currently   Other Topics Concern    Not on file   Social History Narrative    Not on file     Social Determinants of Health     Financial Resource Strain: Not on file   Food Insecurity: Not on file   Transportation Needs: Not on file   Physical Activity: Not on file   Stress: Not on file   Social Connections: Not on file   Intimate Partner Violence: Not on file   Housing Stability: Not on file       Allergies: Allergies   Allergen Reactions    Arimidex [Anastrozole] Other (See Comments)     Hallucination, anxious, fatigue, stomach upset       Effexor [Venlafaxine] Other (See Comments)     Hallucination, vision issues, felt like she was going crazy, anxious    Lexapro [Escitalopram Oxalate] Diarrhea and Other (See Comments)     And fatigue         OB/GYN:  Age of menarche was 22, medically induced. Age of menopause was 31. Patient admits to hormonal therapy, progesterone, premarin. Oral contraceptives? To start periods. Patient is       Physical Exam:  /64   Pulse 66   Temp 97.6 °F (36.4 °C)   Ht 5' 4\" (1.626 m)   Wt 136 lb 3.2 oz (61.8 kg)   SpO2 99%   BMI 23.38 kg/m²     GENERAL: Alert, oriented x 3, not in acute distress. HEENT: PERRLA; EOMI. Oropharynx clear. NECK: Supple. No palpable cervical or supraclavicular lymphadenopathy. LUNGS: Good air entry bilaterally. No wheezing, crackles or rhonchi. CARDIOVASCULAR: Regular rate. No murmurs, rubs or gallops.    BREASTS: Right breast exam is negative for any skin changes, no nipple discharge, she has nodularity and tenderness in the lower inner and outer quadrants, no palpable right axillary adenopathy. She is status post left mastectomy. She has a small raised skin lesion in the left upper chest.  CHEST: This post right port placement  ABDOMEN: Soft. Non-tender, non-distended. Positive bowel sounds. EXTREMITIES: Without clubbing, cyanosis, or edema. NEUROLOGIC: No focal deficits. ECOG PS 1      Impression/Plan:      The patient is a 65 y.o. lady with a past medical history significant for thyroid disease, depression, and IBS, who had presented with an abnormal screening mammogram, she had a left breast biopsy done revealing high-grade DCIS, ER positive less than 10%, MI negative, she underwent on 11/20/2019 a left mastectomy with sentinel lymph node excisional biopsy, she was found to have invasive ductal carcinoma, tumor size 8 mm, grade 2, with associated DCIS, margins are negative, 2 sentinel lymph nodes were removed, they were both negative for metastatic disease, final pathologic stage pT1b(sn) pN0Mx, ER -0% MI -0% HER-2/miles +3+ by IHC, prognostic stage IA. I discussed with the patient her diagnosis, risks of her tumor, prognosis and recommendations for systemic therapy. The patient has a small HER-2/miles positive disease, tumor size is 8 mm, adjuvant treatment with Taxol and Herceptin is recommended, schedule and the side effects of the treatment were reviewed with the patient. 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 The patient was started on tamoxifen on 6/4/2020, she was given Effexor for depression, she had side effects from it, was discontinued and she was not on Lexapro, she does not like it and wanted to go back on Paxil. She was started on Arimidex on 8/20/2020, with poor tolerance, Arimidex was discontinued. We decided to hold off on adjuvant endocrine therapy. Tung Rowell would like to hold off on treatment for the osteoporosis at this time.   I discussed with Tung Rowell having a repeat DEXA scan done, she is declining having it done at this time. Patient had a 2D echocardiogram done, LVEF is 65%, adequate for treatment with Herceptin, she had a port placed, she was started on adjuvant therapy with Taxol and Herceptin on 1/2/2020. She completed Taxol on 3/19/2020. The patient is doing well clinically with no evidence of disease recurrence. The patient has residual peripheral neuropathy, continue vitamin B complex, she is on gabapentin. Labs reviewed, she had prolonged leukopenia, was treatment related, her white count had normalized. The patient completed single agent Herceptin on 12/3/2020. The patient doing well without any clinical evidence of disease recurrence. Continue with surveillance. Right breast tenderness and lumps, ordered right diagnostic mammogram and ultrasound. They were done on 2/25/2020, there was no mammographic/sonographic evidence of malignancy, this was BI-RADS Category 1, negative. She had a right breast screening mammogram done on 3/3/2022, was negative for malignancy, she is scheduled for a repeat right breast screening mammogram on 3/7/2023, await results. Anxiety and depression, continue follow-up with psychiatry,  she is on Paxil. RTC in March, will decrease the frequency of her visits every 6 months after her next visit. Thank you for allowing us to participate in the care of Mrs. Shaina Falcon.     Bryce Corbett MD   HEMATOLOGY/MEDICAL [address] [address] [address]  [address] [address]  Dept: Saud: [phone]

## 2023-02-01 DIAGNOSIS — Z12.31 ENCOUNTER FOR SCREENING MAMMOGRAM FOR BREAST CANCER: Primary | ICD-10-CM

## 2023-02-27 ASSESSMENT — ENCOUNTER SYMPTOMS
COUGH: 0
SHORTNESS OF BREATH: 0
BACK PAIN: 0

## 2023-02-27 NOTE — PROGRESS NOTES
Subjective: This note was copied forward from the last encounter. Essential components for this patient record were reviewed and verified on this visit including:  recent hospitalizations, recent imaging, PMH, PSH, FH, SOC HX, Allergies, and Medications were reviewed and updated as appropriate. In addition, the assessment and plan were copied from prior office note and updated accordingly. Patient ID: Martín Vasquez is a 65 y.o. female kryst[place] woman who presents . for follow up of her left breast cancer which was identified on screening mammogram in August 2019. HPI   09/10/2019 She underwent a stereotactic guided left breast core biopsy at 12 o'clock position. a single top hat shaped marker clip was deployed into the site. Final Surgical Pathology Report  A. Left breast, 12:00, biopsy: High-grade ductal carcinoma in situ,  cannot exclude microinvasion, see comment. B. Left breast 12:00, additional, biopsy: High-grade ductal carcinoma in  situ, cannot exclude microinvasion, see comment. Breast Cancer Marker Studies:  Estrogen Receptors (ER): Positive. Percentage of cells positive: <10%. Intensity: Weak  Progesterone Receptors (MS): Negative      09/17/2019 She underwent an ultrasound guided biopsy of the left breast at the 12 o'clock position. A post-surgical ribbon shaped microclip was placed. Pathological evaluation completed at [facility]):  Final Surgical Pathology Report  Diagnosis:  Mass, Left breast, 12:00, core biopsy: Segments of benign fibroadipose  tissue and scant skeletal muscle, see comment. Comment:   Epithelial lined mammary ducts and lobules are not identified  in the tissue sample. Correlation with clinical and radiologic findings  is essential to assure adequacy of tissue sampling. In the setting of a  mass clinically or radiologically suspicious for neoplasm, additional  tissue sampling is recommended.     11/20/2019 S/P left breast simple mastectomy, blue dye injection, left axillary sentinel node biopsy, possible left axillary dissection. Pathological evaluation completed at [facility]):    A. Breast, left, mastectomy: Invasive ductal carcinoma with adjacent high-grade DCIS (with micro-calcifications) with comedo necrosis. Completely excised; Margins are negative for invasive carcinoma and negative for DCIS. Fibroadenomatoid hyperplasia. Nipple with no significant pathologic findings. B. Gackle lymph node #1:  One lymph node with no evidence of carcinoma (0/1). C. Gackle lymph node #2: One lymph node with no evidence of carcinoma (0/1). CANCER CASE SUMMARY:  Procedure: Left simple mastectomy  Specimen laterality: Left  Tumor site: 12:00 position  Tumor size: 8.0 mm in greatest dimension  Histologic type: Invasive carcinoma of no special type (invasive ductal  carcinoma, not otherwise specified)  Histologic grade (Luverne histologic score):   Glandular differentiation: Score 3   Nuclear pleomorphism: Score 2   Mitotic rate: Score 2   Overall grade: Grade 2 (score of 7)  Tumor focality: Single focus of invasive carcinoma  Ductal carcinoma in situ (DCIS): Present, adjacent to the invasive carcinoma  Invasive carcinoma margins:   Margins uninvolved by invasive carcinoma. Distance from closest margin: 5.0 mm from the posterior margin  DCIS margins:  Margin uninvolved by DCIS. Distance from closest margin: 10.0 mm from the posterior margin  Regional lymph nodes: Uninvolved by tumor cells  Total number of lymph nodes examined: 2 (both sentinel nodes)  Treatment effect: No known presurgical therapy  Pathologic stage classification (pTNM, AJCC 8th edition): pT1b (sn) pN0    03/19/2020 completed adjuvant HT per medical oncology, Dr. Deyanira Horvath  06/04/2020 started endocrine therapy with tamoxifen. Poor tolerance, tamoxifen discontinued. 08/20/2020 endocrine therapy with Arimidex. Poor tolerance, Arimidex discontinued 9/6/2020.   opted for active surveillance    Review of Systems   Constitutional:  Negative for activity change, appetite change, chills, fatigue, fever and unexpected weight change. Arlene Kevin continues to do well. She remains active but she reports she lost her partner of 32 years in January and will be moving to [place] to be with her family. Respiratory:  Negative for cough and shortness of breath. Cardiovascular:  Negative for chest pain and palpitations. Musculoskeletal:  Negative for arthralgias, back pain and gait problem. Neurological:  Negative for light-headedness and headaches. Psychiatric/Behavioral:  The patient is not nervous/anxious. Objective:   Physical Exam  Vitals and nursing note reviewed. Constitutional:       General: She is not in acute distress. Appearance: Normal appearance. She is well-developed. She is not diaphoretic. Comments: ECOG 0. No apparent distress. HENT:      Head: Normocephalic and atraumatic. Mouth/Throat:      Pharynx: No oropharyngeal exudate. Eyes:      General: No scleral icterus. Right eye: No discharge. Left eye: No discharge. Conjunctiva/sclera: Conjunctivae normal.   Neck:      Thyroid: No thyromegaly. Vascular: No JVD. Trachea: No tracheal deviation. Cardiovascular:      Rate and Rhythm: Normal rate and regular rhythm. Heart sounds: No murmur heard. No friction rub. No gallop. Pulmonary:      Effort: Pulmonary effort is normal. No respiratory distress or retractions. Breath sounds: Normal breath sounds. No stridor. No wheezing or rales. Chest:      Chest wall: No mass, lacerations, deformity, swelling, tenderness or edema. Breasts:     Breasts are symmetrical.      Right: No inverted nipple, mass, nipple discharge, skin change or tenderness. Left: No inverted nipple, mass, nipple discharge, skin change or tenderness. Comments: Right breast exam is without evidence of disease.   Abdominal:      General: There is no distension. Palpations: Abdomen is soft. Tenderness: There is no abdominal tenderness. There is no guarding or rebound. Musculoskeletal:         General: No tenderness or deformity. Normal range of motion. Right shoulder: Normal.      Left shoulder: Normal.      Cervical back: Normal range of motion and neck supple. Lymphadenopathy:      Cervical: No cervical adenopathy. Right cervical: No superficial, deep or posterior cervical adenopathy. Left cervical: No superficial, deep or posterior cervical adenopathy. Upper Body:      Right upper body: No pectoral adenopathy. Left upper body: No pectoral adenopathy. Skin:     General: Skin is warm and dry. Coloration: Skin is not pale. Findings: No erythema or rash. Neurological:      Mental Status: She is alert and oriented to person, place, and time. Coordination: Coordination normal.   Psychiatric:         Behavior: Behavior normal.         Thought Content: Thought content normal.         Judgment: Judgment normal.      Comments: Pleasant and conversant. Reports her depression is well controlled on current regimen. Assessment:    Ryan Mathews is a 65 y.o. extremely pleasant female who had left breast biopsy revealing high-grade DCIS, ER positive less than 10%, NM negative. 11/20/2019 left mastectomy with sentinel lymph node excision noted invasive ductal carcinoma, tumor size 8 mm, grade 2 disease with associated DCIS. Margins were negative, 2 sentinel lymph nodes were negative for metastatic disease. Pathologic stage pT1b pN0 MX. ER negative NM negative HER-2/miles positive disease. Stage Ia    Adjuvant therapy:  03/19/2020 completed adjuvant HT per medical oncology, Dr. Seals Score  06/04/2020 started endocrine therapy with tamoxifen (DCIS +). Poor tolerance, tamoxifen discontinued. 08/20/2020 endocrine therapy with Arimidex.   Poor tolerance, Arimidex discontinued 09/06/2020.  09/06/2020 discontinued endocrine therapy; opted for active surveillance. 2020 completed single agent Herceptin; still has MediPort right ches    Posttreatment imagin2021 right screening mammogram: Negative, BI-RADS 1.  2022 right screening mammogram: Negative, BI-RADS 1.    2023 right screening mammogram: Negative, BI-RADS 1. Key clinical points:  10/10/2020 TIA admission: on ASA 81 mg daily. 2021 clinical follow-up without evidence of recurrent disease. 2021 MediPort removed  2020 endocrine therapy discontinued; opted for active surveillance. 10/07/2020 clinical follow-up without evidence of recurrent disease. Continues daily exercise/yoga  2022 clinical follow-up without evidence of recurrent disease. Follow-up annually per her request  2023 clinical follow-up is without evidence of recurrent disease. She recently lost her partner of 32 years and reports she will be moving back to [place] to be with her family. Discussed the importance of establishing care locally once she is settled in. She was reminded on the importance of taking a copy of her imaging for comparison in the future. At this time, we will plan to see in 1 year unless she officially moves and then we will see her at any time as needed. We reviewed her breast imaging today for educational (not interpretive) purposes on this visit. We discussed breast anatomy, breast density as assigned by radiology, the bi-rads result, and reviewed the purpose of any biopsy or surgical clips (did not verify placement) noted on imaging. In addition, we discussed any changes on imaging as they relate to post procedure/post treatment. It was clearly stated to [facility] that interpretation of imaging and the final result of imaging is at the discretion of the reading radiologist.       Plan:    Continue monthly breast/chest wall self examination; detailed instructions reviewed today.  Bring any changes to your physician's attention. Continue healthy diet and exercise routinely as tolerated. Exercise recommendations include 150-300 min/week moderate intensity activity plus 2x weekly resistance exercise (source ASBS 2022). Maintaining ideal body weight (20-25 BMI) may lead to optimal breast cancer outcomes. Avoid alcohol . Continue follow up with Medical Oncology, Primary Care, and all specialties as directed. She will call us in the event she does not move to [place] and we would see her in 1 year with mammogram same day. Otherwise we will see her as needed. I spent a total of 20 minutes on the date of the service which included preparing to see the patient, face-to-face patient care, completing clinical documentation, obtaining and/or reviewing separately obtained history, performing a medically appropriate examination, counseling and educating the patient/family/caregiver, ordering medications, tests, or procedures, communicating with other HCPs (not separately reported), independently interpreting results (not separately reported), communicating results to the patient/family/caregiver and care coordination (not separately reported). This document is generated, in part, by voice recognition software and thus syntax and grammatical errors are possible. Lottie Kirby, RN, MSN, APRN-CNP, [id number]  Advanced Oncology Certified Nurse Practitioner  Department of Breast Surgery  [facility]/  [facility] in collaboration with Dr. Marisela Maravilla.  Kd/Dr. Gregory Lopez/Dr. Alan Carrera APRN-CNP

## 2023-03-07 ENCOUNTER — HOSPITAL ENCOUNTER (OUTPATIENT)
Dept: GENERAL RADIOLOGY | Age: 78
Discharge: HOME | End: 2023-03-09
Payer: MEDICARE

## 2023-03-07 ENCOUNTER — OFFICE VISIT (OUTPATIENT)
Dept: BREAST CENTER | Age: 78
End: 2023-03-07
Payer: MEDICARE

## 2023-03-07 VITALS
TEMPERATURE: 98 F | HEART RATE: 68 BPM | SYSTOLIC BLOOD PRESSURE: 110 MMHG | RESPIRATION RATE: 18 BRPM | BODY MASS INDEX: 22.88 KG/M2 | OXYGEN SATURATION: 98 % | WEIGHT: 134 LBS | DIASTOLIC BLOOD PRESSURE: 78 MMHG | HEIGHT: 64 IN

## 2023-03-07 VITALS — WEIGHT: 137 LBS | HEIGHT: 64 IN | BODY MASS INDEX: 23.39 KG/M2

## 2023-03-07 DIAGNOSIS — Z12.31 ENCOUNTER FOR SCREENING MAMMOGRAM FOR MALIGNANT NEOPLASM OF BREAST: ICD-10-CM

## 2023-03-07 DIAGNOSIS — C50.912 MALIGNANT NEOPLASM OF LEFT FEMALE BREAST, UNSPECIFIED ESTROGEN RECEPTOR STATUS, UNSPECIFIED SITE OF BREAST (HCC): Primary | ICD-10-CM

## 2023-03-07 PROCEDURE — G8420 CALC BMI NORM PARAMETERS: HCPCS | Performed by: NURSE PRACTITIONER

## 2023-03-07 PROCEDURE — 77067 SCR MAMMO BI INCL CAD: CPT

## 2023-03-07 PROCEDURE — 1090F PRES/ABSN URINE INCON ASSESS: CPT | Performed by: NURSE PRACTITIONER

## 2023-03-07 PROCEDURE — 99213 OFFICE O/P EST LOW 20 MIN: CPT | Performed by: NURSE PRACTITIONER

## 2023-03-07 PROCEDURE — G8427 DOCREV CUR MEDS BY ELIG CLIN: HCPCS | Performed by: NURSE PRACTITIONER

## 2023-03-07 PROCEDURE — G8484 FLU IMMUNIZE NO ADMIN: HCPCS | Performed by: NURSE PRACTITIONER

## 2023-03-07 PROCEDURE — G8399 PT W/DXA RESULTS DOCUMENT: HCPCS | Performed by: NURSE PRACTITIONER

## 2023-03-07 PROCEDURE — 1036F TOBACCO NON-USER: CPT | Performed by: NURSE PRACTITIONER

## 2023-03-07 PROCEDURE — 1123F ACP DISCUSS/DSCN MKR DOCD: CPT | Performed by: NURSE PRACTITIONER

## 2023-03-07 RX ORDER — LORAZEPAM 0.5 MG/1
TABLET ORAL
COMMUNITY
Start: 2022-12-21

## 2023-03-09 ENCOUNTER — TELEPHONE (OUTPATIENT)
Dept: BREAST CENTER | Age: 78
End: 2023-03-09

## 2023-03-16 ENCOUNTER — HOSPITAL ENCOUNTER (OUTPATIENT)
Dept: INFUSION THERAPY | Age: 78
Discharge: HOME | End: 2023-03-16
Payer: MEDICARE

## 2023-03-16 ENCOUNTER — OFFICE VISIT (OUTPATIENT)
Dept: ONCOLOGY | Age: 78
End: 2023-03-16
Payer: MEDICARE

## 2023-03-16 VITALS
TEMPERATURE: 98.2 F | HEIGHT: 64 IN | SYSTOLIC BLOOD PRESSURE: 130 MMHG | HEART RATE: 72 BPM | DIASTOLIC BLOOD PRESSURE: 55 MMHG | WEIGHT: 137.3 LBS | OXYGEN SATURATION: 99 % | BODY MASS INDEX: 23.44 KG/M2

## 2023-03-16 DIAGNOSIS — C50.912 MALIGNANT NEOPLASM OF LEFT FEMALE BREAST, UNSPECIFIED ESTROGEN RECEPTOR STATUS, UNSPECIFIED SITE OF BREAST (HCC): ICD-10-CM

## 2023-03-16 DIAGNOSIS — C50.912 MALIGNANT NEOPLASM OF LEFT FEMALE BREAST, UNSPECIFIED ESTROGEN RECEPTOR STATUS, UNSPECIFIED SITE OF BREAST (HCC): Primary | ICD-10-CM

## 2023-03-16 LAB
ALBUMIN SERPL-MCNC: 4.2 G/DL (ref 3.5–5.2)
ALP SERPL-CCNC: 106 U/L (ref 35–104)
ALT SERPL-CCNC: 14 U/L (ref 0–32)
ANION GAP SERPL CALCULATED.3IONS-SCNC: 12 MMOL/L (ref 7–16)
AST SERPL-CCNC: 26 U/L (ref 0–31)
BASOPHILS # BLD: 0.04 E9/L (ref 0–0.2)
BASOPHILS NFR BLD: 0.7 % (ref 0–2)
BILIRUB SERPL-MCNC: 0.4 MG/DL (ref 0–1.2)
BUN SERPL-MCNC: 16 MG/DL (ref 6–23)
CALCIUM SERPL-MCNC: 8.9 MG/DL (ref 8.6–10.2)
CHLORIDE SERPL-SCNC: 95 MMOL/L (ref 98–107)
CO2 SERPL-SCNC: 25 MMOL/L (ref 22–29)
CREAT SERPL-MCNC: 1 MG/DL (ref 0.5–1)
EOSINOPHIL # BLD: 0.05 E9/L (ref 0.05–0.5)
EOSINOPHIL NFR BLD: 0.8 % (ref 0–6)
ERYTHROCYTE [DISTWIDTH] IN BLOOD BY AUTOMATED COUNT: 12.8 FL (ref 11.5–15)
GLUCOSE SERPL-MCNC: 109 MG/DL (ref 74–99)
HCT VFR BLD AUTO: 39 % (ref 34–48)
HGB BLD-MCNC: 13 G/DL (ref 11.5–15.5)
IMM GRANULOCYTES # BLD: 0.01 E9/L
IMM GRANULOCYTES NFR BLD: 0.2 % (ref 0–5)
LYMPHOCYTES # BLD: 1.79 E9/L (ref 1.5–4)
LYMPHOCYTES NFR BLD: 29.4 % (ref 20–42)
MAGNESIUM SERPL-MCNC: 2.2 MG/DL (ref 1.6–2.6)
MCH RBC QN AUTO: 29.2 PG (ref 26–35)
MCHC RBC AUTO-ENTMCNC: 33.3 % (ref 32–34.5)
MCV RBC AUTO: 87.6 FL (ref 80–99.9)
MONOCYTES # BLD: 0.52 E9/L (ref 0.1–0.95)
MONOCYTES NFR BLD: 8.6 % (ref 2–12)
NEUTROPHILS # BLD: 3.67 E9/L (ref 1.8–7.3)
NEUTS SEG NFR BLD: 60.3 % (ref 43–80)
PLATELET # BLD AUTO: 261 E9/L (ref 130–450)
PMV BLD AUTO: 10.2 FL (ref 7–12)
POTASSIUM SERPL-SCNC: 4.5 MMOL/L (ref 3.5–5)
PROT SERPL-MCNC: 7.1 G/DL (ref 6.4–8.3)
RBC # BLD AUTO: 4.45 E12/L (ref 3.5–5.5)
SODIUM SERPL-SCNC: 132 MMOL/L (ref 132–146)
WBC # BLD: 6.1 E9/L (ref 4.5–11.5)

## 2023-03-16 PROCEDURE — 83735 ASSAY OF MAGNESIUM: CPT

## 2023-03-16 PROCEDURE — 1036F TOBACCO NON-USER: CPT | Performed by: INTERNAL MEDICINE

## 2023-03-16 PROCEDURE — G8399 PT W/DXA RESULTS DOCUMENT: HCPCS | Performed by: INTERNAL MEDICINE

## 2023-03-16 PROCEDURE — 85025 COMPLETE CBC W/AUTO DIFF WBC: CPT

## 2023-03-16 PROCEDURE — 80053 COMPREHEN METABOLIC PANEL: CPT

## 2023-03-16 PROCEDURE — 99214 OFFICE O/P EST MOD 30 MIN: CPT | Performed by: INTERNAL MEDICINE

## 2023-03-16 PROCEDURE — G8420 CALC BMI NORM PARAMETERS: HCPCS | Performed by: INTERNAL MEDICINE

## 2023-03-16 PROCEDURE — G8427 DOCREV CUR MEDS BY ELIG CLIN: HCPCS | Performed by: INTERNAL MEDICINE

## 2023-03-16 PROCEDURE — G8484 FLU IMMUNIZE NO ADMIN: HCPCS | Performed by: INTERNAL MEDICINE

## 2023-03-16 PROCEDURE — 1123F ACP DISCUSS/DSCN MKR DOCD: CPT | Performed by: INTERNAL MEDICINE

## 2023-03-16 PROCEDURE — 36415 COLL VENOUS BLD VENIPUNCTURE: CPT

## 2023-03-16 PROCEDURE — 1090F PRES/ABSN URINE INCON ASSESS: CPT | Performed by: INTERNAL MEDICINE

## 2023-03-16 NOTE — PROGRESS NOTES
[facility] MEDICAL ONCOLOGY  [address] [address]  Dept: [phone]  Loc: [phone]  Attending Progress Note      Reason for Visit:   Left breast cancer.    Referring Physician: Anyi Yi MD.    PCP:  MARIE REEDER PA-C, PA    History of Present Illness:    The patient is a 77 y.o. lady with a past medical history significant for thyroid disease, depression, and IBS, who had presented with an abnormal screening mammogram,    8/20/19  Bilateral screening mammogram  [facility]         FINDINGS: No suspicious masses, calcifications, or distortions are   identified on the right. On the left there is a new focal nodular   asymmetry at 12:00, with adjacent linear branching calcifications.   Spot compression views is recommended for the asymmetry with   ultrasound, and spot magnification views for the calcifications..             Impression   1. Right breast no mammographic evidence of malignancy           2. Left breast new focal asymmetry at 12:00, new microcalcifications.       Recommendation:   1. Right breast annual screening mammogram.   2. Left breast additional views spot compression and spot   magnification along with ultrasound.       BI-RADS Category 0:  Incomplete- Needs Additional Imaging Evaluation             8/29/19  Left Diagnostic mammogram   [facility]         FINDINGS:        A small partially obscured mass is identified in the upper outer left   breast measuring approximately 5-6 mm. Additionally, there is a small   segmentally distributed cluster of pleomorphic microcalcifications   located superiorly near the mass extending to the middle third depth.       Ultrasound confirmed a small irregular shaped hypoechoic mass with   circumscribed margins at the 12:00 position measuring 5 mm in maximal   dimension.           Impression       1. Small solid suspicious mass at the 12:00 position.   2. Segmentally distributed pleomorphic  microcalcifications located   near the breast mass likely at the 12:00 position. RECOMMENDATION:       Recommend ultrasound core biopsy of the mass seen on ultrasound and   stereotactic biopsy of the microcalcifications. BI-RADS Category 5: Highly Suggestive of Malignancy          8/29/19  Left Breast US   [facility]         FINDINGS:        A small partially obscured mass is identified in the upper outer left   breast measuring approximately 5-6 mm. Additionally, there is a small   segmentally distributed cluster of pleomorphic microcalcifications   located superiorly near the mass extending to the middle third depth. Ultrasound confirmed a small irregular shaped hypoechoic mass with   circumscribed margins at the 12:00 position measuring 5 mm in maximal   dimension. Impression       1. Small solid suspicious mass at the 12:00 position. 2. Segmentally distributed pleomorphic microcalcifications located   near the breast mass likely at the 12:00 position. RECOMMENDATION:       Recommend ultrasound core biopsy of the mass seen on ultrasound and   stereotactic biopsy of the microcalcifications. BI-RADS Category 5: Highly Suggestive of Malignancy              She underwent a stereotactic guided left breast core biopsy at 12 o'clock position on September 10 , 2019.a single top hat shaped marker clip was deployed into the site. She underwent an ultrasound guided biopsy of the left breast at the 12 o'clock position on September 17, 2019, A post-surgical ribbon shaped microclip was placed. Pathological evaluation completed at [facility]):     9/10/19  Final Surgical Pathology Report  Diagnosis:  A. Left breast, 12:00, biopsy: High-grade ductal carcinoma in situ,  cannot exclude microinvasion, see comment. B. Left breast 12:00, additional, biopsy: High-grade ductal carcinoma in  situ, cannot exclude microinvasion, see comment.     Breast Cancer Marker Studies:  Estrogen Receptors (ER): Positive         Percentage of cells positive: <10%         Intensity: Weak    Progesterone Receptors (OK): Negative        Internal control cells present and stain as expected: No internal  control present     9/17/19 Final Surgical Pathology Report    Diagnosis:  Mass, Left breast, 12:00, core biopsy: Segments of benign fibroadipose  tissue and scant skeletal muscle, see comment. Comment:   Epithelial lined mammary ducts and lobules are not identified  in the tissue sample. Correlation with clinical and radiologic findings  is essential to assure adequacy of tissue sampling. In the setting of a  mass clinically or radiologically suspicious for neoplasm, additional  tissue sampling is recommended. The patient underwent on 11/20/2019 a left mastectomy with sentinel lymph node excisional biopsy, pathology:    CANCER CASE SUMMARY:  Procedure: Left simple mastectomy  Specimen laterality: Left  Tumor site: 12:00 position  Tumor size: 8.0 mm in greatest dimension  Histologic type:  Invasive carcinoma of no special type (invasive ductal  carcinoma, not otherwise specified)  Histologic grade ([place] histologic score):   Glandular differentiation: Score 3   Nuclear pleomorphism: Score 2   Mitotic rate: Score 2   Overall grade: Grade 2 (score of 7)  Tumor focality: Single focus of invasive carcinoma  Ductal carcinoma in situ (DCIS): Present, adjacent to the invasive  carcinoma  Invasive carcinoma margins:   Margins uninvolved by invasive carcinoma    Distance from closest margin: 5.0 mm from the posterior margin  DCIS margins:   Margin uninvolved by DCIS    Distance from closest margin: 10.0 mm from the posterior margin  Regional lymph nodes: Uninvolved by tumor cells   Total number of lymph nodes examined: 2 (both sentinel nodes)  Treatment effect: No known presurgical therapy  Pathologic stage classification (pTNM, AJCC 8th edition):   pT1b   (sn) pN0    Ancillary studies:  Calponin immunostain on block A4 shows the invasive carcinoma lacking a  myoepithelial layer. P63 immunostain on block A6 shows the invasive carcinoma lacking a  myoepithelial layer, with a myoepithelial layer retained around the DCIS. Breast Cancer Marker Studies (Block A6):  Negative: 0%        No internal control cells present. Progesterone Receptors ([place]):  Negative: 0%        No internal control cells present. Her-2/miles (c-erb B-2) protein expression: Positive (Score 3+)    The patient was started on adjuvant treatment with Herceptin and Taxol on 1/2/2020, she completed Taxol  on 3/19/2020. She is on single agent Herceptin. The patient was started on tamoxifen on 6/4/2020, she was given Effexor for depression, she had side effects from it, was discontinued and she was not on Lexapro, she did not like it. She was started on Arimidex on 8/20/2020, the patient has been stressed out, her  has a bladder tumor and will need to have a surgery done, she was in the ED on 9/6/2020, she was feeling tired and short of breath, she feels that her symptoms were because of the Arimidex and because she was off the Paxil prior to that. Arimidex has been discontinued. Katarina Fraser returns for a follow-up visit, she is emotional today, she lost her , he had bladder cancer, she is planning on moving to [place] to be closer to her family. No new bony pain. Review of Systems;  CONSTITUTIONAL: No fever, chills. Good appetite, feeling tired. ENMT: Eyes: No diplopia; Nose: Positive for epistaxis. Mouth: No sore throat. RESPIRATORY: No hemoptysis, shortness of breath, cough. CARDIOVASCULAR: No chest pain, palpitations. GASTROINTESTINAL: As per HPI. GENITOURINARY: No dysuria, urinary frequency, hematuria. NEURO: No syncope, presyncope, headache.   Remainder:  ROS NEGATIVE    Past Medical History:      Diagnosis Date    Cancer ([facility] [address] 2019    breast left    Cerebrovascular disease     tia without issues    Depression Hx antineoplastic chemo     Hypothyroidism     Irritable bowel syndrome     not diagnosed, patient controls with diet    Low sodium levels 06/18/2020    Thyroid disease     TIA (transient ischemic attack) 11/2020     Patient Active Problem List   Diagnosis    Ductal carcinoma in situ (DCIS) of left breast    Malignant neoplasm of left female breast, unspecified estrogen receptor status, unspecified site of breast ([facility] [address]    Poor venous access    Other osteoporosis without current pathological fracture    TIA (transient ischemic attack)    Anxiety    Stroke-like symptoms    Acquired hypothyroidism    Dyslipidemia        Past Surgical History:      Procedure Laterality Date    APPENDECTOMY      BREAST SURGERY      mastectomy left nov 20 2019    CATHETER REMOVAL N/A 3/12/2021    MEDIPORT REMOVAL performed by Kelechi Hill MD at [address], [address] ([address] [facility])      oophorectomy    INSERTION / REMOVAL / REPLACEMENT VENOUS ACCESS CATHETER N/A 12/27/2019    MEDIPORT INSERTION performed by Kelechi Hill MD at [address] Left 11/20/2019    LEFT BREAST SIMPLE  MASTECTOMY, BLUE DYE INJECTION, LEFT AXILLARY  SENTINEL NODE BIOPSY POSSIBLE LEFT AXILLARY DISSECTION -- Ifeoma Campbell -- PECTORAL BLOCK performed by Pb Templeton MD at [address] [facility]         Family History:  Family History   Problem Relation Age of Onset    Cancer Mother 66        liver    Cancer Brother 58        kidney    Cancer Maternal Grandmother        Medications:  Reviewed and reconciled.     Social History:  Social History     Socioeconomic History    Marital status:      Spouse name: Not on file    Number of children: Not on file    Years of education: Not on file    Highest education level: Not on file   Occupational History    Not on file   Tobacco Use    Smoking status: Former     Packs/day: 2.00     Years: 35.00     Pack years: 70.00     Types: Cigarettes Quit date: 36     Years since quittin.2    Smokeless tobacco: Never   Vaping Use    Vaping Use: Never used   Substance and Sexual Activity    Alcohol use: Not Currently    Drug use: Never    Sexual activity: Not Currently   Other Topics Concern    Not on file   Social History Narrative    Not on file     Social Determinants of Health     Financial Resource Strain: Not on file   Food Insecurity: Not on file   Transportation Needs: Not on file   Physical Activity: Not on file   Stress: Not on file   Social Connections: Not on file   Intimate Partner Violence: Not on file   Housing Stability: Not on file       Allergies: Allergies   Allergen Reactions    Arimidex [Anastrozole] Other (See Comments)     Hallucination, anxious, fatigue, stomach upset       Effexor [Venlafaxine] Other (See Comments)     Hallucination, vision issues, felt like she was going crazy, anxious    Lexapro [Escitalopram Oxalate] Diarrhea and Other (See Comments)     And fatigue         OB/GYN:  Age of menarche was 22, medically induced. Age of menopause was 31. Patient admits to hormonal therapy, progesterone, premarin. Oral contraceptives? To start periods. Patient is       Physical Exam:  BP (!) 130/55   Pulse 72   Temp 98.2 °F (36.8 °C)   Ht 5' 4\" (1.626 m)   Wt 137 lb 4.8 oz (62.3 kg)   SpO2 99%   BMI 23.57 kg/m²     GENERAL: Alert, oriented x 3, tearful at times. HEENT: PERRLA; EOMI. Oropharynx clear. NECK: Supple. No palpable cervical or supraclavicular lymphadenopathy. LUNGS: Good air entry bilaterally. No wheezing, crackles or rhonchi. CARDIOVASCULAR: Regular rate. No murmurs, rubs or gallops. BREASTS: Right breast exam is negative for any skin changes, no nipple discharge, she has nodularity and tenderness in the lower inner and outer quadrants, no palpable right axillary adenopathy. She is status post left mastectomy.   She has a small raised skin lesion in the left upper chest.  CHEST: This post right port placement  ABDOMEN: Soft. Non-tender, non-distended. Positive bowel sounds. EXTREMITIES: Without clubbing, cyanosis, or edema. NEUROLOGIC: No focal deficits. ECOG PS 1      Impression/Plan:      The patient is a 65 y.o. lady with a past medical history significant for thyroid disease, depression, and IBS, who had presented with an abnormal screening mammogram, she had a left breast biopsy done revealing high-grade DCIS, ER positive less than 10%, VA negative, she underwent on 11/20/2019 a left mastectomy with sentinel lymph node excisional biopsy, she was found to have invasive ductal carcinoma, tumor size 8 mm, grade 2, with associated DCIS, margins are negative, 2 sentinel lymph nodes were removed, they were both negative for metastatic disease, final pathologic stage pT1b(sn) pN0Mx, ER -0% VA -0% HER-2/miles +3+ by IHC, prognostic stage IA. I discussed with the patient her diagnosis, risks of her tumor, prognosis and recommendations for systemic therapy. The patient has a small HER-2/miles positive disease, tumor size is 8 mm, adjuvant treatment with Taxol and Herceptin is recommended, schedule and the side effects of the treatment were reviewed with the patient. 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 The patient was started on tamoxifen on 6/4/2020, she was given Effexor for depression, she had side effects from it, was discontinued and she was not on Lexapro, she does not like it and wanted to go back on Paxil. She was started on Arimidex on 8/20/2020, with poor tolerance, Arimidex was discontinued. We decided to hold off on adjuvant endocrine therapy. Rubin Longoria would like to hold off on treatment for the osteoporosis at this time. I discussed with Shawneemaisha Longoria having a repeat DEXA scan done, she is declining having it done at this time.     Patient had a 2D echocardiogram done, LVEF is 65%, adequate for treatment with Herceptin, she had a port placed, she was started on adjuvant therapy with Taxol and Herceptin on 1/2/2020. She completed Taxol on 3/19/2020. The patient is doing well clinically with no evidence of disease recurrence. The patient has residual peripheral neuropathy, continue vitamin B complex, she is on gabapentin. Labs reviewed, she had prolonged leukopenia, was treatment related, her white count had normalized, continue to monitor her counts. The patient completed single agent Herceptin on 12/3/2020. The patient doing well without any clinical evidence of disease recurrence. Continue with surveillance. Right breast tenderness and lumps, ordered right diagnostic mammogram and ultrasound. They were done on 2/25/2020, there was no mammographic/sonographic evidence of malignancy, this was BI-RADS Category 1, negative. She had a right breast screening mammogram done on 3/7/2023, was negative for malignancy. She will be due for a repeat right breast mammogram on 3/8/2024. Anxiety and depression, continue follow-up with psychiatry,  she is on Paxil. The patient will be moving to [place], referral was placed to hematology/oncology at [address] [place] [address]. Thank you for allowing us to participate in the care of Mrs. Bee Rivera.     Naresh Burgess MD   HEMATOLOGY/MEDICAL [address] [address] [address]  [address] [address]  Dept: [place]: [phone]

## 2023-08-01 NOTE — TELEPHONE ENCOUNTER
Per patient request - medical records and imaging on Disc has been requested from [facility] ([facility]) -- patient will  Disc from [facility] and medical records were requested to be mailed to patient. Release of records has been signed. GOAL: Pt will ambulate 300 feet w/independently, w/use of appropriate assistive device in 3 weeks.

## 2023-08-31 NOTE — TELEPHONE ENCOUNTER
Met with patient and her significant other in the treatment room prior to her  chemotherapy treatment for follow up. Patient appears well and is in good spirits. States that she is doing alright with the treatments without any side effects. Reports eating and sleeping well. Denies any nausea or vomiting. States that she ordered some wigs from [email or web address] and hats to wear at home just in case needed. She hasn't had any hair loss. Reviewed that she may just experience hair thinning with this treatment regimen. Verbalizes understanding. Denies any issues with transportation for appointments. Provided support and encouragement. Her significant other brought her for her treatment today. Denies any current needs for assistance from this NN. Provided patient with my contact information, office hours, and encouragement to call me with questions or concerns. Patient verbalizes understanding and appreciative of nurse navigator visit. Nurse navigator will continue. JOSE TranW, RN, OCN  Oncology Nurse Navigator Mohs Method Verbiage: The marked and patient-approved site was excised using the standard Mohs technique and the specimen was harvested as a microscopic controlled layer.

## (undated) DEVICE — GAUZE,SPONGE,4"X4",16PLY,XRAY,STRL,LF: Brand: MEDLINE

## (undated) DEVICE — TOWEL,OR,DSP,ST,BLUE,STD,6/PK,12PK/CS: Brand: MEDLINE

## (undated) DEVICE — SPONGE LAP W18XL18IN WHT COT 4 PLY FLD STRUNG RADPQ DISP ST

## (undated) DEVICE — MARKER SURG MARGIN STD 6 CLR INK ASST CORR CLP

## (undated) DEVICE — PLASMABLADE PS210-030S 3.0S LOCK: Brand: PLASMABLADE™

## (undated) DEVICE — GARMENT,MEDLINE,DVT,INT,CALF,MED, GEN2: Brand: MEDLINE

## (undated) DEVICE — PROBE GAM TRUNODE DISP EACH

## (undated) DEVICE — STANDARD HYPODERMIC NEEDLE,POLYPROPYLENE HUB: Brand: MONOJECT

## (undated) DEVICE — INTENDED FOR TISSUE SEPARATION, AND OTHER PROCEDURES THAT REQUIRE A SHARP SURGICAL BLADE TO PUNCTURE OR CUT.: Brand: BARD-PARKER ® STAINLESS STEEL BLADES

## (undated) DEVICE — DRESSING TRNSPAR W5XL4.5IN FLM SHT SEMIPERMEABLE WIND

## (undated) DEVICE — CONTROL SYRINGE LUER-LOCK TIP: Brand: MONOJECT

## (undated) DEVICE — CATHETER HAD L24CM DIA15.5FR BASIC LNG TERM POLYUR STR

## (undated) DEVICE — GOWN,SIRUS,FABRNF,L,20/CS: Brand: MEDLINE

## (undated) DEVICE — PACK,LAPAROTOMY,NO GOWNS: Brand: MEDLINE

## (undated) DEVICE — APPLIER LIG CLP M L11IN TI STR RNG HNDL FOR 20 CLP DISP

## (undated) DEVICE — MARKER,SKIN,WI/RULER AND LABELS: Brand: MEDLINE

## (undated) DEVICE — COVER,LIGHT HANDLE,FLX,1/PK: Brand: MEDLINE INDUSTRIES, INC.

## (undated) DEVICE — STANDARD HYPODERMIC NEEDLE,ALUMINUM HUB: Brand: MONOJECT

## (undated) DEVICE — LIMB HOLDER, WRIST/ANKLE: Brand: DEROYAL

## (undated) DEVICE — DOUBLE BASIN SET: Brand: MEDLINE INDUSTRIES, INC.

## (undated) DEVICE — GLOVE ORANGE PI 7 1/2   MSG9075

## (undated) DEVICE — PACK,UNIV, II AURORA: Brand: MEDLINE

## (undated) DEVICE — CANNULA IV 18GA L15IN BLNT FILL LUERLOCK HUB MJCT

## (undated) DEVICE — STRIP,CLOSURE,WOUND,MEDI-STRIP,1/4X3: Brand: MEDLINE

## (undated) DEVICE — SYRINGE MED 10ML TRNSLUC BRL PLUNG BLK MRK POLYPR CTRL

## (undated) DEVICE — BINDER MAMM POSTSURGICAL MED 34-36 IN

## (undated) DEVICE — BLADE ES ELASTOMERIC COAT INSUL DURABLE BEND UPTO 90DEG

## (undated) DEVICE — ELECTROSURGICAL PENCIL BUTTON SWITCH E-Z CLEAN COATED BLADE ELECTRODE 10 FT (3 M) CORD HOLSTER: Brand: MEGADYNE

## (undated) DEVICE — DRIP REDUCTION MANIFOLD

## (undated) DEVICE — GLOVE SURG L12IN SZ 65FNGR THK94MIL TRNSLUC YEL LTX

## (undated) DEVICE — SCANLAN® SUTURE BOOT™ INSTRUMENT JAW COVERS - ORIGINAL YELLOW, MINI PKG (3 PAIR/CARTRIDGE, 1 CARTRIDGE/PKG): Brand: SCANLAN® SUTURE BOOT™ INSTRUMENT JAW COVERS

## (undated) DEVICE — GOWN,SIRUS,FABRNF,XL,20/CS: Brand: MEDLINE

## (undated) DEVICE — 3M(TM) MEDIPORE(TM) +PAD SOFT CLOTH ADHESIVE WOUND DRESSING 3571: Brand: 3M™ MEDIPORE™

## (undated) DEVICE — CHLORAPREP 26ML ORANGE

## (undated) DEVICE — SURGICAL PROCEDURE PACK BASIC

## (undated) DEVICE — APPLICATOR MEDICATED 26 CC SOLUTION HI LT ORNG CHLORAPREP

## (undated) DEVICE — STRIP SKIN CLSR W1XL5IN NYL REINF CURAD

## (undated) DEVICE — DRESSING ADH N ADH 8X35 IN 6X175 IN SFT CLTH MEDIPORE +

## (undated) DEVICE — NDL CNTR 40CT FM MAG: Brand: MEDLINE INDUSTRIES, INC.

## (undated) DEVICE — BLADE CLIPPER GEN PURP NS

## (undated) DEVICE — GLOVE ORANGE PI 8   MSG9080

## (undated) DEVICE — PATIENT RETURN ELECTRODE, SINGLE-USE, CONTACT QUALITY MONITORING, ADULT, WITH 9FT CORD, FOR PATIENTS WEIGING OVER 33LBS. (15KG): Brand: MEGADYNE

## (undated) DEVICE — NEEDLE HYPO 25GA L1.5IN BLU POLYPR HUB S STL REG BVL STR

## (undated) DEVICE — SET SURG INSTR DISSECT

## (undated) DEVICE — SET INST MINOR PROCEDURE

## (undated) DEVICE — GOWN,SIRUS,NONRNF,SETINSLV,XL,20/CS: Brand: MEDLINE

## (undated) DEVICE — SHEET,DRAPE,53X77,STERILE: Brand: MEDLINE

## (undated) DEVICE — DRAPE THER FLUID WARMING 66X44 IN FLAT SLUSH DBL DISC ORS

## (undated) DEVICE — PENCIL ES L3M BTTN SWCH HOLSTER W/ BLDE ELECTRD EDGE

## (undated) DEVICE — GAUZE,SPONGE,2"X2",8PLY,STERILE,LF,2'S: Brand: MEDLINE

## (undated) DEVICE — TUBING, SUCTION, 3/16" X 12', STRAIGHT: Brand: MEDLINE

## (undated) DEVICE — BANDAGE,GAUZE,4.5"X4.1YD,STERILE,LF: Brand: MEDLINE

## (undated) DEVICE — STAPLER SKIN L39MM DIA0.53MM CRWN 5.7MM S STL FIX HD PROX

## (undated) DEVICE — 3M(TM) MEDIPORE(TM) +PAD SOFT CLOTH ADHESIVE WOUND DRESSING 3566: Brand: 3M™ MEDIPORE™

## (undated) DEVICE — Device